# Patient Record
Sex: MALE | Race: BLACK OR AFRICAN AMERICAN | NOT HISPANIC OR LATINO | Employment: OTHER | URBAN - METROPOLITAN AREA
[De-identification: names, ages, dates, MRNs, and addresses within clinical notes are randomized per-mention and may not be internally consistent; named-entity substitution may affect disease eponyms.]

---

## 2017-01-23 RX ORDER — LEVOCETIRIZINE DIHYDROCHLORIDE 5 MG/1
5 TABLET, FILM COATED ORAL NIGHTLY
Qty: 30 TABLET | Refills: 11 | OUTPATIENT
Start: 2017-01-23 | End: 2018-01-23

## 2017-01-23 NOTE — TELEPHONE ENCOUNTER
----- Message from Randa Hermosillo sent at 1/23/2017  1:52 PM CST -----  Contact: [phone]  Pt is requesting a refill on levocetirizine (XYZAL) 5 MG tablet Please call pt at your earliest convenience.  Thanks !

## 2017-02-02 ENCOUNTER — TELEPHONE (OUTPATIENT)
Dept: FAMILY MEDICINE | Facility: CLINIC | Age: 47
End: 2017-02-02

## 2017-02-02 NOTE — TELEPHONE ENCOUNTER
----- Message from Amaya Barroso sent at 2/1/2017  1:30 PM CST -----  Contact: Self/[phone]  Patient states that his prescription:  levocetirizine (XYZAL) 5 MG tablet is not covered through his insurance. He would like to speak to staff regarding this matter. Thank you.

## 2017-02-02 NOTE — TELEPHONE ENCOUNTER
Patient contacted and message left that he could get Zertec or Claritin over the counter. Orders given per Kesha Mercedes

## 2017-03-03 ENCOUNTER — OFFICE VISIT (OUTPATIENT)
Dept: FAMILY MEDICINE | Facility: CLINIC | Age: 47
End: 2017-03-03
Payer: COMMERCIAL

## 2017-03-03 VITALS
TEMPERATURE: 98 F | HEIGHT: 65 IN | BODY MASS INDEX: 31.22 KG/M2 | DIASTOLIC BLOOD PRESSURE: 110 MMHG | OXYGEN SATURATION: 98 % | WEIGHT: 187.38 LBS | HEART RATE: 68 BPM | SYSTOLIC BLOOD PRESSURE: 150 MMHG | RESPIRATION RATE: 17 BRPM

## 2017-03-03 DIAGNOSIS — J06.9 VIRAL URI WITH COUGH: Primary | ICD-10-CM

## 2017-03-03 DIAGNOSIS — J84.9 INTERSTITIAL LUNG DISEASE: ICD-10-CM

## 2017-03-03 DIAGNOSIS — I10 BENIGN HYPERTENSION: ICD-10-CM

## 2017-03-03 DIAGNOSIS — J01.90 ACUTE SINUSITIS, RECURRENCE NOT SPECIFIED, UNSPECIFIED LOCATION: ICD-10-CM

## 2017-03-03 PROCEDURE — 1160F RVW MEDS BY RX/DR IN RCRD: CPT | Mod: S$GLB,,, | Performed by: NURSE PRACTITIONER

## 2017-03-03 PROCEDURE — 3077F SYST BP >= 140 MM HG: CPT | Mod: S$GLB,,, | Performed by: NURSE PRACTITIONER

## 2017-03-03 PROCEDURE — 99999 PR PBB SHADOW E&M-EST. PATIENT-LVL III: CPT | Mod: PBBFAC,,, | Performed by: NURSE PRACTITIONER

## 2017-03-03 PROCEDURE — 96372 THER/PROPH/DIAG INJ SC/IM: CPT | Mod: S$GLB,,, | Performed by: NURSE PRACTITIONER

## 2017-03-03 PROCEDURE — 3080F DIAST BP >= 90 MM HG: CPT | Mod: S$GLB,,, | Performed by: NURSE PRACTITIONER

## 2017-03-03 PROCEDURE — 99214 OFFICE O/P EST MOD 30 MIN: CPT | Mod: 25,S$GLB,, | Performed by: NURSE PRACTITIONER

## 2017-03-03 RX ORDER — PREDNISONE 10 MG/1
10 TABLET ORAL DAILY
Qty: 5 TABLET | Refills: 0 | Status: SHIPPED | OUTPATIENT
Start: 2017-03-03 | End: 2017-03-13

## 2017-03-03 RX ORDER — MOMETASONE FUROATE 50 UG/1
2 SPRAY, METERED NASAL DAILY
Qty: 17 G | Refills: 3 | Status: SHIPPED | OUTPATIENT
Start: 2017-03-03 | End: 2019-04-18 | Stop reason: SDUPTHER

## 2017-03-03 RX ORDER — LEVOCETIRIZINE DIHYDROCHLORIDE 5 MG/1
5 TABLET, FILM COATED ORAL NIGHTLY
Qty: 30 TABLET | Refills: 3 | Status: SHIPPED | OUTPATIENT
Start: 2017-03-03 | End: 2017-07-19

## 2017-03-03 RX ORDER — PROMETHAZINE HYDROCHLORIDE AND CODEINE PHOSPHATE 6.25; 1 MG/5ML; MG/5ML
5 SOLUTION ORAL EVERY 4 HOURS PRN
Qty: 240 ML | Refills: 0 | Status: SHIPPED | OUTPATIENT
Start: 2017-03-03 | End: 2017-03-13

## 2017-03-03 RX ORDER — ALBUTEROL SULFATE 90 UG/1
2 AEROSOL, METERED RESPIRATORY (INHALATION) EVERY 6 HOURS PRN
Qty: 18 G | Refills: 2 | Status: SHIPPED | OUTPATIENT
Start: 2017-03-03 | End: 2017-05-02 | Stop reason: SDUPTHER

## 2017-03-03 RX ORDER — EFINACONAZOLE 100 MG/ML
SOLUTION TOPICAL
Refills: 12 | COMMUNITY
Start: 2017-01-10 | End: 2019-01-23

## 2017-03-03 NOTE — PROGRESS NOTES
Upper Respiratory Infection  Patient complains of a 1week history of some URI complaints. Associated symptoms include NP cough, PND, KNIGHT.  He has attempted mucinex OTC.  Sick contacts include coworkers.  He has had a flu shot this season. He has not taken his amlodipine today, he has not been taking the metoprolol at all.    Subjective:       Patient ID: Ronal Ham is a 46 y.o. male.      Review of Systems   Constitutional: Negative for fever.   HENT: Positive for postnasal drip.    Respiratory: Positive for cough and shortness of breath.        Objective:      Physical Exam   Constitutional: He is oriented to person, place, and time. He appears well-developed and well-nourished. He does not appear ill. No distress.   HENT:   Right Ear: A middle ear effusion is present.   Left Ear: A middle ear effusion is present.   Nose: Mucosal edema and rhinorrhea present. Right sinus exhibits no maxillary sinus tenderness and no frontal sinus tenderness. Left sinus exhibits no maxillary sinus tenderness and no frontal sinus tenderness.   Mouth/Throat: Uvula is midline and mucous membranes are normal. Posterior oropharyngeal erythema present.   Cardiovascular: Normal rate, regular rhythm and normal heart sounds.  Exam reveals no friction rub.    No murmur heard.  Pulmonary/Chest: Effort normal. No respiratory distress. He has no decreased breath sounds. He has no wheezes. He has no rhonchi. He has no rales.   Musculoskeletal: Normal range of motion. He exhibits no edema.   Neurological: He is alert and oriented to person, place, and time.   Skin: Skin is warm and dry. No erythema.   Psychiatric: He has a normal mood and affect. His behavior is normal.   Vitals reviewed.      Assessment:       1. Viral URI with cough    2. Acute sinusitis, recurrence not specified, unspecified location    3. Interstitial lung disease        Plan:       Viral URI with cough  -     methylPREDNISolone sod suc(PF) 125 mg/2 mL injection 125  mg; Inject 125 mg into the muscle once.  -     promethazine-codeine 6.25-10 mg/5 ml (PHENERGAN WITH CODEINE) 6.25-10 mg/5 mL syrup; Take 5 mLs by mouth every 4 (four) hours as needed for Cough.  Dispense: 240 mL; Refill: 0  -     levocetirizine (XYZAL) 5 MG tablet; Take 1 tablet (5 mg total) by mouth every evening.  Dispense: 30 tablet; Refill: 3  -     mometasone (NASONEX) 50 mcg/actuation nasal spray; 2 sprays by Nasal route once daily.  Dispense: 17 g; Refill: 3  -     predniSONE (DELTASONE) 10 MG tablet; Take 1 tablet (10 mg total) by mouth once daily.  Dispense: 5 tablet; Refill: 0    Will treat symptoms, he was instructed to use nasonex and xyzal every day, even once he feels better.    Interstitial lung disease  -     albuterol 90 mcg/actuation inhaler; Inhale 2 puffs into the lungs every 6 (six) hours as needed for Wheezing.  Dispense: 18 g; Refill: 2    Benign hypertension  Instructed him to take BOTH BP meds EVERY DAY  He was also encouraged to monitor his BP    Follow up if not improved  Go to ER for new worse or concerning symptoms  Drink plenty of water  Tylenol as needed

## 2017-03-03 NOTE — MR AVS SNAPSHOT
[facility]  [address] [facility]  [place] [address]  Phone: [phone]                  Ronal Ham   3/3/2017 9:20 AM   Office Visit    Description:  Male : 1970   Provider:  Magalie Ham, NP-C   Department:  [facility]           Reason for Visit     Cough     Sinus Problem           Diagnoses this Visit        Comments    Viral URI with cough    -  Primary     Acute sinusitis, recurrence not specified, unspecified location         Interstitial lung disease                To Do List           Goals (5 Years of Data)     None       These Medications        Disp Refills Start End    promethazine-codeine 6.25-10 mg/5 ml (PHENERGAN WITH CODEINE) 6.25-10 mg/5 mL syrup 240 mL 0 3/3/2017 3/13/2017    Take 5 mLs by mouth every 4 (four) hours as needed for Cough. - Oral    Pharmacy: [facility] [address] [place] [address] AT [facility] #: [phone]       albuterol 90 mcg/actuation inhaler 18 g 2 3/3/2017 3/3/2018    Inhale 2 puffs into the lungs every 6 (six) hours as needed for Wheezing. - Inhalation    Pharmacy: [facility] [address] [place] [address] AT [address] [facility] #: [phone]       levocetirizine (XYZAL) 5 MG tablet 30 tablet 3 3/3/2017 3/3/2018    Take 1 tablet (5 mg total) by mouth every evening. - Oral    Pharmacy: [facility] [address] [place] [address] AT [address] [facility] #: [phone]       mometasone (NASONEX) 50 mcg/actuation nasal spray 17 g 3 3/3/2017     2 sprays by Nasal route once daily. - Nasal    Pharmacy: [facility] [address] [place] [address] AT [address] [facility] #: [phone]       predniSONE (DELTASONE) 10 MG tablet 5 tablet 0 3/3/2017 3/13/2017    Take 1 tablet (10 mg total) by mouth once daily. - Oral    Pharmacy: [facility] - [place] [facility] - [facility] [address] AT [address] #:  [phone]         [facility]     [facility] -  Assistance  Registered nurses in the [facility] provide clinical advisement, health education, appointment booking, and other advisory services.  Call for this free service at [phone].             Medications           Message regarding Medications     Verify the changes and/or additions to your medication regime listed below are the same as discussed with your clinician today.  If any of these changes or additions are incorrect, please notify your healthcare provider.        START taking these NEW medications        Refills    promethazine-codeine 6.25-10 mg/5 ml (PHENERGAN WITH CODEINE) 6.25-10 mg/5 mL syrup 0    Sig: Take 5 mLs by mouth every 4 (four) hours as needed for Cough.    Class: Normal    Route: Oral    mometasone (NASONEX) 50 mcg/actuation nasal spray 3    Si sprays by Nasal route once daily.    Class: Normal    Route: Nasal    predniSONE (DELTASONE) 10 MG tablet 0    Sig: Take 1 tablet (10 mg total) by mouth once daily.    Class: Normal    Route: Oral      These medications were administered today        Dose Freq    methylPREDNISolone sod suc(PF) 125 mg/2 mL injection 125 mg 125 mg Once    Sig: Inject 125 mg into the muscle once.    Class: Normal    Route: Intramuscular      STOP taking these medications     cetirizine (ZYRTEC) 10 MG tablet Take 1 tablet (10 mg total) by mouth once daily.    azelastine-fluticasone (DYMISTA) 137-50 mcg/spray Spry nassal spray 1 spray by Each Nare route 2 (two) times daily.           Verify that the below list of medications is an accurate representation of the medications you are currently taking.  If none reported, the list may be blank. If incorrect, please contact your healthcare provider. Carry this list with you in case of emergency.           Current Medications     amlodipine (NORVASC) 10 MG tablet Take 1 tablet (10 mg total) by mouth once daily.    atenolol  "(TENORMIN) 50 MG tablet TAKE 1 TABLET BY MOUTH DAILY    diclofenac sodium (VOLTAREN) 1 % Gel Lower extremities: Apply the gel (4 g) to the affected area 4 times daily. Do not apply more than 16 g daily to any one affected joint of the lower extremities. Upper extremities: Apply the gel (2 g) to the affected area 4 times daily. Do not apply more than 8 g daily to any one affected joint of the upper extremities. Total dose should not exceed 32 g per day, overall affected joints.    econazole nitrate 1 % cream ANTIONE EXT TO THE AFFECTED AND SURROUNDING AREAS OF SKIN 1 TIME PER DAY    hydroxychloroquine (PLAQUENIL) 200 mg tablet Take 2 tablets (400 mg total) by mouth once daily.    JUBLIA 10 % Talat APPLY TO AFFECTED TOENAIL(S) BY TOPICAL ROUTE ONCE DAILY    levocetirizine (XYZAL) 5 MG tablet Take 1 tablet (5 mg total) by mouth every evening.    PREVIDENT 5000 SENSITIVE 1.1-5 % Pste     albuterol 90 mcg/actuation inhaler Inhale 2 puffs into the lungs every 6 (six) hours as needed for Wheezing.    mometasone (NASONEX) 50 mcg/actuation nasal spray 2 sprays by Nasal route once daily.    predniSONE (DELTASONE) 10 MG tablet Take 1 tablet (10 mg total) by mouth once daily.    promethazine-codeine 6.25-10 mg/5 ml (PHENERGAN WITH CODEINE) 6.25-10 mg/5 mL syrup Take 5 mLs by mouth every 4 (four) hours as needed for Cough.           Clinical Reference Information           Your Vitals Were     BP Pulse Temp Resp Height Weight    150/110 (BP Location: Left arm, Patient Position: Sitting, BP Method: Manual) 68 97.9 °F (36.6 °C) (Oral) 17 5' 5" (1.651 m) 85 kg (187 lb 6.3 oz)    SpO2 BMI             98% 31.18 kg/m2         Blood Pressure          Most Recent Value    BP  (!)  150/110      Allergies as of 3/3/2017     Carafate  [Sucralfate]      Immunizations Administered on Date of Encounter - 3/3/2017     None      Instructions    Follow up if not improved  Go to ER for new worse or concerning symptoms  Drink plenty of water  Tylenol " as needed    Viral Upper Respiratory Illness (Adult)  You have a viral upper respiratory illness (URI), which is another term for the common cold. This illness is contagious during the first few days. It is spread through the air by coughing and sneezing. It may also be spread by direct contact (touching the sick person and then touching your own eyes, nose, or mouth). Frequent handwashing will decrease risk of spread. Most viral illnesses go away within 7 to 10 days with rest and simple home remedies. Sometimes the illness may last for several weeks. Antibiotics will not kill a virus, and they are generally not prescribed for this condition.    Home care  · If symptoms are severe, rest at home for the first 2 to 3 days. When you resume activity, don't let yourself get too tired.  · Avoid being exposed to cigarette smoke (yours or others).  · You may use acetaminophen or ibuprofen to control pain and fever, unless another medicine was prescribed. (Note: If you have chronic liver or kidney disease, have ever had a stomach ulcer or gastrointestinal bleeding, or are taking blood-thinning medicines, talk with your healthcare provider before using these medicines.) Aspirin should never be given to anyone under 18 years of age who is ill with a viral infection or fever. It may cause severe liver or brain damage.  · Your appetite may be poor, so a light diet is fine. Avoid dehydration by drinking 6 to 8 glasses of fluids per day (water, soft drinks, juices, tea, or soup). Extra fluids will help loosen secretions in the nose and lungs.  · Over-the-counter cold medicines will not shorten the length of time youre sick, but they may be helpful for the following symptoms: cough, sore throat, and nasal and sinus congestion. (Note: Do not use decongestants if you have high blood pressure.)  Follow-up care  Follow up with your healthcare provider, or as advised.  When to seek medical advice  Call your healthcare provider right  away if any of these occur:  · Cough with lots of colored sputum (mucus)  · Severe headache; face, neck, or ear pain  · Difficulty swallowing due to throat pain  · Fever of 100.4°F (38°C)  Call 911, or get immediate medical care  Call emergency services right away if any of these occur:  · Chest pain, shortness of breath, wheezing, or difficulty breathing  · Coughing up blood  · Inability to swallow due to throat pain  Date Last Reviewed: 9/13/2015 © 2000-2016 [employer]. [address] [place] [address]. All rights reserved. This information is not intended as a substitute for professional medical care. Always follow your healthcare professional's instructions.             Language Assistance Services     ATTENTION: Language assistance services are available, free of charge. Please call [phone].      ATENCIÓN: Si habla bekahañol, tiene a cast disposición servicios gratuitos de asistencia lingüística. Llame al [phone].     CHÚ Ý: N?u b?n nói Ti?ng Vi?t, có các d?ch v? h? tr? ngôn ng? mi?n phí dành cho b?n. G?i s? [phone].         [facility] complies with applicable Federal civil rights laws and does not discriminate on the basis of race, color, national origin, age, disability, or sex.

## 2017-03-03 NOTE — PATIENT INSTRUCTIONS
Follow up if not improved  Go to ER for new worse or concerning symptoms  Drink plenty of water  Tylenol as needed    Viral Upper Respiratory Illness (Adult)  You have a viral upper respiratory illness (URI), which is another term for the common cold. This illness is contagious during the first few days. It is spread through the air by coughing and sneezing. It may also be spread by direct contact (touching the sick person and then touching your own eyes, nose, or mouth). Frequent handwashing will decrease risk of spread. Most viral illnesses go away within 7 to 10 days with rest and simple home remedies. Sometimes the illness may last for several weeks. Antibiotics will not kill a virus, and they are generally not prescribed for this condition.    Home care  · If symptoms are severe, rest at home for the first 2 to 3 days. When you resume activity, don't let yourself get too tired.  · Avoid being exposed to cigarette smoke (yours or others).  · You may use acetaminophen or ibuprofen to control pain and fever, unless another medicine was prescribed. (Note: If you have chronic liver or kidney disease, have ever had a stomach ulcer or gastrointestinal bleeding, or are taking blood-thinning medicines, talk with your healthcare provider before using these medicines.) Aspirin should never be given to anyone under 18 years of age who is ill with a viral infection or fever. It may cause severe liver or brain damage.  · Your appetite may be poor, so a light diet is fine. Avoid dehydration by drinking 6 to 8 glasses of fluids per day (water, soft drinks, juices, tea, or soup). Extra fluids will help loosen secretions in the nose and lungs.  · Over-the-counter cold medicines will not shorten the length of time youre sick, but they may be helpful for the following symptoms: cough, sore throat, and nasal and sinus congestion. (Note: Do not use decongestants if you have high blood pressure.)  Follow-up care  Follow up with  your healthcare provider, or as advised.  When to seek medical advice  Call your healthcare provider right away if any of these occur:  · Cough with lots of colored sputum (mucus)  · Severe headache; face, neck, or ear pain  · Difficulty swallowing due to throat pain  · Fever of 100.4°F (38°C)  Call 911, or get immediate medical care  Call emergency services right away if any of these occur:  · Chest pain, shortness of breath, wheezing, or difficulty breathing  · Coughing up blood  · Inability to swallow due to throat pain  Date Last Reviewed: 9/13/2015  © 7446-3532 [employer]. [address] [place] [address]. All rights reserved. This information is not intended as a substitute for professional medical care. Always follow your healthcare professional's instructions.

## 2017-03-15 ENCOUNTER — OFFICE VISIT (OUTPATIENT)
Dept: FAMILY MEDICINE | Facility: CLINIC | Age: 47
End: 2017-03-15
Payer: COMMERCIAL

## 2017-03-15 VITALS
TEMPERATURE: 97 F | BODY MASS INDEX: 30.93 KG/M2 | HEIGHT: 65 IN | WEIGHT: 185.63 LBS | HEART RATE: 64 BPM | SYSTOLIC BLOOD PRESSURE: 138 MMHG | OXYGEN SATURATION: 99 % | DIASTOLIC BLOOD PRESSURE: 82 MMHG

## 2017-03-15 DIAGNOSIS — J32.0 CHRONIC MAXILLARY SINUSITIS: ICD-10-CM

## 2017-03-15 DIAGNOSIS — B96.89 ACUTE BACTERIAL SINUSITIS: Primary | ICD-10-CM

## 2017-03-15 DIAGNOSIS — J01.90 ACUTE BACTERIAL SINUSITIS: Primary | ICD-10-CM

## 2017-03-15 PROCEDURE — 99214 OFFICE O/P EST MOD 30 MIN: CPT | Mod: S$GLB,,, | Performed by: FAMILY MEDICINE

## 2017-03-15 PROCEDURE — 3079F DIAST BP 80-89 MM HG: CPT | Mod: S$GLB,,, | Performed by: FAMILY MEDICINE

## 2017-03-15 PROCEDURE — 3075F SYST BP GE 130 - 139MM HG: CPT | Mod: S$GLB,,, | Performed by: FAMILY MEDICINE

## 2017-03-15 PROCEDURE — 99999 PR PBB SHADOW E&M-EST. PATIENT-LVL III: CPT | Mod: PBBFAC,,, | Performed by: FAMILY MEDICINE

## 2017-03-15 PROCEDURE — 1160F RVW MEDS BY RX/DR IN RCRD: CPT | Mod: S$GLB,,, | Performed by: FAMILY MEDICINE

## 2017-03-15 RX ORDER — DOXYCYCLINE 100 MG/1
100 CAPSULE ORAL 2 TIMES DAILY
Qty: 60 CAPSULE | Refills: 0 | Status: SHIPPED | OUTPATIENT
Start: 2017-03-15 | End: 2017-04-14

## 2017-03-15 RX ORDER — AZITHROMYCIN 500 MG/1
500 TABLET, FILM COATED ORAL DAILY
Qty: 3 TABLET | Refills: 0 | Status: SHIPPED | OUTPATIENT
Start: 2017-03-15 | End: 2017-03-18

## 2017-03-15 NOTE — PROGRESS NOTES
Chief Complaint   Patient presents with    Sinus Problem       SUBJECTIVE:  Ronal Ham is a 46 y.o. male here for new problem of acute recurrent sinusitis without sneezing and has watery eyes and no real cough this time.  Currently has co-morbidities including per problem list.      Past Medical History:   Diagnosis Date    Arthritis     Eye injury     od hit above od    GERD (gastroesophageal reflux disease)     Hypertension     Lupus (systemic lupus erythematosus)     Pulmonary fibrosis     Raynaud phenomenon      Past Surgical History:   Procedure Laterality Date    HERNIA REPAIR       Social History     Social History    Marital status:      Spouse name: N/A    Number of children: 5    Years of education: some colle     Occupational History     [place]  [employer]     Social History Main Topics    Smoking status: Never Smoker    Smokeless tobacco: Never Used    Alcohol use No    Drug use: No    Sexual activity: Yes     Partners: Female     Other Topics Concern    Not on file     Social History Narrative    Adult Screenings updated and reviewed    Mammogram( for females)    Pap ( for females)    PSA( for males )    Colonoscopy age  48    Flu shot yearly    Td     Pneumovax recommended one time  at age  65    Zostavax recommended one time at  age  58    Eye exam recommended yearly    Bone density      Family History   Problem Relation Age of Onset    Heart disease Mother     Heart disease Father     Glaucoma Father     Glaucoma Brother     No Known Problems Sister     No Known Problems Daughter     No Known Problems Son     No Known Problems Maternal Aunt     No Known Problems Maternal Uncle     No Known Problems Paternal Aunt     No Known Problems Paternal Uncle     No Known Problems Maternal Grandmother     No Known Problems Maternal Grandfather     No Known Problems Paternal Grandmother     No Known Problems Paternal Grandfather     Asthma Neg  Hx     Emphysema Neg Hx     Amblyopia Neg Hx     Blindness Neg Hx     Cancer Neg Hx     Cataracts Neg Hx     Diabetes Neg Hx     Hypertension Neg Hx     Macular degeneration Neg Hx     Retinal detachment Neg Hx     Strabismus Neg Hx     Stroke Neg Hx     Thyroid disease Neg Hx      Current Outpatient Prescriptions on File Prior to Visit   Medication Sig Dispense Refill    albuterol 90 mcg/actuation inhaler Inhale 2 puffs into the lungs every 6 (six) hours as needed for Wheezing. 18 g 2    amlodipine (NORVASC) 10 MG tablet Take 1 tablet (10 mg total) by mouth once daily. 90 tablet 1    atenolol (TENORMIN) 50 MG tablet TAKE 1 TABLET BY MOUTH DAILY 90 tablet 11    diclofenac sodium (VOLTAREN) 1 % Gel Lower extremities: Apply the gel (4 g) to the affected area 4 times daily. Do not apply more than 16 g daily to any one affected joint of the lower extremities. Upper extremities: Apply the gel (2 g) to the affected area 4 times daily. Do not apply more than 8 g daily to any one affected joint of the upper extremities. Total dose should not exceed 32 g per day, overall affected joints. 100 g 1    econazole nitrate 1 % cream ANTIONE EXT TO THE AFFECTED AND SURROUNDING AREAS OF SKIN 1 TIME PER DAY  12    hydroxychloroquine (PLAQUENIL) 200 mg tablet Take 2 tablets (400 mg total) by mouth once daily. 180 tablet 3    JUBLIA 10 % Talat APPLY TO AFFECTED TOENAIL(S) BY TOPICAL ROUTE ONCE DAILY  12    levocetirizine (XYZAL) 5 MG tablet Take 1 tablet (5 mg total) by mouth every evening. 30 tablet 3    mometasone (NASONEX) 50 mcg/actuation nasal spray 2 sprays by Nasal route once daily. 17 g 3    PREVIDENT 5000 SENSITIVE 1.1-5 % Pste   1     No current facility-administered medications on file prior to visit.      Review of patient's allergies indicates:   Allergen Reactions    Carafate  [sucralfate]      Other reaction(s): Hives         ROS  Per HPI  No fever or chills  OBJECTIVE:  /82  Pulse 64  Temp 96.9  "°F (36.1 °C) (Oral)   Ht 5' 5" (1.651 m)  Wt 84.2 kg (185 lb 10 oz)  SpO2 99%  BMI 30.89 kg/m2    Wt Readings from Last 3 Encounters:   03/15/17 84.2 kg (185 lb 10 oz)   03/03/17 85 kg (187 lb 6.3 oz)   12/06/16 84.8 kg (186 lb 15.2 oz)     BP Readings from Last 3 Encounters:   03/15/17 138/82   03/03/17 (!) 150/110   12/06/16 (!) 130/90       He appears ill, in some apparent distress.  Alert and oriented times three, pleasant and cooperative. Vital signs are as documented in vital signs section.  Nose     Review of old Records:  Reviewed per [facility]    Review of old labs:  Lab Results   Component Value Date    TSH 1.695 08/24/2015     Lab Results   Component Value Date    WBC 4.61 05/10/2016    HGB 12.8 (L) 05/10/2016    HCT 38.1 (L) 05/10/2016    MCV 88 05/10/2016     05/10/2016       Chemistry        Component Value Date/Time     05/10/2016 1105    K 4.0 05/10/2016 1105     05/10/2016 1105    CO2 29 05/10/2016 1105    BUN 13 05/10/2016 1105    CREATININE 0.8 05/10/2016 1105    GLU 69 (L) 05/10/2016 1105        Component Value Date/Time    CALCIUM 9.1 05/10/2016 1105    ALKPHOS 63 05/10/2016 1105    AST 46 (H) 05/10/2016 1105    ALT 39 05/10/2016 1105    BILITOT 0.3 05/10/2016 1105        Lab Results   Component Value Date    CHOL 149 08/24/2015    CHOL 140 04/01/2014    CHOL 157 06/14/2013     Lab Results   Component Value Date    HDL 43 08/24/2015    HDL 42 04/01/2014    HDL 35 (L) 06/14/2013     Lab Results   Component Value Date    LDLCALC 90.0 08/24/2015    LDLCALC 90.0 04/01/2014    LDLCALC 112.0 06/14/2013     Lab Results   Component Value Date    TRIG 80 08/24/2015    TRIG 40 04/01/2014    TRIG 50 06/14/2013     Lab Results   Component Value Date    CHOLHDL 28.9 08/24/2015    CHOLHDL 30.0 04/01/2014    CHOLHDL 22.3 06/14/2013         Review of old imaging:  Reviewed prior imaging    ASSESSMENT:  Problem List Items Addressed This Visit     None      Visit Diagnoses     Acute bacterial " sinusitis    -  Primary    Relevant Medications    azithromycin (ZITHROMAX) 500 MG tablet    Chronic maxillary sinusitis        Relevant Medications    doxycycline (MONODOX) 100 MG capsule          ICD-10-CM ICD-9-CM   1. Acute bacterial sinusitis J01.90 461.9    B96.89    2. Chronic maxillary sinusitis J32.0 473.0         PLAN:  1. Acute bacterial sinusitis  Potential medication side effects were discussed with the patient; let me know if any occur.    - azithromycin (ZITHROMAX) 500 MG tablet; Take 1 tablet (500 mg total) by mouth once daily.  Dispense: 3 tablet; Refill: 0    2. Chronic maxillary sinusitis  Potential medication side effects were discussed with the patient; let me know if any occur.    - doxycycline (MONODOX) 100 MG capsule; Take 1 capsule (100 mg total) by mouth 2 (two) times daily.  Dispense: 60 capsule; Refill: 0      Start with azithromycin and continue doxy for prolonged course.    Medication List with Changes/Refills   New Medications    AZITHROMYCIN (ZITHROMAX) 500 MG TABLET    Take 1 tablet (500 mg total) by mouth once daily.    DOXYCYCLINE (MONODOX) 100 MG CAPSULE    Take 1 capsule (100 mg total) by mouth 2 (two) times daily.   Current Medications    ALBUTEROL 90 MCG/ACTUATION INHALER    Inhale 2 puffs into the lungs every 6 (six) hours as needed for Wheezing.    AMLODIPINE (NORVASC) 10 MG TABLET    Take 1 tablet (10 mg total) by mouth once daily.    ATENOLOL (TENORMIN) 50 MG TABLET    TAKE 1 TABLET BY MOUTH DAILY    DICLOFENAC SODIUM (VOLTAREN) 1 % GEL    Lower extremities: Apply the gel (4 g) to the affected area 4 times daily. Do not apply more than 16 g daily to any one affected joint of the lower extremities. Upper extremities: Apply the gel (2 g) to the affected area 4 times daily. Do not apply more than 8 g daily to any one affected joint of the upper extremities. Total dose should not exceed 32 g per day, overall affected joints.    ECONAZOLE NITRATE 1 % CREAM    ANTIONE EXT TO THE  AFFECTED AND SURROUNDING AREAS OF SKIN 1 TIME PER DAY    HYDROXYCHLOROQUINE (PLAQUENIL) 200 MG TABLET    Take 2 tablets (400 mg total) by mouth once daily.    JUBLIA 10 % FITO    APPLY TO AFFECTED TOENAIL(S) BY TOPICAL ROUTE ONCE DAILY    LEVOCETIRIZINE (XYZAL) 5 MG TABLET    Take 1 tablet (5 mg total) by mouth every evening.    MOMETASONE (NASONEX) 50 MCG/ACTUATION NASAL SPRAY    2 sprays by Nasal route once daily.    PREVIDENT 5000 SENSITIVE 1.1-5 % PSTE           No Follow-up on file.

## 2017-03-22 ENCOUNTER — OFFICE VISIT (OUTPATIENT)
Dept: FAMILY MEDICINE | Facility: CLINIC | Age: 47
End: 2017-03-22
Payer: COMMERCIAL

## 2017-03-22 VITALS
SYSTOLIC BLOOD PRESSURE: 120 MMHG | DIASTOLIC BLOOD PRESSURE: 80 MMHG | TEMPERATURE: 98 F | RESPIRATION RATE: 16 BRPM | BODY MASS INDEX: 30.52 KG/M2 | WEIGHT: 183.19 LBS | HEART RATE: 69 BPM | OXYGEN SATURATION: 98 % | HEIGHT: 65 IN

## 2017-03-22 DIAGNOSIS — M32.9 SYSTEMIC LUPUS ERYTHEMATOSUS, UNSPECIFIED SLE TYPE, UNSPECIFIED ORGAN INVOLVEMENT STATUS: ICD-10-CM

## 2017-03-22 DIAGNOSIS — R21 RASH: Primary | ICD-10-CM

## 2017-03-22 DIAGNOSIS — L29.9 ITCHING: ICD-10-CM

## 2017-03-22 PROCEDURE — 99999 PR PBB SHADOW E&M-EST. PATIENT-LVL IV: CPT | Mod: PBBFAC,,, | Performed by: PHYSICIAN ASSISTANT

## 2017-03-22 PROCEDURE — 3074F SYST BP LT 130 MM HG: CPT | Mod: S$GLB,,, | Performed by: PHYSICIAN ASSISTANT

## 2017-03-22 PROCEDURE — 3079F DIAST BP 80-89 MM HG: CPT | Mod: S$GLB,,, | Performed by: PHYSICIAN ASSISTANT

## 2017-03-22 PROCEDURE — 1160F RVW MEDS BY RX/DR IN RCRD: CPT | Mod: S$GLB,,, | Performed by: PHYSICIAN ASSISTANT

## 2017-03-22 PROCEDURE — 99214 OFFICE O/P EST MOD 30 MIN: CPT | Mod: S$GLB,,, | Performed by: PHYSICIAN ASSISTANT

## 2017-03-22 RX ORDER — TRIAMCINOLONE ACETONIDE 1 MG/G
CREAM TOPICAL 2 TIMES DAILY
Qty: 80 G | Refills: 0 | Status: SHIPPED | OUTPATIENT
Start: 2017-03-22 | End: 2018-02-02

## 2017-03-22 NOTE — MR AVS SNAPSHOT
[facility]  [address]  [address]  [place] [address]  Phone: [phone]  Fax: [phone]                  Ronal Ham   3/22/2017 10:20 AM   Office Visit    Description:  Male : 1970   Provider:  MELISSA Muñoz   Department:  [facility]           Reason for Visit     Insect Bite     itching all over           Diagnoses this Visit        Comments    Rash    -  Primary            To Do List           Goals (5 Years of Data)     None       These Medications        Disp Refills Start End    triamcinolone acetonide 0.1% (KENALOG) 0.1 % cream 80 g 0 3/22/2017 2017    Apply topically 2 (two) times daily. - Topical (Top)    Pharmacy: [id number] - [place], [place] [address] AT [facility]: [phone]         [facility]     [facility] On Call Nurse Care Line -  Assistance  Registered nurses in the [facility] provide clinical advisement, health education, appointment booking, and other advisory services.  Call for this free service at [phone].             Medications           Message regarding Medications     Verify the changes and/or additions to your medication regime listed below are the same as discussed with your clinician today.  If any of these changes or additions are incorrect, please notify your healthcare provider.        START taking these NEW medications        Refills    triamcinolone acetonide 0.1% (KENALOG) 0.1 % cream 0    Sig: Apply topically 2 (two) times daily.    Class: Normal    Route: Topical (Top)      STOP taking these medications     PREVIDENT 5000 SENSITIVE 1.1-5 % Pste            Verify that the below list of medications is an accurate representation of the medications you are currently taking.  If none reported, the list may be blank. If incorrect, please contact your healthcare provider. Carry this list with you in case of emergency.           Current  "Medications     albuterol 90 mcg/actuation inhaler Inhale 2 puffs into the lungs every 6 (six) hours as needed for Wheezing.    amlodipine (NORVASC) 10 MG tablet Take 1 tablet (10 mg total) by mouth once daily.    atenolol (TENORMIN) 50 MG tablet TAKE 1 TABLET BY MOUTH DAILY    diclofenac sodium (VOLTAREN) 1 % Gel Lower extremities: Apply the gel (4 g) to the affected area 4 times daily. Do not apply more than 16 g daily to any one affected joint of the lower extremities. Upper extremities: Apply the gel (2 g) to the affected area 4 times daily. Do not apply more than 8 g daily to any one affected joint of the upper extremities. Total dose should not exceed 32 g per day, overall affected joints.    doxycycline (MONODOX) 100 MG capsule Take 1 capsule (100 mg total) by mouth 2 (two) times daily.    econazole nitrate 1 % cream ANTIONE EXT TO THE AFFECTED AND SURROUNDING AREAS OF SKIN 1 TIME PER DAY    hydroxychloroquine (PLAQUENIL) 200 mg tablet Take 2 tablets (400 mg total) by mouth once daily.    JUBLIA 10 % Talat APPLY TO AFFECTED TOENAIL(S) BY TOPICAL ROUTE ONCE DAILY    levocetirizine (XYZAL) 5 MG tablet Take 1 tablet (5 mg total) by mouth every evening.    mometasone (NASONEX) 50 mcg/actuation nasal spray 2 sprays by Nasal route once daily.    triamcinolone acetonide 0.1% (KENALOG) 0.1 % cream Apply topically 2 (two) times daily.           Clinical Reference Information           Your Vitals Were     BP Pulse Temp Resp Height Weight    120/80 69 98.3 °F (36.8 °C) (Oral) 16 5' 5" (1.651 m) 83.1 kg (183 lb 3.2 oz)    SpO2 BMI             98% 30.49 kg/m2         Blood Pressure          Most Recent Value    BP  120/80      Allergies as of 3/22/2017     Carafate  [Sucralfate]      Immunizations Administered on Date of Encounter - 3/22/2017     None      Instructions    cetrizine or loratadine once a day  Benadryl at night    Give it 1-2 weeks. If not better see rheumatology        Language Assistance Services     " ATTENTION: Language assistance services are available, free of charge. Please call [phone].      ATENCIÓN: Si habla bekahañol, tiene a cast disposición servicios gratuitos de asistencia lingüística. Llame al [phone].     CHÚ Ý: N?u b?n nói Ti?ng Vi?t, có các d?ch v? h? tr? ngôn ng? mi?n phí dành cho b?n. G?i s? [phone].         [facility] complies with applicable Federal civil rights laws and does not discriminate on the basis of race, color, national origin, age, disability, or sex.

## 2017-03-22 NOTE — PATIENT INSTRUCTIONS
cetrizine or loratadine once a day  Benadryl at night    Give it 1-2 weeks. If not better see rheumatology

## 2017-03-22 NOTE — PROGRESS NOTES
Subjective:       Patient ID: Ronal Ham is a 46 y.o. male with multiple medical diagnoses as listed in the medical history and problem list that presents for Insect Bite (left leg) and itching all over  .    Chief Complaint: Insect Bite (left leg) and itching all over      Insect Bite   This is a new (unsure ) problem. The current episode started in the past 7 days (4 days ). Associated symptoms include a rash (about 4 days as well-improving neosporin). Pertinent negatives include no abdominal pain, anorexia, arthralgias, chills, fever, myalgias, nausea or vomiting. Associated symptoms comments: Itching all over but worse on buttock and genital region as well as leg . Treatments tried: benadryl 2 yesterday and 1 today. The treatment provided moderate relief.     No change to detergent, soaps, or lotions but did start two new natural liquid vitamins and one tablet.  Stop the last two days and possible improvement of symptoms.     History of lupus on plaquenil.      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   Finishing up doxycyline for sinusitis.     Review of Systems   Constitutional: Negative for chills and fever.   Eyes: Positive for pain and redness. Negative for discharge and itching.   Respiratory: Negative for chest tightness, shortness of breath and wheezing.    Gastrointestinal: Negative for abdominal pain, anorexia, nausea and vomiting.   Musculoskeletal: Negative for arthralgias and myalgias.   Skin: Positive for rash (about 4 days as well-improving neosporin). Negative for color change, pallor and wound.        Itching all over for about 4 days.   Buttock and jock area irritated   NO HIVES.          PAST MEDICAL HISTORY:  Past Medical History:   Diagnosis Date    Arthritis     Eye injury     od hit above od    GERD (gastroesophageal reflux disease)     Hypertension     Lupus (systemic lupus erythematosus)     Pulmonary fibrosis     Raynaud phenomenon        SOCIAL HISTORY:  Social History      Social History    Marital status:      Spouse name: N/A    Number of children: 5    Years of education: some colle     Occupational History     [place]  [employer]     Social History Main Topics    Smoking status: Never Smoker    Smokeless tobacco: Never Used    Alcohol use No    Drug use: No    Sexual activity: Yes     Partners: Female     Other Topics Concern    Not on file     Social History Narrative    Adult Screenings updated and reviewed    Mammogram( for females)    Pap ( for females)    PSA( for males )    Colonoscopy age  48    Flu shot yearly    Td     Pneumovax recommended one time  at age  63    Zostavax recommended one time at  age  58    Eye exam recommended yearly    Bone density        ALLERGIES AND MEDICATIONS: updated and reviewed.  Review of patient's allergies indicates:   Allergen Reactions    Carafate  [sucralfate]      Other reaction(s): Hives     Current Outpatient Prescriptions   Medication Sig Dispense Refill    albuterol 90 mcg/actuation inhaler Inhale 2 puffs into the lungs every 6 (six) hours as needed for Wheezing. 18 g 2    amlodipine (NORVASC) 10 MG tablet Take 1 tablet (10 mg total) by mouth once daily. 90 tablet 1    atenolol (TENORMIN) 50 MG tablet TAKE 1 TABLET BY MOUTH DAILY 90 tablet 11    diclofenac sodium (VOLTAREN) 1 % Gel Lower extremities: Apply the gel (4 g) to the affected area 4 times daily. Do not apply more than 16 g daily to any one affected joint of the lower extremities. Upper extremities: Apply the gel (2 g) to the affected area 4 times daily. Do not apply more than 8 g daily to any one affected joint of the upper extremities. Total dose should not exceed 32 g per day, overall affected joints. 100 g 1    doxycycline (MONODOX) 100 MG capsule Take 1 capsule (100 mg total) by mouth 2 (two) times daily. 60 capsule 0    econazole nitrate 1 % cream ANTIONE EXT TO THE AFFECTED AND SURROUNDING AREAS OF SKIN 1 TIME PER DAY  12     "hydroxychloroquine (PLAQUENIL) 200 mg tablet Take 2 tablets (400 mg total) by mouth once daily. 180 tablet 3    JUBLIA 10 % Talat APPLY TO AFFECTED TOENAIL(S) BY TOPICAL ROUTE ONCE DAILY  12    levocetirizine (XYZAL) 5 MG tablet Take 1 tablet (5 mg total) by mouth every evening. 30 tablet 3    mometasone (NASONEX) 50 mcg/actuation nasal spray 2 sprays by Nasal route once daily. 17 g 3    triamcinolone acetonide 0.1% (KENALOG) 0.1 % cream Apply topically 2 (two) times daily. 80 g 0     No current facility-administered medications for this visit.          Objective:   /80  Pulse 69  Temp 98.3 °F (36.8 °C) (Oral)   Resp 16  Ht 5' 5" (1.651 m)  Wt 83.1 kg (183 lb 3.2 oz)  SpO2 98%  BMI 30.49 kg/m2     Physical Exam   Constitutional: He is oriented to person, place, and time.   HENT:   Head: Normocephalic.   Right Ear: Tympanic membrane, external ear and ear canal normal.   Left Ear: Tympanic membrane, external ear and ear canal normal.   Nose: Nose normal. Right sinus exhibits no maxillary sinus tenderness and no frontal sinus tenderness. Left sinus exhibits no maxillary sinus tenderness and no frontal sinus tenderness.   Rash with erythema and some swelling in the malar regional area with improvement with the doxycyline for his sinus he states    Eyes: Conjunctivae and EOM are normal.   Cardiovascular: Normal rate and regular rhythm.    Pulmonary/Chest: Effort normal and breath sounds normal.   Genitourinary: Penis normal. Circumcised. No phimosis, paraphimosis, penile erythema or penile tenderness. No discharge found.   Lymphadenopathy:     He has no cervical adenopathy.   Neurological: He is alert and oriented to person, place, and time.   Skin: Skin is warm and dry. Rash noted.                Assessment:       1. Rash    2. Systemic lupus erythematosus, unspecified SLE type, unspecified organ involvement status    3. Itching        Plan:       Rash  -     triamcinolone acetonide 0.1% (KENALOG) 0.1 % " cream; Apply topically 2 (two) times daily.  Dispense: 80 g; Refill: 0  Discussed with Dr. Terrell who agrees with plan.   If worsens will have him follow up with rheumatology.    Systemic lupus erythematosus, unspecified SLE type, unspecified organ involvement status  Continue plaquenil.     Itching  cetrizine or loratadine once a day  Benadryl at night    Give it 1-2 weeks. If not better see rheumatology           No Follow-up on file.

## 2017-04-19 ENCOUNTER — OFFICE VISIT (OUTPATIENT)
Dept: FAMILY MEDICINE | Facility: CLINIC | Age: 47
End: 2017-04-19
Payer: COMMERCIAL

## 2017-04-19 ENCOUNTER — TELEPHONE (OUTPATIENT)
Dept: FAMILY MEDICINE | Facility: CLINIC | Age: 47
End: 2017-04-19

## 2017-04-19 VITALS
RESPIRATION RATE: 16 BRPM | HEIGHT: 65 IN | DIASTOLIC BLOOD PRESSURE: 82 MMHG | HEART RATE: 63 BPM | WEIGHT: 184.31 LBS | TEMPERATURE: 98 F | SYSTOLIC BLOOD PRESSURE: 128 MMHG | OXYGEN SATURATION: 98 % | BODY MASS INDEX: 30.71 KG/M2

## 2017-04-19 DIAGNOSIS — Z79.1 ENCOUNTER FOR MONITORING CHRONIC NSAID THERAPY: ICD-10-CM

## 2017-04-19 DIAGNOSIS — R06.09 DYSPNEA ON EXERTION: Primary | ICD-10-CM

## 2017-04-19 DIAGNOSIS — R00.2 PALPITATION: ICD-10-CM

## 2017-04-19 DIAGNOSIS — Z51.81 ENCOUNTER FOR MONITORING CHRONIC NSAID THERAPY: ICD-10-CM

## 2017-04-19 DIAGNOSIS — I10 ESSENTIAL HYPERTENSION: ICD-10-CM

## 2017-04-19 DIAGNOSIS — Z00.00 ANNUAL PHYSICAL EXAM: Primary | ICD-10-CM

## 2017-04-19 PROCEDURE — 3079F DIAST BP 80-89 MM HG: CPT | Mod: S$GLB,,, | Performed by: PHYSICIAN ASSISTANT

## 2017-04-19 PROCEDURE — 3074F SYST BP LT 130 MM HG: CPT | Mod: S$GLB,,, | Performed by: PHYSICIAN ASSISTANT

## 2017-04-19 PROCEDURE — 99213 OFFICE O/P EST LOW 20 MIN: CPT | Mod: S$GLB,,, | Performed by: PHYSICIAN ASSISTANT

## 2017-04-19 PROCEDURE — 1160F RVW MEDS BY RX/DR IN RCRD: CPT | Mod: S$GLB,,, | Performed by: PHYSICIAN ASSISTANT

## 2017-04-19 PROCEDURE — 99999 PR PBB SHADOW E&M-EST. PATIENT-LVL IV: CPT | Mod: PBBFAC,,, | Performed by: PHYSICIAN ASSISTANT

## 2017-04-19 RX ORDER — AMLODIPINE BESYLATE 10 MG/1
10 TABLET ORAL DAILY
Qty: 90 TABLET | Refills: 1 | Status: SHIPPED | OUTPATIENT
Start: 2017-04-19 | End: 2017-08-31 | Stop reason: SDUPTHER

## 2017-04-19 NOTE — PROGRESS NOTES
Subjective:       Patient ID: Ronal Ham is a 46 y.o. male with multiple medical diagnoses as listed in the medical history and problem list that presents for Hypertension (pt b/p at home 140/79)  .    Chief Complaint: Hypertension (pt b/p at home 140/79)      Hypertension   This is a chronic problem. The current episode started more than 1 year ago. The problem is uncontrolled (he stopped taking his medicine and chose to stop the Norvasc because it was a lower dose.  He didnt think he needed it anymore ). Associated symptoms include blurred vision (improved ), headaches (resolved ), malaise/fatigue, palpitations (comes and goes for awhile but was bad on sunday but better now ) and shortness of breath (with exercise ). Pertinent negatives include no anxiety, chest pain, orthopnea, peripheral edema or sweats. There are no associated agents to hypertension. Risk factors for coronary artery disease include family history, obesity and male gender. Past treatments include calcium channel blockers and beta blockers (back on both since Monday ).   stopped medication about a week and then Sunday he felt dizzy, had a headache, and bleed when he blew his nose. Pressure was 200/140s. Took an extra atenolol. He took one that morning too but no Norvasc.   Back on it since Monday now.   Pressure is doing better, still a little high.     Review of Systems   Constitutional: Positive for malaise/fatigue.   Eyes: Positive for blurred vision (improved ).   Respiratory: Positive for shortness of breath (with exercise ).    Cardiovascular: Positive for palpitations (comes and goes for awhile but was bad on sunday but better now ). Negative for chest pain and orthopnea.   Neurological: Positive for headaches (resolved ).         PAST MEDICAL HISTORY:  Past Medical History:   Diagnosis Date    Arthritis     Eye injury     od hit above od    GERD (gastroesophageal reflux disease)     Hypertension     Lupus (systemic lupus  erythematosus)     Pulmonary fibrosis     Raynaud phenomenon        SOCIAL HISTORY:  Social History     Social History    Marital status:      Spouse name: N/A    Number of children: 5    Years of education: some colle     Occupational History     [place]  [employer]     Social History Main Topics    Smoking status: Never Smoker    Smokeless tobacco: Never Used    Alcohol use No    Drug use: No    Sexual activity: Yes     Partners: Female     Other Topics Concern    Not on file     Social History Narrative    Adult Screenings updated and reviewed    Mammogram( for females)    Pap ( for females)    PSA( for males )    Colonoscopy age  48    Flu shot yearly    Td     Pneumovax recommended one time  at age  65    Zostavax recommended one time at  age  60    Eye exam recommended yearly    Bone density        ALLERGIES AND MEDICATIONS: updated and reviewed.  Review of patient's allergies indicates:   Allergen Reactions    Carafate  [sucralfate]      Other reaction(s): Hives     Current Outpatient Prescriptions   Medication Sig Dispense Refill    albuterol 90 mcg/actuation inhaler Inhale 2 puffs into the lungs every 6 (six) hours as needed for Wheezing. 18 g 2    amlodipine (NORVASC) 10 MG tablet Take 1 tablet (10 mg total) by mouth once daily. 90 tablet 1    atenolol (TENORMIN) 50 MG tablet TAKE 1 TABLET BY MOUTH DAILY 90 tablet 11    diclofenac sodium (VOLTAREN) 1 % Gel Lower extremities: Apply the gel (4 g) to the affected area 4 times daily. Do not apply more than 16 g daily to any one affected joint of the lower extremities. Upper extremities: Apply the gel (2 g) to the affected area 4 times daily. Do not apply more than 8 g daily to any one affected joint of the upper extremities. Total dose should not exceed 32 g per day, overall affected joints. 100 g 1    econazole nitrate 1 % cream ANTIONE EXT TO THE AFFECTED AND SURROUNDING AREAS OF SKIN 1 TIME PER DAY  12     "hydroxychloroquine (PLAQUENIL) 200 mg tablet Take 2 tablets (400 mg total) by mouth once daily. 180 tablet 3    JUBLIA 10 % Talat APPLY TO AFFECTED TOENAIL(S) BY TOPICAL ROUTE ONCE DAILY  12    levocetirizine (XYZAL) 5 MG tablet Take 1 tablet (5 mg total) by mouth every evening. 30 tablet 3    mometasone (NASONEX) 50 mcg/actuation nasal spray 2 sprays by Nasal route once daily. 17 g 3    triamcinolone acetonide 0.1% (KENALOG) 0.1 % cream Apply topically 2 (two) times daily. 80 g 0     No current facility-administered medications for this visit.          Objective:   /82  Pulse 63  Temp 98.3 °F (36.8 °C) (Oral)   Resp 16  Ht 5' 5" (1.651 m)  Wt 83.6 kg (184 lb 4.9 oz)  SpO2 98%  BMI 30.67 kg/m2     Physical Exam   Constitutional: He is oriented to person, place, and time. No distress.   HENT:   Head: Normocephalic and atraumatic.   Right Ear: Tympanic membrane, external ear and ear canal normal.   Left Ear: Tympanic membrane, external ear and ear canal normal.   Nose: Nose normal.   Mouth/Throat: Uvula is midline, oropharynx is clear and moist and mucous membranes are normal. No tonsillar exudate.   Eyes: Conjunctivae and EOM are normal.   Cardiovascular: Normal rate and regular rhythm.    Pulmonary/Chest: Effort normal and breath sounds normal.   Lymphadenopathy:     He has no cervical adenopathy.   Neurological: He is alert and oriented to person, place, and time.   Skin: Skin is warm. No erythema.         His machine read 132/93 both arms.     Assessment:       1. Dyspnea on exertion    2. Essential hypertension    3. Palpitation    4. Encounter for monitoring chronic NSAID therapy        Plan:       Dyspnea on exertion  -     Exercise stress echo; Future  -     Ambulatory referral to Cardiology    Essential hypertension  -     amlodipine (NORVASC) 10 MG tablet; Take 1 tablet (10 mg total) by mouth once daily.  Dispense: 90 tablet; Refill: 1  Continue atenolol.   Yellow log sheet given.   Bring " back for next appt.   Aware to Check blood pressure twice a day. One hour after taking medication and after dinner or before bed. Sit for 5 minutes. Keep arm at heart level.   No changes as high reading was reflective of him being off the medication.     Palpitation  -     Holter monitor - 48 hour; Future  -     Ambulatory referral to Cardiology      Encounter for monitoring chronic NSAID therapy   Take it for headache. States it is all that helps. Labs order under annual to check CBC.   States he took Fioricet but did not like it.   Consider neurology referral.            No Follow-up on file.

## 2017-04-19 NOTE — LETTER
April 19, 2017         [facility]  Family Medicine  [address]  [address]  [place] [address]  Phone: [phone]  Fax: [phone]   April 19, 2017     Patient: Ronal Ham   YOB: 1970   Date of Visit: 4/19/2017       To Whom it May Concern:    Ronal Ham was seen in my clinic on 4/19/2017. He may return with no restrictions.    If you have any questions or concerns, please don't hesitate to call.    Sincerely,         MELISSA Muñoz

## 2017-04-19 NOTE — MR AVS SNAPSHOT
[facility]  [address]  [address]  [place] [address]  Phone: [phone]  Fax: [phone]                  Ronal Ham   2017 4:00 PM   Office Visit    Description:  Male : 1970   Provider:  MELISSA Muñoz   Department:  [facility]           Reason for Visit     Hypertension           Diagnoses this Visit        Comments    Dyspnea on exertion    -  Primary     Essential hypertension         Palpitation                To Do List           Future Appointments        Provider Department Dept Phone    2017 10:00 AM Bruce Terrell MD [facility] [phone]      Goals (5 Years of Data)     None       These Medications        Disp Refills Start End    amlodipine (NORVASC) 10 MG tablet 90 tablet 1 2017     Take 1 tablet (10 mg total) by mouth once daily. - Oral    Pharmacy: [facility] [address] [place] [address] [address] AT [facility] #: [phone]       Notes to Pharmacy: **Patient requests 90 days supply**      [facility]     [facility] On [facility] Nurse Care Line -  Assistance  Unless otherwise directed by your provider, please contact [facility], our nurse care line that is available for  assistance.     Registered nurses in the [facility] provide: appointment scheduling, clinical advisement, health education, and other advisory services.  Call: [phone] (toll free)               Medications           Message regarding Medications     Verify the changes and/or additions to your medication regime listed below are the same as discussed with your clinician today.  If any of these changes or additions are incorrect, please notify your healthcare provider.             Verify that the below list of medications is an accurate representation of the medications you are currently taking.  If none reported, the list may be blank. If incorrect, please contact your  "healthcare provider. Carry this list with you in case of emergency.           Current Medications     albuterol 90 mcg/actuation inhaler Inhale 2 puffs into the lungs every 6 (six) hours as needed for Wheezing.    amlodipine (NORVASC) 10 MG tablet Take 1 tablet (10 mg total) by mouth once daily.    atenolol (TENORMIN) 50 MG tablet TAKE 1 TABLET BY MOUTH DAILY    diclofenac sodium (VOLTAREN) 1 % Gel Lower extremities: Apply the gel (4 g) to the affected area 4 times daily. Do not apply more than 16 g daily to any one affected joint of the lower extremities. Upper extremities: Apply the gel (2 g) to the affected area 4 times daily. Do not apply more than 8 g daily to any one affected joint of the upper extremities. Total dose should not exceed 32 g per day, overall affected joints.    econazole nitrate 1 % cream ANTIONE EXT TO THE AFFECTED AND SURROUNDING AREAS OF SKIN 1 TIME PER DAY    hydroxychloroquine (PLAQUENIL) 200 mg tablet Take 2 tablets (400 mg total) by mouth once daily.    JUBLIA 10 % Talat APPLY TO AFFECTED TOENAIL(S) BY TOPICAL ROUTE ONCE DAILY    levocetirizine (XYZAL) 5 MG tablet Take 1 tablet (5 mg total) by mouth every evening.    mometasone (NASONEX) 50 mcg/actuation nasal spray 2 sprays by Nasal route once daily.    triamcinolone acetonide 0.1% (KENALOG) 0.1 % cream Apply topically 2 (two) times daily.           Clinical Reference Information           Your Vitals Were     BP Pulse Temp Resp Height Weight    140/90 63 98.3 °F (36.8 °C) (Oral) 16 5' 5" (1.651 m) 83.6 kg (184 lb 4.9 oz)    SpO2 BMI             98% 30.67 kg/m2         Blood Pressure          Most Recent Value    BP  (!)  140/90      Allergies as of 4/19/2017     Carafate  [Sucralfate]      Immunizations Administered on Date of Encounter - 4/19/2017     None      Orders Placed During Today's Visit      Normal Orders This Visit    Ambulatory referral to Cardiology     Future Labs/Procedures Expected by Expires    Exercise stress echo  As " directed 7/18/2017    Holter monitor - 48 hour  As directed 4/19/2018      Language Assistance Services     ATTENTION: Language assistance services are available, free of charge. Please call [phone].      ATENCIÓN: Si rafaela arteaga, tiene a cast disposición servicios gratuitos de asistencia lingüística. Llame al [phone].     CHÚ Ý: N?u b?n nói Ti?ng Vi?t, có các d?ch v? h? tr? ngôn ng? mi?n phí dành cho b?n. G?i s? [phone].         [facility] complies with applicable Federal civil rights laws and does not discriminate on the basis of race, color, national origin, age, disability, or sex.

## 2017-04-19 NOTE — TELEPHONE ENCOUNTER
Patient called and stated that he has been having elevated B/ps for several days since Monday 214/160 on Monday night , and he was feeling so bad that he didn't leave to go anywhere; today it was 150/101, 148/98 and 150/ 96. Dr. Terrell instructed patient to go to the ED or the UC to be evaluated to check on his kidneys , before ordering anything.

## 2017-05-02 ENCOUNTER — OFFICE VISIT (OUTPATIENT)
Dept: FAMILY MEDICINE | Facility: CLINIC | Age: 47
End: 2017-05-02
Payer: COMMERCIAL

## 2017-05-02 ENCOUNTER — LAB VISIT (OUTPATIENT)
Dept: LAB | Facility: HOSPITAL | Age: 47
End: 2017-05-02
Attending: FAMILY MEDICINE
Payer: COMMERCIAL

## 2017-05-02 VITALS
HEART RATE: 79 BPM | SYSTOLIC BLOOD PRESSURE: 136 MMHG | OXYGEN SATURATION: 98 % | WEIGHT: 189.63 LBS | HEIGHT: 65 IN | DIASTOLIC BLOOD PRESSURE: 80 MMHG | TEMPERATURE: 97 F | BODY MASS INDEX: 31.59 KG/M2

## 2017-05-02 DIAGNOSIS — J84.9 INTERSTITIAL LUNG DISEASE: ICD-10-CM

## 2017-05-02 DIAGNOSIS — Z00.00 ANNUAL PHYSICAL EXAM: ICD-10-CM

## 2017-05-02 DIAGNOSIS — Z00.00 ANNUAL PHYSICAL EXAM: Primary | ICD-10-CM

## 2017-05-02 LAB
ALBUMIN SERPL BCP-MCNC: 3.5 G/DL
ALP SERPL-CCNC: 52 U/L
ALT SERPL W/O P-5'-P-CCNC: 23 U/L
ANION GAP SERPL CALC-SCNC: 9 MMOL/L
AST SERPL-CCNC: 25 U/L
BASOPHILS # BLD AUTO: 0.02 K/UL
BASOPHILS NFR BLD: 0.5 %
BILIRUB SERPL-MCNC: 0.4 MG/DL
BUN SERPL-MCNC: 18 MG/DL
CALCIUM SERPL-MCNC: 9.4 MG/DL
CHLORIDE SERPL-SCNC: 106 MMOL/L
CHOLEST/HDLC SERPL: 3.3 {RATIO}
CO2 SERPL-SCNC: 28 MMOL/L
CREAT SERPL-MCNC: 0.8 MG/DL
DIFFERENTIAL METHOD: ABNORMAL
EOSINOPHIL # BLD AUTO: 0.2 K/UL
EOSINOPHIL NFR BLD: 5.4 %
ERYTHROCYTE [DISTWIDTH] IN BLOOD BY AUTOMATED COUNT: 13.9 %
EST. GFR  (AFRICAN AMERICAN): >60 ML/MIN/1.73 M^2
EST. GFR  (NON AFRICAN AMERICAN): >60 ML/MIN/1.73 M^2
GLUCOSE SERPL-MCNC: 67 MG/DL
HCT VFR BLD AUTO: 37.9 %
HDL/CHOLESTEROL RATIO: 29.9 %
HDLC SERPL-MCNC: 144 MG/DL
HDLC SERPL-MCNC: 43 MG/DL
HGB BLD-MCNC: 13 G/DL
LDLC SERPL CALC-MCNC: 91.4 MG/DL
LYMPHOCYTES # BLD AUTO: 1.5 K/UL
LYMPHOCYTES NFR BLD: 39.6 %
MCH RBC QN AUTO: 30.8 PG
MCHC RBC AUTO-ENTMCNC: 34.3 %
MCV RBC AUTO: 90 FL
MONOCYTES # BLD AUTO: 0.5 K/UL
MONOCYTES NFR BLD: 13.6 %
NEUTROPHILS # BLD AUTO: 1.6 K/UL
NEUTROPHILS NFR BLD: 40.6 %
NONHDLC SERPL-MCNC: 101 MG/DL
PLATELET # BLD AUTO: 222 K/UL
PMV BLD AUTO: 10.3 FL
POTASSIUM SERPL-SCNC: 4.2 MMOL/L
PROT SERPL-MCNC: 7.9 G/DL
RBC # BLD AUTO: 4.22 M/UL
SODIUM SERPL-SCNC: 143 MMOL/L
TRIGL SERPL-MCNC: 48 MG/DL
TSH SERPL DL<=0.005 MIU/L-ACNC: 2.79 UIU/ML
WBC # BLD AUTO: 3.89 K/UL

## 2017-05-02 PROCEDURE — 85025 COMPLETE CBC W/AUTO DIFF WBC: CPT

## 2017-05-02 PROCEDURE — 3075F SYST BP GE 130 - 139MM HG: CPT | Mod: S$GLB,,, | Performed by: FAMILY MEDICINE

## 2017-05-02 PROCEDURE — 80061 LIPID PANEL: CPT

## 2017-05-02 PROCEDURE — 99999 PR PBB SHADOW E&M-EST. PATIENT-LVL III: CPT | Mod: PBBFAC,,, | Performed by: FAMILY MEDICINE

## 2017-05-02 PROCEDURE — 3079F DIAST BP 80-89 MM HG: CPT | Mod: S$GLB,,, | Performed by: FAMILY MEDICINE

## 2017-05-02 PROCEDURE — 80053 COMPREHEN METABOLIC PANEL: CPT

## 2017-05-02 PROCEDURE — 84443 ASSAY THYROID STIM HORMONE: CPT

## 2017-05-02 PROCEDURE — 99396 PREV VISIT EST AGE 40-64: CPT | Mod: S$GLB,,, | Performed by: FAMILY MEDICINE

## 2017-05-02 PROCEDURE — 36415 COLL VENOUS BLD VENIPUNCTURE: CPT

## 2017-05-02 PROCEDURE — 83036 HEMOGLOBIN GLYCOSYLATED A1C: CPT

## 2017-05-02 RX ORDER — ALBUTEROL SULFATE 90 UG/1
2 AEROSOL, METERED RESPIRATORY (INHALATION) EVERY 6 HOURS PRN
Qty: 18 G | Refills: 2 | Status: SHIPPED | OUTPATIENT
Start: 2017-05-02 | End: 2018-03-16 | Stop reason: SDUPTHER

## 2017-05-02 NOTE — PROGRESS NOTES
Chief Complaint   Patient presents with    Annual Exam     SUBJECTIVE:   Ronal Ham is a 46 y.o. male presenting for his annual checkup.  Current Outpatient Prescriptions   Medication Sig Dispense Refill    albuterol 90 mcg/actuation inhaler Inhale 2 puffs into the lungs every 6 (six) hours as needed for Wheezing. 18 g 2    amlodipine (NORVASC) 10 MG tablet Take 1 tablet (10 mg total) by mouth once daily. 90 tablet 1    atenolol (TENORMIN) 50 MG tablet TAKE 1 TABLET BY MOUTH DAILY 90 tablet 11    diclofenac sodium (VOLTAREN) 1 % Gel Lower extremities: Apply the gel (4 g) to the affected area 4 times daily. Do not apply more than 16 g daily to any one affected joint of the lower extremities. Upper extremities: Apply the gel (2 g) to the affected area 4 times daily. Do not apply more than 8 g daily to any one affected joint of the upper extremities. Total dose should not exceed 32 g per day, overall affected joints. 100 g 1    econazole nitrate 1 % cream ANTIONE EXT TO THE AFFECTED AND SURROUNDING AREAS OF SKIN 1 TIME PER DAY  12    hydroxychloroquine (PLAQUENIL) 200 mg tablet Take 2 tablets (400 mg total) by mouth once daily. 180 tablet 3    JUBLIA 10 % Talat APPLY TO AFFECTED TOENAIL(S) BY TOPICAL ROUTE ONCE DAILY  12    levocetirizine (XYZAL) 5 MG tablet Take 1 tablet (5 mg total) by mouth every evening. 30 tablet 3    mometasone (NASONEX) 50 mcg/actuation nasal spray 2 sprays by Nasal route once daily. 17 g 3    triamcinolone acetonide 0.1% (KENALOG) 0.1 % cream Apply topically 2 (two) times daily. 80 g 0     No current facility-administered medications for this visit.      Allergies: Carafate  [sucralfate]     ROS:  Feeling well. No dyspnea or chest pain on exertion. No abdominal pain, change in bowel habits, black or bloody stools. No urinary tract or prostatic symptoms. No neurological complaints.  Feeling a little better with the new medication  OBJECTIVE:   The patient appears well, alert,  "oriented x 3, in no distress.   /80 (BP Location: Right arm, Patient Position: Sitting, BP Method: Manual)  Pulse 79  Temp 97.1 °F (36.2 °C) (Oral)   Ht 5' 5" (1.651 m)  Wt 86 kg (189 lb 9.5 oz)  SpO2 98%  BMI 31.55 kg/m2  ENT abnormal.  Neck supple. No adenopathy or thyromegaly. DOUG. Lungs are wheezing, good air entry, , rhonchi or rales. S1 and S2 normal, no murmurs, regular rate and rhythm. Abdomen is soft without tenderness, guarding, mass or organomegaly.  exam: deferred.  Extremities show no edema, normal peripheral pulses. Neurological is normal without focal findings.    ASSESSMENT:   1. Annual physical exam    2. Interstitial lung disease        PLAN:   Counseled on age appropriate medical preventative services, including age appropriate cancer screenings, over all nutritional health, need for a consistent exercise regimen and an over all push towards maintaining a vigorous and active lifestyle.  Counseled on age appropriate vaccines and discussed upcoming health care needs based on age/gender.  Spent time with patient counseling on need for a good patient/doctor relationship moving forward.  Discussed use of common OTC medications and supplements.  Discussed common dietary aids and use of caffeine and the need for good sleep hygiene and stress management.    F/u on the test on the 10th  "

## 2017-05-04 LAB
ESTIMATED AVG GLUCOSE: 117 MG/DL
HBA1C MFR BLD HPLC: 5.7 %

## 2017-05-05 ENCOUNTER — TELEPHONE (OUTPATIENT)
Dept: FAMILY MEDICINE | Facility: CLINIC | Age: 47
End: 2017-05-05

## 2017-05-05 NOTE — TELEPHONE ENCOUNTER
----- Message from Bruce Terrell MD sent at 5/4/2017  6:40 AM CDT -----  Please place a 3 month recall, thank you!

## 2017-05-10 ENCOUNTER — HOSPITAL ENCOUNTER (OUTPATIENT)
Dept: CARDIOLOGY | Facility: HOSPITAL | Age: 47
Discharge: HOME | End: 2017-05-10
Attending: FAMILY MEDICINE
Payer: COMMERCIAL

## 2017-05-10 DIAGNOSIS — R06.09 DYSPNEA ON EXERTION: ICD-10-CM

## 2017-05-10 DIAGNOSIS — R00.2 PALPITATION: ICD-10-CM

## 2017-05-10 LAB
DIASTOLIC DYSFUNCTION: NO
ESTIMATED PA SYSTOLIC PRESSURE: 31.94
GLOBAL PERICARDIAL EFFUSION: NORMAL
MITRAL VALVE REGURGITATION: NORMAL
RETIRED EF AND QEF - SEE NOTES: 55 (ref 55–65)
TRICUSPID VALVE REGURGITATION: NORMAL

## 2017-05-10 PROCEDURE — 93227 XTRNL ECG REC<48 HR R&I: CPT | Mod: ,,, | Performed by: INTERNAL MEDICINE

## 2017-05-10 PROCEDURE — 93325 DOPPLER ECHO COLOR FLOW MAPG: CPT | Mod: 26,,, | Performed by: INTERNAL MEDICINE

## 2017-05-10 PROCEDURE — 93225 XTRNL ECG REC<48 HRS REC: CPT

## 2017-05-10 PROCEDURE — 93320 DOPPLER ECHO COMPLETE: CPT | Mod: 26,,, | Performed by: INTERNAL MEDICINE

## 2017-05-10 PROCEDURE — 93325 DOPPLER ECHO COLOR FLOW MAPG: CPT

## 2017-05-10 PROCEDURE — 93351 STRESS TTE COMPLETE: CPT | Mod: 26,,, | Performed by: INTERNAL MEDICINE

## 2017-06-04 DIAGNOSIS — I10 BENIGN HYPERTENSION: ICD-10-CM

## 2017-06-05 RX ORDER — ATENOLOL 50 MG/1
TABLET ORAL
Qty: 30 TABLET | Refills: 5 | Status: SHIPPED | OUTPATIENT
Start: 2017-06-05 | End: 2017-08-31 | Stop reason: ALTCHOICE

## 2017-06-16 DIAGNOSIS — I10 ESSENTIAL HYPERTENSION: ICD-10-CM

## 2017-06-16 RX ORDER — AMLODIPINE BESYLATE 10 MG/1
TABLET ORAL
Qty: 30 TABLET | Refills: 11 | Status: SHIPPED | OUTPATIENT
Start: 2017-06-16 | End: 2017-07-19 | Stop reason: SDUPTHER

## 2017-06-26 DIAGNOSIS — J84.9 INTERSTITIAL LUNG DISEASE: ICD-10-CM

## 2017-06-26 DIAGNOSIS — M32.9 SLE (SYSTEMIC LUPUS ERYTHEMATOSUS): ICD-10-CM

## 2017-06-26 RX ORDER — HYDROXYCHLOROQUINE SULFATE 200 MG/1
TABLET, FILM COATED ORAL
Qty: 180 TABLET | Refills: 0 | Status: SHIPPED | OUTPATIENT
Start: 2017-06-26 | End: 2017-10-26 | Stop reason: SDUPTHER

## 2017-07-19 ENCOUNTER — OFFICE VISIT (OUTPATIENT)
Dept: FAMILY MEDICINE | Facility: CLINIC | Age: 47
End: 2017-07-19
Payer: COMMERCIAL

## 2017-07-19 VITALS
RESPIRATION RATE: 16 BRPM | BODY MASS INDEX: 30.45 KG/M2 | SYSTOLIC BLOOD PRESSURE: 130 MMHG | DIASTOLIC BLOOD PRESSURE: 80 MMHG | TEMPERATURE: 98 F | WEIGHT: 182.75 LBS | HEIGHT: 65 IN | HEART RATE: 77 BPM | OXYGEN SATURATION: 97 %

## 2017-07-19 DIAGNOSIS — G89.29 CHRONIC PAIN OF RIGHT KNEE: ICD-10-CM

## 2017-07-19 DIAGNOSIS — M25.561 CHRONIC PAIN OF RIGHT KNEE: ICD-10-CM

## 2017-07-19 DIAGNOSIS — G89.29 CHRONIC PAIN OF RIGHT KNEE: Primary | ICD-10-CM

## 2017-07-19 DIAGNOSIS — M25.561 CHRONIC PAIN OF RIGHT KNEE: Primary | ICD-10-CM

## 2017-07-19 PROCEDURE — 99999 PR PBB SHADOW E&M-EST. PATIENT-LVL IV: CPT | Mod: PBBFAC,,, | Performed by: PHYSICIAN ASSISTANT

## 2017-07-19 PROCEDURE — 99214 OFFICE O/P EST MOD 30 MIN: CPT | Mod: S$GLB,,, | Performed by: PHYSICIAN ASSISTANT

## 2017-07-19 RX ORDER — DICLOFENAC SODIUM 10 MG/G
GEL TOPICAL
Qty: 100 G | Refills: 1 | Status: SHIPPED | OUTPATIENT
Start: 2017-07-19 | End: 2017-08-16 | Stop reason: SDUPTHER

## 2017-07-19 RX ORDER — NAPROXEN 500 MG/1
500 TABLET ORAL 2 TIMES DAILY WITH MEALS
Qty: 60 TABLET | Refills: 0 | Status: SHIPPED | OUTPATIENT
Start: 2017-07-19 | End: 2017-07-19 | Stop reason: SDUPTHER

## 2017-07-19 NOTE — PROGRESS NOTES
Subjective:       Patient ID: Ronal Ham is a 46 y.o. male with multiple medical diagnoses as listed in the medical history and problem list that presents for Knee Pain (x 3 weeks)  .    Chief Complaint: Knee Pain (x 3 weeks)      Knee Pain    The incident occurred more than 1 week ago (recurrance started about 3-4 weeks ago ). The incident occurred at work. There was no injury mechanism. The pain is present in the right knee. Quality: stiffness, numbness  The pain is at a severity of 3/10 (3 currently, at its worse about an 8---usually at work but mostly at the end of the day ). The pain is mild. The pain has been intermittent since onset. Associated symptoms include muscle weakness and numbness. Pertinent negatives include no inability to bear weight, loss of motion or tingling. The symptoms are aggravated by weight bearing and movement. Treatments tried: advil, ice, elevate --last dose last night  The treatment provided moderate relief.     Review of Systems   Constitutional: Negative for appetite change, chills, fatigue and fever.   Musculoskeletal: Positive for arthralgias and joint swelling.   Neurological: Positive for numbness. Negative for dizziness, tingling and tremors.         PAST MEDICAL HISTORY:  Past Medical History:   Diagnosis Date    Arthritis     Eye injury     od hit above od    GERD (gastroesophageal reflux disease)     Hypertension     Lupus (systemic lupus erythematosus)     Pulmonary fibrosis     Raynaud phenomenon        SOCIAL HISTORY:  Social History     Social History    Marital status: Single     Spouse name: N/A    Number of children: 5    Years of education: some colle     Occupational History     [place]  [employer]     Social History Main Topics    Smoking status: Never Smoker    Smokeless tobacco: Never Used    Alcohol use No    Drug use: No    Sexual activity: Yes     Partners: Female     Other Topics Concern    Not on file     Social  History Narrative    Adult Screenings updated and reviewed    Mammogram( for females)    Pap ( for females)    PSA( for males )    Colonoscopy age  48    Flu shot yearly    Td     Pneumovax recommended one time  at age  65    Zostavax recommended one time at  age  58    Eye exam recommended yearly    Bone density        ALLERGIES AND MEDICATIONS: updated and reviewed.  Review of patient's allergies indicates:   Allergen Reactions    Carafate  [sucralfate]      Other reaction(s): Hives     Current Outpatient Prescriptions   Medication Sig Dispense Refill    albuterol 90 mcg/actuation inhaler Inhale 2 puffs into the lungs every 6 (six) hours as needed for Wheezing. 18 g 2    amlodipine (NORVASC) 10 MG tablet Take 1 tablet (10 mg total) by mouth once daily. 90 tablet 1    atenolol (TENORMIN) 50 MG tablet TAKE 1 TABLET BY MOUTH DAILY 30 tablet 5    econazole nitrate 1 % cream ANTIONE EXT TO THE AFFECTED AND SURROUNDING AREAS OF SKIN 1 TIME PER DAY  12    hydroxychloroquine (PLAQUENIL) 200 mg tablet TAKE 2 TABLETS BY MOUTH DAILY 180 tablet 0    JUBLIA 10 % Talat APPLY TO AFFECTED TOENAIL(S) BY TOPICAL ROUTE ONCE DAILY  12    mometasone (NASONEX) 50 mcg/actuation nasal spray 2 sprays by Nasal route once daily. 17 g 3    triamcinolone acetonide 0.1% (KENALOG) 0.1 % cream Apply topically 2 (two) times daily. 80 g 0    diclofenac sodium (VOLTAREN) 1 % Gel Lower extremities: Apply the gel (4 g) to the affected area 4 times daily. Do not apply more than 16 g daily to any one affected joint of the lower extremities. Upper extremities: Apply the gel (2 g) to the affected area 4 times daily. Do not apply more than 8 g daily to any one affected joint of the upper extremities. Total dose should not exceed 32 g per day, overall affected joints. 100 g 1    naproxen (NAPROSYN) 500 MG tablet TAKE 1 TABLET(500 MG) BY MOUTH TWICE DAILY WITH MEALS 180 tablet 0     No current facility-administered medications for this visit.   "        Objective:   /80   Pulse 77   Temp 98.4 °F (36.9 °C) (Oral)   Resp 16   Ht 5' 5" (1.651 m)   Wt 82.9 kg (182 lb 12.2 oz)   SpO2 97%   BMI 30.41 kg/m²      Physical Exam   Constitutional: He is oriented to person, place, and time. No distress.   Eyes: Conjunctivae and EOM are normal.   Cardiovascular: Normal rate and regular rhythm.    Pulmonary/Chest: Effort normal and breath sounds normal.   Musculoskeletal: Normal range of motion.        Right knee: He exhibits swelling (mild no warmth or tenderness ). He exhibits normal range of motion, no effusion, no deformity, no erythema, normal patellar mobility and no bony tenderness. No tenderness found.        Left knee: He exhibits normal range of motion, no effusion, no deformity, normal patellar mobility and no bony tenderness. No tenderness found.        Right lower leg: Normal.        Left lower leg: Normal.   No pain with flexion and extension    Neurological: He is alert and oriented to person, place, and time.   Skin: Skin is warm. No rash noted. No erythema.           Assessment:       1. Chronic pain of right knee        Plan:       Chronic pain of right knee  -     naproxen (NAPROSYN) 500 MG tablet; Take 1 tablet (500 mg total) by mouth 2 (two) times daily with meals.  Dispense: 180 tablet; Refill: 0  -     diclofenac sodium (VOLTAREN) 1 % Gel; Lower extremities: Apply the gel (4 g) to the affected area 4 times daily. Do not apply more than 16 g daily to any one affected joint of the lower extremities. Upper extremities: Apply the gel (2 g) to the affected area 4 times daily. Do not apply more than 8 g daily to any one affected joint of the upper extremities. Total dose should not exceed 32 g per day, overall affected joints.  Dispense: 100 g; Refill: 1    Naproxen twice a day for two week with meals.   Ice every night as well as elevated.  Wear brace for days using stairs.  Wrap with ace bandage or wear sleeve to compress swelling.     We " can try short course of steroids if needed. Updated me in a week via my chart.   If no improvement, back to ortho           No Follow-up on file.

## 2017-07-19 NOTE — PATIENT INSTRUCTIONS
Naproxen twice a day for two week with meals.   Ice every night as well as elevated.  Wear brace for days using stairs.  Wrap with ace bandage or wear sleeve to compress swelling.     We can try short course of steroids if needed.   If no improvement, back to ortho

## 2017-07-20 RX ORDER — NAPROXEN 500 MG/1
TABLET ORAL
Qty: 180 TABLET | Refills: 0 | Status: SHIPPED | OUTPATIENT
Start: 2017-07-20 | End: 2018-02-02

## 2017-08-16 ENCOUNTER — OFFICE VISIT (OUTPATIENT)
Dept: FAMILY MEDICINE | Facility: CLINIC | Age: 47
End: 2017-08-16
Payer: COMMERCIAL

## 2017-08-16 ENCOUNTER — HOSPITAL ENCOUNTER (OUTPATIENT)
Dept: RADIOLOGY | Facility: HOSPITAL | Age: 47
Discharge: HOME | End: 2017-08-16
Attending: PHYSICIAN ASSISTANT
Payer: COMMERCIAL

## 2017-08-16 VITALS
OXYGEN SATURATION: 98 % | SYSTOLIC BLOOD PRESSURE: 122 MMHG | HEIGHT: 65 IN | HEART RATE: 86 BPM | DIASTOLIC BLOOD PRESSURE: 70 MMHG | TEMPERATURE: 99 F | RESPIRATION RATE: 16 BRPM | WEIGHT: 184.06 LBS | BODY MASS INDEX: 30.67 KG/M2

## 2017-08-16 DIAGNOSIS — M25.561 CHRONIC PAIN OF RIGHT KNEE: ICD-10-CM

## 2017-08-16 DIAGNOSIS — M79.2 NEUROGENIC PAIN, LEG, RIGHT: Primary | ICD-10-CM

## 2017-08-16 DIAGNOSIS — G89.29 CHRONIC PAIN OF RIGHT KNEE: ICD-10-CM

## 2017-08-16 PROCEDURE — 99999 PR PBB SHADOW E&M-EST. PATIENT-LVL V: CPT | Mod: PBBFAC,,, | Performed by: PHYSICIAN ASSISTANT

## 2017-08-16 PROCEDURE — 3008F BODY MASS INDEX DOCD: CPT | Mod: S$GLB,,, | Performed by: PHYSICIAN ASSISTANT

## 2017-08-16 PROCEDURE — 3074F SYST BP LT 130 MM HG: CPT | Mod: S$GLB,,, | Performed by: PHYSICIAN ASSISTANT

## 2017-08-16 PROCEDURE — 99214 OFFICE O/P EST MOD 30 MIN: CPT | Mod: S$GLB,,, | Performed by: PHYSICIAN ASSISTANT

## 2017-08-16 PROCEDURE — 73562 X-RAY EXAM OF KNEE 3: CPT | Mod: TC,RT

## 2017-08-16 PROCEDURE — 3078F DIAST BP <80 MM HG: CPT | Mod: S$GLB,,, | Performed by: PHYSICIAN ASSISTANT

## 2017-08-16 PROCEDURE — 73562 X-RAY EXAM OF KNEE 3: CPT | Mod: 26,RT,, | Performed by: RADIOLOGY

## 2017-08-16 RX ORDER — TIZANIDINE 4 MG/1
TABLET ORAL
Qty: 368 TABLET | Refills: 0 | Status: SHIPPED | OUTPATIENT
Start: 2017-08-16 | End: 2018-02-02

## 2017-08-16 RX ORDER — TIZANIDINE 4 MG/1
4 TABLET ORAL EVERY 6 HOURS PRN
Qty: 90 TABLET | Refills: 0 | Status: SHIPPED | OUTPATIENT
Start: 2017-08-16 | End: 2017-08-16 | Stop reason: SDUPTHER

## 2017-08-16 RX ORDER — DICLOFENAC SODIUM 10 MG/G
GEL TOPICAL
Qty: 100 G | Refills: 1 | Status: SHIPPED | OUTPATIENT
Start: 2017-08-16 | End: 2018-01-23 | Stop reason: SDUPTHER

## 2017-08-16 NOTE — PROGRESS NOTES
Subjective:       Patient ID: Ronal Ham is a 47 y.o. male with multiple medical diagnoses as listed in the medical history and problem list that presents for Knee Pain (more on right)  .    Chief Complaint: Knee Pain (more on right)      Knee Pain    The incident occurred more than 1 week ago. There was no injury mechanism (was working with alot of stairs but a bit better now ). The pain is present in the right knee and right thigh. Quality: achy and stiffness of knee but quads and calves tightness and weakness with some numbness as well  The pain is at a severity of 8/10. The pain is moderate. Associated symptoms include muscle weakness. Pertinent negatives include no numbness or tingling. The symptoms are aggravated by movement and weight bearing. He has tried NSAIDs for the symptoms.     Review of Systems   Constitutional: Negative for chills and fever.   Musculoskeletal: Positive for arthralgias, gait problem and myalgias. Negative for joint swelling.   Neurological: Negative for tingling, numbness and headaches.         PAST MEDICAL HISTORY:  Past Medical History:   Diagnosis Date    Arthritis     Eye injury     od hit above od    GERD (gastroesophageal reflux disease)     Hypertension     Lupus (systemic lupus erythematosus)     Pulmonary fibrosis     Raynaud phenomenon        SOCIAL HISTORY:  Social History     Social History    Marital status:      Spouse name: N/A    Number of children: 5    Years of education: some colle     Occupational History     [place]  [employer]     Social History Main Topics    Smoking status: Never Smoker    Smokeless tobacco: Never Used    Alcohol use No    Drug use: No    Sexual activity: Yes     Partners: Female     Other Topics Concern    Not on file     Social History Narrative    Adult Screenings updated and reviewed    Mammogram( for females)    Pap ( for females)    PSA( for males )    Colonoscopy age  48    Flu shot  yearly    Td     Pneumovax recommended one time  at age  65    Zostavax recommended one time at  age  58    Eye exam recommended yearly    Bone density        ALLERGIES AND MEDICATIONS: updated and reviewed.  Review of patient's allergies indicates:   Allergen Reactions    Carafate  [sucralfate]      Other reaction(s): Hives     Current Outpatient Prescriptions   Medication Sig Dispense Refill    albuterol 90 mcg/actuation inhaler Inhale 2 puffs into the lungs every 6 (six) hours as needed for Wheezing. 18 g 2    amlodipine (NORVASC) 10 MG tablet Take 1 tablet (10 mg total) by mouth once daily. 90 tablet 1    atenolol (TENORMIN) 50 MG tablet TAKE 1 TABLET BY MOUTH DAILY 30 tablet 5    hydroxychloroquine (PLAQUENIL) 200 mg tablet TAKE 2 TABLETS BY MOUTH DAILY 180 tablet 0    JUBLIA 10 % Talat APPLY TO AFFECTED TOENAIL(S) BY TOPICAL ROUTE ONCE DAILY  12    mometasone (NASONEX) 50 mcg/actuation nasal spray 2 sprays by Nasal route once daily. 17 g 3    naproxen (NAPROSYN) 500 MG tablet TAKE 1 TABLET(500 MG) BY MOUTH TWICE DAILY WITH MEALS 180 tablet 0    diclofenac sodium (VOLTAREN) 1 % Gel Lower extremities: Apply the gel (4 g) to the affected area 4 times daily. Do not apply more than 16 g daily to any one affected joint of the lower extremities. Upper extremities: Apply the gel (2 g) to the affected area 4 times daily. Do not apply more than 8 g daily to any one affected joint of the upper extremities. Total dose should not exceed 32 g per day, overall affected joints. 100 g 1    econazole nitrate 1 % cream ANTIONE EXT TO THE AFFECTED AND SURROUNDING AREAS OF SKIN 1 TIME PER DAY  12    tizanidine (ZANAFLEX) 4 MG tablet TAKE 1 TABLET(4 MG) BY MOUTH EVERY 6 HOURS AS NEEDED 368 tablet 0    triamcinolone acetonide 0.1% (KENALOG) 0.1 % cream Apply topically 2 (two) times daily. 80 g 0     No current facility-administered medications for this visit.          Objective:   /70   Pulse 86   Temp 98.5 °F (36.9  "°C) (Oral)   Resp 16   Ht 5' 5" (1.651 m)   Wt 83.5 kg (184 lb 1.4 oz)   SpO2 98%   BMI 30.63 kg/m²      Physical Exam   Constitutional: He is oriented to person, place, and time. No distress.   HENT:   Head: Normocephalic and atraumatic.   Cardiovascular: Normal rate and regular rhythm.    Pulmonary/Chest: Effort normal and breath sounds normal.   Musculoskeletal: Normal range of motion. He exhibits tenderness. He exhibits no edema.        Right knee: He exhibits normal range of motion, no swelling, no effusion, no deformity, no erythema, normal patellar mobility and no bony tenderness. Tenderness (quad muscles and above patella) found.        Left knee: He exhibits normal range of motion, no swelling, no deformity and no erythema. No tenderness found.        Right lower leg: He exhibits tenderness. He exhibits no bony tenderness, no edema, no deformity and no laceration.        Left lower leg: Normal.   crepitus left knee    Neurological: He is alert and oriented to person, place, and time.   Skin: Skin is warm. No erythema.           Assessment:       1. Neurogenic pain, leg, right    2. Chronic pain of right knee        Plan:       Neurogenic pain, leg, right  -     EMG w/ Ultrasound; Future  -     Ambulatory referral to Neurology    Chronic pain of right knee  -     Discontinue: tizanidine (ZANAFLEX) 4 MG tablet; Take 1 tablet (4 mg total) by mouth every 6 (six) hours as needed.  Dispense: 90 tablet; Refill: 0  -     Cancel: X-Ray Knee Complete 4 Or More Views Right; Future; Expected date: 08/16/2017  -     diclofenac sodium (VOLTAREN) 1 % Gel; Lower extremities: Apply the gel (4 g) to the affected area 4 times daily. Do not apply more than 16 g daily to any one affected joint of the lower extremities. Upper extremities: Apply the gel (2 g) to the affected area 4 times daily. Do not apply more than 8 g daily to any one affected joint of the upper extremities. Total dose should not exceed 32 g per day, " overall affected joints.  Dispense: 100 g; Refill: 1    Will call and schedule with ortho.   Continue to wear knee sleeve.           No Follow-up on file.

## 2017-08-17 ENCOUNTER — TELEPHONE (OUTPATIENT)
Dept: ORTHOPEDICS | Facility: CLINIC | Age: 47
End: 2017-08-17

## 2017-08-17 NOTE — TELEPHONE ENCOUNTER
Left message for pt stating that unfortunately we have nothing sooner than his scheduled appt, as Paulette is out tomorrow. Other providers scheduled was checked nothing available. He was advised to keep his 8/28 appt.

## 2017-08-28 ENCOUNTER — OFFICE VISIT (OUTPATIENT)
Dept: ORTHOPEDICS | Facility: CLINIC | Age: 47
End: 2017-08-28
Payer: COMMERCIAL

## 2017-08-28 VITALS — BODY MASS INDEX: 31.11 KG/M2 | HEIGHT: 65 IN | WEIGHT: 186.75 LBS

## 2017-08-28 DIAGNOSIS — M17.0 PRIMARY OSTEOARTHRITIS OF BOTH KNEES: Primary | ICD-10-CM

## 2017-08-28 PROCEDURE — 20610 DRAIN/INJ JOINT/BURSA W/O US: CPT | Mod: 50,S$GLB,, | Performed by: NURSE PRACTITIONER

## 2017-08-28 PROCEDURE — 99999 PR PBB SHADOW E&M-EST. PATIENT-LVL III: CPT | Mod: PBBFAC,,, | Performed by: NURSE PRACTITIONER

## 2017-08-28 PROCEDURE — 3008F BODY MASS INDEX DOCD: CPT | Mod: S$GLB,,, | Performed by: NURSE PRACTITIONER

## 2017-08-28 PROCEDURE — 99213 OFFICE O/P EST LOW 20 MIN: CPT | Mod: 25,S$GLB,, | Performed by: NURSE PRACTITIONER

## 2017-08-28 RX ORDER — HYALURONATE SODIUM 20 MG/2 ML
40 SYRINGE (ML) INTRAARTICULAR WEEKLY
Status: DISCONTINUED | OUTPATIENT
Start: 2017-09-11 | End: 2020-06-03

## 2017-08-28 RX ADMIN — TRIAMCINOLONE ACETONIDE 80 MG: 40 INJECTION, SUSPENSION INTRA-ARTICULAR; INTRAMUSCULAR at 04:08

## 2017-08-29 ENCOUNTER — TELEPHONE (OUTPATIENT)
Dept: FAMILY MEDICINE | Facility: CLINIC | Age: 47
End: 2017-08-29

## 2017-08-29 NOTE — LETTER
August 29, 2017    Ronalbetzy Ham  [address]  [place] [address]             [facility]  [address] [facility]  [place] [address]  Phone: [phone]  Fax: [phone] Dear Mr. Ham:    Sorry we were unable to contact you to schedule your neurology appointment. Please give the referral office a call to schedule. [phone].        If you have any questions or concerns, please don't hesitate to call.    Sincerely,        Antonia Barrera MA

## 2017-08-31 ENCOUNTER — OFFICE VISIT (OUTPATIENT)
Dept: FAMILY MEDICINE | Facility: CLINIC | Age: 47
End: 2017-08-31
Payer: COMMERCIAL

## 2017-08-31 VITALS
SYSTOLIC BLOOD PRESSURE: 132 MMHG | DIASTOLIC BLOOD PRESSURE: 70 MMHG | TEMPERATURE: 99 F | OXYGEN SATURATION: 99 % | HEIGHT: 65 IN | BODY MASS INDEX: 30.85 KG/M2 | WEIGHT: 185.19 LBS | HEART RATE: 73 BPM

## 2017-08-31 DIAGNOSIS — J84.9 INTERSTITIAL LUNG DISEASE: ICD-10-CM

## 2017-08-31 DIAGNOSIS — I10 BENIGN HYPERTENSION: Primary | ICD-10-CM

## 2017-08-31 DIAGNOSIS — I10 ESSENTIAL HYPERTENSION: ICD-10-CM

## 2017-08-31 DIAGNOSIS — J30.1 CHRONIC SEASONAL ALLERGIC RHINITIS DUE TO POLLEN: ICD-10-CM

## 2017-08-31 DIAGNOSIS — R05.3 CHRONIC COUGH: ICD-10-CM

## 2017-08-31 PROCEDURE — 3008F BODY MASS INDEX DOCD: CPT | Mod: S$GLB,,, | Performed by: FAMILY MEDICINE

## 2017-08-31 PROCEDURE — 99999 PR PBB SHADOW E&M-EST. PATIENT-LVL III: CPT | Mod: PBBFAC,,, | Performed by: FAMILY MEDICINE

## 2017-08-31 PROCEDURE — 99214 OFFICE O/P EST MOD 30 MIN: CPT | Mod: S$GLB,,, | Performed by: FAMILY MEDICINE

## 2017-08-31 PROCEDURE — 3075F SYST BP GE 130 - 139MM HG: CPT | Mod: S$GLB,,, | Performed by: FAMILY MEDICINE

## 2017-08-31 PROCEDURE — 3078F DIAST BP <80 MM HG: CPT | Mod: S$GLB,,, | Performed by: FAMILY MEDICINE

## 2017-08-31 RX ORDER — PROPRANOLOL HYDROCHLORIDE 40 MG/1
80 TABLET ORAL NIGHTLY PRN
Qty: 90 TABLET | Refills: 1 | Status: SHIPPED | OUTPATIENT
Start: 2017-08-31 | End: 2018-02-02

## 2017-08-31 RX ORDER — TRIAMCINOLONE ACETONIDE 40 MG/ML
80 INJECTION, SUSPENSION INTRA-ARTICULAR; INTRAMUSCULAR
Status: COMPLETED | OUTPATIENT
Start: 2017-08-28 | End: 2017-08-28

## 2017-08-31 RX ORDER — AMLODIPINE BESYLATE 10 MG/1
10 TABLET ORAL DAILY
Qty: 90 TABLET | Refills: 1 | Status: SHIPPED | OUTPATIENT
Start: 2017-08-31 | End: 2018-03-12 | Stop reason: SDUPTHER

## 2017-08-31 RX ORDER — PREDNISONE 10 MG/1
60 TABLET ORAL DAILY
Qty: 120 TABLET | Refills: 0 | Status: SHIPPED | OUTPATIENT
Start: 2017-08-31 | End: 2017-09-20

## 2017-08-31 NOTE — PROGRESS NOTES
CC: Pain of the Left Knee and Pain of the Right Knee      HPI: Pt with bilateral knee pain for the past month. He was seen last year and had gel injections in the left knee. The pain is aching and medial and worse with activity. He works on his feet throughout the day and this aggravates the pain. He has taken oral medication without improvement. He would like to have cortisone injections, if possible, for pain relief. He is ambulating without assistive device. There is not a limp.    ROS  General: denies fever and chills  Resp: no c/o sob  CVS: no c/o cp  MSK: c/o bilateral knee pain which is aching and medial and worse with activity    PE  General: AAOx3, pleasant and cooperative  Resp: respirations even and unlabored  MSK: bilateral knee exam  0 degrees extension  120 degrees flexion  No warmth or erythema   - effusion    Xray:  MRI reviewed by me: Advanced chondromalacia of the medial trochlea and medial femoral condyle    Assessment:  Bilateral knee djd    Plan:  Cortisone injections in bilateral knees today  RICE  nsaids prn  F/u as needed    Knee Injection Procedure Note    Pre-operative Diagnosis: bilateral knee degenerative arthritis    Post-operative Diagnosis: same    Indications: bilateral knee pain    Anesthesia: none    Procedure Details     Verbal consent was obtained for the procedure. The injection site was identified and the skin was prepared with alcohol. The bilateral knee was injected from an anterolateral approach with 1 ml of Kenalog and 5 ml Lidocaine under sterile technique using a 22 gauge needle. The needle was removed and the area cleansed and dressed.    Complications:  None; patient tolerated the procedure well.    he was advised to rest the knee today, using ice and elevation as needed for comfort and swelling. he did receive immediate relief of the knee pain. he was told this would be short lived and is secondary to the lidocaine. he may have an increase in discomfort tonight followed  by steady improvement over the next several days. It may take 1-3 weeks following the injection to get the full benefit of the medication.

## 2017-08-31 NOTE — PROGRESS NOTES
Chief Complaint   Patient presents with    Cough       SUBJECTIVE:  Ronal Ham is a 47 y.o. male here for new problem of cough that is improved currently.  He has bilateral knee pain taht flares up with climbing.  He has been doing HEP and taking the medications to help with this and trying to be as active as possible.  He just wants to get better and stay ahead of the cough and the shortness of breath.  Currently has co-morbidities including per problem list.      Past Medical History:   Diagnosis Date    Arthritis     Eye injury     od hit above od    GERD (gastroesophageal reflux disease)     Hypertension     Lupus (systemic lupus erythematosus)     Pulmonary fibrosis     Raynaud phenomenon      Past Surgical History:   Procedure Laterality Date    HERNIA REPAIR       Social History     Social History    Marital status:      Spouse name: N/A    Number of children: 5    Years of education: some colle     Occupational History     [place]  [employer]     Social History Main Topics    Smoking status: Never Smoker    Smokeless tobacco: Never Used    Alcohol use No    Drug use: No    Sexual activity: Yes     Partners: Female     Other Topics Concern    Not on file     Social History Narrative    Adult Screenings updated and reviewed    Mammogram( for females)    Pap ( for females)    PSA( for males )    Colonoscopy age  48    Flu shot yearly    Td     Pneumovax recommended one time  at age  63    Zostavax recommended one time at  age  58    Eye exam recommended yearly    Bone density      Family History   Problem Relation Age of Onset    Heart disease Mother     Heart disease Father     Glaucoma Father     Glaucoma Brother     No Known Problems Sister     No Known Problems Daughter     No Known Problems Son     No Known Problems Maternal Aunt     No Known Problems Maternal Uncle     No Known Problems Paternal Aunt     No Known Problems Paternal Uncle      No Known Problems Maternal Grandmother     No Known Problems Maternal Grandfather     No Known Problems Paternal Grandmother     No Known Problems Paternal Grandfather     Asthma Neg Hx     Emphysema Neg Hx     Amblyopia Neg Hx     Blindness Neg Hx     Cancer Neg Hx     Cataracts Neg Hx     Diabetes Neg Hx     Hypertension Neg Hx     Macular degeneration Neg Hx     Retinal detachment Neg Hx     Strabismus Neg Hx     Stroke Neg Hx     Thyroid disease Neg Hx      Current Outpatient Prescriptions on File Prior to Visit   Medication Sig Dispense Refill    albuterol 90 mcg/actuation inhaler Inhale 2 puffs into the lungs every 6 (six) hours as needed for Wheezing. 18 g 2    diclofenac sodium (VOLTAREN) 1 % Gel Lower extremities: Apply the gel (4 g) to the affected area 4 times daily. Do not apply more than 16 g daily to any one affected joint of the lower extremities. Upper extremities: Apply the gel (2 g) to the affected area 4 times daily. Do not apply more than 8 g daily to any one affected joint of the upper extremities. Total dose should not exceed 32 g per day, overall affected joints. 100 g 1    econazole nitrate 1 % cream ANTIONE EXT TO THE AFFECTED AND SURROUNDING AREAS OF SKIN 1 TIME PER DAY  12    hydroxychloroquine (PLAQUENIL) 200 mg tablet TAKE 2 TABLETS BY MOUTH DAILY 180 tablet 0    JUBLIA 10 % Talat APPLY TO AFFECTED TOENAIL(S) BY TOPICAL ROUTE ONCE DAILY  12    mometasone (NASONEX) 50 mcg/actuation nasal spray 2 sprays by Nasal route once daily. 17 g 3    naproxen (NAPROSYN) 500 MG tablet TAKE 1 TABLET(500 MG) BY MOUTH TWICE DAILY WITH MEALS 180 tablet 0    tizanidine (ZANAFLEX) 4 MG tablet TAKE 1 TABLET(4 MG) BY MOUTH EVERY 6 HOURS AS NEEDED 368 tablet 0    triamcinolone acetonide 0.1% (KENALOG) 0.1 % cream Apply topically 2 (two) times daily. 80 g 0     Current Facility-Administered Medications on File Prior to Visit   Medication Dose Route Frequency Provider Last Rate Last Dose     "[START ON 9/11/2017] EUFLEXXA 10 mg/mL(mw 2.4 -3.6 million) injection Syrg 40 mg  40 mg Intra-articular Weekly Paulette Sewell NP         Review of patient's allergies indicates:   Allergen Reactions    Carafate  [sucralfate]      Other reaction(s): Hives         Review of Systems   Constitutional: Negative.    HENT: Negative.    Eyes: Negative.    Respiratory: Positive for cough, shortness of breath and wheezing.    Cardiovascular: Negative.    Gastrointestinal: Negative.    Genitourinary: Negative.    Musculoskeletal: Positive for joint pain (knees). Negative for falls.   Skin: Negative.    Neurological: Negative.    Endo/Heme/Allergies: Negative.    Psychiatric/Behavioral: Negative.        OBJECTIVE:  /70   Pulse 73   Temp 98.5 °F (36.9 °C) (Oral)   Ht 5' 5" (1.651 m)   Wt 84 kg (185 lb 3 oz)   SpO2 99%   BMI 30.82 kg/m²     Wt Readings from Last 3 Encounters:   08/31/17 84 kg (185 lb 3 oz)   08/28/17 84.7 kg (186 lb 11.7 oz)   08/16/17 83.5 kg (184 lb 1.4 oz)     BP Readings from Last 3 Encounters:   08/31/17 132/70   08/16/17 122/70   07/19/17 130/80       He appears well, in no apparent distress.  Alert and oriented times three, pleasant and cooperative. Vital signs are as documented in vital signs section.  Face with hypopigmented rash  Nose with chronic rhinorrhea, throat with PND  Lungs with mild wheezing  Heart rate is normal  Knees with edema is noted, redness is swelling      Review of old Records:  Reviewed per [facility]    Review of old labs:  Lab Results   Component Value Date    TSH 2.791 05/02/2017     Lab Results   Component Value Date    WBC 3.89 (L) 05/02/2017    HGB 13.0 (L) 05/02/2017    HCT 37.9 (L) 05/02/2017    MCV 90 05/02/2017     05/02/2017       Chemistry        Component Value Date/Time     05/02/2017 1050    K 4.2 05/02/2017 1050     05/02/2017 1050    CO2 28 05/02/2017 1050    BUN 18 05/02/2017 1050    CREATININE 0.8 05/02/2017 1050    GLU 67 (L) " 05/02/2017 1050        Component Value Date/Time    CALCIUM 9.4 05/02/2017 1050    ALKPHOS 52 (L) 05/02/2017 1050    AST 25 05/02/2017 1050    ALT 23 05/02/2017 1050    BILITOT 0.4 05/02/2017 1050    ESTGFRAFRICA >60 05/02/2017 1050    EGFRNONAA >60 05/02/2017 1050        Lab Results   Component Value Date    CHOL 144 05/02/2017    CHOL 149 08/24/2015    CHOL 140 04/01/2014     Lab Results   Component Value Date    HDL 43 05/02/2017    HDL 43 08/24/2015    HDL 42 04/01/2014     Lab Results   Component Value Date    LDLCALC 91.4 05/02/2017    LDLCALC 90.0 08/24/2015    LDLCALC 90.0 04/01/2014     Lab Results   Component Value Date    TRIG 48 05/02/2017    TRIG 80 08/24/2015    TRIG 40 04/01/2014     Lab Results   Component Value Date    CHOLHDL 29.9 05/02/2017    CHOLHDL 28.9 08/24/2015    CHOLHDL 30.0 04/01/2014         Review of old imaging:  n/a    ASSESSMENT:  Problem List Items Addressed This Visit     Interstitial lung disease    Chronic rhinitis    Relevant Medications    predniSONE (DELTASONE) 10 MG tablet    Chronic cough    Relevant Medications    predniSONE (DELTASONE) 10 MG tablet    Benign hypertension - Primary    Relevant Medications    propranolol (INDERAL) 40 MG tablet    amlodipine (NORVASC) 10 MG tablet      Other Visit Diagnoses     Essential hypertension              ICD-10-CM ICD-9-CM   1. Benign hypertension I10 401.1   2. Chronic cough R05 786.2   3. Chronic seasonal allergic rhinitis due to pollen J30.1 477.0   4. Interstitial lung disease J84.9 515   5. Essential hypertension I10 401.9         PLAN:  1. Benign hypertension  Potential medication side effects were discussed with the patient; let me know if any occur.    - propranolol (INDERAL) 40 MG tablet; Take 2 tablets (80 mg total) by mouth nightly as needed.  Dispense: 90 tablet; Refill: 1  - amlodipine (NORVASC) 10 MG tablet; Take 1 tablet (10 mg total) by mouth once daily.  Dispense: 90 tablet; Refill: 1    2. Chronic cough  Give him  this with a plan.  If starts to exacerbate will start prednisone  Will call me after  - predniSONE (DELTASONE) 10 MG tablet; Take 6 tablets (60 mg total) by mouth once daily.  Dispense: 120 tablet; Refill: 0    3. Chronic seasonal allergic rhinitis due to pollen  Potential medication side effects were discussed with the patient; let me know if any occur.    - predniSONE (DELTASONE) 10 MG tablet; Take 6 tablets (60 mg total) by mouth once daily.  Dispense: 120 tablet; Refill: 0    4. Interstitial lung disease  noted    5. Essential hypertension  The current medical regimen is effective;  continue present plan and medications.        Medication List with Changes/Refills   New Medications    PREDNISONE (DELTASONE) 10 MG TABLET    Take 6 tablets (60 mg total) by mouth once daily.    PROPRANOLOL (INDERAL) 40 MG TABLET    Take 2 tablets (80 mg total) by mouth nightly as needed.   Current Medications    ALBUTEROL 90 MCG/ACTUATION INHALER    Inhale 2 puffs into the lungs every 6 (six) hours as needed for Wheezing.    DICLOFENAC SODIUM (VOLTAREN) 1 % GEL    Lower extremities: Apply the gel (4 g) to the affected area 4 times daily. Do not apply more than 16 g daily to any one affected joint of the lower extremities. Upper extremities: Apply the gel (2 g) to the affected area 4 times daily. Do not apply more than 8 g daily to any one affected joint of the upper extremities. Total dose should not exceed 32 g per day, overall affected joints.    ECONAZOLE NITRATE 1 % CREAM    ANTIONE EXT TO THE AFFECTED AND SURROUNDING AREAS OF SKIN 1 TIME PER DAY    HYDROXYCHLOROQUINE (PLAQUENIL) 200 MG TABLET    TAKE 2 TABLETS BY MOUTH DAILY    JUBLIA 10 % FITO    APPLY TO AFFECTED TOENAIL(S) BY TOPICAL ROUTE ONCE DAILY    MOMETASONE (NASONEX) 50 MCG/ACTUATION NASAL SPRAY    2 sprays by Nasal route once daily.    NAPROXEN (NAPROSYN) 500 MG TABLET    TAKE 1 TABLET(500 MG) BY MOUTH TWICE DAILY WITH MEALS    TIZANIDINE (ZANAFLEX) 4 MG TABLET    TAKE  1 TABLET(4 MG) BY MOUTH EVERY 6 HOURS AS NEEDED    TRIAMCINOLONE ACETONIDE 0.1% (KENALOG) 0.1 % CREAM    Apply topically 2 (two) times daily.   Changed and/or Refilled Medications    Modified Medication Previous Medication    AMLODIPINE (NORVASC) 10 MG TABLET amlodipine (NORVASC) 10 MG tablet       Take 1 tablet (10 mg total) by mouth once daily.    Take 1 tablet (10 mg total) by mouth once daily.   Discontinued Medications    ATENOLOL (TENORMIN) 50 MG TABLET    TAKE 1 TABLET BY MOUTH DAILY       Return in about 4 weeks (around 9/28/2017) for assess treatment plan.

## 2017-09-12 ENCOUNTER — TELEPHONE (OUTPATIENT)
Dept: ORTHOPEDICS | Facility: CLINIC | Age: 47
End: 2017-09-12

## 2017-09-12 NOTE — TELEPHONE ENCOUNTER
----- Message from Trupti Rider sent at 9/11/2017  5:25 PM CDT -----  Contact: Pt  Pt needs to r/s injection pt missed appt    Pt can be reached at [phone]    Thanks

## 2017-09-15 ENCOUNTER — LAB VISIT (OUTPATIENT)
Dept: LAB | Facility: HOSPITAL | Age: 47
End: 2017-09-15
Attending: INTERNAL MEDICINE
Payer: COMMERCIAL

## 2017-09-15 ENCOUNTER — HOSPITAL ENCOUNTER (OUTPATIENT)
Dept: PULMONOLOGY | Facility: CLINIC | Age: 47
Discharge: HOME | End: 2017-09-15
Payer: COMMERCIAL

## 2017-09-15 ENCOUNTER — OFFICE VISIT (OUTPATIENT)
Dept: RHEUMATOLOGY | Facility: CLINIC | Age: 47
End: 2017-09-15
Payer: COMMERCIAL

## 2017-09-15 VITALS
WEIGHT: 180 LBS | HEIGHT: 64 IN | DIASTOLIC BLOOD PRESSURE: 87 MMHG | BODY MASS INDEX: 30.73 KG/M2 | SYSTOLIC BLOOD PRESSURE: 142 MMHG | HEART RATE: 60 BPM

## 2017-09-15 DIAGNOSIS — J84.9 INTERSTITIAL LUNG DISEASE: ICD-10-CM

## 2017-09-15 DIAGNOSIS — M32.13 OTHER SYSTEMIC LUPUS ERYTHEMATOSUS WITH LUNG INVOLVEMENT: Primary | ICD-10-CM

## 2017-09-15 DIAGNOSIS — M32.13 OTHER SYSTEMIC LUPUS ERYTHEMATOSUS WITH LUNG INVOLVEMENT: ICD-10-CM

## 2017-09-15 LAB
C3 SERPL-MCNC: 123 MG/DL
C4 SERPL-MCNC: 31 MG/DL
CK SERPL-CCNC: 248 U/L
PRE FEV1 FVC: 81
PRE FEV1: 1.74
PRE FVC: 2.14
PREDICTED FEV1 FVC: 84
PREDICTED FEV1: 2.54
PREDICTED FVC: 3.04

## 2017-09-15 PROCEDURE — 86160 COMPLEMENT ANTIGEN: CPT | Mod: 59

## 2017-09-15 PROCEDURE — 86160 COMPLEMENT ANTIGEN: CPT

## 2017-09-15 PROCEDURE — 94729 DIFFUSING CAPACITY: CPT | Mod: S$GLB,,, | Performed by: INTERNAL MEDICINE

## 2017-09-15 PROCEDURE — 82550 ASSAY OF CK (CPK): CPT

## 2017-09-15 PROCEDURE — 99215 OFFICE O/P EST HI 40 MIN: CPT | Mod: S$GLB,,, | Performed by: INTERNAL MEDICINE

## 2017-09-15 PROCEDURE — 3079F DIAST BP 80-89 MM HG: CPT | Mod: S$GLB,,, | Performed by: INTERNAL MEDICINE

## 2017-09-15 PROCEDURE — 94727 GAS DIL/WSHOT DETER LNG VOL: CPT | Mod: S$GLB,,, | Performed by: INTERNAL MEDICINE

## 2017-09-15 PROCEDURE — 82085 ASSAY OF ALDOLASE: CPT

## 2017-09-15 PROCEDURE — 94010 BREATHING CAPACITY TEST: CPT | Mod: 51,S$GLB,, | Performed by: INTERNAL MEDICINE

## 2017-09-15 PROCEDURE — 36415 COLL VENOUS BLD VENIPUNCTURE: CPT

## 2017-09-15 PROCEDURE — 86225 DNA ANTIBODY NATIVE: CPT

## 2017-09-15 PROCEDURE — 99999 PR PBB SHADOW E&M-EST. PATIENT-LVL III: CPT | Mod: PBBFAC,,, | Performed by: INTERNAL MEDICINE

## 2017-09-15 PROCEDURE — 3008F BODY MASS INDEX DOCD: CPT | Mod: S$GLB,,, | Performed by: INTERNAL MEDICINE

## 2017-09-15 PROCEDURE — 3077F SYST BP >= 140 MM HG: CPT | Mod: S$GLB,,, | Performed by: INTERNAL MEDICINE

## 2017-09-15 ASSESSMENT — ROUTINE ASSESSMENT OF PATIENT INDEX DATA (RAPID3)
PATIENT GLOBAL ASSESSMENT SCORE: 5
PAIN SCORE: .5
MDHAQ FUNCTION SCORE: 0
AM STIFFNESS SCORE: 1, YES
TOTAL RAPID3 SCORE: 1.83
WHEN YOU AWAKENED IN THE MORNING OVER THE LAST WEEK, PLEASE INDICATE THE AMOUNT OF TIME IT TAKES UNTIL YOU ARE AS LIMBER AS YOU WILL BE FOR THE DAY: 2 MINUTES
FATIGUE SCORE: 1
PSYCHOLOGICAL DISTRESS SCORE: 0

## 2017-09-18 ENCOUNTER — OFFICE VISIT (OUTPATIENT)
Dept: ORTHOPEDICS | Facility: CLINIC | Age: 47
End: 2017-09-18
Payer: COMMERCIAL

## 2017-09-18 DIAGNOSIS — M17.0 PRIMARY OSTEOARTHRITIS OF BOTH KNEES: ICD-10-CM

## 2017-09-18 LAB
ALDOLASE SERPL-CCNC: 4.4 U/L
DSDNA AB SER-ACNC: NORMAL [IU]/ML

## 2017-09-18 PROCEDURE — 20610 DRAIN/INJ JOINT/BURSA W/O US: CPT | Mod: 50,S$GLB,, | Performed by: NURSE PRACTITIONER

## 2017-09-18 PROCEDURE — 99999 PR PBB SHADOW E&M-EST. PATIENT-LVL III: CPT | Mod: PBBFAC,,, | Performed by: NURSE PRACTITIONER

## 2017-09-18 PROCEDURE — 99499 UNLISTED E&M SERVICE: CPT | Mod: S$GLB,,, | Performed by: NURSE PRACTITIONER

## 2017-09-18 RX ADMIN — Medication 40 MG: at 04:09

## 2017-09-18 NOTE — PROGRESS NOTES
Ronal Ham presents to clinic today for the first bilateral knee Euflexxa injection.    Exam demonstrates the no effusion in the  bilateral knee, and the skin is intact.    Diagnosis: osteoarthritis knee    After time out was performed and patient ID, side, and site were verified, the  bilateral  knee was sterilly prepped in the standard fashion.  A 22-gauge needle was introduced into bilateral knee joint from an danni-lateral site without complication and knee was then injected with 2 ml of Euflexxa.  Sterile dressing was applied.  The patient was instructed to resume activities as tolerated and to call with any problems.     We will see Ronal Ham back next week for the second injection.

## 2017-09-19 NOTE — PROGRESS NOTES
History of present illness: 47-year-old gentleman with a diagnosis of SLE prior to 2005.  He initially presented with arthritis and rash.  He has interstitial lung disease which has been stable.  He has been on chronic Plaquenil therapy.  He was last seen in 2014.  He is supposed to be seen on a yearly basis.  He does follow with his primary doctor.  He has having some cough and shortness of breath.  It interferes with activity.  He has had no unexplained fevers.  He has no headache or rash.  He has occasional conjunctivitis.  He has no oral ulcers, dry eye or mouth.  He has had no recent Raynaud's phenomena although he has in the past.  He has some pain in his knees with occasional swelling.  He had had an injection in his knee which had helped.  He has occasional paresthesias in the right leg.  His legs sometimes feel weak.  His last eye exam was one year ago.    Physical examination:  Skin: No rashes  ENT: Adequate tears and saliva  Chest: He has right basilar rales  Cardiac: No murmurs, gallops, rubs  Abdomen: Liver edge is palpable.  No splenomegaly.  Extremities: No sclerodactyly.  No pedal edema  Musculoskeletal: Cervical spine is unremarkable.  He has no synovitis in the upper extremities.  He has effusion in the right knee.  He has bony hypertrophy of both knees.  He has decreased range of motion of the knees.  Neurologic: Normal muscle strength testing    Assessment:  1.  SLE by history.  He has no evidence of active lupus at this time  2.  Interstitial lung disease by history  3.  Osteoarthritis of the knees    Plans:  1.  Laboratory studies are obtained  2.  I scheduled him for pulmonary function test and a CT scan of the chest  3.  Continue Plaquenil as before  4.  He is due for his eye exam  5.  Return to see me in 12 months

## 2017-09-26 ENCOUNTER — OFFICE VISIT (OUTPATIENT)
Dept: ORTHOPEDICS | Facility: CLINIC | Age: 47
End: 2017-09-26
Payer: COMMERCIAL

## 2017-09-26 DIAGNOSIS — M17.0 PRIMARY OSTEOARTHRITIS OF BOTH KNEES: ICD-10-CM

## 2017-09-26 PROCEDURE — 99999 PR PBB SHADOW E&M-EST. PATIENT-LVL III: CPT | Mod: PBBFAC,,, | Performed by: NURSE PRACTITIONER

## 2017-09-26 PROCEDURE — 99499 UNLISTED E&M SERVICE: CPT | Mod: S$GLB,,, | Performed by: NURSE PRACTITIONER

## 2017-09-26 PROCEDURE — 20610 DRAIN/INJ JOINT/BURSA W/O US: CPT | Mod: 50,S$GLB,, | Performed by: NURSE PRACTITIONER

## 2017-09-26 RX ADMIN — Medication 40 MG: at 05:09

## 2017-09-26 NOTE — PROGRESS NOTES
Ronal Ham presents to clinic today for the second bilateral knee Euflexxa injection.    Exam demonstrates the no effusion in the  bilateral knee, and the skin is intact.    Diagnosis: osteoarthritis knee    After time out was performed and patient ID, side, and site were verified, the  bilateral  knee was sterilly prepped in the standard fashion.  A 22-gauge needle was introduced into bilateral knee joint from an danni-lateral site without complication and knee was then injected with 2 ml of Euflexxa.  Sterile dressing was applied.  The patient was instructed to resume activities as tolerated and to call with any problems.     We will see Ronal Ham back next week for the third injection.

## 2017-09-29 DIAGNOSIS — J84.9 INTERSTITIAL LUNG DISEASE: ICD-10-CM

## 2017-09-29 DIAGNOSIS — M32.9 SLE (SYSTEMIC LUPUS ERYTHEMATOSUS): ICD-10-CM

## 2017-09-29 RX ORDER — HYDROXYCHLOROQUINE SULFATE 200 MG/1
TABLET, FILM COATED ORAL
Qty: 180 TABLET | Refills: 3 | Status: SHIPPED | OUTPATIENT
Start: 2017-09-29 | End: 2018-10-01 | Stop reason: SDUPTHER

## 2017-10-16 ENCOUNTER — TELEPHONE (OUTPATIENT)
Dept: ORTHOPEDICS | Facility: CLINIC | Age: 47
End: 2017-10-16

## 2017-10-16 NOTE — TELEPHONE ENCOUNTER
----- Message from Umer Carroll sent at 10/16/2017  2:31 PM CDT -----  Contact: self  Patient ask for a call to reschedule his injection patient can be reached on home phone

## 2017-10-18 ENCOUNTER — OFFICE VISIT (OUTPATIENT)
Dept: ORTHOPEDICS | Facility: CLINIC | Age: 47
End: 2017-10-18
Payer: COMMERCIAL

## 2017-10-18 VITALS — WEIGHT: 179.88 LBS | BODY MASS INDEX: 30.88 KG/M2

## 2017-10-18 DIAGNOSIS — M17.0 PRIMARY OSTEOARTHRITIS OF BOTH KNEES: ICD-10-CM

## 2017-10-18 PROCEDURE — 20610 DRAIN/INJ JOINT/BURSA W/O US: CPT | Mod: 50,S$GLB,, | Performed by: NURSE PRACTITIONER

## 2017-10-18 PROCEDURE — 99999 PR PBB SHADOW E&M-EST. PATIENT-LVL III: CPT | Mod: PBBFAC,,, | Performed by: NURSE PRACTITIONER

## 2017-10-18 PROCEDURE — 99499 UNLISTED E&M SERVICE: CPT | Mod: S$GLB,,, | Performed by: NURSE PRACTITIONER

## 2017-10-18 RX ADMIN — Medication 40 MG: at 04:10

## 2017-10-20 NOTE — PROGRESS NOTES
Ronal Ham presents to clinic today for the third bilateral knee Euflexxa injection.    Exam demonstrates the no effusion in the  bilateral knee, and the skin is intact.    Diagnosis: osteoarthritis knee    After time out was performed and patient ID, side, and site were verified, the  bilateral  knee was sterilly prepped in the standard fashion.  A 22-gauge needle was introduced into bilateral knee joint from an danni-lateral site without complication and knee was then injected with 2 ml of Euflexxa.  Sterile dressing was applied.  The patient was instructed to resume activities as tolerated and to call with any problems.     He will f/u as needed. He reports good relief of the knee pain so far with the injections.

## 2017-10-24 ENCOUNTER — TELEPHONE (OUTPATIENT)
Dept: FAMILY MEDICINE | Facility: CLINIC | Age: 47
End: 2017-10-24

## 2017-10-26 ENCOUNTER — OFFICE VISIT (OUTPATIENT)
Dept: FAMILY MEDICINE | Facility: CLINIC | Age: 47
End: 2017-10-26
Payer: COMMERCIAL

## 2017-10-26 VITALS
HEART RATE: 92 BPM | RESPIRATION RATE: 16 BRPM | TEMPERATURE: 98 F | BODY MASS INDEX: 31 KG/M2 | HEIGHT: 65 IN | SYSTOLIC BLOOD PRESSURE: 130 MMHG | DIASTOLIC BLOOD PRESSURE: 94 MMHG | OXYGEN SATURATION: 99 % | WEIGHT: 186.06 LBS

## 2017-10-26 DIAGNOSIS — J20.8 ACUTE BACTERIAL BRONCHITIS: Primary | ICD-10-CM

## 2017-10-26 DIAGNOSIS — B96.89 ACUTE BACTERIAL BRONCHITIS: Primary | ICD-10-CM

## 2017-10-26 PROCEDURE — 99214 OFFICE O/P EST MOD 30 MIN: CPT | Mod: S$GLB,,, | Performed by: PHYSICIAN ASSISTANT

## 2017-10-26 PROCEDURE — 99999 PR PBB SHADOW E&M-EST. PATIENT-LVL IV: CPT | Mod: PBBFAC,,, | Performed by: PHYSICIAN ASSISTANT

## 2017-10-26 RX ORDER — AMOXICILLIN AND CLAVULANATE POTASSIUM 500; 125 MG/1; MG/1
1 TABLET, FILM COATED ORAL 2 TIMES DAILY
Qty: 20 TABLET | Refills: 0 | Status: SHIPPED | OUTPATIENT
Start: 2017-10-26 | End: 2017-11-05

## 2017-10-26 NOTE — PROGRESS NOTES
Subjective:       Patient ID: Ronal Ham is a 47 y.o. male with multiple medical diagnoses as listed in the medical history and problem list that presents for Shortness of Breath and URI  .    Chief Complaint: Shortness of Breath and URI      Shortness of Breath   This is a new problem. Associated symptoms include wheezing. Pertinent negatives include no ear pain, headaches, rash, rhinorrhea or sore throat.   URI    This is a new problem. The current episode started in the past 7 days. The problem has been unchanged. There has been no fever. Associated symptoms include coughing (sometimes productive ), sinus pain and wheezing. Pertinent negatives include no congestion, ear pain, headaches, nausea, rash, rhinorrhea, sneezing or sore throat. Associated symptoms comments: Chest congestion . Treatments tried: inhaler yesterday and mucinex      Review of Systems   HENT: Positive for sinus pain. Negative for congestion, ear pain, rhinorrhea, sneezing and sore throat.    Respiratory: Positive for cough (sometimes productive ), shortness of breath and wheezing.    Gastrointestinal: Negative for nausea.   Skin: Negative for rash.   Neurological: Negative for headaches.         PAST MEDICAL HISTORY:  Past Medical History:   Diagnosis Date    Arthritis     Eye injury     od hit above od    GERD (gastroesophageal reflux disease)     Hypertension     Lupus (systemic lupus erythematosus)     Pulmonary fibrosis     Raynaud phenomenon        SOCIAL HISTORY:  Social History     Social History    Marital status:      Spouse name: N/A    Number of children: 5    Years of education: some colle     Occupational History     [place]  [employer]     Social History Main Topics    Smoking status: Never Smoker    Smokeless tobacco: Never Used    Alcohol use No    Drug use: No    Sexual activity: Yes     Partners: Female     Other Topics Concern    Not on file     Social History Narrative     Adult Screenings updated and reviewed    Mammogram( for females)    Pap ( for females)    PSA( for males )    Colonoscopy age  48    Flu shot yearly    Td     Pneumovax recommended one time  at age  65    Zostavax recommended one time at  age  60    Eye exam recommended yearly    Bone density        ALLERGIES AND MEDICATIONS: updated and reviewed.  Review of patient's allergies indicates:   Allergen Reactions    Carafate  [sucralfate]      Other reaction(s): Hives     Current Outpatient Prescriptions   Medication Sig Dispense Refill    albuterol 90 mcg/actuation inhaler Inhale 2 puffs into the lungs every 6 (six) hours as needed for Wheezing. 18 g 2    amlodipine (NORVASC) 10 MG tablet Take 1 tablet (10 mg total) by mouth once daily. 90 tablet 1    diclofenac sodium (VOLTAREN) 1 % Gel Lower extremities: Apply the gel (4 g) to the affected area 4 times daily. Do not apply more than 16 g daily to any one affected joint of the lower extremities. Upper extremities: Apply the gel (2 g) to the affected area 4 times daily. Do not apply more than 8 g daily to any one affected joint of the upper extremities. Total dose should not exceed 32 g per day, overall affected joints. 100 g 1    econazole nitrate 1 % cream ANTIONE EXT TO THE AFFECTED AND SURROUNDING AREAS OF SKIN 1 TIME PER DAY  12    hydroxychloroquine (PLAQUENIL) 200 mg tablet TAKE 2 TABLETS BY MOUTH DAILY 180 tablet 3    JUBLIA 10 % Talat APPLY TO AFFECTED TOENAIL(S) BY TOPICAL ROUTE ONCE DAILY  12    naproxen (NAPROSYN) 500 MG tablet TAKE 1 TABLET(500 MG) BY MOUTH TWICE DAILY WITH MEALS 180 tablet 0    propranolol (INDERAL) 40 MG tablet Take 2 tablets (80 mg total) by mouth nightly as needed. 90 tablet 1    tizanidine (ZANAFLEX) 4 MG tablet TAKE 1 TABLET(4 MG) BY MOUTH EVERY 6 HOURS AS NEEDED 368 tablet 0    triamcinolone acetonide 0.1% (KENALOG) 0.1 % cream Apply topically 2 (two) times daily. 80 g 0    amoxicillin-clavulanate 500-125mg (AUGMENTIN) 500-125  "mg Tab Take 1 tablet (500 mg total) by mouth 2 (two) times daily. 20 tablet 0    mometasone (NASONEX) 50 mcg/actuation nasal spray 2 sprays by Nasal route once daily. 17 g 3     Current Facility-Administered Medications   Medication Dose Route Frequency Provider Last Rate Last Dose    EUFLEXXA 10 mg/mL(mw 2.4 -3.6 million) injection Syrg 40 mg  40 mg Intra-articular Weekly Paulette Sewell QI   40 mg at 10/18/17 1637         Objective:   BP (!) 130/94   Pulse 92   Temp 98.4 °F (36.9 °C) (Oral)   Resp 16   Ht 5' 5" (1.651 m)   Wt 84.4 kg (186 lb 1.1 oz)   SpO2 99%   BMI 30.96 kg/m²      Physical Exam   Constitutional: He is oriented to person, place, and time. No distress.   HENT:   Head: Normocephalic and atraumatic.   Right Ear: Tympanic membrane, external ear and ear canal normal.   Left Ear: Tympanic membrane, external ear and ear canal normal.   Nose: Nose normal.   Mouth/Throat: Uvula is midline, oropharynx is clear and moist and mucous membranes are normal. No tonsillar exudate.   Eyes: Conjunctivae and EOM are normal.   Cardiovascular: Normal rate and regular rhythm.    Pulmonary/Chest: Effort normal. He has no decreased breath sounds. He has no wheezes. He has rhonchi. He has no rales.           Lymphadenopathy:     He has no cervical adenopathy.   Neurological: He is alert and oriented to person, place, and time.           Assessment:       1. Acute bacterial bronchitis        Plan:       Acute bacterial bronchitis  -     amoxicillin-clavulanate 500-125mg (AUGMENTIN) 500-125 mg Tab; Take 1 tablet (500 mg total) by mouth 2 (two) times daily.  Dispense: 20 tablet; Refill: 0    inhaler every 6 hours         No Follow-up on file.  "

## 2017-12-11 ENCOUNTER — OFFICE VISIT (OUTPATIENT)
Dept: FAMILY MEDICINE | Facility: CLINIC | Age: 47
End: 2017-12-11
Payer: COMMERCIAL

## 2017-12-11 VITALS
DIASTOLIC BLOOD PRESSURE: 106 MMHG | OXYGEN SATURATION: 97 % | HEART RATE: 90 BPM | WEIGHT: 184.06 LBS | BODY MASS INDEX: 30.67 KG/M2 | TEMPERATURE: 98 F | SYSTOLIC BLOOD PRESSURE: 150 MMHG | HEIGHT: 65 IN

## 2017-12-11 DIAGNOSIS — J01.90 ACUTE SINUSITIS WITH SYMPTOMS GREATER THAN 10 DAYS: Primary | ICD-10-CM

## 2017-12-11 DIAGNOSIS — J84.10 PULMONARY FIBROSIS: ICD-10-CM

## 2017-12-11 DIAGNOSIS — N52.9 ERECTILE DYSFUNCTION, UNSPECIFIED ERECTILE DYSFUNCTION TYPE: ICD-10-CM

## 2017-12-11 DIAGNOSIS — Z23 NEED FOR PROPHYLACTIC VACCINATION AND INOCULATION AGAINST INFLUENZA: ICD-10-CM

## 2017-12-11 PROCEDURE — 90471 IMMUNIZATION ADMIN: CPT | Mod: S$GLB,,, | Performed by: FAMILY MEDICINE

## 2017-12-11 PROCEDURE — 99999 PR PBB SHADOW E&M-EST. PATIENT-LVL IV: CPT | Mod: PBBFAC,,, | Performed by: PHYSICIAN ASSISTANT

## 2017-12-11 PROCEDURE — 90686 IIV4 VACC NO PRSV 0.5 ML IM: CPT | Mod: S$GLB,,, | Performed by: FAMILY MEDICINE

## 2017-12-11 PROCEDURE — 99214 OFFICE O/P EST MOD 30 MIN: CPT | Mod: 25,S$GLB,, | Performed by: PHYSICIAN ASSISTANT

## 2017-12-11 RX ORDER — DESLORATADINE 5 MG/1
5 TABLET ORAL DAILY
Qty: 30 TABLET | Refills: 11 | Status: SHIPPED | OUTPATIENT
Start: 2017-12-11 | End: 2019-01-23

## 2017-12-11 RX ORDER — TADALAFIL 5 MG/1
5 TABLET ORAL DAILY PRN
Qty: 10 TABLET | Refills: 0 | Status: SHIPPED | OUTPATIENT
Start: 2017-12-11 | End: 2019-01-23

## 2017-12-11 RX ORDER — DOXYCYCLINE HYCLATE 100 MG
100 TABLET ORAL 2 TIMES DAILY
Qty: 20 TABLET | Refills: 0 | Status: SHIPPED | OUTPATIENT
Start: 2017-12-11 | End: 2017-12-21

## 2017-12-11 NOTE — PROGRESS NOTES
Subjective:       Patient ID: Ronal Ham is a 47 y.o. male with multiple medical diagnoses as listed in the medical history and problem list that presents for Cough and Sinus Problem  .    Chief Complaint: Cough and Sinus Problem      Cough   This is a new problem. The current episode started 1 to 4 weeks ago. The problem has been gradually worsening. Episode frequency: worse at night  The cough is productive of sputum. Associated symptoms include eye redness (2 weeks ago and used visine ), postnasal drip, rhinorrhea, a sore throat, shortness of breath and wheezing. Pertinent negatives include no chills, ear pain, fever or headaches.   Sinus Problem   This is a new problem. The current episode started 1 to 4 weeks ago (2 weeks ). There has been no fever. Associated symptoms include congestion, coughing (productive white creamy ), shortness of breath, sinus pressure (right maxillary ), sneezing and a sore throat. Pertinent negatives include no chills, ear pain or headaches. Treatments tried: benadryl and claritin      Review of Systems   Constitutional: Negative for chills and fever.   HENT: Positive for congestion, postnasal drip, rhinorrhea, sinus pressure (right maxillary ), sneezing and sore throat. Negative for ear pain and sinus pain.    Eyes: Positive for redness (2 weeks ago and used visine ). Negative for photophobia, pain, discharge, itching and visual disturbance.   Respiratory: Positive for cough (productive white creamy ), shortness of breath and wheezing. Negative for chest tightness.         No history of asthma  No smoking    Gastrointestinal: Negative.    Neurological: Negative for headaches.         PAST MEDICAL HISTORY:  Past Medical History:   Diagnosis Date    Arthritis     Eye injury     od hit above od    GERD (gastroesophageal reflux disease)     Hypertension     Lupus (systemic lupus erythematosus)     Pulmonary fibrosis     Raynaud phenomenon        SOCIAL HISTORY:  Social  History     Social History    Marital status:      Spouse name: N/A    Number of children: 5    Years of education: some colle     Occupational History     [place]  [employer]     Social History Main Topics    Smoking status: Never Smoker    Smokeless tobacco: Never Used    Alcohol use No    Drug use: No    Sexual activity: Yes     Partners: Female     Other Topics Concern    Not on file     Social History Narrative    Adult Screenings updated and reviewed    Mammogram( for females)    Pap ( for females)    PSA( for males )    Colonoscopy age  48    Flu shot yearly    Td     Pneumovax recommended one time  at age  65    Zostavax recommended one time at  age  58    Eye exam recommended yearly    Bone density        ALLERGIES AND MEDICATIONS: updated and reviewed.  Review of patient's allergies indicates:   Allergen Reactions    Carafate  [sucralfate]      Other reaction(s): Hives     Current Outpatient Prescriptions   Medication Sig Dispense Refill    albuterol 90 mcg/actuation inhaler Inhale 2 puffs into the lungs every 6 (six) hours as needed for Wheezing. 18 g 2    amlodipine (NORVASC) 10 MG tablet Take 1 tablet (10 mg total) by mouth once daily. 90 tablet 1    diclofenac sodium (VOLTAREN) 1 % Gel Lower extremities: Apply the gel (4 g) to the affected area 4 times daily. Do not apply more than 16 g daily to any one affected joint of the lower extremities. Upper extremities: Apply the gel (2 g) to the affected area 4 times daily. Do not apply more than 8 g daily to any one affected joint of the upper extremities. Total dose should not exceed 32 g per day, overall affected joints. 100 g 1    econazole nitrate 1 % cream ANTIONE EXT TO THE AFFECTED AND SURROUNDING AREAS OF SKIN 1 TIME PER DAY  12    hydroxychloroquine (PLAQUENIL) 200 mg tablet TAKE 2 TABLETS BY MOUTH DAILY 180 tablet 3    JUBLIA 10 % Talat APPLY TO AFFECTED TOENAIL(S) BY TOPICAL ROUTE ONCE DAILY  12    mometasone  "(NASONEX) 50 mcg/actuation nasal spray 2 sprays by Nasal route once daily. 17 g 3    naproxen (NAPROSYN) 500 MG tablet TAKE 1 TABLET(500 MG) BY MOUTH TWICE DAILY WITH MEALS 180 tablet 0    propranolol (INDERAL) 40 MG tablet Take 2 tablets (80 mg total) by mouth nightly as needed. 90 tablet 1    tizanidine (ZANAFLEX) 4 MG tablet TAKE 1 TABLET(4 MG) BY MOUTH EVERY 6 HOURS AS NEEDED 368 tablet 0    triamcinolone acetonide 0.1% (KENALOG) 0.1 % cream Apply topically 2 (two) times daily. 80 g 0    desloratadine (CLARINEX) 5 mg tablet Take 1 tablet (5 mg total) by mouth once daily. 30 tablet 11    doxycycline (VIBRA-TABS) 100 MG tablet Take 1 tablet (100 mg total) by mouth 2 (two) times daily. 20 tablet 0    tadalafil (CIALIS) 5 MG tablet Take 1 tablet (5 mg total) by mouth daily as needed for Erectile Dysfunction. 10 tablet 0     Current Facility-Administered Medications   Medication Dose Route Frequency Provider Last Rate Last Dose    EUFLEXXA 10 mg/mL(mw 2.4 -3.6 million) injection Syrg 40 mg  40 mg Intra-articular Weekly Paulette Sewell, NP   40 mg at 10/18/17 1637         Objective:   BP (!) 150/106   Pulse 90   Temp 97.9 °F (36.6 °C) (Oral)   Ht 5' 5" (1.651 m)   Wt 83.5 kg (184 lb 1.4 oz)   SpO2 97%   BMI 30.63 kg/m²      Physical Exam   Constitutional: He is oriented to person, place, and time. No distress.   HENT:   Head: Normocephalic and atraumatic.   Right Ear: Tympanic membrane, external ear and ear canal normal.   Left Ear: Tympanic membrane, external ear and ear canal normal.   Nose: No mucosal edema or rhinorrhea. Right sinus exhibits maxillary sinus tenderness. Right sinus exhibits no frontal sinus tenderness. Left sinus exhibits maxillary sinus tenderness. Left sinus exhibits no frontal sinus tenderness.   Mouth/Throat: Uvula is midline, oropharynx is clear and moist and mucous membranes are normal.   Eyes: Conjunctivae and EOM are normal.   Cardiovascular: Normal rate and regular rhythm.  "   Pulmonary/Chest: Effort normal and breath sounds normal.   Left side with some abnormal breath sounds but similar area to before. He has hx of PF and has CT repeat scheduled so will not order X-ray    Musculoskeletal: Normal range of motion.   Lymphadenopathy:     He has no cervical adenopathy.   Neurological: He is alert and oriented to person, place, and time.   Skin: Skin is warm. No erythema.           Assessment:       1. Acute sinusitis with symptoms greater than 10 days    2. Erectile dysfunction, unspecified erectile dysfunction type    3. Need for prophylactic vaccination and inoculation against influenza    4. Pulmonary fibrosis        Plan:       Acute sinusitis with symptoms greater than 10 days  -     doxycycline (VIBRA-TABS) 100 MG tablet; Take 1 tablet (100 mg total) by mouth 2 (two) times daily.  Dispense: 20 tablet; Refill: 0  -     desloratadine (CLARINEX) 5 mg tablet; Take 1 tablet (5 mg total) by mouth once daily.  Dispense: 30 tablet; Refill: 11    Erectile dysfunction, unspecified erectile dysfunction type  -     tadalafil (CIALIS) 5 MG tablet; Take 1 tablet (5 mg total) by mouth daily as needed for Erectile Dysfunction.  Dispense: 10 tablet; Refill: 0    Need for prophylactic vaccination and inoculation against influenza  -     Flu Vaccine - Quadrivalent (PF) (3 years & older)    Pulmonary fibrosis  Repeat Ct and follow up with Jacqui as directed           No Follow-up on file.    Flu consent signed by patient. Flu vaccine administered.

## 2017-12-12 ENCOUNTER — TELEPHONE (OUTPATIENT)
Dept: FAMILY MEDICINE | Facility: CLINIC | Age: 47
End: 2017-12-12

## 2018-01-23 ENCOUNTER — OFFICE VISIT (OUTPATIENT)
Dept: FAMILY MEDICINE | Facility: CLINIC | Age: 48
End: 2018-01-23
Payer: COMMERCIAL

## 2018-01-23 VITALS
OXYGEN SATURATION: 98 % | DIASTOLIC BLOOD PRESSURE: 80 MMHG | WEIGHT: 183 LBS | RESPIRATION RATE: 16 BRPM | SYSTOLIC BLOOD PRESSURE: 130 MMHG | BODY MASS INDEX: 30.49 KG/M2 | HEART RATE: 93 BPM | TEMPERATURE: 99 F | HEIGHT: 65 IN

## 2018-01-23 DIAGNOSIS — R05.3 CHRONIC COUGH: ICD-10-CM

## 2018-01-23 DIAGNOSIS — J84.9 INTERSTITIAL LUNG DISEASE: ICD-10-CM

## 2018-01-23 DIAGNOSIS — M25.561 CHRONIC PAIN OF RIGHT KNEE: Primary | ICD-10-CM

## 2018-01-23 DIAGNOSIS — G89.29 CHRONIC PAIN OF RIGHT KNEE: Primary | ICD-10-CM

## 2018-01-23 PROCEDURE — 99214 OFFICE O/P EST MOD 30 MIN: CPT | Mod: S$GLB,,, | Performed by: FAMILY MEDICINE

## 2018-01-23 PROCEDURE — 99999 PR PBB SHADOW E&M-EST. PATIENT-LVL III: CPT | Mod: PBBFAC,,, | Performed by: FAMILY MEDICINE

## 2018-01-23 PROCEDURE — 3008F BODY MASS INDEX DOCD: CPT | Mod: S$GLB,,, | Performed by: FAMILY MEDICINE

## 2018-01-23 RX ORDER — DICLOFENAC SODIUM 10 MG/G
GEL TOPICAL
Qty: 100 G | Refills: 1 | Status: SHIPPED | OUTPATIENT
Start: 2018-01-23 | End: 2019-01-23

## 2018-01-23 RX ORDER — DOXYCYCLINE 100 MG/1
100 CAPSULE ORAL 2 TIMES DAILY
Qty: 28 CAPSULE | Refills: 0 | Status: SHIPPED | OUTPATIENT
Start: 2018-01-23 | End: 2018-02-02

## 2018-01-23 RX ORDER — METHYLPREDNISOLONE 4 MG/1
TABLET ORAL
Qty: 1 PACKAGE | Refills: 0 | Status: SHIPPED | OUTPATIENT
Start: 2018-01-23 | End: 2018-02-02

## 2018-02-02 ENCOUNTER — OFFICE VISIT (OUTPATIENT)
Dept: FAMILY MEDICINE | Facility: CLINIC | Age: 48
End: 2018-02-02
Payer: COMMERCIAL

## 2018-02-02 VITALS
SYSTOLIC BLOOD PRESSURE: 134 MMHG | HEIGHT: 64 IN | TEMPERATURE: 98 F | OXYGEN SATURATION: 99 % | HEART RATE: 85 BPM | BODY MASS INDEX: 31.24 KG/M2 | RESPIRATION RATE: 18 BRPM | DIASTOLIC BLOOD PRESSURE: 84 MMHG | WEIGHT: 183 LBS

## 2018-02-02 DIAGNOSIS — Z02.4 ENCOUNTER FOR CDL (COMMERCIAL DRIVING LICENSE) EXAM: Primary | ICD-10-CM

## 2018-02-02 LAB
BILIRUB SERPL-MCNC: NORMAL MG/DL
BLOOD URINE, POC: NORMAL
COLOR, POC UA: YELLOW
GLUCOSE UR QL STRIP: NORMAL
KETONES UR QL STRIP: NORMAL
LEUKOCYTE ESTERASE URINE, POC: NORMAL
NITRITE, POC UA: NORMAL
PH, POC UA: 5
PROTEIN, POC: NORMAL
SPECIFIC GRAVITY, POC UA: 1.01
UROBILINOGEN, POC UA: NORMAL

## 2018-02-02 PROCEDURE — 99214 OFFICE O/P EST MOD 30 MIN: CPT | Mod: 25,S$GLB,, | Performed by: NURSE PRACTITIONER

## 2018-02-02 PROCEDURE — 99999 PR PBB SHADOW E&M-EST. PATIENT-LVL III: CPT | Mod: PBBFAC,,, | Performed by: NURSE PRACTITIONER

## 2018-02-02 PROCEDURE — 3008F BODY MASS INDEX DOCD: CPT | Mod: S$GLB,,, | Performed by: NURSE PRACTITIONER

## 2018-02-02 PROCEDURE — 81001 URINALYSIS AUTO W/SCOPE: CPT | Mod: S$GLB,,, | Performed by: NURSE PRACTITIONER

## 2018-02-02 NOTE — PATIENT INSTRUCTIONS
Follow up with primary care provider as previously indicated  Go to ER for new, worse or concerning symptoms    Arthralgia    Arthralgia is the term for pain in or around the joint. It is a symptom, not a disease. This pain may involve one or more joints. In some cases, the pain moves from joint to joint.  There are many causes for joint pain. These include:  · Injury  · Osteoarthritis (wearing out of the joint surface)  · Gout (inflammation of the joint due to crystals in the joint fluid)  · Infection inside the joint    · Bursitis (inflammation of the fluid-filled sacs around the joint)  · Autoimmune disorders such as rheumatoid arthritis or lupus  · Tendonitis (inflammation of chords that attach muscle to bone)  Home care  · Rest the involved joint(s) until your symptoms improve.   · You may be prescribed pain medicine. If none is prescribed, you may use acetaminophen or ibuprofen to control pain and inflammation.  Follow-up care  Follow up with your healthcare provider or as advised.  When to seek medical advice  Contact your healthcare provider right away if any of the following occurs:  · Pain, swelling, or redness of joint increases  · Pain worsens or recurs after a period of improvement  · Pain moves to other joints  · You cannot bear weight on the affected joint   · You cannot move the affected joint  · Joint appears deformed  · New rash appears  · Fever of 100.4ºF (38ºC) or higher, or as directed by your healthcare provider  Date Last Reviewed: 3/1/2017  © 9622-1819 [employer]. [address] [place] [address], [place] [address]. All rights reserved. This information is not intended as a substitute for professional medical care. Always follow your healthcare professional's instructions.

## 2018-02-02 NOTE — PROGRESS NOTES
Subjective:       Patient ID: Ronal Ham is a 47 y.o. male.    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  He feels well today.  He denies any h/o seizure, DM, LOC.  He reports medication compliance.    Review of Systems   Constitutional: Negative for fever.   Respiratory: Negative.    Cardiovascular: Negative.        Objective:      Physical Exam   Constitutional: He is oriented to person, place, and time. He appears well-developed and well-nourished. He does not appear ill. No distress.   HENT:   Head: Normocephalic and atraumatic.   Right Ear: Tympanic membrane normal.   Left Ear: Tympanic membrane normal.   Nose: Nose normal.   Mouth/Throat: Uvula is midline, oropharynx is clear and moist and mucous membranes are normal.   Forced whisper WNL at 5 feet   Eyes: EOM are normal. Pupils are equal, round, and reactive to light.   Corrected visual acuity under hearing/vision tab  Able to distinguish colors on the flag  Horizontal field of vision 70 degrees   Cardiovascular: Normal rate, regular rhythm and normal heart sounds.  Exam reveals no friction rub.    No murmur heard.  Pulses:       Radial pulses are 2+ on the right side, and 2+ on the left side.        Dorsalis pedis pulses are 2+ on the right side, and 2+ on the left side.        Posterior tibial pulses are 2+ on the right side, and 2+ on the left side.   Pulmonary/Chest: Effort normal and breath sounds normal. No respiratory distress. He has no decreased breath sounds. He has no wheezes. He has no rhonchi. He has no rales.   Abdominal: Soft. Bowel sounds are normal. He exhibits no distension. There is no tenderness. There is no guarding.   Musculoskeletal: Normal range of motion.   BUE 5/5  BLE 5/5+  B hand  equal and strong   Neurological: He is alert and oriented to person, place, and time.   Skin: Skin is warm and dry. Capillary refill takes less than 2 seconds.   Vitals  reviewed.      Assessment:       1. Encounter for CDL (commercial driving license) exam        Plan:       Encounter for CDL (commercial driving license) exam  -     POCT urinalysis, dipstick or tablet reag    He is certified for 2 years, he should report any changes in his condition or medical regimen.  F/u with PCP as previously directed

## 2018-02-03 ENCOUNTER — PATIENT MESSAGE (OUTPATIENT)
Dept: FAMILY MEDICINE | Facility: CLINIC | Age: 48
End: 2018-02-03

## 2018-02-03 DIAGNOSIS — R05.3 CHRONIC COUGH: Primary | ICD-10-CM

## 2018-02-05 RX ORDER — SILDENAFIL 100 MG/1
100 TABLET, FILM COATED ORAL DAILY PRN
Qty: 10 TABLET | Refills: 5 | Status: SHIPPED | OUTPATIENT
Start: 2018-02-05 | End: 2020-01-03 | Stop reason: SDUPTHER

## 2018-02-05 RX ORDER — PROMETHAZINE HYDROCHLORIDE AND DEXTROMETHORPHAN HYDROBROMIDE 6.25; 15 MG/5ML; MG/5ML
SYRUP ORAL
Qty: 480 ML | Refills: 0 | Status: SHIPPED | OUTPATIENT
Start: 2018-02-05 | End: 2018-04-27

## 2018-03-12 DIAGNOSIS — I10 BENIGN HYPERTENSION: ICD-10-CM

## 2018-03-12 NOTE — TELEPHONE ENCOUNTER
----- Message from Haley Bailey sent at 3/12/2018  4:16 PM CDT -----  Contact: self  Refill request for ---amlodipine (NORVASC) 10 MG tabletamlodipine (NORVASC) 10 MG tablet-- [facility] on [address]   [phone].

## 2018-03-13 RX ORDER — AMLODIPINE BESYLATE 10 MG/1
10 TABLET ORAL DAILY
Qty: 90 TABLET | Refills: 1 | Status: SHIPPED | OUTPATIENT
Start: 2018-03-13 | End: 2018-09-27 | Stop reason: SDUPTHER

## 2018-03-16 ENCOUNTER — PATIENT MESSAGE (OUTPATIENT)
Dept: FAMILY MEDICINE | Facility: CLINIC | Age: 48
End: 2018-03-16

## 2018-03-16 DIAGNOSIS — J84.9 INTERSTITIAL LUNG DISEASE: ICD-10-CM

## 2018-03-17 RX ORDER — ALBUTEROL SULFATE 90 UG/1
AEROSOL, METERED RESPIRATORY (INHALATION)
Qty: 18 G | Refills: 0 | Status: SHIPPED | OUTPATIENT
Start: 2018-03-17 | End: 2018-04-27 | Stop reason: SDUPTHER

## 2018-04-27 ENCOUNTER — TELEPHONE (OUTPATIENT)
Dept: FAMILY MEDICINE | Facility: CLINIC | Age: 48
End: 2018-04-27

## 2018-04-27 ENCOUNTER — HOSPITAL ENCOUNTER (OUTPATIENT)
Dept: RADIOLOGY | Facility: HOSPITAL | Age: 48
Discharge: HOME | End: 2018-04-27
Attending: INTERNAL MEDICINE
Payer: COMMERCIAL

## 2018-04-27 ENCOUNTER — OFFICE VISIT (OUTPATIENT)
Dept: FAMILY MEDICINE | Facility: CLINIC | Age: 48
End: 2018-04-27
Payer: COMMERCIAL

## 2018-04-27 VITALS
HEIGHT: 64 IN | BODY MASS INDEX: 31.96 KG/M2 | SYSTOLIC BLOOD PRESSURE: 116 MMHG | HEART RATE: 93 BPM | TEMPERATURE: 99 F | WEIGHT: 187.19 LBS | DIASTOLIC BLOOD PRESSURE: 86 MMHG | OXYGEN SATURATION: 99 %

## 2018-04-27 DIAGNOSIS — J06.9 UPPER RESPIRATORY INFECTION WITH COUGH AND CONGESTION: ICD-10-CM

## 2018-04-27 DIAGNOSIS — M25.551 RIGHT HIP PAIN: ICD-10-CM

## 2018-04-27 DIAGNOSIS — M25.551 RIGHT HIP PAIN: Primary | ICD-10-CM

## 2018-04-27 PROCEDURE — 3074F SYST BP LT 130 MM HG: CPT | Mod: CPTII,S$GLB,, | Performed by: INTERNAL MEDICINE

## 2018-04-27 PROCEDURE — 99214 OFFICE O/P EST MOD 30 MIN: CPT | Mod: 25,S$GLB,, | Performed by: INTERNAL MEDICINE

## 2018-04-27 PROCEDURE — 3079F DIAST BP 80-89 MM HG: CPT | Mod: CPTII,S$GLB,, | Performed by: INTERNAL MEDICINE

## 2018-04-27 PROCEDURE — 73502 X-RAY EXAM HIP UNI 2-3 VIEWS: CPT | Mod: 26,RT,, | Performed by: RADIOLOGY

## 2018-04-27 PROCEDURE — 96372 THER/PROPH/DIAG INJ SC/IM: CPT | Mod: S$GLB,,, | Performed by: INTERNAL MEDICINE

## 2018-04-27 PROCEDURE — 73502 X-RAY EXAM HIP UNI 2-3 VIEWS: CPT | Mod: TC,FY,RT

## 2018-04-27 PROCEDURE — 99999 PR PBB SHADOW E&M-EST. PATIENT-LVL III: CPT | Mod: PBBFAC,,, | Performed by: INTERNAL MEDICINE

## 2018-04-27 RX ORDER — KETOROLAC TROMETHAMINE 30 MG/ML
30 INJECTION, SOLUTION INTRAMUSCULAR; INTRAVENOUS
Status: COMPLETED | OUTPATIENT
Start: 2018-04-27 | End: 2018-04-27

## 2018-04-27 RX ORDER — METHYLPREDNISOLONE ACETATE 40 MG/ML
40 INJECTION, SUSPENSION INTRA-ARTICULAR; INTRALESIONAL; INTRAMUSCULAR; SOFT TISSUE
Status: COMPLETED | OUTPATIENT
Start: 2018-04-27 | End: 2018-04-27

## 2018-04-27 RX ORDER — METHYLPREDNISOLONE 4 MG/1
TABLET ORAL
Qty: 1 PACKAGE | Refills: 0 | Status: SHIPPED | OUTPATIENT
Start: 2018-04-28 | End: 2018-10-01 | Stop reason: SDUPTHER

## 2018-04-27 RX ORDER — ALBUTEROL SULFATE 90 UG/1
AEROSOL, METERED RESPIRATORY (INHALATION)
Qty: 18 G | Refills: 3 | Status: SHIPPED | OUTPATIENT
Start: 2018-04-27 | End: 2018-11-20 | Stop reason: SDUPTHER

## 2018-04-27 RX ORDER — PROMETHAZINE HYDROCHLORIDE AND DEXTROMETHORPHAN HYDROBROMIDE 6.25; 15 MG/5ML; MG/5ML
5 SYRUP ORAL EVERY 12 HOURS PRN
Qty: 180 ML | Refills: 0 | Status: SHIPPED | OUTPATIENT
Start: 2018-04-27 | End: 2018-05-07

## 2018-04-27 RX ADMIN — KETOROLAC TROMETHAMINE 30 MG: 30 INJECTION, SOLUTION INTRAMUSCULAR; INTRAVENOUS at 03:04

## 2018-04-27 RX ADMIN — METHYLPREDNISOLONE ACETATE 40 MG: 40 INJECTION, SUSPENSION INTRA-ARTICULAR; INTRALESIONAL; INTRAMUSCULAR; SOFT TISSUE at 03:04

## 2018-04-27 NOTE — TELEPHONE ENCOUNTER
Called pt and gave him results of xray. All questions/concerns addressed and he voiced understanding.

## 2018-04-27 NOTE — PROGRESS NOTES
SUBJECTIVE     Chief Complaint   Patient presents with    Hip Pain     R side hip pain. Dull pain . Can barely walk    Sinus Problem    Cough    Nasal Congestion       HPI  Ronal Ham is a 47 y.o. male with multiple medical diagnoses as listed in the medical history and problem list that presents for evaluation of R hip pain x 1 week. Pt reports pain has been nagging/throbbing at a 7/10 and constant in nature, but is worse when he applies pressure. Pt reports some radiation down the leg. He has been jumping rope and lifting weights, but denies any preceding trauma or falls. He has been taking Tizanidine with minimal relief of pain.    URI- x 1 week. Pt reports a dry cough, runny nose, and R ear fullness. Pt denies any sore throat. +subjective fever, but denies chills or night sweats. He has been taking OTC sinus meds, similar to Claritin, with minimal improvement. Denies any sick contacts/recent travel.    PAST MEDICAL HISTORY:  Past Medical History:   Diagnosis Date    Arthritis     Eye injury     od hit above od    GERD (gastroesophageal reflux disease)     Hypertension     Lupus (systemic lupus erythematosus)     Pulmonary fibrosis     Raynaud phenomenon        PAST SURGICAL HISTORY:  Past Surgical History:   Procedure Laterality Date    HERNIA REPAIR         SOCIAL HISTORY:  Social History     Social History    Marital status:      Spouse name: N/A    Number of children: 5    Years of education: some colle     Occupational History     [place]  [employer]     Social History Main Topics    Smoking status: Never Smoker    Smokeless tobacco: Never Used    Alcohol use No    Drug use: No    Sexual activity: Yes     Partners: Female     Other Topics Concern    Not on file     Social History Narrative    Adult Screenings updated and reviewed    Mammogram( for females)    Pap ( for females)    PSA( for males )    Colonoscopy age  48    Flu shot yearly    Td      Pneumovax recommended one time  at age  63    Zostavax recommended one time at  age  58    Eye exam recommended yearly    Bone density        FAMILY HISTORY:  Family History   Problem Relation Age of Onset    Heart disease Mother     Heart disease Father     Glaucoma Father     Glaucoma Brother     No Known Problems Sister     No Known Problems Daughter     No Known Problems Son     No Known Problems Maternal Aunt     No Known Problems Maternal Uncle     No Known Problems Paternal Aunt     No Known Problems Paternal Uncle     No Known Problems Maternal Grandmother     No Known Problems Maternal Grandfather     No Known Problems Paternal Grandmother     No Known Problems Paternal Grandfather     Asthma Neg Hx     Emphysema Neg Hx     Amblyopia Neg Hx     Blindness Neg Hx     Cancer Neg Hx     Cataracts Neg Hx     Diabetes Neg Hx     Hypertension Neg Hx     Macular degeneration Neg Hx     Retinal detachment Neg Hx     Strabismus Neg Hx     Stroke Neg Hx     Thyroid disease Neg Hx        ALLERGIES AND MEDICATIONS: updated and reviewed.  Review of patient's allergies indicates:   Allergen Reactions    Carafate  [sucralfate] Rash     Other reaction(s): Hives     Current Outpatient Prescriptions   Medication Sig Dispense Refill    albuterol (VENTOLIN HFA) 90 mcg/actuation inhaler USE 2 PUFFS BY MOUTH EVERY 6 HOURS AS NEEDED FOR WHEEZING 18 g 3    amLODIPine (NORVASC) 10 MG tablet Take 1 tablet (10 mg total) by mouth once daily. 90 tablet 1    desloratadine (CLARINEX) 5 mg tablet Take 1 tablet (5 mg total) by mouth once daily. 30 tablet 11    diclofenac sodium (VOLTAREN) 1 % Gel Lower extremities: Apply the gel (4 g) to the affected area 4 times daily. Do not apply more than 16 g daily to any one affected joint of the lower extremities. Upper extremities: Apply the gel (2 g) to the affected area 4 times daily. Do not apply more than 8 g daily to any one affected joint of the upper  extremities. Total dose should not exceed 32 g per day, overall affected joints. 100 g 1    econazole nitrate 1 % cream ANTIONE EXT TO THE AFFECTED AND SURROUNDING AREAS OF SKIN 1 TIME PER DAY  12    hydroxychloroquine (PLAQUENIL) 200 mg tablet TAKE 2 TABLETS BY MOUTH DAILY 180 tablet 3    JUBLIA 10 % Talat APPLY TO AFFECTED TOENAIL(S) BY TOPICAL ROUTE ONCE DAILY  12    mometasone (NASONEX) 50 mcg/actuation nasal spray 2 sprays by Nasal route once daily. 17 g 3    sildenafil (VIAGRA) 100 MG tablet Take 1 tablet (100 mg total) by mouth daily as needed for Erectile Dysfunction. 10 tablet 5    tadalafil (CIALIS) 5 MG tablet Take 1 tablet (5 mg total) by mouth daily as needed for Erectile Dysfunction. 10 tablet 0    [START ON 4/28/2018] methylPREDNISolone (MEDROL DOSEPACK) 4 mg tablet use as directed 1 Package 0    promethazine-dextromethorphan (PROMETHAZINE-DM) 6.25-15 mg/5 mL Syrp Take 5 mLs by mouth every 12 (twelve) hours as needed. 180 mL 0     Current Facility-Administered Medications   Medication Dose Route Frequency Provider Last Rate Last Dose    EUFLEXXA 10 mg/mL(mw 2.4 -3.6 million) injection Syrg 40 mg  40 mg Intra-articular Weekly Paulette Sewell NP   40 mg at 10/18/17 1637       ROS  Review of Systems   Constitutional: Negative for chills and fever.   HENT: Positive for congestion and rhinorrhea. Negative for hearing loss and sore throat.    Eyes: Negative for visual disturbance.   Respiratory: Positive for cough and shortness of breath.    Cardiovascular: Negative for chest pain, palpitations and leg swelling.   Gastrointestinal: Negative for abdominal pain, constipation, diarrhea, nausea and vomiting.   Genitourinary: Negative for dysuria, frequency and urgency.   Musculoskeletal: Positive for arthralgias (R hip pain). Negative for joint swelling and myalgias.   Skin: Negative for rash and wound.   Neurological: Negative for headaches.   Psychiatric/Behavioral: Negative for agitation and confusion.  "The patient is not nervous/anxious.          OBJECTIVE     Physical Exam  Vitals:    04/27/18 1445   BP: 116/86   Pulse: 93   Temp: 99.1 °F (37.3 °C)    Body mass index is 32.13 kg/m².  Weight: 84.9 kg (187 lb 2.7 oz)   Height: 5' 4" (162.6 cm)     Physical Exam   Constitutional: He is oriented to person, place, and time. He appears well-developed and well-nourished. No distress.   HENT:   Head: Normocephalic and atraumatic.   Right Ear: External ear normal.   Left Ear: External ear normal.   Nose: Nose normal.   Mouth/Throat: Oropharynx is clear and moist.   Eyes: Conjunctivae and EOM are normal. Right eye exhibits no discharge. Left eye exhibits no discharge. No scleral icterus.   Neck: Normal range of motion. Neck supple. No JVD present. No tracheal deviation present.   Cardiovascular: Normal rate, regular rhythm, normal heart sounds and intact distal pulses.  Exam reveals no gallop and no friction rub.    No murmur heard.  Pulmonary/Chest: Effort normal and breath sounds normal. No respiratory distress. He has no wheezes.   Abdominal: Soft. Bowel sounds are normal. He exhibits no distension and no mass. There is no tenderness. There is no rebound and no guarding.   Musculoskeletal: Normal range of motion. He exhibits no edema or deformity.        Right hip: He exhibits tenderness. He exhibits normal range of motion, normal strength and no bony tenderness.   Neurological: He is alert and oriented to person, place, and time. He exhibits normal muscle tone. Coordination normal.   Skin: Skin is warm and dry. No rash noted. No erythema.   Psychiatric: He has a normal mood and affect. His behavior is normal. Judgment and thought content normal.         Health Maintenance       Date Due Completion Date    TETANUS VACCINE 06/02/2020 6/2/2010    Lipid Panel 05/02/2022 5/2/2017            ASSESSMENT     47 y.o. male with     1. Right hip pain    2. Upper respiratory infection with cough and congestion        PLAN:     1. " Right hip pain  - Likely 2/2 greater trochanteric bursitis vs IT band syndrome vs OA; will proceed with imaging  - Pt encouraged to apply ice packs 2-3 times daily at 10 minute intervals x 72 hours, then okay to change to heating compress with care not to burn his self; he  voiced understanding   - Pt to take NSAIDs prn pain  - X-Ray Hip 2 View Right; Future  - ketorolac injection 30 mg; Inject 1 mL (30 mg total) into the muscle one time.  - methylPREDNISolone acetate injection 40 mg; Inject 1 mL (40 mg total) into the muscle one time.  - methylPREDNISolone (MEDROL DOSEPACK) 4 mg tablet; use as directed  Dispense: 1 Package; Refill: 0    2. Upper respiratory infection with cough and congestion  - Continue symptomatic treatment with rest, increase fluid intake, tylenol or ibuprofen PRN fever(temp >/= 100.4) or body aches. Okay to take OTC antihistamines, i.e. Bendaryl, Claritin, Allegra, etc. as needed.  - Okay to gargle with warm, salt water or use throat lozenges as needed  - methylPREDNISolone (MEDROL DOSEPACK) 4 mg tablet; use as directed  Dispense: 1 Package; Refill: 0  - albuterol (VENTOLIN HFA) 90 mcg/actuation inhaler; USE 2 PUFFS BY MOUTH EVERY 6 HOURS AS NEEDED FOR WHEEZING  Dispense: 18 g; Refill: 3  - promethazine-dextromethorphan (PROMETHAZINE-DM) 6.25-15 mg/5 mL Syrp; Take 5 mLs by mouth every 12 (twelve) hours as needed.  Dispense: 180 mL; Refill: 0        RTC in 1-2 weeks as needed for any acute worsening of current condition or failure to improve     Ramila Hurt MD  04/27/2018 3:07 PM        No Follow-up on file.

## 2018-08-09 ENCOUNTER — OFFICE VISIT (OUTPATIENT)
Dept: FAMILY MEDICINE | Facility: CLINIC | Age: 48
End: 2018-08-09
Payer: COMMERCIAL

## 2018-08-09 VITALS
DIASTOLIC BLOOD PRESSURE: 88 MMHG | HEART RATE: 86 BPM | SYSTOLIC BLOOD PRESSURE: 138 MMHG | HEIGHT: 64 IN | BODY MASS INDEX: 30.41 KG/M2 | OXYGEN SATURATION: 98 % | TEMPERATURE: 99 F | WEIGHT: 178.13 LBS

## 2018-08-09 DIAGNOSIS — J84.9 INTERSTITIAL LUNG DISEASE: ICD-10-CM

## 2018-08-09 DIAGNOSIS — M32.13 OTHER SYSTEMIC LUPUS ERYTHEMATOSUS WITH LUNG INVOLVEMENT: Primary | ICD-10-CM

## 2018-08-09 DIAGNOSIS — J31.0 CHRONIC RHINITIS: ICD-10-CM

## 2018-08-09 DIAGNOSIS — Z00.00 ANNUAL PHYSICAL EXAM: ICD-10-CM

## 2018-08-09 PROCEDURE — 3079F DIAST BP 80-89 MM HG: CPT | Mod: CPTII,S$GLB,, | Performed by: FAMILY MEDICINE

## 2018-08-09 PROCEDURE — 3008F BODY MASS INDEX DOCD: CPT | Mod: CPTII,S$GLB,, | Performed by: FAMILY MEDICINE

## 2018-08-09 PROCEDURE — 99999 PR PBB SHADOW E&M-EST. PATIENT-LVL III: CPT | Mod: PBBFAC,,, | Performed by: FAMILY MEDICINE

## 2018-08-09 PROCEDURE — 99214 OFFICE O/P EST MOD 30 MIN: CPT | Mod: S$GLB,,, | Performed by: FAMILY MEDICINE

## 2018-08-09 PROCEDURE — 3075F SYST BP GE 130 - 139MM HG: CPT | Mod: CPTII,S$GLB,, | Performed by: FAMILY MEDICINE

## 2018-08-09 RX ORDER — PREDNISONE 20 MG/1
60 TABLET ORAL DAILY
Qty: 21 TABLET | Refills: 0 | Status: SHIPPED | OUTPATIENT
Start: 2018-08-09 | End: 2018-08-16

## 2018-08-09 RX ORDER — DOXYCYCLINE 100 MG/1
100 CAPSULE ORAL 2 TIMES DAILY
Qty: 28 CAPSULE | Refills: 0 | Status: SHIPPED | OUTPATIENT
Start: 2018-08-09 | End: 2018-09-08

## 2018-08-09 RX ORDER — AZITHROMYCIN 250 MG/1
TABLET, FILM COATED ORAL
Qty: 6 TABLET | Refills: 0 | Status: SHIPPED | OUTPATIENT
Start: 2018-08-09 | End: 2018-10-01

## 2018-08-09 NOTE — PROGRESS NOTES
Chief Complaint   Patient presents with    Cough     no sore throat or ear pain    Shortness of Breath     going on x 1 week    Fatigue       SUBJECTIVE:  Ronal Ham is a 48 y.o. male here for new problem of flare of his cough and ILD, eh has been sneezing, itching watery nose and eyes that progressed to thick mucus and more cough and Castro with sinus TTP over the last 10-14 days, no overt fever or chills noted.  Currently has co-morbidities including per problem list.      Past Medical History:   Diagnosis Date    Arthritis     Eye injury     od hit above od    GERD (gastroesophageal reflux disease)     Hypertension     Lupus (systemic lupus erythematosus)     Pulmonary fibrosis     Raynaud phenomenon      Past Surgical History:   Procedure Laterality Date    HERNIA REPAIR       Social History     Social History    Marital status:      Spouse name: N/A    Number of children: 5    Years of education: some colle     Occupational History     [place]  [employer]     Social History Main Topics    Smoking status: Never Smoker    Smokeless tobacco: Never Used    Alcohol use No    Drug use: No    Sexual activity: Yes     Partners: Female     Other Topics Concern    Not on file     Social History Narrative    Adult Screenings updated and reviewed    Mammogram( for females)    Pap ( for females)    PSA( for males )    Colonoscopy age  48    Flu shot yearly    Td     Pneumovax recommended one time  at age  63    Zostavax recommended one time at  age  58    Eye exam recommended yearly    Bone density      Family History   Problem Relation Age of Onset    Heart disease Mother     Heart disease Father     Glaucoma Father     Glaucoma Brother     No Known Problems Sister     No Known Problems Daughter     No Known Problems Son     No Known Problems Maternal Aunt     No Known Problems Maternal Uncle     No Known Problems Paternal Aunt     No Known Problems Paternal  Uncle     No Known Problems Maternal Grandmother     No Known Problems Maternal Grandfather     No Known Problems Paternal Grandmother     No Known Problems Paternal Grandfather     Asthma Neg Hx     Emphysema Neg Hx     Amblyopia Neg Hx     Blindness Neg Hx     Cancer Neg Hx     Cataracts Neg Hx     Diabetes Neg Hx     Hypertension Neg Hx     Macular degeneration Neg Hx     Retinal detachment Neg Hx     Strabismus Neg Hx     Stroke Neg Hx     Thyroid disease Neg Hx      Current Outpatient Prescriptions on File Prior to Visit   Medication Sig Dispense Refill    albuterol (VENTOLIN HFA) 90 mcg/actuation inhaler USE 2 PUFFS BY MOUTH EVERY 6 HOURS AS NEEDED FOR WHEEZING 18 g 3    amLODIPine (NORVASC) 10 MG tablet Take 1 tablet (10 mg total) by mouth once daily. 90 tablet 1    desloratadine (CLARINEX) 5 mg tablet Take 1 tablet (5 mg total) by mouth once daily. 30 tablet 11    hydroxychloroquine (PLAQUENIL) 200 mg tablet TAKE 2 TABLETS BY MOUTH DAILY 180 tablet 3    diclofenac sodium (VOLTAREN) 1 % Gel Lower extremities: Apply the gel (4 g) to the affected area 4 times daily. Do not apply more than 16 g daily to any one affected joint of the lower extremities. Upper extremities: Apply the gel (2 g) to the affected area 4 times daily. Do not apply more than 8 g daily to any one affected joint of the upper extremities. Total dose should not exceed 32 g per day, overall affected joints. 100 g 1    econazole nitrate 1 % cream ANTIONE EXT TO THE AFFECTED AND SURROUNDING AREAS OF SKIN 1 TIME PER DAY  12    JUBLIA 10 % Talat APPLY TO AFFECTED TOENAIL(S) BY TOPICAL ROUTE ONCE DAILY  12    methylPREDNISolone (MEDROL DOSEPACK) 4 mg tablet use as directed 1 Package 0    mometasone (NASONEX) 50 mcg/actuation nasal spray 2 sprays by Nasal route once daily. 17 g 3    sildenafil (VIAGRA) 100 MG tablet Take 1 tablet (100 mg total) by mouth daily as needed for Erectile Dysfunction. 10 tablet 5    tadalafil (CIALIS)  "5 MG tablet Take 1 tablet (5 mg total) by mouth daily as needed for Erectile Dysfunction. 10 tablet 0     Current Facility-Administered Medications on File Prior to Visit   Medication Dose Route Frequency Provider Last Rate Last Dose    EUFLEXXA 10 mg/mL(mw 2.4 -3.6 million) injection Syrg 40 mg  40 mg Intra-articular Weekly Paulette SewellQI   40 mg at 10/18/17 1637     Review of patient's allergies indicates:   Allergen Reactions    Carafate  [sucralfate] Rash     Other reaction(s): Hives         ROS    OBJECTIVE:  /88   Pulse 86   Temp 99 °F (37.2 °C) (Oral)   Ht 5' 4" (1.626 m)   Wt 80.8 kg (178 lb 2.1 oz)   SpO2 98%   BMI 30.58 kg/m²     Wt Readings from Last 3 Encounters:   08/09/18 80.8 kg (178 lb 2.1 oz)   04/27/18 84.9 kg (187 lb 2.7 oz)   02/02/18 83 kg (183 lb)     BP Readings from Last 3 Encounters:   08/09/18 138/88   04/27/18 116/86   02/02/18 134/84       Appears somewhat ill, alert and oriented  Chronic skin changes noted, no new focal lesions.  Lungs coarse throughout no signs of consolidation  Nose with severe coryza and thick mucus, left sinus TTP  Throat with PND  No significant LAD.    Review of old Records:  Reviewed per [facility]    Review of old labs:      Review of old imaging:      ASSESSMENT:  Problem List Items Addressed This Visit     SLE (systemic lupus erythematosus) - Primary    Relevant Medications    predniSONE (DELTASONE) 20 MG tablet    azithromycin (Z-ANDREY) 250 MG tablet    doxycycline (MONODOX) 100 MG capsule    Interstitial lung disease    Relevant Medications    predniSONE (DELTASONE) 20 MG tablet    azithromycin (Z-ANDREY) 250 MG tablet    doxycycline (MONODOX) 100 MG capsule    Chronic rhinitis    Relevant Medications    predniSONE (DELTASONE) 20 MG tablet    azithromycin (Z-ANDREY) 250 MG tablet    doxycycline (MONODOX) 100 MG capsule      Other Visit Diagnoses     Annual physical exam        Relevant Orders    CBC auto differential    Comprehensive metabolic panel    " Hemoglobin A1c    Lipid panel    HIV-1 and HIV-2 antibodies    C. trachomatis/N. gonorrhoeae by AMP DNA    TSH    Uric acid    Urinalysis    Testosterone Panel    RPR          ICD-10-CM ICD-9-CM   1. Other systemic lupus erythematosus with lung involvement M32.13 710.0     517.8   2. Interstitial lung disease J84.9 515   3. Chronic rhinitis J31.0 472.0   4. Annual physical exam Z00.00 V70.0         PLAN:    Suspect COPD like exacerbation  Treat similarly as well as some sinusitis.  He will f/u if not improved  Has done well in past  If clears he will then get labs and do f/u for annual    1. Other systemic lupus erythematosus with lung involvement    - predniSONE (DELTASONE) 20 MG tablet; Take 3 tablets (60 mg total) by mouth once daily. for 7 days  Dispense: 21 tablet; Refill: 0  - azithromycin (Z-ANDREY) 250 MG tablet; Take 1 po daily, take 2 on day one.  Dispense: 6 tablet; Refill: 0  - doxycycline (MONODOX) 100 MG capsule; Take 1 capsule (100 mg total) by mouth 2 (two) times daily.  Dispense: 28 capsule; Refill: 0    2. Interstitial lung disease   predniSONE (DELTASONE) 20 MG tablet; Take 3 tablets (60 mg total) by mouth once daily. for 7 days  Dispense: 21 tablet; Refill: 0  - azithromycin (Z-ANDREY) 250 MG tablet; Take 1 po daily, take 2 on day one.  Dispense: 6 tablet; Refill: 0  - doxycycline (MONODOX) 100 MG capsule; Take 1 capsule (100 mg total) by mouth 2 (two) times daily.  Dispense: 28 capsule; Refill: 0    3. Chronic rhinitis    - predniSONE (DELTASONE) 20 MG tablet; Take 3 tablets (60 mg total) by mouth once daily. for 7 days  Dispense: 21 tablet; Refill: 0  - azithromycin (Z-ANDREY) 250 MG tablet; Take 1 po daily, take 2 on day one.  Dispense: 6 tablet; Refill: 0  - doxycycline (MONODOX) 100 MG capsule; Take 1 capsule (100 mg total) by mouth 2 (two) times daily.  Dispense: 28 capsule; Refill: 0    4. Annual physical exam    - CBC auto differential; Standing  - Comprehensive metabolic panel; Standing  -  Hemoglobin A1c; Standing  - Lipid panel; Standing  - HIV-1 and HIV-2 antibodies; Future  - C. trachomatis/N. gonorrhoeae by AMP DNA; Future  - TSH; Standing  - Uric acid; Future  - Urinalysis; Standing  - Testosterone Panel; Future  - RPR; Future      Medication List with Changes/Refills   New Medications    AZITHROMYCIN (Z-ANDREY) 250 MG TABLET    Take 1 po daily, take 2 on day one.    DOXYCYCLINE (MONODOX) 100 MG CAPSULE    Take 1 capsule (100 mg total) by mouth 2 (two) times daily.    PREDNISONE (DELTASONE) 20 MG TABLET    Take 3 tablets (60 mg total) by mouth once daily. for 7 days   Current Medications    ALBUTEROL (VENTOLIN HFA) 90 MCG/ACTUATION INHALER    USE 2 PUFFS BY MOUTH EVERY 6 HOURS AS NEEDED FOR WHEEZING    AMLODIPINE (NORVASC) 10 MG TABLET    Take 1 tablet (10 mg total) by mouth once daily.    DESLORATADINE (CLARINEX) 5 MG TABLET    Take 1 tablet (5 mg total) by mouth once daily.    DICLOFENAC SODIUM (VOLTAREN) 1 % GEL    Lower extremities: Apply the gel (4 g) to the affected area 4 times daily. Do not apply more than 16 g daily to any one affected joint of the lower extremities. Upper extremities: Apply the gel (2 g) to the affected area 4 times daily. Do not apply more than 8 g daily to any one affected joint of the upper extremities. Total dose should not exceed 32 g per day, overall affected joints.    ECONAZOLE NITRATE 1 % CREAM    ANTIONE EXT TO THE AFFECTED AND SURROUNDING AREAS OF SKIN 1 TIME PER DAY    HYDROXYCHLOROQUINE (PLAQUENIL) 200 MG TABLET    TAKE 2 TABLETS BY MOUTH DAILY    JUBLIA 10 % FITO    APPLY TO AFFECTED TOENAIL(S) BY TOPICAL ROUTE ONCE DAILY    METHYLPREDNISOLONE (MEDROL DOSEPACK) 4 MG TABLET    use as directed    MOMETASONE (NASONEX) 50 MCG/ACTUATION NASAL SPRAY    2 sprays by Nasal route once daily.    SILDENAFIL (VIAGRA) 100 MG TABLET    Take 1 tablet (100 mg total) by mouth daily as needed for Erectile Dysfunction.    TADALAFIL (CIALIS) 5 MG TABLET    Take 1 tablet (5 mg total)  by mouth daily as needed for Erectile Dysfunction.       No Follow-up on file.

## 2018-09-17 DIAGNOSIS — I10 ESSENTIAL HYPERTENSION: Primary | ICD-10-CM

## 2018-09-27 DIAGNOSIS — I10 BENIGN HYPERTENSION: ICD-10-CM

## 2018-09-27 NOTE — TELEPHONE ENCOUNTER
----- Message from Aixa Sahni sent at 9/27/2018  3:36 PM CDT -----  Contact: Self  Patient requesting refill. Patient is totally out of medication. Please call [phone]    amLODIPine (NORVASC) 10 MG tablet        [facility] [id number] [facility] [place] [facility] [address] [facility] AT [facility] [address] [place] [address]

## 2018-09-28 RX ORDER — AMLODIPINE BESYLATE 10 MG/1
10 TABLET ORAL DAILY
Qty: 90 TABLET | Refills: 1 | Status: SHIPPED | OUTPATIENT
Start: 2018-09-28 | End: 2019-04-03 | Stop reason: SDUPTHER

## 2018-10-01 ENCOUNTER — OFFICE VISIT (OUTPATIENT)
Dept: FAMILY MEDICINE | Facility: CLINIC | Age: 48
End: 2018-10-01
Payer: COMMERCIAL

## 2018-10-01 VITALS
OXYGEN SATURATION: 98 % | DIASTOLIC BLOOD PRESSURE: 102 MMHG | HEIGHT: 64 IN | HEART RATE: 89 BPM | BODY MASS INDEX: 31.24 KG/M2 | TEMPERATURE: 98 F | WEIGHT: 183 LBS | SYSTOLIC BLOOD PRESSURE: 150 MMHG

## 2018-10-01 DIAGNOSIS — R05.3 CHRONIC COUGH: ICD-10-CM

## 2018-10-01 DIAGNOSIS — J84.9 INTERSTITIAL LUNG DISEASE: ICD-10-CM

## 2018-10-01 DIAGNOSIS — M32.9 SLE (SYSTEMIC LUPUS ERYTHEMATOSUS): ICD-10-CM

## 2018-10-01 DIAGNOSIS — M32.13 OTHER SYSTEMIC LUPUS ERYTHEMATOSUS WITH LUNG INVOLVEMENT: ICD-10-CM

## 2018-10-01 DIAGNOSIS — J31.0 CHRONIC RHINITIS: Primary | ICD-10-CM

## 2018-10-01 PROCEDURE — 99999 PR PBB SHADOW E&M-EST. PATIENT-LVL III: CPT | Mod: PBBFAC,,, | Performed by: FAMILY MEDICINE

## 2018-10-01 PROCEDURE — 3077F SYST BP >= 140 MM HG: CPT | Mod: CPTII,S$GLB,, | Performed by: FAMILY MEDICINE

## 2018-10-01 PROCEDURE — 99214 OFFICE O/P EST MOD 30 MIN: CPT | Mod: S$GLB,,, | Performed by: FAMILY MEDICINE

## 2018-10-01 PROCEDURE — 3080F DIAST BP >= 90 MM HG: CPT | Mod: CPTII,S$GLB,, | Performed by: FAMILY MEDICINE

## 2018-10-01 PROCEDURE — 3008F BODY MASS INDEX DOCD: CPT | Mod: CPTII,S$GLB,, | Performed by: FAMILY MEDICINE

## 2018-10-01 RX ORDER — HYDROXYCHLOROQUINE SULFATE 200 MG/1
400 TABLET, FILM COATED ORAL DAILY
Qty: 180 TABLET | Refills: 3 | Status: SHIPPED | OUTPATIENT
Start: 2018-10-01 | End: 2019-12-09 | Stop reason: SDUPTHER

## 2018-10-01 RX ORDER — PROMETHAZINE HYDROCHLORIDE AND CODEINE PHOSPHATE 6.25; 1 MG/5ML; MG/5ML
5 SOLUTION ORAL EVERY 4 HOURS PRN
Qty: 240 ML | Refills: 0 | Status: SHIPPED | OUTPATIENT
Start: 2018-10-01 | End: 2019-12-09 | Stop reason: SDUPTHER

## 2018-10-01 NOTE — PROGRESS NOTES
Chief Complaint   Patient presents with    Knee Pain     swelling,and pain    Neck Pain     R side of neck pain x 2weeks    Cough    Hypertension     been out of meds x couple days.       SUBJECTIVE:  Ronal Ham is a 48 y.o. male here for follow up of knee pain and neck pain with cough and HTN and has not been taking his medications to date.  He admits he has not been taking the plaquenil.  He just didn't think that it helped at all.  He is now doing worse he is stating that he has more chest congestion and neck spasm and he has been haivng more inflammation and knee pain.  Currently has co-morbidities including per problem list.      Social History     Tobacco Use    Smoking status: Never Smoker    Smokeless tobacco: Never Used   Substance Use Topics    Alcohol use: No    Drug use: No       Patient Active Problem List    Diagnosis Date Noted    Systemic lupus erythematosus with lung involvement     Effusion of left knee joint 11/17/2015     MRI 2015      Chondromalacia of left patellofemoral joint 11/17/2015    Prediabetes 09/11/2015    Chronic cough 09/02/2014    Allergic rhinitis 09/02/2014    Tinea pedis 04/01/2014    Male erectile disorder 04/01/2014    Encounter for vitamin deficiency screening 04/01/2014    Chronic rhinitis 10/03/2012    GERD (gastroesophageal reflux disease) 10/03/2012    Raynaud's syndrome 09/19/2012    Interstitial lung disease 09/19/2012    SLE (systemic lupus erythematosus) 09/07/2012    Benign hypertension 09/07/2012       Review of Systems   Constitutional: Negative.    HENT: Negative.    Eyes: Negative.    Respiratory: Positive for cough, shortness of breath and wheezing. Negative for hemoptysis and sputum production.    Cardiovascular: Negative.    Gastrointestinal: Negative.    Genitourinary: Negative.    Musculoskeletal: Positive for joint pain and myalgias. Negative for back pain, falls and neck pain.   Skin: Negative.    Neurological: Negative.   "  Endo/Heme/Allergies: Negative.    Psychiatric/Behavioral: Negative.        OBJECTIVE:  BP (!) 150/102   Pulse 89   Temp 98.2 °F (36.8 °C) (Oral)   Ht 5' 4" (1.626 m)   Wt 83 kg (182 lb 15.7 oz)   SpO2 98%   BMI 31.41 kg/m²     Wt Readings from Last 3 Encounters:   10/01/18 83 kg (182 lb 15.7 oz)   08/09/18 80.8 kg (178 lb 2.1 oz)   04/27/18 84.9 kg (187 lb 2.7 oz)     BP Readings from Last 3 Encounters:   10/01/18 (!) 150/102   08/09/18 138/88   04/27/18 116/86       He appears well, in no apparent distress.  Alert and oriented times three, pleasant and cooperative. Vital signs are as documented in vital signs section.  Nose with coryza  Lungs with coarse BS and with wheezing.  n oedema  Has knee swelling.  The abdomen is soft without tenderness, guarding, mass, rebound or organomegaly. Bowel sounds are normal. No CVA tenderness or inguinal adenopathy noted.      Review of old Records:  Reviewed per epic and [facility]    Review of old labs:    Reviewed last labs    Review of old imaging:  Reviewed imaging      ASSESSMENT:  Problem List Items Addressed This Visit     SLE (systemic lupus erythematosus)    Relevant Medications    hydroxychloroquine (PLAQUENIL) 200 mg tablet    Interstitial lung disease    Relevant Medications    hydroxychloroquine (PLAQUENIL) 200 mg tablet    Chronic rhinitis - Primary    Chronic cough          ICD-10-CM ICD-9-CM   1. Chronic rhinitis J31.0 472.0   2. Chronic cough R05 786.2   3. Other systemic lupus erythematosus with lung involvement M32.13 710.0     517.8   4. Interstitial lung disease J84.9 515   5. SLE (systemic lupus erythematosus) M32.9 710.0               PLAN:     actually get him on plaquenil  Schedule eye exam  Add anorro   Avoid ICS for now  Albuterol  Promethazine and codeine for symptoms.    zanaflex for at night.         Medication List           Accurate as of 10/1/18  4:13 PM. If you have any questions, ask your nurse or doctor.               START taking these " medications    promethazine-codeine 6.25-10 mg/5 ml 6.25-10 mg/5 mL syrup  Commonly known as:  PHENERGAN with CODEINE  Take 5 mLs by mouth every 4 (four) hours as needed for Cough.  Started by:  Bruce Terrell MD        CHANGE how you take these medications    hydroxychloroquine 200 mg tablet  Commonly known as:  PLAQUENIL  Take 2 tablets (400 mg total) by mouth once daily.  What changed:  See the new instructions.  Changed by:  Bruce Terrell MD        CONTINUE taking these medications    albuterol 90 mcg/actuation inhaler  Commonly known as:  VENTOLIN HFA  USE 2 PUFFS BY MOUTH EVERY 6 HOURS AS NEEDED FOR WHEEZING     amLODIPine 10 MG tablet  Commonly known as:  NORVASC  Take 1 tablet (10 mg total) by mouth once daily.     azithromycin 250 MG tablet  Commonly known as:  Z-ANDREY  Take 1 po daily, take 2 on day one.     desloratadine 5 mg tablet  Commonly known as:  CLARINEX  Take 1 tablet (5 mg total) by mouth once daily.     diclofenac sodium 1 % Gel  Commonly known as:  VOLTAREN  Lower extremities: Apply the gel (4 g) to the affected area 4 times daily. Do not apply more than 16 g daily to any one affected joint of the lower extremities. Upper extremities: Apply the gel (2 g) to the affected area 4 times daily. Do not apply more than 8 g daily to any one affected joint of the upper extremities. Total dose should not exceed 32 g per day, overall affected joints.     econazole nitrate 1 % cream     JUBLIA 10 % Avne  Generic drug:  efinaconazole     mometasone 50 mcg/actuation nasal spray  Commonly known as:  NASONEX  2 sprays by Nasal route once daily.     sildenafil 100 MG tablet  Commonly known as:  VIAGRA  Take 1 tablet (100 mg total) by mouth daily as needed for Erectile Dysfunction.     tadalafil 5 MG tablet  Commonly known as:  CIALIS  Take 1 tablet (5 mg total) by mouth daily as needed for Erectile Dysfunction.           Where to Get Your Medications      These medications were sent to [id number] -  [place] - [address] [address] [address] [place] [address]    Phone:  [phone]   · hydroxychloroquine 200 mg tablet     You can get these medications from any pharmacy    Bring a paper prescription for each of these medications  · promethazine-codeine 6.25-10 mg/5 ml 6.25-10 mg/5 mL syrup         No Follow-up on file.

## 2018-11-20 DIAGNOSIS — J06.9 UPPER RESPIRATORY INFECTION WITH COUGH AND CONGESTION: ICD-10-CM

## 2018-11-20 RX ORDER — ALBUTEROL SULFATE 90 UG/1
AEROSOL, METERED RESPIRATORY (INHALATION)
Qty: 18 G | Refills: 3 | Status: SHIPPED | OUTPATIENT
Start: 2018-11-20 | End: 2018-11-26 | Stop reason: SDUPTHER

## 2018-11-20 NOTE — TELEPHONE ENCOUNTER
----- Message from Dorota Bowman sent at 11/19/2018  4:35 PM CST -----  Contact: Self  Patient called for medication refill. Please call [phone]        albuterol (VENTOLIN HFA) 90 mcg/actuation inhaler              [id number]  [place], [place] - [facility] [address] AT [address]

## 2018-11-26 DIAGNOSIS — J06.9 UPPER RESPIRATORY INFECTION WITH COUGH AND CONGESTION: ICD-10-CM

## 2018-11-26 RX ORDER — ALBUTEROL SULFATE 90 UG/1
AEROSOL, METERED RESPIRATORY (INHALATION)
Qty: 18 G | Refills: 3 | Status: SHIPPED | OUTPATIENT
Start: 2018-11-26 | End: 2020-01-28 | Stop reason: ALTCHOICE

## 2018-11-26 NOTE — TELEPHONE ENCOUNTER
----- Message from Magalie Robbins sent at 11/26/2018  3:39 PM CST -----  Contact: Self/  [phone]  Refill:  albuterol (VENTOLIN HFA) 90 mcg/actuation inhaler  [facility] on [address] in [facility]

## 2018-11-26 NOTE — TELEPHONE ENCOUNTER
----- Message from Magalie Robbins sent at 11/26/2018  3:39 PM CST -----  Contact: Self/  [phone]  Refill:  albuterol (VENTOLIN HFA) 90 mcg/actuation inhaler  [facility] on [address] in [facility]

## 2018-12-18 ENCOUNTER — LAB VISIT (OUTPATIENT)
Dept: LAB | Facility: HOSPITAL | Age: 48
End: 2018-12-18
Attending: FAMILY MEDICINE
Payer: COMMERCIAL

## 2018-12-18 DIAGNOSIS — Z00.00 ANNUAL PHYSICAL EXAM: ICD-10-CM

## 2018-12-18 LAB
ALBUMIN SERPL BCP-MCNC: 3.5 G/DL
ALP SERPL-CCNC: 67 U/L
ALT SERPL W/O P-5'-P-CCNC: 20 U/L
ANION GAP SERPL CALC-SCNC: 9 MMOL/L
AST SERPL-CCNC: 29 U/L
BASOPHILS # BLD AUTO: 0.02 K/UL
BASOPHILS NFR BLD: 0.5 %
BILIRUB SERPL-MCNC: 0.3 MG/DL
BILIRUB UR QL STRIP: NEGATIVE
BUN SERPL-MCNC: 11 MG/DL
CALCIUM SERPL-MCNC: 9.3 MG/DL
CHLORIDE SERPL-SCNC: 106 MMOL/L
CHOLEST SERPL-MCNC: 138 MG/DL
CHOLEST/HDLC SERPL: 2.8 {RATIO}
CLARITY UR: ABNORMAL
CO2 SERPL-SCNC: 27 MMOL/L
COLOR UR: ABNORMAL
CREAT SERPL-MCNC: 0.9 MG/DL
DIFFERENTIAL METHOD: ABNORMAL
EOSINOPHIL # BLD AUTO: 0.2 K/UL
EOSINOPHIL NFR BLD: 4.8 %
ERYTHROCYTE [DISTWIDTH] IN BLOOD BY AUTOMATED COUNT: 13.6 %
EST. GFR  (AFRICAN AMERICAN): >60 ML/MIN/1.73 M^2
EST. GFR  (NON AFRICAN AMERICAN): >60 ML/MIN/1.73 M^2
GLUCOSE SERPL-MCNC: 96 MG/DL
GLUCOSE UR QL STRIP: NEGATIVE
HCT VFR BLD AUTO: 37.1 %
HDLC SERPL-MCNC: 49 MG/DL
HDLC SERPL: 35.5 %
HGB BLD-MCNC: 12.8 G/DL
HGB UR QL STRIP: NEGATIVE
KETONES UR QL STRIP: NEGATIVE
LDLC SERPL CALC-MCNC: 77.4 MG/DL
LEUKOCYTE ESTERASE UR QL STRIP: NEGATIVE
LYMPHOCYTES # BLD AUTO: 1.5 K/UL
LYMPHOCYTES NFR BLD: 38.4 %
MCH RBC QN AUTO: 30.5 PG
MCHC RBC AUTO-ENTMCNC: 34.5 G/DL
MCV RBC AUTO: 88 FL
MONOCYTES # BLD AUTO: 0.3 K/UL
MONOCYTES NFR BLD: 7.3 %
NEUTROPHILS # BLD AUTO: 2 K/UL
NEUTROPHILS NFR BLD: 49 %
NITRITE UR QL STRIP: NEGATIVE
NONHDLC SERPL-MCNC: 89 MG/DL
PH UR STRIP: 6 [PH] (ref 5–8)
PLATELET # BLD AUTO: 186 K/UL
PMV BLD AUTO: 9.3 FL
POTASSIUM SERPL-SCNC: 3.9 MMOL/L
PROT SERPL-MCNC: 7.9 G/DL
PROT UR QL STRIP: NEGATIVE
RBC # BLD AUTO: 4.2 M/UL
SODIUM SERPL-SCNC: 142 MMOL/L
SP GR UR STRIP: 1.01 (ref 1–1.03)
TRIGL SERPL-MCNC: 58 MG/DL
TSH SERPL DL<=0.005 MIU/L-ACNC: 1.86 UIU/ML
URATE SERPL-MCNC: 5.9 MG/DL
URN SPEC COLLECT METH UR: ABNORMAL
UROBILINOGEN UR STRIP-ACNC: NEGATIVE EU/DL
WBC # BLD AUTO: 3.98 K/UL

## 2018-12-18 PROCEDURE — 82040 ASSAY OF SERUM ALBUMIN: CPT

## 2018-12-18 PROCEDURE — 83036 HEMOGLOBIN GLYCOSYLATED A1C: CPT

## 2018-12-18 PROCEDURE — 81003 URINALYSIS AUTO W/O SCOPE: CPT

## 2018-12-18 PROCEDURE — 87591 N.GONORRHOEAE DNA AMP PROB: CPT

## 2018-12-18 PROCEDURE — 86703 HIV-1/HIV-2 1 RESULT ANTBDY: CPT

## 2018-12-18 PROCEDURE — 80061 LIPID PANEL: CPT

## 2018-12-18 PROCEDURE — 36415 COLL VENOUS BLD VENIPUNCTURE: CPT

## 2018-12-18 PROCEDURE — 86592 SYPHILIS TEST NON-TREP QUAL: CPT

## 2018-12-18 PROCEDURE — 84550 ASSAY OF BLOOD/URIC ACID: CPT

## 2018-12-18 PROCEDURE — 85025 COMPLETE CBC W/AUTO DIFF WBC: CPT

## 2018-12-18 PROCEDURE — 80053 COMPREHEN METABOLIC PANEL: CPT

## 2018-12-18 PROCEDURE — 84443 ASSAY THYROID STIM HORMONE: CPT

## 2018-12-18 PROCEDURE — 84270 ASSAY OF SEX HORMONE GLOBUL: CPT

## 2018-12-18 PROCEDURE — 87491 CHLMYD TRACH DNA AMP PROBE: CPT

## 2018-12-19 ENCOUNTER — OFFICE VISIT (OUTPATIENT)
Dept: FAMILY MEDICINE | Facility: CLINIC | Age: 48
End: 2018-12-19
Payer: COMMERCIAL

## 2018-12-19 VITALS
HEIGHT: 65 IN | DIASTOLIC BLOOD PRESSURE: 72 MMHG | TEMPERATURE: 98 F | BODY MASS INDEX: 31 KG/M2 | OXYGEN SATURATION: 100 % | HEART RATE: 90 BPM | WEIGHT: 186.06 LBS | SYSTOLIC BLOOD PRESSURE: 130 MMHG

## 2018-12-19 DIAGNOSIS — M32.13 SYSTEMIC LUPUS ERYTHEMATOSUS WITH LUNG INVOLVEMENT, UNSPECIFIED SLE TYPE: ICD-10-CM

## 2018-12-19 DIAGNOSIS — J84.9 INTERSTITIAL LUNG DISEASE: Primary | ICD-10-CM

## 2018-12-19 DIAGNOSIS — R05.3 CHRONIC COUGH: ICD-10-CM

## 2018-12-19 DIAGNOSIS — M32.13 OTHER SYSTEMIC LUPUS ERYTHEMATOSUS WITH LUNG INVOLVEMENT: ICD-10-CM

## 2018-12-19 LAB
ESTIMATED AVG GLUCOSE: 103 MG/DL
HBA1C MFR BLD HPLC: 5.2 %
HIV 1+2 AB+HIV1 P24 AG SERPL QL IA: NEGATIVE
RPR SER QL: NORMAL

## 2018-12-19 PROCEDURE — 3075F SYST BP GE 130 - 139MM HG: CPT | Mod: CPTII,S$GLB,, | Performed by: FAMILY MEDICINE

## 2018-12-19 PROCEDURE — 99214 OFFICE O/P EST MOD 30 MIN: CPT | Mod: S$GLB,,, | Performed by: FAMILY MEDICINE

## 2018-12-19 PROCEDURE — 3078F DIAST BP <80 MM HG: CPT | Mod: CPTII,S$GLB,, | Performed by: FAMILY MEDICINE

## 2018-12-19 PROCEDURE — 3008F BODY MASS INDEX DOCD: CPT | Mod: CPTII,S$GLB,, | Performed by: FAMILY MEDICINE

## 2018-12-19 PROCEDURE — 99999 PR PBB SHADOW E&M-EST. PATIENT-LVL III: CPT | Mod: PBBFAC,,, | Performed by: FAMILY MEDICINE

## 2018-12-19 RX ORDER — TRIAMCINOLONE ACETONIDE 1 MG/G
CREAM TOPICAL 2 TIMES DAILY
Qty: 60 G | Refills: 0 | Status: SHIPPED | OUTPATIENT
Start: 2018-12-19 | End: 2019-01-23 | Stop reason: SDUPTHER

## 2018-12-19 RX ORDER — PREDNISONE 10 MG/1
TABLET ORAL
Qty: 60 TABLET | Refills: 0 | Status: SHIPPED | OUTPATIENT
Start: 2018-12-19 | End: 2019-01-18

## 2018-12-19 RX ORDER — TRIAMCINOLONE ACETONIDE 1 MG/G
CREAM TOPICAL 2 TIMES DAILY
Qty: 60 G | Refills: 0 | Status: SHIPPED | OUTPATIENT
Start: 2018-12-19 | End: 2018-12-19 | Stop reason: SDUPTHER

## 2018-12-19 NOTE — PROGRESS NOTES
Chief Complaint   Patient presents with    Sinusitis       SUBJECTIVE:  Ronal Ham is a 48 y.o. male here for new problem of sinusitis that is chronic but stable, breathing that is stable, and the knee milton n that has improved but still with mild to moderate pain and trouble with climbing steps at work, manageable.  He has done well with prednisone burst in the past.  He also has issues with new skin lesions on the face that are itchy and then open to sores and similar to prior SLE skin lesions.  Currently has co-morbidities including per problem lsit.      Past Medical History:   Diagnosis Date    Arthritis     Eye injury     od hit above od    GERD (gastroesophageal reflux disease)     Hypertension     Lupus (systemic lupus erythematosus)     Pulmonary fibrosis     Raynaud phenomenon      Past Surgical History:   Procedure Laterality Date    HERNIA REPAIR       Social History     Socioeconomic History    Marital status:      Spouse name: Not on file    Number of children: 5    Years of education: some colle    Highest education level: Not on file   Social Needs    Financial resource strain: Not on file    Food insecurity - worry: Not on file    Food insecurity - inability: Not on file    Transportation needs - medical: Not on file    Transportation needs - non-medical: Not on file   Occupational History    Occupation: [employer] [place]      Employer: [employer]   Tobacco Use    Smoking status: Never Smoker    Smokeless tobacco: Never Used   Substance and Sexual Activity    Alcohol use: No    Drug use: No    Sexual activity: Yes     Partners: Female   Other Topics Concern    Not on file   Social History Narrative    Adult Screenings updated and reviewed    Mammogram( for females)    Pap ( for females)    PSA( for males )    Colonoscopy age  48    Flu shot yearly    Td     Pneumovax recommended one time  at age  65    Zostavax recommended one time at  age  58    Eye  exam recommended yearly    Bone density      Family History   Problem Relation Age of Onset    Heart disease Mother     Heart disease Father     Glaucoma Father     Glaucoma Brother     No Known Problems Sister     No Known Problems Daughter     No Known Problems Son     No Known Problems Maternal Aunt     No Known Problems Maternal Uncle     No Known Problems Paternal Aunt     No Known Problems Paternal Uncle     No Known Problems Maternal Grandmother     No Known Problems Maternal Grandfather     No Known Problems Paternal Grandmother     No Known Problems Paternal Grandfather     Asthma Neg Hx     Emphysema Neg Hx     Amblyopia Neg Hx     Blindness Neg Hx     Cancer Neg Hx     Cataracts Neg Hx     Diabetes Neg Hx     Hypertension Neg Hx     Macular degeneration Neg Hx     Retinal detachment Neg Hx     Strabismus Neg Hx     Stroke Neg Hx     Thyroid disease Neg Hx      Current Outpatient Medications on File Prior to Visit   Medication Sig Dispense Refill    albuterol (VENTOLIN HFA) 90 mcg/actuation inhaler USE 2 PUFFS BY MOUTH EVERY 6 HOURS AS NEEDED FOR WHEEZING 18 g 3    amLODIPine (NORVASC) 10 MG tablet Take 1 tablet (10 mg total) by mouth once daily. 90 tablet 1    diclofenac sodium (VOLTAREN) 1 % Gel Lower extremities: Apply the gel (4 g) to the affected area 4 times daily. Do not apply more than 16 g daily to any one affected joint of the lower extremities. Upper extremities: Apply the gel (2 g) to the affected area 4 times daily. Do not apply more than 8 g daily to any one affected joint of the upper extremities. Total dose should not exceed 32 g per day, overall affected joints. 100 g 1    econazole nitrate 1 % cream ANTIONE EXT TO THE AFFECTED AND SURROUNDING AREAS OF SKIN 1 TIME PER DAY  12    hydroxychloroquine (PLAQUENIL) 200 mg tablet Take 2 tablets (400 mg total) by mouth once daily. 180 tablet 3    JUBLIA 10 % [place] APPLY TO AFFECTED TOENAIL(S) BY TOPICAL ROUTE ONCE  "DAILY  12    mometasone (NASONEX) 50 mcg/actuation nasal spray 2 sprays by Nasal route once daily. 17 g 3    sildenafil (VIAGRA) 100 MG tablet Take 1 tablet (100 mg total) by mouth daily as needed for Erectile Dysfunction. 10 tablet 5    [DISCONTINUED] umeclidinium-vilanterol (ANORO ELLIPTA) 62.5-25 mcg/actuation DsDv Inhale 1 puff into the lungs once daily. 60 each 5    desloratadine (CLARINEX) 5 mg tablet Take 1 tablet (5 mg total) by mouth once daily. 30 tablet 11    tadalafil (CIALIS) 5 MG tablet Take 1 tablet (5 mg total) by mouth daily as needed for Erectile Dysfunction. 10 tablet 0     Current Facility-Administered Medications on File Prior to Visit   Medication Dose Route Frequency Provider Last Rate Last Dose    EUFLEXXA 10 mg/mL(mw 2.4 -3.6 million) injection Syrg 40 mg  40 mg Intra-articular Weekly Paulette Sewell, NP   40 mg at 10/18/17 1637     Review of patient's allergies indicates:   Allergen Reactions    Carafate  [sucralfate] Rash     Other reaction(s): Hives         ROS    OBJECTIVE:  /72   Pulse 90   Temp 97.6 °F (36.4 °C) (Oral)   Ht 5' 5" (1.651 m)   Wt 84.4 kg (186 lb 1.1 oz)   SpO2 100%   BMI 30.96 kg/m²     Wt Readings from Last 3 Encounters:   12/19/18 84.4 kg (186 lb 1.1 oz)   10/01/18 83 kg (182 lb 15.7 oz)   08/09/18 80.8 kg (178 lb 2.1 oz)     BP Readings from Last 3 Encounters:   12/19/18 130/72   10/01/18 (!) 150/102   08/09/18 138/88       He appears well, in no apparent distress.  Alert and oriented times three, pleasant and cooperative. Vital signs are as documented in vital signs section.  Skin on the face with new and old lesions.  Sinuses with chronic changes and no acute seeming pain.  No increased musuc right now.  The neck is supple and free of adenopathy or masses, the thyroid is normal without enlargement or nodules.  Lungs with chronic changes noted.  No acute consolidative signs on exam.  Knees without acute swelling or redness noted today.      Review " of old Records:  Reviewed per [facility]    Review of old labs:  Lab Results   Component Value Date    TSH 1.859 12/18/2018     Lab Results   Component Value Date    WBC 3.98 12/18/2018    HGB 12.8 (L) 12/18/2018    HCT 37.1 (L) 12/18/2018    MCV 88 12/18/2018     12/18/2018       Chemistry        Component Value Date/Time     12/18/2018 1626    K 3.9 12/18/2018 1626     12/18/2018 1626    CO2 27 12/18/2018 1626    BUN 11 12/18/2018 1626    CREATININE 0.9 12/18/2018 1626    GLU 96 12/18/2018 1626        Component Value Date/Time    CALCIUM 9.3 12/18/2018 1626    ALKPHOS 67 12/18/2018 1626    AST 29 12/18/2018 1626    ALT 20 12/18/2018 1626    BILITOT 0.3 12/18/2018 1626    ESTGFRAFRICA >60 12/18/2018 1626    EGFRNONAA >60 12/18/2018 1626        Lab Results   Component Value Date    CHOL 138 12/18/2018    CHOL 144 05/02/2017    CHOL 149 08/24/2015     Lab Results   Component Value Date    HDL 49 12/18/2018    HDL 43 05/02/2017    HDL 43 08/24/2015     Lab Results   Component Value Date    LDLCALC 77.4 12/18/2018    LDLCALC 91.4 05/02/2017    LDLCALC 90.0 08/24/2015     Lab Results   Component Value Date    TRIG 58 12/18/2018    TRIG 48 05/02/2017    TRIG 80 08/24/2015     Lab Results   Component Value Date    CHOLHDL 35.5 12/18/2018    CHOLHDL 29.9 05/02/2017    CHOLHDL 28.9 08/24/2015         Review of old imaging:  Reviewed prior imaging    ASSESSMENT:  Problem List Items Addressed This Visit     Systemic lupus erythematosus with lung involvement    SLE (systemic lupus erythematosus)    Interstitial lung disease - Primary    Chronic cough          ICD-10-CM ICD-9-CM   1. Interstitial lung disease J84.9 515   2. Systemic lupus erythematosus with lung involvement, unspecified SLE type M32.13 710.0     517.8   3. Other systemic lupus erythematosus with lung involvement M32.13 710.0     517.8   4. Chronic cough R05 786.2         PLAN:  1. Interstitial lung disease  Do a steroid burst and see if he  improves.  We will add in meloxicam adfter he is done.111    2. Systemic lupus erythematosus with lung involvement, unspecified SLE type  Steroid burst    3. Other systemic lupus erythematosus with lung involvement  See above    4. Chronic cough  Continue with prior treatments.             Medication List           Accurate as of 12/19/18  4:11 PM. If you have any questions, ask your nurse or doctor.               START taking these medications    predniSONE 10 MG tablet  Commonly known as:  DELTASONE  Take 3 tablets (30 mg total) by mouth once daily for 10 days, THEN 2 tablets (20 mg total) once daily for 10 days, THEN 1 tablet (10 mg total) once daily for 10 days.  Start taking on:  12/19/2018  Started by:  Bruce Terrell MD        CONTINUE taking these medications    albuterol 90 mcg/actuation inhaler  Commonly known as:  VENTOLIN HFA  USE 2 PUFFS BY MOUTH EVERY 6 HOURS AS NEEDED FOR WHEEZING     amLODIPine 10 MG tablet  Commonly known as:  NORVASC  Take 1 tablet (10 mg total) by mouth once daily.     desloratadine 5 mg tablet  Commonly known as:  CLARINEX  Take 1 tablet (5 mg total) by mouth once daily.     diclofenac sodium 1 % Gel  Commonly known as:  VOLTAREN  Lower extremities: Apply the gel (4 g) to the affected area 4 times daily. Do not apply more than 16 g daily to any one affected joint of the lower extremities. Upper extremities: Apply the gel (2 g) to the affected area 4 times daily. Do not apply more than 8 g daily to any one affected joint of the upper extremities. Total dose should not exceed 32 g per day, overall affected joints.     econazole nitrate 1 % cream     hydroxychloroquine 200 mg tablet  Commonly known as:  PLAQUENIL  Take 2 tablets (400 mg total) by mouth once daily.     JUBLIA 10 % [place]  Generic drug:  efinaconazole     mometasone 50 mcg/actuation nasal spray  Commonly known as:  NASONEX  2 sprays by Nasal route once daily.     sildenafil 100 MG tablet  Commonly known as:  VIAGRA  Take  1 tablet (100 mg total) by mouth daily as needed for Erectile Dysfunction.     tadalafil 5 MG tablet  Commonly known as:  CIALIS  Take 1 tablet (5 mg total) by mouth daily as needed for Erectile Dysfunction.           Where to Get Your Medications      You can get these medications from any pharmacy    Bring a paper prescription for each of these medications  · predniSONE 10 MG tablet         No Follow-up on file.

## 2018-12-20 LAB
C TRACH DNA SPEC QL NAA+PROBE: NOT DETECTED
N GONORRHOEA DNA SPEC QL NAA+PROBE: NOT DETECTED

## 2018-12-23 LAB
ALBUMIN SERPL-MCNC: 3.8 G/DL (ref 3.6–5.1)
SHBG SERPL-SCNC: 23 NMOL/L (ref 10–50)
TESTOST FREE SERPL-MCNC: 52.2 PG/ML (ref 46–224)
TESTOST SERPL-MCNC: 295 NG/DL (ref 250–1100)
TESTOSTERONE.FREE+WB SERPL-MCNC: 91.4 NG/DL (ref 110–575)

## 2018-12-24 PROBLEM — R79.89 LOW TESTOSTERONE IN MALE: Status: ACTIVE | Noted: 2018-12-24

## 2019-01-23 ENCOUNTER — OFFICE VISIT (OUTPATIENT)
Dept: FAMILY MEDICINE | Facility: CLINIC | Age: 49
End: 2019-01-23
Payer: COMMERCIAL

## 2019-01-23 VITALS
HEIGHT: 65 IN | OXYGEN SATURATION: 99 % | TEMPERATURE: 98 F | HEART RATE: 89 BPM | DIASTOLIC BLOOD PRESSURE: 88 MMHG | RESPIRATION RATE: 16 BRPM | SYSTOLIC BLOOD PRESSURE: 138 MMHG | WEIGHT: 190.25 LBS | BODY MASS INDEX: 31.7 KG/M2

## 2019-01-23 DIAGNOSIS — M32.13 SYSTEMIC LUPUS ERYTHEMATOSUS WITH LUNG INVOLVEMENT, UNSPECIFIED SLE TYPE: ICD-10-CM

## 2019-01-23 DIAGNOSIS — Z12.11 COLON CANCER SCREENING: Primary | ICD-10-CM

## 2019-01-23 DIAGNOSIS — K21.9 GASTROESOPHAGEAL REFLUX DISEASE, ESOPHAGITIS PRESENCE NOT SPECIFIED: ICD-10-CM

## 2019-01-23 PROCEDURE — 3008F PR BODY MASS INDEX (BMI) DOCUMENTED: ICD-10-PCS | Mod: CPTII,S$GLB,, | Performed by: PHYSICIAN ASSISTANT

## 2019-01-23 PROCEDURE — 99214 PR OFFICE/OUTPT VISIT, EST, LEVL IV, 30-39 MIN: ICD-10-PCS | Mod: S$GLB,,, | Performed by: PHYSICIAN ASSISTANT

## 2019-01-23 PROCEDURE — 3079F PR MOST RECENT DIASTOLIC BLOOD PRESSURE 80-89 MM HG: ICD-10-PCS | Mod: CPTII,S$GLB,, | Performed by: PHYSICIAN ASSISTANT

## 2019-01-23 PROCEDURE — 99214 OFFICE O/P EST MOD 30 MIN: CPT | Mod: S$GLB,,, | Performed by: PHYSICIAN ASSISTANT

## 2019-01-23 PROCEDURE — 3008F BODY MASS INDEX DOCD: CPT | Mod: CPTII,S$GLB,, | Performed by: PHYSICIAN ASSISTANT

## 2019-01-23 PROCEDURE — 3075F PR MOST RECENT SYSTOLIC BLOOD PRESS GE 130-139MM HG: ICD-10-PCS | Mod: CPTII,S$GLB,, | Performed by: PHYSICIAN ASSISTANT

## 2019-01-23 PROCEDURE — 3075F SYST BP GE 130 - 139MM HG: CPT | Mod: CPTII,S$GLB,, | Performed by: PHYSICIAN ASSISTANT

## 2019-01-23 PROCEDURE — 99999 PR PBB SHADOW E&M-EST. PATIENT-LVL IV: CPT | Mod: PBBFAC,,, | Performed by: PHYSICIAN ASSISTANT

## 2019-01-23 PROCEDURE — 3079F DIAST BP 80-89 MM HG: CPT | Mod: CPTII,S$GLB,, | Performed by: PHYSICIAN ASSISTANT

## 2019-01-23 PROCEDURE — 99999 PR PBB SHADOW E&M-EST. PATIENT-LVL IV: ICD-10-PCS | Mod: PBBFAC,,, | Performed by: PHYSICIAN ASSISTANT

## 2019-01-23 RX ORDER — TRIAMCINOLONE ACETONIDE 1 MG/G
CREAM TOPICAL 2 TIMES DAILY
Qty: 60 G | Refills: 0 | Status: SHIPPED | OUTPATIENT
Start: 2019-01-23 | End: 2022-05-06

## 2019-01-23 NOTE — PATIENT INSTRUCTIONS
Sherman Osman MD Consulting Physician Rheumatology 01/23/2019 End  1/23/19   Phone: [phone]; Fax: [phone]

## 2019-01-23 NOTE — PROGRESS NOTES
Subjective:       Patient ID: Ronal Ham is a 48 y.o. male with multiple medical diagnoses as listed in the medical history and problem list that presents for Rash (face ) and Heartburn  .    Chief Complaint: Rash (face ) and Heartburn      Rash   This is a new problem. The current episode started more than 1 month ago. The problem is unchanged. Location: face, cheeks  The rash is characterized by redness and swelling. Pertinent negatives include no cough or fatigue. Past treatments include nothing (states the pharmacy said he never got the crem that was sent in by Dr. anders ).   Heartburn   He complains of heartburn. He reports no belching, no choking, no coughing or no early satiety. This is a new problem. The current episode started 1 to 4 weeks ago. The heartburn is of mild intensity. The heartburn does not wake him from sleep. The heartburn does not limit his activity. The heartburn doesn't change with position. The symptoms are aggravated by ETOH (red wine ). Pertinent negatives include no anemia, fatigue or melena. Risk factors include ETOH use. He has tried nothing for the symptoms.        Review of Systems   Constitutional: Negative for fatigue.   Respiratory: Negative for cough and choking.    Gastrointestinal: Positive for heartburn. Negative for melena.   Skin: Positive for rash.         PAST MEDICAL HISTORY:  Past Medical History:   Diagnosis Date    Arthritis     Eye injury     od hit above od    GERD (gastroesophageal reflux disease)     Hypertension     Lupus (systemic lupus erythematosus)     Pulmonary fibrosis     Raynaud phenomenon        SOCIAL HISTORY:  Social History     Socioeconomic History    Marital status:      Spouse name: Not on file    Number of children: 5    Years of education: some colle    Highest education level: Not on file   Social Needs    Financial resource strain: Not on file    Food insecurity - worry: Not on file    Food insecurity - inability:  Not on file    Transportation needs - medical: Not on file    Transportation needs - non-medical: Not on file   Occupational History    Occupation: Communication Science off [employer]      Employer: [employer]   Tobacco Use    Smoking status: Never Smoker    Smokeless tobacco: Never Used   Substance and Sexual Activity    Alcohol use: No    Drug use: No    Sexual activity: Yes     Partners: Female   Other Topics Concern    Not on file   Social History Narrative    Adult Screenings updated and reviewed    Mammogram( for females)    Pap ( for females)    PSA( for males )    Colonoscopy age  48    Flu shot yearly    Td     Pneumovax recommended one time  at age  63    Zostavax recommended one time at  age  58    Eye exam recommended yearly    Bone density        ALLERGIES AND MEDICATIONS: updated and reviewed.  Review of patient's allergies indicates:   Allergen Reactions    Carafate  [sucralfate] Rash     Other reaction(s): Hives     Current Outpatient Medications   Medication Sig Dispense Refill    albuterol (VENTOLIN HFA) 90 mcg/actuation inhaler USE 2 PUFFS BY MOUTH EVERY 6 HOURS AS NEEDED FOR WHEEZING 18 g 3    amLODIPine (NORVASC) 10 MG tablet Take 1 tablet (10 mg total) by mouth once daily. 90 tablet 1    hydroxychloroquine (PLAQUENIL) 200 mg tablet Take 2 tablets (400 mg total) by mouth once daily. 180 tablet 3    sildenafil (VIAGRA) 100 MG tablet Take 1 tablet (100 mg total) by mouth daily as needed for Erectile Dysfunction. 10 tablet 5    econazole nitrate 1 % cream ANTIONE EXT TO THE AFFECTED AND SURROUNDING AREAS OF SKIN 1 TIME PER DAY  12    mometasone (NASONEX) 50 mcg/actuation nasal spray 2 sprays by Nasal route once daily. 17 g 3    ranitidine (ZANTAC) 150 MG capsule Take 1 capsule (150 mg total) by mouth 2 (two) times daily. 60 capsule 0    triamcinolone acetonide 0.1% (KENALOG) 0.1 % cream Apply topically 2 (two) times daily. 60 g 0     Current Facility-Administered Medications   Medication Dose  "Route Frequency Provider Last Rate Last Dose    EUFLEXXA 10 mg/mL(mw 2.4 -3.6 million) injection Syrg 40 mg  40 mg Intra-articular Weekly Paulette Sewell NP   40 mg at 10/18/17 1637         Objective:   /88   Pulse 89   Temp 98.4 °F (36.9 °C) (Oral)   Resp 16   Ht 5' 5" (1.651 m)   Wt 86.3 kg (190 lb 4.1 oz)   SpO2 99%   BMI 31.66 kg/m²      Physical Exam   Constitutional: He is oriented to person, place, and time. No distress.   HENT:   Head: Normocephalic and atraumatic.   Cardiovascular: Normal rate and regular rhythm.   Pulmonary/Chest: Effort normal and breath sounds normal.   Abdominal: Soft. Bowel sounds are normal. There is no tenderness.   Neurological: He is alert and oriented to person, place, and time.   Skin: Rash (face, bilaterally adjacent to nose, swelling, indurated, erthematous and dry) noted.           Assessment:       1. Colon cancer screening    2. Systemic lupus erythematosus with lung involvement, unspecified SLE type    3. Gastroesophageal reflux disease, esophagitis presence not specified        Plan:       Colon cancer screening  -     Case request GI: COLONOSCOPY    Systemic lupus erythematosus with lung involvement, unspecified SLE type  -     triamcinolone acetonide 0.1% (KENALOG) 0.1 % cream; Apply topically 2 (two) times daily.  Dispense: 60 g; Refill: 0  Aware it can lighten his skin.  Needs to follow up with rheumatologist. It has been years.     Gastroesophageal reflux disease, esophagitis presence not specified  -     ranitidine (ZANTAC) 150 MG capsule; Take 1 capsule (150 mg total) by mouth 2 (two) times daily.  Dispense: 60 capsule; Refill: 0            No Follow-up on file.  "

## 2019-01-30 DIAGNOSIS — Z12.11 COLON CANCER SCREENING: Primary | ICD-10-CM

## 2019-03-22 ENCOUNTER — PATIENT MESSAGE (OUTPATIENT)
Dept: ADMINISTRATIVE | Facility: OTHER | Age: 49
End: 2019-03-22

## 2019-04-03 DIAGNOSIS — I10 BENIGN HYPERTENSION: ICD-10-CM

## 2019-04-03 RX ORDER — AMLODIPINE BESYLATE 10 MG/1
TABLET ORAL
Qty: 90 TABLET | Refills: 0 | Status: SHIPPED | OUTPATIENT
Start: 2019-04-03 | End: 2019-07-09 | Stop reason: SDUPTHER

## 2019-04-18 ENCOUNTER — OFFICE VISIT (OUTPATIENT)
Dept: FAMILY MEDICINE | Facility: CLINIC | Age: 49
End: 2019-04-18
Payer: COMMERCIAL

## 2019-04-18 VITALS
OXYGEN SATURATION: 98 % | SYSTOLIC BLOOD PRESSURE: 138 MMHG | BODY MASS INDEX: 30.71 KG/M2 | HEART RATE: 72 BPM | DIASTOLIC BLOOD PRESSURE: 88 MMHG | RESPIRATION RATE: 16 BRPM | WEIGHT: 184.31 LBS | TEMPERATURE: 98 F | HEIGHT: 65 IN

## 2019-04-18 DIAGNOSIS — J84.9 INTERSTITIAL LUNG DISEASE: ICD-10-CM

## 2019-04-18 DIAGNOSIS — M32.13 SYSTEMIC LUPUS ERYTHEMATOSUS WITH LUNG INVOLVEMENT, UNSPECIFIED SLE TYPE: ICD-10-CM

## 2019-04-18 DIAGNOSIS — M32.13 OTHER SYSTEMIC LUPUS ERYTHEMATOSUS WITH LUNG INVOLVEMENT: Primary | ICD-10-CM

## 2019-04-18 DIAGNOSIS — J01.90 ACUTE SINUSITIS, RECURRENCE NOT SPECIFIED, UNSPECIFIED LOCATION: ICD-10-CM

## 2019-04-18 PROCEDURE — 3079F PR MOST RECENT DIASTOLIC BLOOD PRESSURE 80-89 MM HG: ICD-10-PCS | Mod: CPTII,S$GLB,, | Performed by: PHYSICIAN ASSISTANT

## 2019-04-18 PROCEDURE — 3008F PR BODY MASS INDEX (BMI) DOCUMENTED: ICD-10-PCS | Mod: CPTII,S$GLB,, | Performed by: PHYSICIAN ASSISTANT

## 2019-04-18 PROCEDURE — 99999 PR PBB SHADOW E&M-EST. PATIENT-LVL V: CPT | Mod: PBBFAC,,, | Performed by: PHYSICIAN ASSISTANT

## 2019-04-18 PROCEDURE — 3075F PR MOST RECENT SYSTOLIC BLOOD PRESS GE 130-139MM HG: ICD-10-PCS | Mod: CPTII,S$GLB,, | Performed by: PHYSICIAN ASSISTANT

## 2019-04-18 PROCEDURE — 99213 PR OFFICE/OUTPT VISIT, EST, LEVL III, 20-29 MIN: ICD-10-PCS | Mod: S$GLB,,, | Performed by: PHYSICIAN ASSISTANT

## 2019-04-18 PROCEDURE — 3008F BODY MASS INDEX DOCD: CPT | Mod: CPTII,S$GLB,, | Performed by: PHYSICIAN ASSISTANT

## 2019-04-18 PROCEDURE — 99999 PR PBB SHADOW E&M-EST. PATIENT-LVL V: ICD-10-PCS | Mod: PBBFAC,,, | Performed by: PHYSICIAN ASSISTANT

## 2019-04-18 PROCEDURE — 3075F SYST BP GE 130 - 139MM HG: CPT | Mod: CPTII,S$GLB,, | Performed by: PHYSICIAN ASSISTANT

## 2019-04-18 PROCEDURE — 99213 OFFICE O/P EST LOW 20 MIN: CPT | Mod: S$GLB,,, | Performed by: PHYSICIAN ASSISTANT

## 2019-04-18 PROCEDURE — 3079F DIAST BP 80-89 MM HG: CPT | Mod: CPTII,S$GLB,, | Performed by: PHYSICIAN ASSISTANT

## 2019-04-18 RX ORDER — AZELASTINE 1 MG/ML
1 SPRAY, METERED NASAL 2 TIMES DAILY
Qty: 30 ML | Refills: 2 | Status: SHIPPED | OUTPATIENT
Start: 2019-04-18 | End: 2021-03-15

## 2019-04-18 RX ORDER — MOMETASONE FUROATE 50 UG/1
2 SPRAY, METERED NASAL DAILY
Qty: 17 G | Refills: 3 | Status: SHIPPED | OUTPATIENT
Start: 2019-04-18 | End: 2019-04-22

## 2019-04-18 RX ORDER — LORATADINE 10 MG/1
10 TABLET ORAL DAILY
COMMUNITY
End: 2020-02-06 | Stop reason: SDUPTHER

## 2019-04-18 NOTE — PROGRESS NOTES
Subjective:       Patient ID: Ronal Ham is a 48 y.o. male with multiple medical diagnoses as listed in the medical history and problem list that presents for URI and Hoarse  .    Chief Complaint: URI and Hoarse      URI    This is a new problem. The current episode started in the past 7 days (monday last week ). Associated symptoms include congestion, coughing (dry), ear pain, headaches, rhinorrhea and sneezing. Pertinent negatives include no abdominal pain, diarrhea, nausea, sinus pain, sore throat or vomiting. Treatments tried: essential oil and claritin  The treatment provided mild relief.   c/o throat is dry at night.     Review of Systems   Constitutional: Positive for fatigue. Negative for chills and fever.   HENT: Positive for congestion, ear pain, postnasal drip, rhinorrhea, sinus pressure (maxillary ), sneezing and voice change. Negative for sinus pain, sore throat and trouble swallowing.    Eyes: Negative for pain, discharge, redness and itching.   Respiratory: Positive for cough (dry).    Gastrointestinal: Negative for abdominal pain, constipation, diarrhea, nausea and vomiting.   Musculoskeletal: Negative for myalgias.   Neurological: Positive for headaches.         PAST MEDICAL HISTORY:  Past Medical History:   Diagnosis Date    Arthritis     Eye injury     od hit above od    GERD (gastroesophageal reflux disease)     Hypertension     Lupus (systemic lupus erythematosus)     Pulmonary fibrosis     Raynaud phenomenon        SOCIAL HISTORY:  Social History     Socioeconomic History    Marital status:      Spouse name: Not on file    Number of children: 5    Years of education: some colle    Highest education level: Not on file   Occupational History    Occupation:  [place]      Employer: [employer]   Social Needs    Financial resource strain: Not on file    Food insecurity:     Worry: Not on file     Inability: Not on file    Transportation needs:      Medical: Not on file     Non-medical: Not on file   Tobacco Use    Smoking status: Never Smoker    Smokeless tobacco: Never Used   Substance and Sexual Activity    Alcohol use: No    Drug use: No    Sexual activity: Yes     Partners: Female   Lifestyle    Physical activity:     Days per week: Not on file     Minutes per session: Not on file    Stress: Not on file   Relationships    Social connections:     Talks on phone: Not on file     Gets together: Not on file     Attends Restorationist service: Not on file     Active member of club or organization: Not on file     Attends meetings of clubs or organizations: Not on file     Relationship status: Not on file   Other Topics Concern    Not on file   Social History Narrative    Adult Screenings updated and reviewed    Mammogram( for females)    Pap ( for females)    PSA( for males )    Colonoscopy age  48    Flu shot yearly    Td     Pneumovax recommended one time  at age  65    Zostavax recommended one time at  age  58    Eye exam recommended yearly    Bone density        ALLERGIES AND MEDICATIONS: updated and reviewed.  Review of patient's allergies indicates:   Allergen Reactions    Carafate  [sucralfate] Rash     Other reaction(s): Hives     Current Outpatient Medications   Medication Sig Dispense Refill    albuterol (VENTOLIN HFA) 90 mcg/actuation inhaler USE 2 PUFFS BY MOUTH EVERY 6 HOURS AS NEEDED FOR WHEEZING 18 g 3    amLODIPine (NORVASC) 10 MG tablet TAKE 1 TABLET BY MOUTH EVERY DAY 90 tablet 0    econazole nitrate 1 % cream ANTIONE EXT TO THE AFFECTED AND SURROUNDING AREAS OF SKIN 1 TIME PER DAY  12    hydroxychloroquine (PLAQUENIL) 200 mg tablet Take 2 tablets (400 mg total) by mouth once daily. 180 tablet 3    loratadine (CLARITIN) 10 mg tablet Take 10 mg by mouth once daily.      mometasone (NASONEX) 50 mcg/actuation nasal spray 2 sprays by Nasal route once daily. 17 g 3    ranitidine (ZANTAC) 150 MG capsule Take 1 capsule (150 mg total) by mouth  "2 (two) times daily. 60 capsule 0    sildenafil (VIAGRA) 100 MG tablet Take 1 tablet (100 mg total) by mouth daily as needed for Erectile Dysfunction. 10 tablet 5    triamcinolone acetonide 0.1% (KENALOG) 0.1 % cream Apply topically 2 (two) times daily. 60 g 0    azelastine (ASTELIN) 137 mcg (0.1 %) nasal spray 1 spray (137 mcg total) by Nasal route 2 (two) times daily. 30 mL 2     Current Facility-Administered Medications   Medication Dose Route Frequency Provider Last Rate Last Dose    EUFLEXXA 10 mg/mL(mw 2.4 -3.6 million) injection Syrg 40 mg  40 mg Intra-articular Weekly Paulette Sewell NP   40 mg at 10/18/17 1637         Objective:   /88   Pulse 72   Temp 98.3 °F (36.8 °C) (Oral)   Resp 16   Ht 5' 5" (1.651 m)   Wt 83.6 kg (184 lb 4.9 oz)   SpO2 98%   BMI 30.67 kg/m²      Physical Exam   Constitutional: He is oriented to person, place, and time. No distress.   HENT:   Head: Normocephalic and atraumatic.   Right Ear: External ear and ear canal normal. Tympanic membrane is injected. No middle ear effusion.   Left Ear: External ear and ear canal normal. Tympanic membrane is injected.  No middle ear effusion.   Nose: Mucosal edema and rhinorrhea present. Right sinus exhibits no maxillary sinus tenderness and no frontal sinus tenderness. Left sinus exhibits no maxillary sinus tenderness and no frontal sinus tenderness.   Mouth/Throat: Uvula is midline and mucous membranes are normal. No oropharyngeal exudate or posterior oropharyngeal erythema (PND). No tonsillar exudate.   Fluid in both TMs  Right nasal congestion clear    Eyes: Conjunctivae and EOM are normal.   Cardiovascular: Normal rate, regular rhythm and normal heart sounds.   Pulmonary/Chest: Effort normal and breath sounds normal. He has no decreased breath sounds. He has no wheezes. He has no rhonchi. He has no rales.           Egophony in all marked areas but patient has interstitial lung disease    Lymphadenopathy:     He has no " cervical adenopathy.   Neurological: He is alert and oriented to person, place, and time.           Assessment:       1. Other systemic lupus erythematosus with lung involvement    2. Acute sinusitis, recurrence not specified, unspecified location    3. Systemic lupus erythematosus with lung involvement, unspecified SLE type    4. Interstitial lung disease        Plan:       Other systemic lupus erythematosus with lung involvement  -     Ambulatory referral to Rheumatology  Pt has not seen one in a couple years.     Acute sinusitis, recurrence not specified, unspecified location  -     azelastine (ASTELIN) 137 mcg (0.1 %) nasal spray; 1 spray (137 mcg total) by Nasal route 2 (two) times daily.  Dispense: 30 mL; Refill: 2  -     mometasone (NASONEX) 50 mcg/actuation nasal spray; 2 sprays by Nasal route once daily.  Dispense: 17 g; Refill: 3    Call for fever 100.4 or higher, change in discharge color, no improvement in 7-10 days.     Also let us know for any increase in SOB from his baseline, chest pain, chest tightness.     Systemic lupus erythematosus with lung involvement, unspecified SLE type  Getting back in with rheum    Interstitial lung disease  Getting back in with rheum as this is related this his SLE          No follow-ups on file.

## 2019-04-22 ENCOUNTER — TELEPHONE (OUTPATIENT)
Dept: FAMILY MEDICINE | Facility: CLINIC | Age: 49
End: 2019-04-22

## 2019-04-22 DIAGNOSIS — J31.0 CHRONIC RHINITIS: Primary | ICD-10-CM

## 2019-04-22 RX ORDER — FLUTICASONE PROPIONATE 50 MCG
1 SPRAY, SUSPENSION (ML) NASAL DAILY
Qty: 16 G | Refills: 5 | Status: SHIPPED | OUTPATIENT
Start: 2019-04-22 | End: 2020-08-07

## 2019-04-22 NOTE — TELEPHONE ENCOUNTER
----- Message from Dorota Bowman sent at 4/22/2019 11:07 AM CDT -----  Contact: [facility]  [facility] called to request different medication for insurance purposes. Listed medication is not covered by patient's insurance.Please call to advise at [phone]        mometasone (NASONEX) 50 mcg/actuation nasal spray 17 g             [id number] [address] AT [facility] [address]

## 2019-04-29 ENCOUNTER — TELEPHONE (OUTPATIENT)
Dept: ADMINISTRATIVE | Facility: HOSPITAL | Age: 49
End: 2019-04-29

## 2019-05-07 ENCOUNTER — HOSPITAL ENCOUNTER (OUTPATIENT)
Dept: RADIOLOGY | Facility: HOSPITAL | Age: 49
Discharge: HOME | End: 2019-05-07
Attending: ALLERGY & IMMUNOLOGY
Payer: COMMERCIAL

## 2019-05-07 ENCOUNTER — OFFICE VISIT (OUTPATIENT)
Dept: ALLERGY | Facility: CLINIC | Age: 49
End: 2019-05-07
Payer: COMMERCIAL

## 2019-05-07 ENCOUNTER — TELEPHONE (OUTPATIENT)
Dept: ALLERGY | Facility: CLINIC | Age: 49
End: 2019-05-07

## 2019-05-07 VITALS
BODY MASS INDEX: 30.01 KG/M2 | SYSTOLIC BLOOD PRESSURE: 118 MMHG | HEIGHT: 65 IN | DIASTOLIC BLOOD PRESSURE: 88 MMHG | WEIGHT: 180.13 LBS

## 2019-05-07 DIAGNOSIS — M32.13 SYSTEMIC LUPUS ERYTHEMATOSUS WITH LUNG INVOLVEMENT, UNSPECIFIED SLE TYPE: ICD-10-CM

## 2019-05-07 DIAGNOSIS — R05.9 COUGH: Primary | ICD-10-CM

## 2019-05-07 DIAGNOSIS — J18.9 PNEUMONIA OF LEFT LOWER LOBE DUE TO INFECTIOUS ORGANISM: Primary | ICD-10-CM

## 2019-05-07 DIAGNOSIS — J31.0 CHRONIC RHINITIS: ICD-10-CM

## 2019-05-07 DIAGNOSIS — R05.9 COUGH: ICD-10-CM

## 2019-05-07 PROCEDURE — 3074F PR MOST RECENT SYSTOLIC BLOOD PRESSURE < 130 MM HG: ICD-10-PCS | Mod: CPTII,S$GLB,, | Performed by: ALLERGY & IMMUNOLOGY

## 2019-05-07 PROCEDURE — 99999 PR PBB SHADOW E&M-EST. PATIENT-LVL IV: ICD-10-PCS | Mod: PBBFAC,,, | Performed by: ALLERGY & IMMUNOLOGY

## 2019-05-07 PROCEDURE — 71046 X-RAY EXAM CHEST 2 VIEWS: CPT | Mod: TC

## 2019-05-07 PROCEDURE — 99204 OFFICE O/P NEW MOD 45 MIN: CPT | Mod: S$GLB,,, | Performed by: ALLERGY & IMMUNOLOGY

## 2019-05-07 PROCEDURE — 71046 XR CHEST PA AND LATERAL: ICD-10-PCS | Mod: 26,,, | Performed by: RADIOLOGY

## 2019-05-07 PROCEDURE — 3074F SYST BP LT 130 MM HG: CPT | Mod: CPTII,S$GLB,, | Performed by: ALLERGY & IMMUNOLOGY

## 2019-05-07 PROCEDURE — 3079F DIAST BP 80-89 MM HG: CPT | Mod: CPTII,S$GLB,, | Performed by: ALLERGY & IMMUNOLOGY

## 2019-05-07 PROCEDURE — 3079F PR MOST RECENT DIASTOLIC BLOOD PRESSURE 80-89 MM HG: ICD-10-PCS | Mod: CPTII,S$GLB,, | Performed by: ALLERGY & IMMUNOLOGY

## 2019-05-07 PROCEDURE — 99204 PR OFFICE/OUTPT VISIT, NEW, LEVL IV, 45-59 MIN: ICD-10-PCS | Mod: S$GLB,,, | Performed by: ALLERGY & IMMUNOLOGY

## 2019-05-07 PROCEDURE — 71046 X-RAY EXAM CHEST 2 VIEWS: CPT | Mod: 26,,, | Performed by: RADIOLOGY

## 2019-05-07 PROCEDURE — 99999 PR PBB SHADOW E&M-EST. PATIENT-LVL IV: CPT | Mod: PBBFAC,,, | Performed by: ALLERGY & IMMUNOLOGY

## 2019-05-07 PROCEDURE — 3008F BODY MASS INDEX DOCD: CPT | Mod: CPTII,S$GLB,, | Performed by: ALLERGY & IMMUNOLOGY

## 2019-05-07 PROCEDURE — 3008F PR BODY MASS INDEX (BMI) DOCUMENTED: ICD-10-PCS | Mod: CPTII,S$GLB,, | Performed by: ALLERGY & IMMUNOLOGY

## 2019-05-07 RX ORDER — AMOXICILLIN AND CLAVULANATE POTASSIUM 875; 125 MG/1; MG/1
1 TABLET, FILM COATED ORAL EVERY 12 HOURS
Qty: 14 TABLET | Refills: 0 | Status: SHIPPED | OUTPATIENT
Start: 2019-05-07 | End: 2019-05-14

## 2019-05-07 NOTE — PATIENT INSTRUCTIONS
Start taking flonase (nasonex also ok) 2 sprays daily.  If symptoms still persistent, double the dose to 2 sprays twice a day.     If symptoms still persistent, ADD azelastine.      Restart your ranitidine/zantac 1 tablet twice daily.

## 2019-05-07 NOTE — TELEPHONE ENCOUNTER
Reviewed CXR results with patient - possible LLL PNA with effusion.  Patient says he has not had any fevers recently.  Explained that this is either infectious or 2/2 underlying SLE.  Will treat for possible PNA but will also have follow up on 5/13/19 with pulmonology.  Patient well-appearing in clinic today, VSS.  Instructed patient to go to ER if worse in any way.  Will call in a prescription for augmentin x 7 days.

## 2019-05-13 ENCOUNTER — HOSPITAL ENCOUNTER (OUTPATIENT)
Dept: PULMONOLOGY | Facility: CLINIC | Age: 49
Discharge: HOME | End: 2019-05-13
Payer: COMMERCIAL

## 2019-05-13 ENCOUNTER — PATIENT MESSAGE (OUTPATIENT)
Dept: ALLERGY | Facility: CLINIC | Age: 49
End: 2019-05-13

## 2019-05-13 DIAGNOSIS — R05.9 COUGH: ICD-10-CM

## 2019-05-13 LAB
POST FEV1 FVC: 0.83
POST FEV1: 1.87
POST FVC: 2.26
PRE FEV1 FVC: 77
PRE FEV1: 1.81
PRE FVC: 2.35
PREDICTED FEV1 FVC: 83
PREDICTED FEV1: 2.68
PREDICTED FVC: 3.22

## 2019-05-13 PROCEDURE — 94729 PR C02/MEMBANE DIFFUSE CAPACITY: ICD-10-PCS | Mod: S$GLB,,, | Performed by: INTERNAL MEDICINE

## 2019-05-13 PROCEDURE — 94060 PR EVAL OF BRONCHOSPASM: ICD-10-PCS | Mod: S$GLB,,, | Performed by: INTERNAL MEDICINE

## 2019-05-13 PROCEDURE — 94727 GAS DIL/WSHOT DETER LNG VOL: CPT | Mod: S$GLB,,, | Performed by: INTERNAL MEDICINE

## 2019-05-13 PROCEDURE — 94729 DIFFUSING CAPACITY: CPT | Mod: S$GLB,,, | Performed by: INTERNAL MEDICINE

## 2019-05-13 PROCEDURE — 94060 EVALUATION OF WHEEZING: CPT | Mod: S$GLB,,, | Performed by: INTERNAL MEDICINE

## 2019-05-13 PROCEDURE — 94727 PR PULM FUNCTION TEST BY GAS: ICD-10-PCS | Mod: S$GLB,,, | Performed by: INTERNAL MEDICINE

## 2019-05-14 ENCOUNTER — TELEPHONE (OUTPATIENT)
Dept: ALLERGY | Facility: CLINIC | Age: 49
End: 2019-05-14

## 2019-05-14 NOTE — TELEPHONE ENCOUNTER
Spoke to Mr. Ham.  Discussed PFT results.  He said he is feeling better after antibiotics.  Recommended that he make an appointment to see pulmonology SOON.

## 2019-05-28 ENCOUNTER — TELEPHONE (OUTPATIENT)
Dept: FAMILY MEDICINE | Facility: CLINIC | Age: 49
End: 2019-05-28

## 2019-05-28 NOTE — TELEPHONE ENCOUNTER
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 Pt wants to know if he could get a copy of the physical.    Can the clinic reply by [facility]? No    Would the patient rather a call back or a response via [facility]?  Call back    Best call back number:[phone]    Additional Information:

## 2019-05-28 NOTE — TELEPHONE ENCOUNTER
"Return call to Pt, and informed him that he was given the original form back after he received his physical. Pt stated, "I don't know where I put it". Informed him that he could call the job where he turned it in to, and they should be able to provide him with a copy. Pt acknowledged understanding.   "

## 2019-07-09 DIAGNOSIS — I10 BENIGN HYPERTENSION: ICD-10-CM

## 2019-07-10 RX ORDER — AMLODIPINE BESYLATE 10 MG/1
TABLET ORAL
Qty: 90 TABLET | Refills: 0 | Status: SHIPPED | OUTPATIENT
Start: 2019-07-10 | End: 2019-10-12 | Stop reason: SDUPTHER

## 2019-10-11 DIAGNOSIS — M32.13 SYSTEMIC LUPUS ERYTHEMATOSUS WITH LUNG INVOLVEMENT, UNSPECIFIED SLE TYPE: ICD-10-CM

## 2019-10-11 DIAGNOSIS — Z79.899 LONG-TERM USE OF PLAQUENIL: Primary | ICD-10-CM

## 2019-10-12 DIAGNOSIS — I10 BENIGN HYPERTENSION: ICD-10-CM

## 2019-10-14 RX ORDER — AMLODIPINE BESYLATE 10 MG/1
TABLET ORAL
Qty: 90 TABLET | Refills: 0 | Status: SHIPPED | OUTPATIENT
Start: 2019-10-14 | End: 2019-12-26

## 2019-11-20 ENCOUNTER — CLINICAL SUPPORT (OUTPATIENT)
Dept: FAMILY MEDICINE | Facility: CLINIC | Age: 49
End: 2019-11-20
Payer: COMMERCIAL

## 2019-11-20 ENCOUNTER — PATIENT OUTREACH (OUTPATIENT)
Dept: ADMINISTRATIVE | Facility: OTHER | Age: 49
End: 2019-11-20

## 2019-11-20 VITALS
SYSTOLIC BLOOD PRESSURE: 146 MMHG | BODY MASS INDEX: 29.89 KG/M2 | DIASTOLIC BLOOD PRESSURE: 96 MMHG | OXYGEN SATURATION: 97 % | TEMPERATURE: 99 F | WEIGHT: 179.38 LBS | HEIGHT: 65 IN | HEART RATE: 89 BPM

## 2019-11-20 DIAGNOSIS — Z02.4 ENCOUNTER FOR CDL (COMMERCIAL DRIVING LICENSE) EXAM: Primary | ICD-10-CM

## 2019-11-20 LAB
BILIRUB SERPL-MCNC: NORMAL MG/DL
BILIRUB UR QL STRIP: NEGATIVE
BLOOD URINE, POC: NORMAL
CLARITY UR REFRACT.AUTO: CLEAR
COLOR UR AUTO: NORMAL
COLOR, POC UA: YELLOW
GLUCOSE UR QL STRIP: NEGATIVE
GLUCOSE UR QL STRIP: NORMAL
HGB UR QL STRIP: NEGATIVE
KETONES UR QL STRIP: NEGATIVE
KETONES UR QL STRIP: NORMAL
LEUKOCYTE ESTERASE UR QL STRIP: NEGATIVE
LEUKOCYTE ESTERASE URINE, POC: NORMAL
NITRITE UR QL STRIP: NEGATIVE
NITRITE, POC UA: NORMAL
PH UR STRIP: 7 [PH] (ref 5–8)
PH, POC UA: 7
PROT UR QL STRIP: NEGATIVE
PROTEIN, POC: NORMAL
SP GR UR STRIP: 1.01 (ref 1–1.03)
SPECIFIC GRAVITY, POC UA: 1.01
URN SPEC COLLECT METH UR: NORMAL
UROBILINOGEN, POC UA: NORMAL

## 2019-11-20 PROCEDURE — 81002 URINALYSIS NONAUTO W/O SCOPE: CPT | Mod: S$GLB,,, | Performed by: NURSE PRACTITIONER

## 2019-11-20 PROCEDURE — 99999 PR PBB SHADOW E&M-EST. PATIENT-LVL IV: ICD-10-PCS | Mod: PBBFAC,,, | Performed by: NURSE PRACTITIONER

## 2019-11-20 PROCEDURE — 81002 POCT URINE DIPSTICK WITHOUT MICROSCOPE: ICD-10-PCS | Mod: S$GLB,,, | Performed by: NURSE PRACTITIONER

## 2019-11-20 PROCEDURE — 99499 UNLISTED E&M SERVICE: CPT | Mod: S$GLB,,, | Performed by: NURSE PRACTITIONER

## 2019-11-20 PROCEDURE — 99999 PR PBB SHADOW E&M-EST. PATIENT-LVL IV: CPT | Mod: PBBFAC,,, | Performed by: NURSE PRACTITIONER

## 2019-11-20 PROCEDURE — 99499 NO LOS: ICD-10-PCS | Mod: S$GLB,,, | Performed by: NURSE PRACTITIONER

## 2019-11-20 PROCEDURE — 81003 URINALYSIS AUTO W/O SCOPE: CPT

## 2019-11-20 NOTE — PROGRESS NOTES
" Medical Examination     Ronal Ham is a 49 y.o. male who presents today for a  fitness determination physical exam. The patient reports no problems.  The following portions of the patient's history were reviewed and updated as appropriate: allergies, current medications, past family history, past medical history, past social history, past surgical history and problem list.    Review of Systems  A comprehensive review of systems was negative.    Review or Systems  Eyes: denies visual changes at this time denies floaters   ENT: no nasal congestion or sore throat  Respiratory: no cough or shorness of breath  Cardiovascular: no chest pain or palpitations  Gastrointestinal: no nausea or vomiting, no abdominal pain or change in bowel habits  Genitourinary: no hematuria or dysuria; denies frequency  Hematologic/Lymphatic: no easy bruising or lymphadenopathy  Musculoskeletal: no arthralgias or myalgias  Neurological: no seizures or tremors  Endocrine: no heat or cold intolerance    Objective:      Vision:   Uncorrected Corrected Horizontal Field of Vision   Right Eye 20/20 Does not wear glasses 70 degrees   Left Eye  20/20 Does not wear glasses 70 degrees   Both Eyes  20/20 Does not wear glasses      Applicant can recognize and distinguish among traffic control signals and devices showing standard red, green, and magali colors.    Monocular Vision?: No    Hearing:  Forced whisper WNL at 5 feet    BP (!) 146/96 (BP Location: Left arm, Patient Position: Sitting, BP Method: Large (Manual))   Pulse 89   Temp 98.7 °F (37.1 °C) (Oral)   Ht 5' 5" (1.651 m)   Wt 81.4 kg (179 lb 5.5 oz)   SpO2 97%   BMI 29.84 kg/m²     General Appearance:    Alert, cooperative, no distress, appears stated age   Head:    Normocephalic, without obvious abnormality, atraumatic   Eyes:    PERRL, conjunctiva/corneas clear, EOM's intact, fundi     benign, both eyes        Ears:    Normal TM's and external " ear canals, both ears   Nose:   Nares normal, septum midline, mucosa normal, no drainage    or sinus tenderness   Throat:   Lips, mucosa, and tongue normal; teeth and gums normal   Neck:   Supple, symmetrical, trachea midline, no adenopathy;        thyroid:  No enlargement/tenderness/nodules; no carotid    bruit or JVD   Back:     Symmetric, no curvature, ROM normal, no CVA tenderness   Lungs:     Clear to auscultation bilaterally, respirations unlabored   Chest wall:    No tenderness or deformity   Heart:    Regular rate and rhythm, S1 and S2 normal, no murmur, rub   or gallop   Abdomen:     Soft, non-tender, bowel sounds active all four quadrants,     no masses, no organomegaly   Genitalia:    Normal male without lesion, discharge or tenderness   Rectal:    Normal tone, normal prostate, no masses or tenderness;    guaiac negative stool   Extremities:   Extremities normal, atraumatic, no cyanosis or edema   Pulses:   2+ and symmetric all extremities   Skin:   Skin color, texture, turgor normal, no rashes or lesions   Lymph nodes:   Cervical, supraclavicular, and axillary nodes normal   Neurologic:   CNII-XII intact. Normal strength, sensation and reflexes       throughout       Labs:  Lab Results   Component Value Date    SPECGRAV 1.010 11/20/2019    PROTEINUR POS 11/20/2019    BILIRUBINUR NEG 11/20/2019          Assessment:      Healthy male exam.   Meets standards in 47 .41;  qualifies for 1 year certificate.      Plan:      Medical examiners certificate completed and printed.  Discussed blood pressure with patient and normal parameters. He is to follow up with his PCP for blood pressure management. Discussed the importance with being consistent with blood pressure medication. He verbalized understanding.  Return as needed.

## 2019-11-22 ENCOUNTER — OCCUPATIONAL HEALTH (OUTPATIENT)
Dept: URGENT CARE | Facility: CLINIC | Age: 49
End: 2019-11-22

## 2019-11-22 DIAGNOSIS — Z02.1 PRE-EMPLOYMENT EXAMINATION: Primary | ICD-10-CM

## 2019-11-22 PROCEDURE — 99499 UNLISTED E&M SERVICE: CPT | Mod: S$GLB,,, | Performed by: NURSE PRACTITIONER

## 2019-11-22 PROCEDURE — 99499 DOT PHYSICAL: ICD-10-PCS | Mod: S$GLB,,, | Performed by: NURSE PRACTITIONER

## 2019-11-26 ENCOUNTER — PATIENT MESSAGE (OUTPATIENT)
Dept: OPTOMETRY | Facility: CLINIC | Age: 49
End: 2019-11-26

## 2019-12-09 DIAGNOSIS — J84.9 INTERSTITIAL LUNG DISEASE: ICD-10-CM

## 2019-12-09 DIAGNOSIS — M32.9 SLE (SYSTEMIC LUPUS ERYTHEMATOSUS): ICD-10-CM

## 2019-12-11 RX ORDER — HYDROXYCHLOROQUINE SULFATE 200 MG/1
400 TABLET, FILM COATED ORAL DAILY
Qty: 180 TABLET | Refills: 3 | Status: SHIPPED | OUTPATIENT
Start: 2019-12-11 | End: 2020-12-21 | Stop reason: SDUPTHER

## 2019-12-11 RX ORDER — PROMETHAZINE HYDROCHLORIDE AND CODEINE PHOSPHATE 6.25; 1 MG/5ML; MG/5ML
5 SOLUTION ORAL EVERY 4 HOURS PRN
Qty: 240 ML | Refills: 0 | Status: SHIPPED | OUTPATIENT
Start: 2019-12-11 | End: 2020-01-17 | Stop reason: SDUPTHER

## 2019-12-25 DIAGNOSIS — I10 BENIGN HYPERTENSION: ICD-10-CM

## 2019-12-26 RX ORDER — AMLODIPINE BESYLATE 10 MG/1
TABLET ORAL
Qty: 90 TABLET | Refills: 0 | Status: SHIPPED | OUTPATIENT
Start: 2019-12-26 | End: 2020-03-30

## 2020-01-03 NOTE — TELEPHONE ENCOUNTER
Last Office Visit Info:   The patient's last visit with Bruce Terrell MD was on 12/19/2018.    The patient's last visit in current department was on Visit date not found.        Last CBC Results:   Lab Results   Component Value Date    WBC 3.98 12/18/2018    HGB 12.8 (L) 12/18/2018    HCT 37.1 (L) 12/18/2018     12/18/2018       Last CMP Results  Lab Results   Component Value Date     12/18/2018    K 3.9 12/18/2018     12/18/2018    CO2 27 12/18/2018    BUN 11 12/18/2018    CREATININE 0.9 12/18/2018    CALCIUM 9.3 12/18/2018    ALBUMIN 3.5 12/18/2018    ALBUMIN 3.8 12/18/2018    AST 29 12/18/2018    ALT 20 12/18/2018       Last Lipids  Lab Results   Component Value Date    CHOL 138 12/18/2018    TRIG 58 12/18/2018    HDL 49 12/18/2018    LDLCALC 77.4 12/18/2018       Last A1C  Lab Results   Component Value Date    HGBA1C 5.2 12/18/2018       Last TSH  Lab Results   Component Value Date    TSH 1.859 12/18/2018         Current Med Refills  Medication List with Changes/Refills   Current Medications    ALBUTEROL (VENTOLIN HFA) 90 MCG/ACTUATION INHALER    USE 2 PUFFS BY MOUTH EVERY 6 HOURS AS NEEDED FOR WHEEZING       Start Date: 11/26/2018End Date: --    AMLODIPINE (NORVASC) 10 MG TABLET    TAKE 1 TABLET BY MOUTH EVERY DAY       Start Date: 12/26/2019End Date: --    AZELASTINE (ASTELIN) 137 MCG (0.1 %) NASAL SPRAY    1 spray (137 mcg total) by Nasal route 2 (two) times daily.       Start Date: 4/18/2019 End Date: 4/17/2020    ECONAZOLE NITRATE 1 % CREAM    ANTIONE EXT TO THE AFFECTED AND SURROUNDING AREAS OF SKIN 1 TIME PER DAY       Start Date: 11/18/2016End Date: --    FLUTICASONE (FLONASE) 50 MCG/ACTUATION NASAL SPRAY    1 spray (50 mcg total) by Each Nare route once daily.       Start Date: 4/22/2019 End Date: --    HYDROXYCHLOROQUINE (PLAQUENIL) 200 MG TABLET    Take 2 tablets (400 mg total) by mouth once daily.       Start Date: 12/11/2019End Date: --    LORATADINE (CLARITIN) 10 MG TABLET    Take 10  mg by mouth once daily.       Start Date: --        End Date: --    RANITIDINE (ZANTAC) 150 MG CAPSULE    Take 1 capsule (150 mg total) by mouth 2 (two) times daily.       Start Date: 1/23/2019 End Date: 1/23/2020    SILDENAFIL (VIAGRA) 100 MG TABLET    Take 1 tablet (100 mg total) by mouth daily as needed for Erectile Dysfunction.       Start Date: 2/5/2018  End Date: 4/18/2021    TRIAMCINOLONE ACETONIDE 0.1% (KENALOG) 0.1 % CREAM    Apply topically 2 (two) times daily.       Start Date: 1/23/2019 End Date: --       Order(s) placed per written order guidelines:     Please advise.

## 2020-01-04 RX ORDER — SILDENAFIL 100 MG/1
100 TABLET, FILM COATED ORAL DAILY PRN
Qty: 10 TABLET | Refills: 5 | Status: SHIPPED | OUTPATIENT
Start: 2020-01-04 | End: 2021-06-04 | Stop reason: SDUPTHER

## 2020-01-08 ENCOUNTER — PATIENT MESSAGE (OUTPATIENT)
Dept: ADMINISTRATIVE | Facility: HOSPITAL | Age: 50
End: 2020-01-08

## 2020-01-08 ENCOUNTER — PATIENT OUTREACH (OUTPATIENT)
Dept: ADMINISTRATIVE | Facility: HOSPITAL | Age: 50
End: 2020-01-08

## 2020-01-17 RX ORDER — PROMETHAZINE HYDROCHLORIDE AND CODEINE PHOSPHATE 6.25; 1 MG/5ML; MG/5ML
5 SOLUTION ORAL EVERY 4 HOURS PRN
Qty: 240 ML | Refills: 0 | Status: SHIPPED | OUTPATIENT
Start: 2020-01-17 | End: 2020-04-20 | Stop reason: SDUPTHER

## 2020-01-17 NOTE — TELEPHONE ENCOUNTER
Last Office Visit Info:   The patient's last visit with Bruce Terrell MD was on 12/19/2018.    The patient's last visit in current department was on Visit date not found.        Last CBC Results:   Lab Results   Component Value Date    WBC 3.98 12/18/2018    HGB 12.8 (L) 12/18/2018    HCT 37.1 (L) 12/18/2018     12/18/2018       Last CMP Results  Lab Results   Component Value Date     12/18/2018    K 3.9 12/18/2018     12/18/2018    CO2 27 12/18/2018    BUN 11 12/18/2018    CREATININE 0.9 12/18/2018    CALCIUM 9.3 12/18/2018    ALBUMIN 3.5 12/18/2018    ALBUMIN 3.8 12/18/2018    AST 29 12/18/2018    ALT 20 12/18/2018       Last Lipids  Lab Results   Component Value Date    CHOL 138 12/18/2018    TRIG 58 12/18/2018    HDL 49 12/18/2018    LDLCALC 77.4 12/18/2018       Last A1C  Lab Results   Component Value Date    HGBA1C 5.2 12/18/2018       Last TSH  Lab Results   Component Value Date    TSH 1.859 12/18/2018         Current Med Refills  Medication List with Changes/Refills   Current Medications    ALBUTEROL (VENTOLIN HFA) 90 MCG/ACTUATION INHALER    USE 2 PUFFS BY MOUTH EVERY 6 HOURS AS NEEDED FOR WHEEZING       Start Date: 11/26/2018End Date: --    AMLODIPINE (NORVASC) 10 MG TABLET    TAKE 1 TABLET BY MOUTH EVERY DAY       Start Date: 12/26/2019End Date: --    AZELASTINE (ASTELIN) 137 MCG (0.1 %) NASAL SPRAY    1 spray (137 mcg total) by Nasal route 2 (two) times daily.       Start Date: 4/18/2019 End Date: 4/17/2020    ECONAZOLE NITRATE 1 % CREAM    ANTIONE EXT TO THE AFFECTED AND SURROUNDING AREAS OF SKIN 1 TIME PER DAY       Start Date: 11/18/2016End Date: --    FLUTICASONE (FLONASE) 50 MCG/ACTUATION NASAL SPRAY    1 spray (50 mcg total) by Each Nare route once daily.       Start Date: 4/22/2019 End Date: --    HYDROXYCHLOROQUINE (PLAQUENIL) 200 MG TABLET    Take 2 tablets (400 mg total) by mouth once daily.       Start Date: 12/11/2019End Date: --    LORATADINE (CLARITIN) 10 MG TABLET    Take 10  mg by mouth once daily.       Start Date: --        End Date: --    RANITIDINE (ZANTAC) 150 MG CAPSULE    Take 1 capsule (150 mg total) by mouth 2 (two) times daily.       Start Date: 1/23/2019 End Date: 1/23/2020    SILDENAFIL (VIAGRA) 100 MG TABLET    Take 1 tablet (100 mg total) by mouth daily as needed for Erectile Dysfunction.       Start Date: 1/4/2020  End Date: 1/3/2021    TRIAMCINOLONE ACETONIDE 0.1% (KENALOG) 0.1 % CREAM    Apply topically 2 (two) times daily.       Start Date: 1/23/2019 End Date: --       Order(s) placed per written order guidelines:     Please advise.

## 2020-01-28 ENCOUNTER — OFFICE VISIT (OUTPATIENT)
Dept: FAMILY MEDICINE | Facility: CLINIC | Age: 50
End: 2020-01-28
Payer: COMMERCIAL

## 2020-01-28 ENCOUNTER — PATIENT OUTREACH (OUTPATIENT)
Dept: ADMINISTRATIVE | Facility: HOSPITAL | Age: 50
End: 2020-01-28

## 2020-01-28 ENCOUNTER — PATIENT MESSAGE (OUTPATIENT)
Dept: ADMINISTRATIVE | Facility: HOSPITAL | Age: 50
End: 2020-01-28

## 2020-01-28 VITALS
WEIGHT: 179.69 LBS | RESPIRATION RATE: 19 BRPM | SYSTOLIC BLOOD PRESSURE: 122 MMHG | HEIGHT: 65 IN | TEMPERATURE: 99 F | OXYGEN SATURATION: 99 % | DIASTOLIC BLOOD PRESSURE: 80 MMHG | BODY MASS INDEX: 29.94 KG/M2 | HEART RATE: 93 BPM

## 2020-01-28 DIAGNOSIS — B96.89 ACUTE BACTERIAL BRONCHITIS: Primary | ICD-10-CM

## 2020-01-28 DIAGNOSIS — I10 BENIGN HYPERTENSION: ICD-10-CM

## 2020-01-28 DIAGNOSIS — J20.8 ACUTE BACTERIAL BRONCHITIS: Primary | ICD-10-CM

## 2020-01-28 DIAGNOSIS — Z00.00 ANNUAL PHYSICAL EXAM: Primary | ICD-10-CM

## 2020-01-28 DIAGNOSIS — Z12.11 COLON CANCER SCREENING: ICD-10-CM

## 2020-01-28 PROCEDURE — 99999 PR PBB SHADOW E&M-EST. PATIENT-LVL IV: ICD-10-PCS | Mod: PBBFAC,,, | Performed by: PHYSICIAN ASSISTANT

## 2020-01-28 PROCEDURE — 3008F PR BODY MASS INDEX (BMI) DOCUMENTED: ICD-10-PCS | Mod: CPTII,S$GLB,, | Performed by: PHYSICIAN ASSISTANT

## 2020-01-28 PROCEDURE — 99214 PR OFFICE/OUTPT VISIT, EST, LEVL IV, 30-39 MIN: ICD-10-PCS | Mod: 25,S$GLB,, | Performed by: PHYSICIAN ASSISTANT

## 2020-01-28 PROCEDURE — 96372 THER/PROPH/DIAG INJ SC/IM: CPT | Mod: S$GLB,,, | Performed by: PHYSICIAN ASSISTANT

## 2020-01-28 PROCEDURE — 3079F PR MOST RECENT DIASTOLIC BLOOD PRESSURE 80-89 MM HG: ICD-10-PCS | Mod: CPTII,S$GLB,, | Performed by: PHYSICIAN ASSISTANT

## 2020-01-28 PROCEDURE — 3079F DIAST BP 80-89 MM HG: CPT | Mod: CPTII,S$GLB,, | Performed by: PHYSICIAN ASSISTANT

## 2020-01-28 PROCEDURE — 99214 OFFICE O/P EST MOD 30 MIN: CPT | Mod: 25,S$GLB,, | Performed by: PHYSICIAN ASSISTANT

## 2020-01-28 PROCEDURE — 3074F SYST BP LT 130 MM HG: CPT | Mod: CPTII,S$GLB,, | Performed by: PHYSICIAN ASSISTANT

## 2020-01-28 PROCEDURE — 3074F PR MOST RECENT SYSTOLIC BLOOD PRESSURE < 130 MM HG: ICD-10-PCS | Mod: CPTII,S$GLB,, | Performed by: PHYSICIAN ASSISTANT

## 2020-01-28 PROCEDURE — 99999 PR PBB SHADOW E&M-EST. PATIENT-LVL IV: CPT | Mod: PBBFAC,,, | Performed by: PHYSICIAN ASSISTANT

## 2020-01-28 PROCEDURE — 3008F BODY MASS INDEX DOCD: CPT | Mod: CPTII,S$GLB,, | Performed by: PHYSICIAN ASSISTANT

## 2020-01-28 PROCEDURE — 96372 PR INJECTION,THERAP/PROPH/DIAG2ST, IM OR SUBCUT: ICD-10-PCS | Mod: S$GLB,,, | Performed by: PHYSICIAN ASSISTANT

## 2020-01-28 RX ORDER — AMOXICILLIN AND CLAVULANATE POTASSIUM 500; 125 MG/1; MG/1
1 TABLET, FILM COATED ORAL 2 TIMES DAILY
Qty: 14 TABLET | Refills: 0 | Status: SHIPPED | OUTPATIENT
Start: 2020-01-28 | End: 2020-02-04

## 2020-01-28 RX ORDER — METHYLPREDNISOLONE 4 MG/1
TABLET ORAL
Qty: 1 PACKAGE | Refills: 0 | Status: SHIPPED | OUTPATIENT
Start: 2020-01-28 | End: 2020-02-11 | Stop reason: ALTCHOICE

## 2020-01-28 RX ORDER — METHYLPREDNISOLONE ACETATE 40 MG/ML
40 INJECTION, SUSPENSION INTRA-ARTICULAR; INTRALESIONAL; INTRAMUSCULAR; SOFT TISSUE
Status: COMPLETED | OUTPATIENT
Start: 2020-01-28 | End: 2020-01-28

## 2020-01-28 RX ORDER — BENZONATATE 100 MG/1
100 CAPSULE ORAL 3 TIMES DAILY PRN
Qty: 30 CAPSULE | Refills: 0 | Status: SHIPPED | OUTPATIENT
Start: 2020-01-28 | End: 2020-02-27

## 2020-01-28 RX ORDER — ALBUTEROL SULFATE 90 UG/1
1-2 AEROSOL, METERED RESPIRATORY (INHALATION) EVERY 6 HOURS PRN
Qty: 18 G | Refills: 0 | Status: SHIPPED | OUTPATIENT
Start: 2020-01-28 | End: 2020-08-10

## 2020-01-28 RX ADMIN — METHYLPREDNISOLONE ACETATE 40 MG: 40 INJECTION, SUSPENSION INTRA-ARTICULAR; INTRALESIONAL; INTRAMUSCULAR; SOFT TISSUE at 01:01

## 2020-01-28 NOTE — PROGRESS NOTES
Subjective:       Patient ID: Ronal Ham is a 49 y.o. male with multiple medical diagnoses as listed in the medical history and problem list that presents for Headache; Sinusitis; and Nasal Congestion  .    Chief Complaint: Headache; Sinusitis; and Nasal Congestion      Sinusitis   This is a new problem. The current episode started 1 to 4 weeks ago. The problem has been gradually worsening since onset. Associated symptoms include congestion, coughing, shortness of breath, sinus pressure, sneezing and a sore throat. Pertinent negatives include no chills or ear pain. Treatments tried: claritin cough syrup advil --last dose at 6      Review of Systems   Constitutional: Negative for chills, fatigue and fever.   HENT: Positive for congestion, postnasal drip, rhinorrhea, sinus pressure, sneezing, sore throat and voice change. Negative for ear pain and sinus pain.    Eyes: Positive for pain. Negative for photophobia, discharge, redness and itching.   Respiratory: Positive for cough, shortness of breath and wheezing. Negative for chest tightness.    Gastrointestinal: Negative for abdominal pain, constipation, diarrhea, nausea and vomiting.   Musculoskeletal: Negative for myalgias.   Neurological: Positive for dizziness.         PAST MEDICAL HISTORY:  Past Medical History:   Diagnosis Date    Arthritis     Eye injury     od hit above od    GERD (gastroesophageal reflux disease)     Hypertension     Lupus (systemic lupus erythematosus)     Pulmonary fibrosis     Raynaud phenomenon        SOCIAL HISTORY:  Social History     Socioeconomic History    Marital status:      Spouse name: Not on file    Number of children: 5    Years of education: some colle    Highest education level: Not on file   Occupational History    Occupation:  off shore      Employer: [employer]   Social Needs    Financial resource strain: Not on file    Food insecurity:     Worry: Not on file     Inability: Not  on file    Transportation needs:     Medical: Not on file     Non-medical: Not on file   Tobacco Use    Smoking status: Never Smoker    Smokeless tobacco: Never Used   Substance and Sexual Activity    Alcohol use: No    Drug use: No    Sexual activity: Yes     Partners: Female   Lifestyle    Physical activity:     Days per week: Not on file     Minutes per session: Not on file    Stress: Not on file   Relationships    Social connections:     Talks on phone: Not on file     Gets together: Not on file     Attends Hindu service: Not on file     Active member of club or organization: Not on file     Attends meetings of clubs or organizations: Not on file     Relationship status: Not on file   Other Topics Concern    Not on file   Social History Narrative    Adult Screenings updated and reviewed    Mammogram( for females)    Pap ( for females)    PSA( for males )    Colonoscopy age  48    Flu shot yearly    Td     Pneumovax recommended one time  at age  65    Zostavax recommended one time at  age  58    Eye exam recommended yearly    Bone density        ALLERGIES AND MEDICATIONS: updated and reviewed.  Review of patient's allergies indicates:   Allergen Reactions    Carafate  [sucralfate] Rash     Other reaction(s): Hives     Current Outpatient Medications   Medication Sig Dispense Refill    amLODIPine (NORVASC) 10 MG tablet TAKE 1 TABLET BY MOUTH EVERY DAY 90 tablet 0    azelastine (ASTELIN) 137 mcg (0.1 %) nasal spray 1 spray (137 mcg total) by Nasal route 2 (two) times daily. 30 mL 2    econazole nitrate 1 % cream ANTIONE EXT TO THE AFFECTED AND SURROUNDING AREAS OF SKIN 1 TIME PER DAY  12    fluticasone (FLONASE) 50 mcg/actuation nasal spray 1 spray (50 mcg total) by Each Nare route once daily. 16 g 5    hydroxychloroquine (PLAQUENIL) 200 mg tablet Take 2 tablets (400 mg total) by mouth once daily. 180 tablet 3    loratadine (CLARITIN) 10 mg tablet Take 10 mg by mouth once daily.      sildenafil  "(VIAGRA) 100 MG tablet Take 1 tablet (100 mg total) by mouth daily as needed for Erectile Dysfunction. 10 tablet 5    triamcinolone acetonide 0.1% (KENALOG) 0.1 % cream Apply topically 2 (two) times daily. 60 g 0    albuterol (PROAIR HFA) 90 mcg/actuation inhaler Inhale 1-2 puffs into the lungs every 6 (six) hours as needed for Wheezing. Rescue 18 g 0    amoxicillin-clavulanate 500-125mg (AUGMENTIN) 500-125 mg Tab Take 1 tablet (500 mg total) by mouth 2 (two) times daily. for 7 days 14 tablet 0    benzonatate (TESSALON) 100 MG capsule Take 1 capsule (100 mg total) by mouth 3 (three) times daily as needed for Cough. 30 capsule 0    methylPREDNISolone (MEDROL DOSEPACK) 4 mg tablet Take as directed 1 Package 0    ranitidine (ZANTAC) 150 MG capsule Take 1 capsule (150 mg total) by mouth 2 (two) times daily. (Patient not taking: Reported on 11/20/2019) 60 capsule 0     Current Facility-Administered Medications   Medication Dose Route Frequency Provider Last Rate Last Dose    EUFLEXXA 10 mg/mL(mw 2.4 -3.6 million) injection Syrg 40 mg  40 mg Intra-articular Weekly Paulette Sewell NP   40 mg at 10/18/17 1637    methylPREDNISolone acetate injection 40 mg  40 mg Intramuscular 1 time in Clinic/HOD MELISSA Muñoz             Objective:   /80 (BP Location: Left arm, Patient Position: Sitting, BP Method: Large (Manual))   Pulse 93   Temp 98.9 °F (37.2 °C) (Oral)   Resp 19   Ht 5' 5" (1.651 m)   Wt 81.5 kg (179 lb 10.8 oz)   SpO2 99%   BMI 29.90 kg/m²      Physical Exam   Constitutional: He is oriented to person, place, and time. No distress.   HENT:   Head: Normocephalic and atraumatic.   Right Ear: Tympanic membrane normal.   Left Ear: Tympanic membrane normal.   Nose: Right sinus exhibits maxillary sinus tenderness and frontal sinus tenderness. Left sinus exhibits maxillary sinus tenderness and frontal sinus tenderness.   Mouth/Throat: Uvula is midline, oropharynx is clear and moist and mucous membranes " are normal.   Left purulent discharge    Eyes: Conjunctivae and EOM are normal.   Cardiovascular: Normal rate and regular rhythm.   Pulmonary/Chest: Effort normal. He has wheezes. He has no rhonchi. He has no rales.   Neurological: He is alert and oriented to person, place, and time.   Skin: Skin is warm. No erythema.           Assessment:       1. Acute bacterial bronchitis    2. Benign hypertension    3. Colon cancer screening        Plan:       Acute bacterial bronchitis  -     methylPREDNISolone (MEDROL DOSEPACK) 4 mg tablet; Take as directed  Dispense: 1 Package; Refill: 0  -     methylPREDNISolone acetate injection 40 mg  -     benzonatate (TESSALON) 100 MG capsule; Take 1 capsule (100 mg total) by mouth 3 (three) times daily as needed for Cough.  Dispense: 30 capsule; Refill: 0  -     albuterol (PROAIR HFA) 90 mcg/actuation inhaler; Inhale 1-2 puffs into the lungs every 6 (six) hours as needed for Wheezing. Rescue  Dispense: 18 g; Refill: 0  -     amoxicillin-clavulanate 500-125mg (AUGMENTIN) 500-125 mg Tab; Take 1 tablet (500 mg total) by mouth 2 (two) times daily. for 7 days  Dispense: 14 tablet; Refill: 0    Benign hypertension  The current medical regimen is effective;  continue present plan and medications.  Will monitor pressure     Colon cancer screening  -     Fecal Immunochemical Test (iFOBT); Future; Expected date: 01/28/2020            No follow-ups on file.

## 2020-01-28 NOTE — PROGRESS NOTES
After obtaining consent, and per orders of MELISSA Carrasquillo, injection of Depo-Medrol 40mg/mL given by Mague Duckworth. Patient instructed to remain in clinic for 20 minutes afterwards, and to report any adverse reaction to me immediately.

## 2020-01-29 ENCOUNTER — LAB VISIT (OUTPATIENT)
Dept: LAB | Facility: HOSPITAL | Age: 50
End: 2020-01-29
Attending: PHYSICIAN ASSISTANT
Payer: COMMERCIAL

## 2020-01-29 DIAGNOSIS — Z00.00 ANNUAL PHYSICAL EXAM: ICD-10-CM

## 2020-01-29 LAB
ALBUMIN SERPL BCP-MCNC: 3.6 G/DL (ref 3.5–5.2)
ALP SERPL-CCNC: 60 U/L (ref 55–135)
ALT SERPL W/O P-5'-P-CCNC: 21 U/L (ref 10–44)
ANION GAP SERPL CALC-SCNC: 9 MMOL/L (ref 8–16)
AST SERPL-CCNC: 28 U/L (ref 10–40)
BACTERIA #/AREA URNS HPF: NORMAL /HPF
BASOPHILS # BLD AUTO: 0.02 K/UL (ref 0–0.2)
BASOPHILS NFR BLD: 0.4 % (ref 0–1.9)
BILIRUB SERPL-MCNC: 0.3 MG/DL (ref 0.1–1)
BILIRUB UR QL STRIP: NEGATIVE
BUN SERPL-MCNC: 11 MG/DL (ref 6–20)
CALCIUM SERPL-MCNC: 9.2 MG/DL (ref 8.7–10.5)
CHLORIDE SERPL-SCNC: 107 MMOL/L (ref 95–110)
CHOLEST SERPL-MCNC: 144 MG/DL (ref 120–199)
CHOLEST/HDLC SERPL: 3.1 {RATIO} (ref 2–5)
CLARITY UR: CLEAR
CO2 SERPL-SCNC: 26 MMOL/L (ref 23–29)
COLOR UR: YELLOW
CREAT SERPL-MCNC: 0.8 MG/DL (ref 0.5–1.4)
DIFFERENTIAL METHOD: ABNORMAL
EOSINOPHIL # BLD AUTO: 0.3 K/UL (ref 0–0.5)
EOSINOPHIL NFR BLD: 5.7 % (ref 0–8)
ERYTHROCYTE [DISTWIDTH] IN BLOOD BY AUTOMATED COUNT: 13.2 % (ref 11.5–14.5)
EST. GFR  (AFRICAN AMERICAN): >60 ML/MIN/1.73 M^2
EST. GFR  (NON AFRICAN AMERICAN): >60 ML/MIN/1.73 M^2
GLUCOSE SERPL-MCNC: 76 MG/DL (ref 70–110)
GLUCOSE UR QL STRIP: NEGATIVE
HCT VFR BLD AUTO: 42 % (ref 40–54)
HDLC SERPL-MCNC: 47 MG/DL (ref 40–75)
HDLC SERPL: 32.6 % (ref 20–50)
HGB BLD-MCNC: 13.8 G/DL (ref 14–18)
HGB UR QL STRIP: NEGATIVE
HYALINE CASTS #/AREA URNS LPF: 0 /LPF
IMM GRANULOCYTES # BLD AUTO: 0.01 K/UL (ref 0–0.04)
IMM GRANULOCYTES NFR BLD AUTO: 0.2 % (ref 0–0.5)
KETONES UR QL STRIP: NEGATIVE
LDLC SERPL CALC-MCNC: 91.2 MG/DL (ref 63–159)
LEUKOCYTE ESTERASE UR QL STRIP: NEGATIVE
LYMPHOCYTES # BLD AUTO: 1.4 K/UL (ref 1–4.8)
LYMPHOCYTES NFR BLD: 29.2 % (ref 18–48)
MCH RBC QN AUTO: 29.9 PG (ref 27–31)
MCHC RBC AUTO-ENTMCNC: 32.9 G/DL (ref 32–36)
MCV RBC AUTO: 91 FL (ref 82–98)
MICROSCOPIC COMMENT: NORMAL
MONOCYTES # BLD AUTO: 0.4 K/UL (ref 0.3–1)
MONOCYTES NFR BLD: 8.9 % (ref 4–15)
NEUTROPHILS # BLD AUTO: 2.6 K/UL (ref 1.8–7.7)
NEUTROPHILS NFR BLD: 55.6 % (ref 38–73)
NITRITE UR QL STRIP: NEGATIVE
NONHDLC SERPL-MCNC: 97 MG/DL
NRBC BLD-RTO: 0 /100 WBC
PH UR STRIP: 5 [PH] (ref 5–8)
PLATELET # BLD AUTO: 187 K/UL (ref 150–350)
PMV BLD AUTO: 9.1 FL (ref 9.2–12.9)
POTASSIUM SERPL-SCNC: 3.9 MMOL/L (ref 3.5–5.1)
PROT SERPL-MCNC: 8.1 G/DL (ref 6–8.4)
PROT UR QL STRIP: ABNORMAL
RBC # BLD AUTO: 4.62 M/UL (ref 4.6–6.2)
RBC #/AREA URNS HPF: 3 /HPF (ref 0–4)
SODIUM SERPL-SCNC: 142 MMOL/L (ref 136–145)
SP GR UR STRIP: 1.02 (ref 1–1.03)
TRIGL SERPL-MCNC: 29 MG/DL (ref 30–150)
TSH SERPL DL<=0.005 MIU/L-ACNC: 1.22 UIU/ML (ref 0.4–4)
URN SPEC COLLECT METH UR: ABNORMAL
UROBILINOGEN UR STRIP-ACNC: NEGATIVE EU/DL
WBC # BLD AUTO: 4.73 K/UL (ref 3.9–12.7)
WBC #/AREA URNS HPF: 2 /HPF (ref 0–5)

## 2020-01-29 PROCEDURE — 80053 COMPREHEN METABOLIC PANEL: CPT

## 2020-01-29 PROCEDURE — 81000 URINALYSIS NONAUTO W/SCOPE: CPT

## 2020-01-29 PROCEDURE — 83036 HEMOGLOBIN GLYCOSYLATED A1C: CPT

## 2020-01-29 PROCEDURE — 86703 HIV-1/HIV-2 1 RESULT ANTBDY: CPT

## 2020-01-29 PROCEDURE — 80061 LIPID PANEL: CPT

## 2020-01-29 PROCEDURE — 85025 COMPLETE CBC W/AUTO DIFF WBC: CPT

## 2020-01-29 PROCEDURE — 36415 COLL VENOUS BLD VENIPUNCTURE: CPT

## 2020-01-29 PROCEDURE — 84443 ASSAY THYROID STIM HORMONE: CPT

## 2020-01-29 PROCEDURE — 84153 ASSAY OF PSA TOTAL: CPT

## 2020-01-30 LAB
COMPLEXED PSA SERPL-MCNC: 1.1 NG/ML (ref 0–4)
ESTIMATED AVG GLUCOSE: 111 MG/DL (ref 68–131)
HBA1C MFR BLD HPLC: 5.5 % (ref 4–5.6)
HIV 1+2 AB+HIV1 P24 AG SERPL QL IA: NEGATIVE

## 2020-02-06 ENCOUNTER — OFFICE VISIT (OUTPATIENT)
Dept: FAMILY MEDICINE | Facility: CLINIC | Age: 50
End: 2020-02-06
Payer: COMMERCIAL

## 2020-02-06 VITALS
WEIGHT: 180.75 LBS | HEIGHT: 65 IN | DIASTOLIC BLOOD PRESSURE: 78 MMHG | SYSTOLIC BLOOD PRESSURE: 132 MMHG | TEMPERATURE: 98 F | HEART RATE: 70 BPM | OXYGEN SATURATION: 99 % | BODY MASS INDEX: 30.12 KG/M2

## 2020-02-06 DIAGNOSIS — K21.9 GASTROESOPHAGEAL REFLUX DISEASE, ESOPHAGITIS PRESENCE NOT SPECIFIED: ICD-10-CM

## 2020-02-06 DIAGNOSIS — J30.9 ALLERGIC RHINITIS, UNSPECIFIED SEASONALITY, UNSPECIFIED TRIGGER: ICD-10-CM

## 2020-02-06 DIAGNOSIS — S83.412A SPRAIN OF MEDIAL COLLATERAL LIGAMENT OF LEFT KNEE, INITIAL ENCOUNTER: Primary | ICD-10-CM

## 2020-02-06 PROCEDURE — 99214 PR OFFICE/OUTPT VISIT, EST, LEVL IV, 30-39 MIN: ICD-10-PCS | Mod: 25,S$GLB,, | Performed by: PHYSICIAN ASSISTANT

## 2020-02-06 PROCEDURE — 3078F PR MOST RECENT DIASTOLIC BLOOD PRESSURE < 80 MM HG: ICD-10-PCS | Mod: CPTII,S$GLB,, | Performed by: PHYSICIAN ASSISTANT

## 2020-02-06 PROCEDURE — 3075F SYST BP GE 130 - 139MM HG: CPT | Mod: CPTII,S$GLB,, | Performed by: PHYSICIAN ASSISTANT

## 2020-02-06 PROCEDURE — 96372 PR INJECTION,THERAP/PROPH/DIAG2ST, IM OR SUBCUT: ICD-10-PCS | Mod: S$GLB,,, | Performed by: PHYSICIAN ASSISTANT

## 2020-02-06 PROCEDURE — 3075F PR MOST RECENT SYSTOLIC BLOOD PRESS GE 130-139MM HG: ICD-10-PCS | Mod: CPTII,S$GLB,, | Performed by: PHYSICIAN ASSISTANT

## 2020-02-06 PROCEDURE — 99214 OFFICE O/P EST MOD 30 MIN: CPT | Mod: 25,S$GLB,, | Performed by: PHYSICIAN ASSISTANT

## 2020-02-06 PROCEDURE — 99999 PR PBB SHADOW E&M-EST. PATIENT-LVL IV: ICD-10-PCS | Mod: PBBFAC,,, | Performed by: PHYSICIAN ASSISTANT

## 2020-02-06 PROCEDURE — 3008F BODY MASS INDEX DOCD: CPT | Mod: CPTII,S$GLB,, | Performed by: PHYSICIAN ASSISTANT

## 2020-02-06 PROCEDURE — 96372 THER/PROPH/DIAG INJ SC/IM: CPT | Mod: S$GLB,,, | Performed by: PHYSICIAN ASSISTANT

## 2020-02-06 PROCEDURE — 99999 PR PBB SHADOW E&M-EST. PATIENT-LVL IV: CPT | Mod: PBBFAC,,, | Performed by: PHYSICIAN ASSISTANT

## 2020-02-06 PROCEDURE — 3078F DIAST BP <80 MM HG: CPT | Mod: CPTII,S$GLB,, | Performed by: PHYSICIAN ASSISTANT

## 2020-02-06 PROCEDURE — 3008F PR BODY MASS INDEX (BMI) DOCUMENTED: ICD-10-PCS | Mod: CPTII,S$GLB,, | Performed by: PHYSICIAN ASSISTANT

## 2020-02-06 RX ORDER — KETOROLAC TROMETHAMINE 30 MG/ML
30 INJECTION, SOLUTION INTRAMUSCULAR; INTRAVENOUS ONCE
Status: COMPLETED | OUTPATIENT
Start: 2020-02-06 | End: 2020-02-06

## 2020-02-06 RX ORDER — DICLOFENAC SODIUM 10 MG/G
2 GEL TOPICAL 4 TIMES DAILY
Qty: 100 G | Refills: 0 | Status: SHIPPED | OUTPATIENT
Start: 2020-02-06 | End: 2022-11-16 | Stop reason: ALTCHOICE

## 2020-02-06 RX ORDER — LORATADINE 10 MG/1
10 TABLET ORAL DAILY
Qty: 30 TABLET | Refills: 11 | Status: SHIPPED | OUTPATIENT
Start: 2020-02-06 | End: 2020-02-11 | Stop reason: SDUPTHER

## 2020-02-06 RX ADMIN — KETOROLAC TROMETHAMINE 30 MG: 30 INJECTION, SOLUTION INTRAMUSCULAR; INTRAVENOUS at 12:02

## 2020-02-06 NOTE — PATIENT INSTRUCTIONS
Understanding Medial Collateral Ligament Sprain    The knee is a complex joint where the thighbone (femur) meets the shinbone (tibia). Strong tissues called ligaments connect these bones together. Ligaments also keep the bones aligned, so the knee only bends how it is supposed to. The medial collateral ligament (MCL) runs across the knee joint on the medial side of the leg. Injury to this ligament may be very painful. The knee may also not work the way it should.  Causes of an MCL sprain  An MCL sprain often happens when the knee joint is pushed beyond its normal range of motion. It is most common during a blow to the knee from the outside, pushing the knee inward. It may also happen if the knee is forced into a twist. These movements stretch and tear the MCL. Other parts of the knee may be damaged along with the MCL.  Symptoms of an MCL sprain  These include:  · Knee pain  · Knee swelling  · Locking of the joint  · Wobbly or unstable feeling in the joint  Treatment for an MCL sprain  Treatment will depend on the severity of the sprain and whether there is damage to other parts of the knee. Options often include:  · Rest. This allows the knee to heal. Activities that stress the knee should be avoided. Crutches, a knee brace, or both may also be recommended for a short time.  · Cold packs and elevation of the knee. These help reduce swelling and relieve pain.  · Compression. The knee may be wrapped with a bandage to help reduce swelling.  · Medicines. These help relieve pain and swelling.  · Exercises. These help improve the knees stability, strength, and range of motion.  If the injury is severe or several parts of the joint are involved, surgery may be an option. Surgery repairs the MCL and any other damaged structures.     When to call your healthcare provider  Call your healthcare provider right away if you have any of these:  · Pain, swelling, or instability that doesnt get better with treatment or gets  worse  · New symptoms   Date Last Reviewed: 3/10/2016  © 7443-7712 [employer] [address] [place] [address]. All rights reserved. This information is not intended as a substitute for professional medical care. Always follow your healthcare professional's instructions.        Quadriceps, Isometric (Strength)    This exercise is for an injured right knee. Switch sides if the injury is to your left knee.  1. Sit on the floor with your right leg straight in front of you. Bend your left knee up and put your left foot flat on the floor.  2. Flex your right foot and tighten the thigh muscles of your right leg. Press the back of your right knee toward the floor. Dont arch your back or hunch your shoulders.  3. Hold for 5 to 10 seconds. Then relax.  4. Repeat 10 times, or as instructed.  5. Do this exercise 3 times a day, or as instructed.  Date Last Reviewed: 3/10/2016  © 5997-9987 [employer] [address] [place] [address]. All rights reserved. This information is not intended as a substitute for professional medical care. Always follow your healthcare professional's instructions.        Quadriceps, Short Arcs (Strength)    6. Sit on the floor with your right leg straight in front of you. Bend your left knee up and put your left foot flat on the floor.  7. Place a rolled-up towel under your right thigh, just above your knee. Relax your leg.  8. Straighten your right leg, lifting your foot off the floor. Hold for 5 seconds. Then relax.  9. Repeat 10 times, or as instructed.  10. Switch legs and then repeat.     Challenge yourself  Use ankle weights for a tougher workout. Your healthcare provider will tell you what size ankle weights to use.   Date Last Reviewed: 3/10/2016  © 4766-8376 [employer] [address] [place] [address]. All rights reserved. This information is not intended as a substitute for professional medical care. Always follow  your healthcare professional's instructions.

## 2020-02-06 NOTE — PROGRESS NOTES
Subjective:       Patient ID: Ronal Ham is a 49 y.o. male with multiple medical diagnoses as listed in the medical history and problem list that presents for Knee Pain (left)  .    Chief Complaint: Knee Pain (left)      Knee Pain    The incident occurred more than 1 week ago (at work ). There was no injury mechanism (may have hit it at work not sure ). The pain is present in the left knee (medial lower aspect ). The quality of the pain is described as aching. The pain is at a severity of 8/10. The pain has been constant (intensity changes ) since onset. Pertinent negatives include no inability to bear weight, numbness or tingling. The symptoms are aggravated by movement, palpation and weight bearing. Treatments tried: advil and excerdin--nothing today  The treatment provided mild relief.        Review of Systems   Constitutional: Negative for chills, fatigue and fever.   Musculoskeletal: Positive for arthralgias and myalgias. Negative for back pain and gait problem.   Skin: Negative for color change and rash.   Neurological: Negative for tingling and numbness.         PAST MEDICAL HISTORY:  Past Medical History:   Diagnosis Date    Arthritis     Eye injury     od hit above od    GERD (gastroesophageal reflux disease)     Hypertension     Lupus (systemic lupus erythematosus)     Pulmonary fibrosis     Raynaud phenomenon        SOCIAL HISTORY:  Social History     Socioeconomic History    Marital status:      Spouse name: Not on file    Number of children: 5    Years of education: some colle    Highest education level: Not on file   Occupational History    Occupation:  off shore      Employer: [employer]   Social Needs    Financial resource strain: Not on file    Food insecurity:     Worry: Not on file     Inability: Not on file    Transportation needs:     Medical: Not on file     Non-medical: Not on file   Tobacco Use    Smoking status: Never Smoker    Smokeless  tobacco: Never Used   Substance and Sexual Activity    Alcohol use: No    Drug use: No    Sexual activity: Yes     Partners: Female   Lifestyle    Physical activity:     Days per week: Not on file     Minutes per session: Not on file    Stress: Not on file   Relationships    Social connections:     Talks on phone: Not on file     Gets together: Not on file     Attends Worship service: Not on file     Active member of club or organization: Not on file     Attends meetings of clubs or organizations: Not on file     Relationship status: Not on file   Other Topics Concern    Not on file   Social History Narrative    Adult Screenings updated and reviewed    Mammogram( for females)    Pap ( for females)    PSA( for males )    Colonoscopy age  48    Flu shot yearly    Td     Pneumovax recommended one time  at age  63    Zostavax recommended one time at  age  58    Eye exam recommended yearly    Bone density        ALLERGIES AND MEDICATIONS: updated and reviewed.  Review of patient's allergies indicates:   Allergen Reactions    Carafate  [sucralfate] Rash     Other reaction(s): Hives     Current Outpatient Medications   Medication Sig Dispense Refill    albuterol (PROAIR HFA) 90 mcg/actuation inhaler Inhale 1-2 puffs into the lungs every 6 (six) hours as needed for Wheezing. Rescue 18 g 0    amLODIPine (NORVASC) 10 MG tablet TAKE 1 TABLET BY MOUTH EVERY DAY 90 tablet 0    azelastine (ASTELIN) 137 mcg (0.1 %) nasal spray 1 spray (137 mcg total) by Nasal route 2 (two) times daily. 30 mL 2    benzonatate (TESSALON) 100 MG capsule Take 1 capsule (100 mg total) by mouth 3 (three) times daily as needed for Cough. 30 capsule 0    econazole nitrate 1 % cream ANTIONE EXT TO THE AFFECTED AND SURROUNDING AREAS OF SKIN 1 TIME PER DAY  12    fluticasone (FLONASE) 50 mcg/actuation nasal spray 1 spray (50 mcg total) by Each Nare route once daily. 16 g 5    hydroxychloroquine (PLAQUENIL) 200 mg tablet Take 2 tablets (400 mg  "total) by mouth once daily. 180 tablet 3    loratadine (CLARITIN) 10 mg tablet Take 1 tablet (10 mg total) by mouth once daily. 30 tablet 11    ranitidine (ZANTAC) 150 MG capsule Take 1 capsule (150 mg total) by mouth 2 (two) times daily. 60 capsule 2    sildenafil (VIAGRA) 100 MG tablet Take 1 tablet (100 mg total) by mouth daily as needed for Erectile Dysfunction. 10 tablet 5    triamcinolone acetonide 0.1% (KENALOG) 0.1 % cream Apply topically 2 (two) times daily. 60 g 0    diclofenac sodium (VOLTAREN) 1 % Gel Apply 2 g topically 4 (four) times daily. 100 g 0    methylPREDNISolone (MEDROL DOSEPACK) 4 mg tablet Take as directed (Patient not taking: Reported on 2/6/2020) 1 Package 0     Current Facility-Administered Medications   Medication Dose Route Frequency Provider Last Rate Last Dose    EUFLEXXA 10 mg/mL(mw 2.4 -3.6 million) injection Syrg 40 mg  40 mg Intra-articular Weekly Paulette Sewell, NP   40 mg at 10/18/17 1637         Objective:   /78   Pulse 70   Temp 98.1 °F (36.7 °C) (Oral)   Ht 5' 5" (1.651 m)   Wt 82 kg (180 lb 12.4 oz)   SpO2 99%   BMI 30.08 kg/m²      Physical Exam   Constitutional: He is oriented to person, place, and time.   HENT:   Head: Normocephalic and atraumatic.   Cardiovascular: Normal rate and regular rhythm.   Pulmonary/Chest: Effort normal and breath sounds normal.   Musculoskeletal:        Right hip: Normal.        Right knee: He exhibits normal range of motion, no swelling, no effusion, no deformity, normal patellar mobility and no bony tenderness. Tenderness found. No medial joint line, no lateral joint line and no patellar tendon tenderness noted.        Left knee: Normal.        Right ankle: Normal.        Legs:  Neurological: He is alert and oriented to person, place, and time.   Skin: No rash noted. No erythema.           Assessment:       1. Sprain of medial collateral ligament of left knee, initial encounter    2. Gastroesophageal reflux disease, " esophagitis presence not specified    3. Allergic rhinitis, unspecified seasonality, unspecified trigger        Plan:       Sprain of medial collateral ligament of left knee, initial encounter  -     diclofenac sodium (VOLTAREN) 1 % Gel; Apply 2 g topically 4 (four) times daily.  Dispense: 100 g; Refill: 0  -     ketorolac injection 30 mg  RICE  Follow up if no improved.     Gastroesophageal reflux disease, esophagitis presence not specified  -     ranitidine (ZANTAC) 150 MG capsule; Take 1 capsule (150 mg total) by mouth 2 (two) times daily.  Dispense: 60 capsule; Refill: 2    Allergic rhinitis, unspecified seasonality, unspecified trigger  -     loratadine (CLARITIN) 10 mg tablet; Take 1 tablet (10 mg total) by mouth once daily.  Dispense: 30 tablet; Refill: 11            No follow-ups on file.

## 2020-02-11 ENCOUNTER — OFFICE VISIT (OUTPATIENT)
Dept: FAMILY MEDICINE | Facility: CLINIC | Age: 50
End: 2020-02-11
Payer: COMMERCIAL

## 2020-02-11 ENCOUNTER — TELEPHONE (OUTPATIENT)
Dept: FAMILY MEDICINE | Facility: CLINIC | Age: 50
End: 2020-02-11

## 2020-02-11 VITALS
BODY MASS INDEX: 30.08 KG/M2 | HEART RATE: 77 BPM | SYSTOLIC BLOOD PRESSURE: 132 MMHG | OXYGEN SATURATION: 98 % | DIASTOLIC BLOOD PRESSURE: 82 MMHG | TEMPERATURE: 98 F | WEIGHT: 180.75 LBS

## 2020-02-11 DIAGNOSIS — K21.9 GASTROESOPHAGEAL REFLUX DISEASE, ESOPHAGITIS PRESENCE NOT SPECIFIED: ICD-10-CM

## 2020-02-11 DIAGNOSIS — M25.561 ACUTE PAIN OF RIGHT KNEE: Primary | ICD-10-CM

## 2020-02-11 DIAGNOSIS — J30.9 ALLERGIC RHINITIS, UNSPECIFIED SEASONALITY, UNSPECIFIED TRIGGER: ICD-10-CM

## 2020-02-11 PROCEDURE — 3008F PR BODY MASS INDEX (BMI) DOCUMENTED: ICD-10-PCS | Mod: CPTII,S$GLB,, | Performed by: FAMILY MEDICINE

## 2020-02-11 PROCEDURE — 3075F SYST BP GE 130 - 139MM HG: CPT | Mod: CPTII,S$GLB,, | Performed by: FAMILY MEDICINE

## 2020-02-11 PROCEDURE — 3079F PR MOST RECENT DIASTOLIC BLOOD PRESSURE 80-89 MM HG: ICD-10-PCS | Mod: CPTII,S$GLB,, | Performed by: FAMILY MEDICINE

## 2020-02-11 PROCEDURE — 99999 PR PBB SHADOW E&M-EST. PATIENT-LVL III: CPT | Mod: PBBFAC,,, | Performed by: FAMILY MEDICINE

## 2020-02-11 PROCEDURE — 99214 OFFICE O/P EST MOD 30 MIN: CPT | Mod: S$GLB,,, | Performed by: FAMILY MEDICINE

## 2020-02-11 PROCEDURE — 3079F DIAST BP 80-89 MM HG: CPT | Mod: CPTII,S$GLB,, | Performed by: FAMILY MEDICINE

## 2020-02-11 PROCEDURE — 3008F BODY MASS INDEX DOCD: CPT | Mod: CPTII,S$GLB,, | Performed by: FAMILY MEDICINE

## 2020-02-11 PROCEDURE — 3075F PR MOST RECENT SYSTOLIC BLOOD PRESS GE 130-139MM HG: ICD-10-PCS | Mod: CPTII,S$GLB,, | Performed by: FAMILY MEDICINE

## 2020-02-11 PROCEDURE — 99999 PR PBB SHADOW E&M-EST. PATIENT-LVL III: ICD-10-PCS | Mod: PBBFAC,,, | Performed by: FAMILY MEDICINE

## 2020-02-11 PROCEDURE — 99214 PR OFFICE/OUTPT VISIT, EST, LEVL IV, 30-39 MIN: ICD-10-PCS | Mod: S$GLB,,, | Performed by: FAMILY MEDICINE

## 2020-02-11 RX ORDER — LORATADINE 10 MG/1
10 TABLET ORAL DAILY
Qty: 30 TABLET | Refills: 11 | Status: SHIPPED | OUTPATIENT
Start: 2020-02-11 | End: 2021-06-03 | Stop reason: SDUPTHER

## 2020-02-11 RX ORDER — TRAMADOL HYDROCHLORIDE 50 MG/1
50 TABLET ORAL EVERY 6 HOURS
Qty: 28 TABLET | Refills: 0 | Status: SHIPPED | OUTPATIENT
Start: 2020-02-11 | End: 2021-06-04 | Stop reason: SDUPTHER

## 2020-02-11 RX ORDER — TIZANIDINE 4 MG/1
4-16 TABLET ORAL NIGHTLY PRN
Qty: 60 TABLET | Refills: 0 | Status: SHIPPED | OUTPATIENT
Start: 2020-02-11 | End: 2020-03-10 | Stop reason: ALTCHOICE

## 2020-02-11 RX ORDER — IBUPROFEN 800 MG/1
800 TABLET ORAL 3 TIMES DAILY
Qty: 21 TABLET | Refills: 0 | Status: SHIPPED | OUTPATIENT
Start: 2020-02-11 | End: 2020-03-10

## 2020-02-11 NOTE — PROGRESS NOTES
Chief Complaint   Patient presents with    Right Knee Pain/ possibly swollen    Facial Rash/ Outbreak     SUBJECTIVE:  Ronal Ham is a 49 y.o. male who sustained a right knee injury 2 week(s) ago. Mechanism of injury: direct trauma. Immediate symptoms: immediate pain, delayed swelling. Symptoms have been chronic and worsening since that time. Prior history of related problems: no prior problems with this area in the past.    OBJECTIVE:  Vital signs as noted above.  Appearance: alert, well appearing, and in no distress and in mild to moderate distress.  Knee exam: antalgic gait, soft tissue tenderness over on the right side, per problem list.  X-ray: not needed.    ASSESSMENT:  Knee strain, not mproving with topical  Increase treatment for 2 weeks.  Get xray and injection if not improving.    PLAN:  Apply a compressive ACE bandage. Rest and elevate the affected painful area.  Apply cold compresses intermittently as needed.  As pain recedes, begin normal activities slowly as tolerated.  Call if symptoms persist.  Potential medication side effects were discussed with the patient; let me know if any occur.  Use NSAID, tizanidine, tramadol  See orders for this visit as documented in the electronic medical record.

## 2020-02-11 NOTE — TELEPHONE ENCOUNTER
----- Message from Jodie Haq sent at 2/11/2020  4:22 PM CST -----  Contact: self [phone]  .Type: Patient Call Back    Who called: self     What is the request in detail: Pt state that the pharmacy didn't received the Rx for traMADol (ULTRAM) 50 mg tablet. Please resend to   .  [id number] - [place], [place] - [address] [facility] [address]  [address]  [facility] [place] [address]  Phone: [phone] Fax: [phone]      Can the clinic reply by [facility]? Call back     Would the patient rather a call back or a response via [facility]? Call back     Best call back number: [phone]

## 2020-02-11 NOTE — TELEPHONE ENCOUNTER
Return call to Pt, and informed that his Rx was ready for  at the office. Pt states that he will pick it up on tomorrow.

## 2020-02-12 ENCOUNTER — TELEPHONE (OUTPATIENT)
Dept: FAMILY MEDICINE | Facility: CLINIC | Age: 50
End: 2020-02-12

## 2020-02-12 DIAGNOSIS — K21.9 GASTROESOPHAGEAL REFLUX DISEASE, ESOPHAGITIS PRESENCE NOT SPECIFIED: ICD-10-CM

## 2020-02-12 RX ORDER — FAMOTIDINE 40 MG/1
40 TABLET, FILM COATED ORAL DAILY
Qty: 30 TABLET | Refills: 11 | Status: SHIPPED | OUTPATIENT
Start: 2020-02-12 | End: 2021-03-15

## 2020-02-12 NOTE — TELEPHONE ENCOUNTER
----- Message from Sridhar Hung sent at 2/12/2020  9:30 AM CST -----  Contact: isauro/pelon /[phone]   Name of Who is Calling:     What is the request in detail:  Request call back in reference to medication ranitidine (ZANTAC) 150 MG capsule  Is not covered by insurance and want to know if want to change to famotidine or other medication  Please contact to further discuss and advise      Can the clinic reply by [facility]: no     What Number to Call Back if not in [facility]:  akrolina /[phone]

## 2020-02-20 DIAGNOSIS — M25.569 KNEE PAIN, UNSPECIFIED CHRONICITY, UNSPECIFIED LATERALITY: Primary | ICD-10-CM

## 2020-02-21 ENCOUNTER — CLINICAL SUPPORT (OUTPATIENT)
Dept: FAMILY MEDICINE | Facility: CLINIC | Age: 50
End: 2020-02-21
Payer: COMMERCIAL

## 2020-02-21 DIAGNOSIS — M25.569 KNEE PAIN, UNSPECIFIED CHRONICITY, UNSPECIFIED LATERALITY: Primary | ICD-10-CM

## 2020-02-21 PROCEDURE — 99212 OFFICE O/P EST SF 10 MIN: CPT | Mod: 25,S$GLB,, | Performed by: FAMILY MEDICINE

## 2020-02-21 PROCEDURE — 20610 LARGE JOINT ASPIRATION/INJECTION: R KNEE: ICD-10-PCS | Mod: S$GLB,,, | Performed by: FAMILY MEDICINE

## 2020-02-21 PROCEDURE — 99212 PR OFFICE/OUTPT VISIT, EST, LEVL II, 10-19 MIN: ICD-10-PCS | Mod: 25,S$GLB,, | Performed by: FAMILY MEDICINE

## 2020-02-21 PROCEDURE — 20610 DRAIN/INJ JOINT/BURSA W/O US: CPT | Mod: S$GLB,,, | Performed by: FAMILY MEDICINE

## 2020-02-21 RX ORDER — TRIAMCINOLONE ACETONIDE 40 MG/ML
40 INJECTION, SUSPENSION INTRA-ARTICULAR; INTRAMUSCULAR
Status: DISCONTINUED | OUTPATIENT
Start: 2020-02-21 | End: 2020-02-21 | Stop reason: HOSPADM

## 2020-02-21 RX ADMIN — TRIAMCINOLONE ACETONIDE 40 MG: 40 INJECTION, SUSPENSION INTRA-ARTICULAR; INTRAMUSCULAR at 01:02

## 2020-02-21 NOTE — PROCEDURES
Large Joint Aspiration/Injection: R knee  Performed by: Bruce Terrell MD  Authorized by: Bruce Terrell MD  Date/Time: 2/21/2020 1:40 PM    Consent Done?:  Yes (Verbal)  Indications:  diagnostic evaluation and pain  Timeout: Immediately prior to procedure a time out was called to verify the correct patient, procedure, equipment, support staff and site/side marked as required.  Prep:Patient was prepped and draped in the usual sterile fashion.  Procedure site marked: Yes     Details:   Needle size: 21 G   Approach: anterolateral  Location:  Knee  Site:  R knee    Medications: 40 mg triamcinolone acetonide 40 mg/mL  Aspirate:  sent for laboratory analysis  Patient tolerance:  patient tolerated the procedure well with no immediate complications

## 2020-02-21 NOTE — PROGRESS NOTES
Right knee pain    SUBJECTIVE:  Ronal Ham is a 49 y.o. male  Here with knee pain not improving much with conservative treatment.  Wants to know next step    OBJECTIVE:  Right knee with mild swelling and no rash  Flexion with pain    ASSESSMENT:  1. Knee pain, unspecified chronicity, unspecified laterality      PLAN:  Diagnoses and all orders for this visit:    Knee pain, unspecified chronicity, unspecified laterality      Apply a compressive ACE bandage. Rest and elevate the affected painful area.  Apply cold compresses intermittently as needed.  As pain recedes, begin normal activities slowly as tolerated.  Call if symptoms persist.

## 2020-03-04 ENCOUNTER — OFFICE VISIT (OUTPATIENT)
Dept: ORTHOPEDICS | Facility: CLINIC | Age: 50
End: 2020-03-04
Payer: COMMERCIAL

## 2020-03-04 ENCOUNTER — HOSPITAL ENCOUNTER (OUTPATIENT)
Dept: RADIOLOGY | Facility: HOSPITAL | Age: 50
Discharge: HOME | End: 2020-03-04
Attending: NURSE PRACTITIONER
Payer: COMMERCIAL

## 2020-03-04 VITALS
HEART RATE: 99 BPM | DIASTOLIC BLOOD PRESSURE: 88 MMHG | BODY MASS INDEX: 30.12 KG/M2 | WEIGHT: 180.75 LBS | HEIGHT: 65 IN | SYSTOLIC BLOOD PRESSURE: 135 MMHG

## 2020-03-04 DIAGNOSIS — M25.562 PAIN IN BOTH KNEES, UNSPECIFIED CHRONICITY: ICD-10-CM

## 2020-03-04 DIAGNOSIS — M25.562 PAIN IN BOTH KNEES, UNSPECIFIED CHRONICITY: Primary | ICD-10-CM

## 2020-03-04 DIAGNOSIS — M25.561 PAIN IN BOTH KNEES, UNSPECIFIED CHRONICITY: Primary | ICD-10-CM

## 2020-03-04 DIAGNOSIS — S89.91XA INJURY OF RIGHT KNEE, INITIAL ENCOUNTER: ICD-10-CM

## 2020-03-04 DIAGNOSIS — M25.561 PAIN IN BOTH KNEES, UNSPECIFIED CHRONICITY: ICD-10-CM

## 2020-03-04 PROCEDURE — 3079F PR MOST RECENT DIASTOLIC BLOOD PRESSURE 80-89 MM HG: ICD-10-PCS | Mod: CPTII,S$GLB,, | Performed by: NURSE PRACTITIONER

## 2020-03-04 PROCEDURE — 3079F DIAST BP 80-89 MM HG: CPT | Mod: CPTII,S$GLB,, | Performed by: NURSE PRACTITIONER

## 2020-03-04 PROCEDURE — 73564 X-RAY EXAM KNEE 4 OR MORE: CPT | Mod: 26,,, | Performed by: RADIOLOGY

## 2020-03-04 PROCEDURE — 99213 PR OFFICE/OUTPT VISIT, EST, LEVL III, 20-29 MIN: ICD-10-PCS | Mod: S$GLB,,, | Performed by: NURSE PRACTITIONER

## 2020-03-04 PROCEDURE — 3075F SYST BP GE 130 - 139MM HG: CPT | Mod: CPTII,S$GLB,, | Performed by: NURSE PRACTITIONER

## 2020-03-04 PROCEDURE — 99999 PR PBB SHADOW E&M-EST. PATIENT-LVL III: ICD-10-PCS | Mod: PBBFAC,,, | Performed by: NURSE PRACTITIONER

## 2020-03-04 PROCEDURE — 73564 XR KNEE ORTHO BILAT WITH FLEXION: ICD-10-PCS | Mod: 26,,, | Performed by: RADIOLOGY

## 2020-03-04 PROCEDURE — 3075F PR MOST RECENT SYSTOLIC BLOOD PRESS GE 130-139MM HG: ICD-10-PCS | Mod: CPTII,S$GLB,, | Performed by: NURSE PRACTITIONER

## 2020-03-04 PROCEDURE — 3008F PR BODY MASS INDEX (BMI) DOCUMENTED: ICD-10-PCS | Mod: CPTII,S$GLB,, | Performed by: NURSE PRACTITIONER

## 2020-03-04 PROCEDURE — 99213 OFFICE O/P EST LOW 20 MIN: CPT | Mod: S$GLB,,, | Performed by: NURSE PRACTITIONER

## 2020-03-04 PROCEDURE — 3008F BODY MASS INDEX DOCD: CPT | Mod: CPTII,S$GLB,, | Performed by: NURSE PRACTITIONER

## 2020-03-04 PROCEDURE — 73564 X-RAY EXAM KNEE 4 OR MORE: CPT | Mod: TC,50

## 2020-03-04 PROCEDURE — 99999 PR PBB SHADOW E&M-EST. PATIENT-LVL III: CPT | Mod: PBBFAC,,, | Performed by: NURSE PRACTITIONER

## 2020-03-04 NOTE — PROGRESS NOTES
CC: Pain of the Right Knee      HPI: Pt with c/o right knee pain for the past month. The pain started after he hit his knee against a metal box at work. The pain is medial and aching. It is worse with increased activity. He has taken advil around the clock without relief. He ambulates independently. He has seen his pcp and had an intraarticular cortisone injection without relief. He has tried wearing a sleeve brace in the past, but the one he has is too tight. He ambulates independently. There is a slight limp.    ROS  General: denies fever and chills  Resp: no c/o sob  CVS: no c/o cp  MSK: c/o right knee pain which is medial and aching and worse with increased activity    PE  General: AAOx3, pleasant and cooperative  Resp: respirations even and unlabored  MSK: right knee exam  0 degrees extension  120 degrees flexion  No warmth or erythema   - effusion  - crepitus  5/5 quad strength  - instability  + cleopatra, medial    Xray:  Reviewed by me with the patient: no fracture or destruction noted    Assessment:  Right knee injury    Plan:  MRI for further evaluation in light of negative xray, lack of relief with cortisone injection and nsaids, and + cleopatra  Continue advil and tramadol as prescribed by his pcp  RICE  F/u MRI results by phone

## 2020-03-10 ENCOUNTER — HOSPITAL ENCOUNTER (EMERGENCY)
Facility: HOSPITAL | Age: 50
Discharge: HOME | End: 2020-03-10
Attending: EMERGENCY MEDICINE
Payer: COMMERCIAL

## 2020-03-10 VITALS
SYSTOLIC BLOOD PRESSURE: 178 MMHG | TEMPERATURE: 99 F | HEART RATE: 81 BPM | OXYGEN SATURATION: 100 % | HEIGHT: 65 IN | WEIGHT: 180 LBS | DIASTOLIC BLOOD PRESSURE: 100 MMHG | BODY MASS INDEX: 29.99 KG/M2 | RESPIRATION RATE: 14 BRPM

## 2020-03-10 DIAGNOSIS — S39.012A STRAIN OF LUMBAR REGION, INITIAL ENCOUNTER: Primary | ICD-10-CM

## 2020-03-10 DIAGNOSIS — M54.31 RIGHT SIDED SCIATICA: ICD-10-CM

## 2020-03-10 PROCEDURE — 63600175 PHARM REV CODE 636 W HCPCS: Performed by: EMERGENCY MEDICINE

## 2020-03-10 PROCEDURE — 99284 EMERGENCY DEPT VISIT MOD MDM: CPT | Mod: 25

## 2020-03-10 PROCEDURE — 99284 PR EMERGENCY DEPT VISIT,LEVEL IV: ICD-10-PCS | Mod: ,,, | Performed by: EMERGENCY MEDICINE

## 2020-03-10 PROCEDURE — 96372 THER/PROPH/DIAG INJ SC/IM: CPT

## 2020-03-10 PROCEDURE — 25000003 PHARM REV CODE 250: Performed by: EMERGENCY MEDICINE

## 2020-03-10 PROCEDURE — 99284 EMERGENCY DEPT VISIT MOD MDM: CPT | Mod: ,,, | Performed by: EMERGENCY MEDICINE

## 2020-03-10 RX ORDER — OXYCODONE AND ACETAMINOPHEN 7.5; 325 MG/1; MG/1
1 TABLET ORAL
Status: COMPLETED | OUTPATIENT
Start: 2020-03-10 | End: 2020-03-10

## 2020-03-10 RX ORDER — OXYCODONE AND ACETAMINOPHEN 5; 325 MG/1; MG/1
1 TABLET ORAL EVERY 4 HOURS PRN
Qty: 6 TABLET | Refills: 0 | Status: SHIPPED | OUTPATIENT
Start: 2020-03-10 | End: 2020-03-12

## 2020-03-10 RX ORDER — IBUPROFEN 600 MG/1
600 TABLET ORAL EVERY 8 HOURS PRN
Qty: 9 TABLET | Refills: 0 | Status: SHIPPED | OUTPATIENT
Start: 2020-03-10 | End: 2020-03-12 | Stop reason: SDUPTHER

## 2020-03-10 RX ORDER — KETOROLAC TROMETHAMINE 30 MG/ML
30 INJECTION, SOLUTION INTRAMUSCULAR; INTRAVENOUS
Status: COMPLETED | OUTPATIENT
Start: 2020-03-10 | End: 2020-03-10

## 2020-03-10 RX ORDER — LIDOCAINE 50 MG/G
1 PATCH TOPICAL
Status: DISCONTINUED | OUTPATIENT
Start: 2020-03-10 | End: 2020-03-10 | Stop reason: HOSPADM

## 2020-03-10 RX ORDER — CYCLOBENZAPRINE HCL 10 MG
10 TABLET ORAL 3 TIMES DAILY PRN
Qty: 6 TABLET | Refills: 0 | Status: SHIPPED | OUTPATIENT
Start: 2020-03-10 | End: 2020-03-12 | Stop reason: SDUPTHER

## 2020-03-10 RX ORDER — CYCLOBENZAPRINE HCL 10 MG
10 TABLET ORAL
Status: COMPLETED | OUTPATIENT
Start: 2020-03-10 | End: 2020-03-10

## 2020-03-10 RX ADMIN — LIDOCAINE 1 PATCH: 50 PATCH TOPICAL at 07:03

## 2020-03-10 RX ADMIN — KETOROLAC TROMETHAMINE 30 MG: 30 INJECTION, SOLUTION INTRAMUSCULAR; INTRAVENOUS at 07:03

## 2020-03-10 RX ADMIN — OXYCODONE HYDROCHLORIDE AND ACETAMINOPHEN 1 TABLET: 7.5; 325 TABLET ORAL at 08:03

## 2020-03-10 RX ADMIN — CYCLOBENZAPRINE 10 MG: 10 TABLET, FILM COATED ORAL at 07:03

## 2020-03-10 NOTE — ED PROVIDER NOTES
Encounter Date: 3/10/2020    SCRIBE #1 NOTE: I, Liliya Ojeda, am scribing for, and in the presence of,  Dr. Malcolm . I have scribed the entire note.       History     Chief Complaint   Patient presents with    Back Pain     started week ago, was moving something today    Knee Pain     r knee pain     Time patient was seen by the provider: 6:54 PM      The patient is a 49 y.o. male with PMHX including: HTN, Raynaud phenomenon, lupus, who presents to the ED with a complaint of back pain. The patient reports of chronic right knee pain starting a month ago. He describes pain as throbbing in nature. He is being seen by his doctor for the knee. He was given muscle relaxer, which he has ran out of. He is schedule for an MRI of the right knee tomorrow. States chronic knee pain has thrown off his gait and he began developing lower back pain in the past few weeks, exacerbated by picking up a heavy box last week  and that exacerbated the pain in his lower back. The pain is worsened by any movement. Denies changes in urination or changes in stool. Denies IV drug use.     The history is provided by the patient and medical records.     Review of patient's allergies indicates:   Allergen Reactions    Carafate  [sucralfate] Rash     Other reaction(s): Hives     Past Medical History:   Diagnosis Date    Arthritis     Eye injury     od hit above od    GERD (gastroesophageal reflux disease)     Hypertension     Lupus (systemic lupus erythematosus)     Pulmonary fibrosis     Raynaud phenomenon      Past Surgical History:   Procedure Laterality Date    HERNIA REPAIR       Family History   Problem Relation Age of Onset    Heart disease Mother     Heart disease Father     Glaucoma Father     Glaucoma Brother     No Known Problems Sister     No Known Problems Daughter     No Known Problems Son     No Known Problems Maternal Aunt     No Known Problems Maternal Uncle     No Known Problems Paternal Aunt     No  Known Problems Paternal Uncle     No Known Problems Maternal Grandmother     No Known Problems Maternal Grandfather     No Known Problems Paternal Grandmother     No Known Problems Paternal Grandfather     Asthma Neg Hx     Emphysema Neg Hx     Amblyopia Neg Hx     Blindness Neg Hx     Cancer Neg Hx     Cataracts Neg Hx     Diabetes Neg Hx     Hypertension Neg Hx     Macular degeneration Neg Hx     Retinal detachment Neg Hx     Strabismus Neg Hx     Stroke Neg Hx     Thyroid disease Neg Hx      Social History     Tobacco Use    Smoking status: Never Smoker    Smokeless tobacco: Never Used   Substance Use Topics    Alcohol use: No    Drug use: No     Review of Systems   Constitutional: Negative for chills and fever.   Respiratory: Negative for cough and shortness of breath.    Cardiovascular: Negative for chest pain and leg swelling.   Gastrointestinal: Negative for abdominal pain, blood in stool, nausea and vomiting.        Neg changes in stool   Genitourinary: Negative for decreased urine volume, difficulty urinating, flank pain and hematuria.        Neg changes in urination   Musculoskeletal: Positive for arthralgias (knee) and back pain. Negative for gait problem, joint swelling, neck pain and neck stiffness.   Skin: Negative for color change, rash and wound.   Allergic/Immunologic: Negative for immunocompromised state.   Neurological: Negative for weakness and numbness.   Hematological: Does not bruise/bleed easily.       Physical Exam     Initial Vitals [03/10/20 1815]   BP Pulse Resp Temp SpO2   (!) 174/105 90 18 98.6 °F (37 °C) 100 %      MAP       --         Physical Exam    Nursing note and vitals reviewed.  Constitutional: He appears well-developed and well-nourished. He is not diaphoretic. He appears distressed.   Appears uncomfortable   HENT:   Head: Normocephalic and atraumatic.   Nose: Nose normal.   Eyes: Conjunctivae and EOM are normal. Pupils are equal, round, and reactive to  light. No scleral icterus.   Neck: Normal range of motion. Neck supple.   Abdominal: Soft. There is no tenderness.   Musculoskeletal: Normal range of motion. He exhibits no edema.   Tenderness to palpation to midline and right paraspinal lumbar and tender over the sciatic notch, full range of motion front and back. Mildly painful ROM R knee, no erythema or swelling. Other extremities normal.    Neurological: He is alert and oriented to person, place, and time. He has normal strength. No sensory deficit.   Negative straight leg raise.   Skin: Skin is warm and dry. Capillary refill takes less than 2 seconds. No rash noted. No pallor.   Psychiatric: He has a normal mood and affect. His behavior is normal. Judgment and thought content normal.         ED Course   Procedures  Labs Reviewed - No data to display       Imaging Results          X-Ray Lumbar Spine Ap And Lateral (Final result)  Result time 03/10/20 19:47:35    Final result by Walyl Marley MD (03/10/20 19:47:35)                 Impression:      No acute finding.  Mild degenerative change at the lumbosacral junction.      Electronically signed by: Wally Marley MD  Date:    03/10/2020  Time:    19:47             Narrative:    EXAMINATION:  XR LUMBAR SPINE AP AND LATERAL    CLINICAL HISTORY:  Low back pain, minor trauma;    TECHNIQUE:  AP, lateral and spot images were performed of the lumbar spine.    COMPARISON:  11/23/2011    FINDINGS:  Five non-rib-bearing lumbar type vertebral bodies.  No evidence of an acute fracture.  Alignment is maintained.  Mild intervertebral disc height loss at L5-S1 is minimally progressed from prior exam.                                 Medical Decision Making:   History:   Old Medical Records: I decided to obtain old medical records.  Old Records Summarized: records from clinic visits and records from previous admission(s).       <> Summary of Records: See below  Initial Assessment:   Patient with tenderness to palpation  equally to midline and paraspinal worsening with range of motion, pain radiates down the right leg maybe due to muscular strain vs sciatica.  Negative straight leg raise, however, and no symptoms of cauda equina. Will order lumbar XR although less likely fracture.   Differential Diagnosis:   Strain, sprain, sciatica, less likely disc herniation, fracture, or mass, I have considered but do not suspect infection or AoD/AAA  Independently Interpreted Test(s):   I have ordered and independently interpreted X-rays - see prior notes.  Clinical Tests:   Radiological Study: Ordered and Reviewed  ED Management:  Given Toradol injection, flexeril and lidocaine patch.     8:00 PM   Patient reports still having pain and stiff range of motion. I reviewed : prescription was given for Tramadol 50 mg for 7 days course 20 for his knee.     8:08 PM  XR shows no emergent findings. Patient observed to ambulate steadily,  stable for discharge with pain and muscle spasm control and recommend follow up with PCP.            Scribe Attestation:   Scribe #1: I performed the above scribed service and the documentation accurately describes the services I performed. I attest to the accuracy of the note.                            Clinical Impression:       ICD-10-CM ICD-9-CM   1. Strain of lumbar region, initial encounter S39.012A 847.2   2. Right sided sciatica M54.31 724.3         Disposition:   Disposition: Discharged  Condition: Stable     ED Disposition Condition    Discharge Stable        ED Prescriptions     Medication Sig Dispense Start Date End Date Auth. Provider    ibuprofen (ADVIL,MOTRIN) 600 MG tablet () Take 1 tablet (600 mg total) by mouth every 8 (eight) hours as needed for Pain. 9 tablet 3/10/2020 3/12/2020 Yovana Malcolm MD    oxyCODONE-acetaminophen (PERCOCET) 5-325 mg per tablet () Take 1 tablet by mouth every 4 (four) hours as needed for Pain. 6 tablet 3/10/2020 3/12/2020 Yovana Malcolm MD     cyclobenzaprine (FLEXERIL) 10 MG tablet () Take 1 tablet (10 mg total) by mouth 3 (three) times daily as needed for Muscle spasms. 6 tablet 3/10/2020 3/12/2020 Yovana Malcolm MD        Follow-up Information     Follow up With Specialties Details Why Contact Info    Bruce Terrell MD Family Medicine Schedule an appointment as soon as possible for a visit in 2 days  [address] [facility]  [place] [address]  [phone]                                       Yovana Malcolm MD  03/15/20 0919

## 2020-03-10 NOTE — ED TRIAGE NOTES
Patient states mid right lower back pain  After picking up a box today, onset 1 week PTA, worse with mvmt or standing up,

## 2020-03-11 ENCOUNTER — HOSPITAL ENCOUNTER (OUTPATIENT)
Dept: RADIOLOGY | Facility: HOSPITAL | Age: 50
Discharge: HOME | End: 2020-03-11
Attending: NURSE PRACTITIONER
Payer: COMMERCIAL

## 2020-03-11 ENCOUNTER — TELEPHONE (OUTPATIENT)
Dept: FAMILY MEDICINE | Facility: CLINIC | Age: 50
End: 2020-03-11

## 2020-03-11 DIAGNOSIS — S89.91XA INJURY OF RIGHT KNEE, INITIAL ENCOUNTER: ICD-10-CM

## 2020-03-11 DIAGNOSIS — M25.569 KNEE PAIN, UNSPECIFIED CHRONICITY, UNSPECIFIED LATERALITY: Primary | ICD-10-CM

## 2020-03-11 DIAGNOSIS — M54.50 LUMBAR PAIN: ICD-10-CM

## 2020-03-11 PROCEDURE — 73721 MRI KNEE WITHOUT CONTRAST RIGHT: ICD-10-PCS | Mod: 26,RT,, | Performed by: RADIOLOGY

## 2020-03-11 PROCEDURE — 73721 MRI JNT OF LWR EXTRE W/O DYE: CPT | Mod: TC,RT

## 2020-03-11 PROCEDURE — 73721 MRI JNT OF LWR EXTRE W/O DYE: CPT | Mod: 26,RT,, | Performed by: RADIOLOGY

## 2020-03-11 NOTE — TELEPHONE ENCOUNTER
Let him know this is beyond my abilities based on his findings, will likely need ortho for knee but we need interventional pain specialist for the back to help.  Have placed referral.

## 2020-03-11 NOTE — TELEPHONE ENCOUNTER
Pt called office, and stated that he is at the hospital having a MRI of his knee done, and is requesting that the Provider order a MRI of his spine. Informed the Pt that I would send the message to the Provider. That  isn't the Provider who ordered the MRI of his knee. That if the Provider finds that the test is warranted he will order the MRI and we will send the order to his Insurance for authorization. Pt placed me on speaker phone with another individual, who stated that the Pt is in excruciating pain and hasn't slept for only 2 hour's. Informed them that the Pt was given Rx's for pain medication on yesterday, and he should be taking that as directed. The Pt asked for a same day appointment. Informed the Pt that the Provider has no same day appointments open. That if he is in excruciating pain that he can go to the Urgent Care on [address] or I can try and schedule him with another Provider at another clinic if their is a opening. Pt refused, and stated that he has been a Pt of  for a long time and he knows about him. Pt asked my name, and if I was a Nurse. I informed him of my name, and that I was 's Nurse. Pt states that I must be new, because he never seen me before. I informed the Pt that I am not new, and have been here for a year. Pt asked again about coming in to the office today to see the Provider. Informed him that the Provider has no same day openings available. Pt asked that I send a message to the Provider to call him personally. Informed him that I would, and that it may take 24-48 hours to respond to his message.

## 2020-03-11 NOTE — DISCHARGE INSTRUCTIONS
Your x-ray did not show fracture but it did show loss of disc height which may be contributing to your muscle strain pain.  Please follow-up with your PCP this week.  Take ibuprofen as prescribed if needed to help relieve pain and swelling.  You may also take Tylenol as indicated.  Take Flexeril as prescribed if needed for severe muscle spasm.  You may also take Percocet as prescribed if needed for severe pain.     Do not take Flexeril and Percocet together as they can cause drowsiness.  Do not drink alcohol, drive, or operate machinery while taking Flexeril or Percocet as they cause drowsiness.    Return to the emergency department for worsening pain, numbness/tingling or weakness down your legs, changes in urination or stooling, or other concerning symptoms.

## 2020-03-12 ENCOUNTER — OFFICE VISIT (OUTPATIENT)
Dept: FAMILY MEDICINE | Facility: CLINIC | Age: 50
End: 2020-03-12
Payer: COMMERCIAL

## 2020-03-12 ENCOUNTER — TELEPHONE (OUTPATIENT)
Dept: FAMILY MEDICINE | Facility: CLINIC | Age: 50
End: 2020-03-12

## 2020-03-12 VITALS
OXYGEN SATURATION: 98 % | DIASTOLIC BLOOD PRESSURE: 88 MMHG | BODY MASS INDEX: 29.92 KG/M2 | TEMPERATURE: 98 F | HEART RATE: 97 BPM | SYSTOLIC BLOOD PRESSURE: 138 MMHG | RESPIRATION RATE: 16 BRPM | HEIGHT: 64 IN | WEIGHT: 175.25 LBS

## 2020-03-12 DIAGNOSIS — Z12.11 COLON CANCER SCREENING: ICD-10-CM

## 2020-03-12 DIAGNOSIS — M54.31 SCIATICA, RIGHT SIDE: Primary | ICD-10-CM

## 2020-03-12 DIAGNOSIS — K59.03 DRUG-INDUCED CONSTIPATION: ICD-10-CM

## 2020-03-12 PROCEDURE — 99214 PR OFFICE/OUTPT VISIT, EST, LEVL IV, 30-39 MIN: ICD-10-PCS | Mod: S$GLB,,, | Performed by: PHYSICIAN ASSISTANT

## 2020-03-12 PROCEDURE — 99999 PR PBB SHADOW E&M-EST. PATIENT-LVL V: CPT | Mod: PBBFAC,,, | Performed by: PHYSICIAN ASSISTANT

## 2020-03-12 PROCEDURE — 3079F DIAST BP 80-89 MM HG: CPT | Mod: CPTII,S$GLB,, | Performed by: PHYSICIAN ASSISTANT

## 2020-03-12 PROCEDURE — 3008F BODY MASS INDEX DOCD: CPT | Mod: CPTII,S$GLB,, | Performed by: PHYSICIAN ASSISTANT

## 2020-03-12 PROCEDURE — 3075F SYST BP GE 130 - 139MM HG: CPT | Mod: CPTII,S$GLB,, | Performed by: PHYSICIAN ASSISTANT

## 2020-03-12 PROCEDURE — 3079F PR MOST RECENT DIASTOLIC BLOOD PRESSURE 80-89 MM HG: ICD-10-PCS | Mod: CPTII,S$GLB,, | Performed by: PHYSICIAN ASSISTANT

## 2020-03-12 PROCEDURE — 3008F PR BODY MASS INDEX (BMI) DOCUMENTED: ICD-10-PCS | Mod: CPTII,S$GLB,, | Performed by: PHYSICIAN ASSISTANT

## 2020-03-12 PROCEDURE — 3075F PR MOST RECENT SYSTOLIC BLOOD PRESS GE 130-139MM HG: ICD-10-PCS | Mod: CPTII,S$GLB,, | Performed by: PHYSICIAN ASSISTANT

## 2020-03-12 PROCEDURE — 99999 PR PBB SHADOW E&M-EST. PATIENT-LVL V: ICD-10-PCS | Mod: PBBFAC,,, | Performed by: PHYSICIAN ASSISTANT

## 2020-03-12 PROCEDURE — 99214 OFFICE O/P EST MOD 30 MIN: CPT | Mod: S$GLB,,, | Performed by: PHYSICIAN ASSISTANT

## 2020-03-12 RX ORDER — CYCLOBENZAPRINE HCL 10 MG
10 TABLET ORAL 3 TIMES DAILY PRN
Qty: 30 TABLET | Refills: 0 | Status: SHIPPED | OUTPATIENT
Start: 2020-03-12 | End: 2020-03-17

## 2020-03-12 RX ORDER — IBUPROFEN 600 MG/1
600 TABLET ORAL EVERY 8 HOURS PRN
Qty: 30 TABLET | Refills: 0 | Status: SHIPPED | OUTPATIENT
Start: 2020-03-12 | End: 2020-03-16

## 2020-03-12 NOTE — TELEPHONE ENCOUNTER
----- Message from Deyanira Rubin sent at 3/12/2020  9:26 AM CDT -----  Contact: WifeBonny Lee   Type:  Same Day Appointment Request    Caller is requesting a same day appointment.  Caller declined first available   appointment listed below.      Name of Caller: Nam Lee     When is the first available appointment? 3/24/2020    Symptoms:  Severe pain/ blood in stool       Would the patient rather a call back or a response via [facility]? Call back       Best Call Back Number: [phone]

## 2020-03-12 NOTE — PROGRESS NOTES
Subjective:       Patient ID: Ronal Ham is a 49 y.o. male with multiple medical diagnoses as listed in the medical history and problem list that presents for Back Pain and Rectal Bleeding (this morning)  .    Chief Complaint: Back Pain and Rectal Bleeding (this morning)      Back Pain   The current episode started 1 to 4 weeks ago. Progression since onset: slightly better  The pain is present in the lumbar spine and gluteal. The pain radiates to the right foot, right knee and right thigh. The pain is at a severity of 9/10. The pain is severe. The symptoms are aggravated by stress, twisting and bending. Pertinent negatives include no bladder incontinence, bowel incontinence, headaches, numbness, paresis, paresthesias or perianal numbness. He has tried ice (only given percocet 6, ibuprofen 9 and flexeril 6 pills --did not take stool softener) for the symptoms.   Rectal Bleeding   This is a recurrent problem. The current episode started more than 1 month ago (recently today; usually daily BM, missed yesterday and strained today ). The problem has been resolved. Pertinent negatives include no headaches or numbness. He has tried nothing for the symptoms.   was not taking stool softener with percocet     Review of Systems   Gastrointestinal: Positive for hematochezia. Negative for bowel incontinence.   Genitourinary: Negative for bladder incontinence.   Neurological: Negative for numbness, headaches and paresthesias.         PAST MEDICAL HISTORY:  Past Medical History:   Diagnosis Date    Arthritis     Eye injury     od hit above od    GERD (gastroesophageal reflux disease)     Hypertension     Lupus (systemic lupus erythematosus)     Pulmonary fibrosis     Raynaud phenomenon        SOCIAL HISTORY:  Social History     Socioeconomic History    Marital status:      Spouse name: Not on file    Number of children: 5    Years of education: some colle    Highest education level: Not on file    Occupational History    Occupation: [employer]      Employer: Check   Social Needs    Financial resource strain: Not on file    Food insecurity:     Worry: Not on file     Inability: Not on file    Transportation needs:     Medical: Not on file     Non-medical: Not on file   Tobacco Use    Smoking status: Never Smoker    Smokeless tobacco: Never Used   Substance and Sexual Activity    Alcohol use: No    Drug use: No    Sexual activity: Yes     Partners: Female   Lifestyle    Physical activity:     Days per week: Not on file     Minutes per session: Not on file    Stress: Not on file   Relationships    Social connections:     Talks on phone: Not on file     Gets together: Not on file     Attends Restorationist service: Not on file     Active member of club or organization: Not on file     Attends meetings of clubs or organizations: Not on file     Relationship status: Not on file   Other Topics Concern    Not on file   Social History Narrative    Adult Screenings updated and reviewed    Mammogram( for females)    Pap ( for females)    PSA( for males )    Colonoscopy age  48    Flu shot yearly    Td     Pneumovax recommended one time  at age  65    Zostavax recommended one time at  age  60    Eye exam recommended yearly    Bone density        ALLERGIES AND MEDICATIONS: updated and reviewed.  Review of patient's allergies indicates:   Allergen Reactions    Carafate  [sucralfate] Rash     Other reaction(s): Hives     Current Outpatient Medications   Medication Sig Dispense Refill    albuterol (PROAIR HFA) 90 mcg/actuation inhaler Inhale 1-2 puffs into the lungs every 6 (six) hours as needed for Wheezing. Rescue 18 g 0    amLODIPine (NORVASC) 10 MG tablet TAKE 1 TABLET BY MOUTH EVERY DAY 90 tablet 0    azelastine (ASTELIN) 137 mcg (0.1 %) nasal spray 1 spray (137 mcg total) by Nasal route 2 (two) times daily. 30 mL 2    cyclobenzaprine (FLEXERIL) 10 MG tablet Take 1 tablet (10 mg total)  "by mouth 3 (three) times daily as needed for Muscle spasms. 30 tablet 0    famotidine (PEPCID) 40 MG tablet Take 1 tablet (40 mg total) by mouth once daily. 30 tablet 11    fluticasone (FLONASE) 50 mcg/actuation nasal spray 1 spray (50 mcg total) by Each Nare route once daily. 16 g 5    hydroxychloroquine (PLAQUENIL) 200 mg tablet Take 2 tablets (400 mg total) by mouth once daily. 180 tablet 3    ibuprofen (ADVIL,MOTRIN) 600 MG tablet Take 1 tablet (600 mg total) by mouth every 8 (eight) hours as needed for Pain. 30 tablet 0    loratadine (CLARITIN) 10 mg tablet Take 1 tablet (10 mg total) by mouth once daily. 30 tablet 11    sildenafil (VIAGRA) 100 MG tablet Take 1 tablet (100 mg total) by mouth daily as needed for Erectile Dysfunction. 10 tablet 5    triamcinolone acetonide 0.1% (KENALOG) 0.1 % cream Apply topically 2 (two) times daily. 60 g 0    diclofenac sodium (VOLTAREN) 1 % Gel Apply 2 g topically 4 (four) times daily. (Patient not taking: Reported on 3/12/2020) 100 g 0    econazole nitrate 1 % cream ANTIONE EXT TO THE AFFECTED AND SURROUNDING AREAS OF SKIN 1 TIME PER DAY  12    oxyCODONE-acetaminophen (PERCOCET) 5-325 mg per tablet Take 1 tablet by mouth every 4 (four) hours as needed for Pain. 6 tablet 0     Current Facility-Administered Medications   Medication Dose Route Frequency Provider Last Rate Last Dose    EUFLEXXA 10 mg/mL(mw 2.4 -3.6 million) injection Syrg 40 mg  40 mg Intra-articular Weekly Paulette Sewell NP   40 mg at 10/18/17 1637         Objective:   /88   Pulse 97   Temp 98.3 °F (36.8 °C) (Oral)   Resp 16   Ht 5' 4" (1.626 m)   Wt 79.5 kg (175 lb 4.3 oz)   SpO2 98%   BMI 30.08 kg/m²      Physical Exam   Constitutional: He is oriented to person, place, and time. No distress.   HENT:   Head: Normocephalic and atraumatic.   Cardiovascular: Normal rate and regular rhythm.   Pulmonary/Chest: Effort normal and breath sounds normal.   Musculoskeletal:        Right hip: He " exhibits normal strength, no tenderness and no bony tenderness.        Left hip: He exhibits normal range of motion, normal strength, no tenderness and no bony tenderness.        Lumbar back: He exhibits tenderness, pain and spasm.   Neurological: He is alert and oriented to person, place, and time.   Skin: No rash noted.           Assessment:       1. Sciatica, right side    2. Colon cancer screening    3. Drug-induced constipation        Plan:       Sciatica, right side  -     cyclobenzaprine (FLEXERIL) 10 MG tablet; Take 1 tablet (10 mg total) by mouth 3 (three) times daily as needed for Muscle spasms.  Dispense: 30 tablet; Refill: 0  -     ibuprofen (ADVIL,MOTRIN) 600 MG tablet; Take 1 tablet (600 mg total) by mouth every 8 (eight) hours as needed for Pain.  Dispense: 30 tablet; Refill: 0  -     Ambulatory referral/consult to Physical/Occupational Therapy; Future; Expected date: 03/19/2020  Moist heat  Stretches demonstrated  Advised to not go long periods of time idle  If not improving over the weekend, medrol dose pack.     Colon cancer screening  -     Case request GI: COLONOSCOPY            No follow-ups on file.

## 2020-03-16 DIAGNOSIS — M17.0 PRIMARY OSTEOARTHRITIS OF BOTH KNEES: Primary | ICD-10-CM

## 2020-03-16 NOTE — PROGRESS NOTES
Orthovisc ordered for bilateral knee djd. Will start once approval is obtained from insurance company.

## 2020-03-18 ENCOUNTER — CLINICAL SUPPORT (OUTPATIENT)
Dept: REHABILITATION | Facility: HOSPITAL | Age: 50
End: 2020-03-18
Payer: COMMERCIAL

## 2020-03-18 ENCOUNTER — TELEPHONE (OUTPATIENT)
Dept: ADMINISTRATIVE | Facility: HOSPITAL | Age: 50
End: 2020-03-18

## 2020-03-18 DIAGNOSIS — M54.31 SCIATICA, RIGHT SIDE: ICD-10-CM

## 2020-03-18 DIAGNOSIS — R26.9 GAIT ABNORMALITY: ICD-10-CM

## 2020-03-18 DIAGNOSIS — M53.86 DECREASED ROM OF LUMBAR SPINE: ICD-10-CM

## 2020-03-18 DIAGNOSIS — M25.661 DECREASED ROM OF RIGHT KNEE: ICD-10-CM

## 2020-03-18 DIAGNOSIS — R53.1 DECREASED STRENGTH: ICD-10-CM

## 2020-03-18 PROCEDURE — 97161 PT EVAL LOW COMPLEX 20 MIN: CPT | Mod: PN

## 2020-03-18 PROCEDURE — 97110 THERAPEUTIC EXERCISES: CPT | Mod: PN

## 2020-03-20 ENCOUNTER — DOCUMENTATION ONLY (OUTPATIENT)
Dept: REHABILITATION | Facility: HOSPITAL | Age: 50
End: 2020-03-20

## 2020-03-20 PROBLEM — R53.1 DECREASED STRENGTH: Status: ACTIVE | Noted: 2020-03-20

## 2020-03-20 PROBLEM — M53.86 DECREASED ROM OF LUMBAR SPINE: Status: ACTIVE | Noted: 2020-03-20

## 2020-03-20 PROBLEM — R26.9 GAIT ABNORMALITY: Status: ACTIVE | Noted: 2020-03-20

## 2020-03-20 PROBLEM — M25.661 DECREASED ROM OF RIGHT KNEE: Status: ACTIVE | Noted: 2020-03-20

## 2020-03-20 NOTE — PROGRESS NOTES
Patient: Ronal Ham  Date: 3/20/2020  Diagnosis: No diagnosis found.  MRN: [id number]    Called and left a voicemail for patient due to therapy following updates regarding COVID-19 closely and taking every precaution to ensure the safety of our patients, staff and community.  In an abundance of caution and in an effort to help reduce risk and limit community spread, we have decided to temporarily postpone appointments for ~2 weeks for patients who may be at increased risk to attend in-person therapy or where therapy is not critically needed at this time. Encouraged pt to call clinic for any questions/concerns.     Shannon Fairchild, PTA  3/20/2020

## 2020-03-20 NOTE — PLAN OF CARE
"  [facility]  Physical Therapy Initial Evaluation    Name: Ronal Ham  Clinic Number: [id number]    Therapy Diagnosis:   Encounter Diagnoses   Name Primary?    Sciatica, right side     Decreased ROM of lumbar spine     Decreased ROM of right knee     Decreased strength     Gait abnormality      Physician: Ramesh Carrasquillo, PA    Physician Orders: PT Eval and Treat   Medical Diagnosis from Referral: M54.31 (ICD-10-CM) - Sciatica, right side  Evaluation Date: 3/18/2020  Authorization Period Expiration: 12/31/2020  Plan of Care Expiration: 6/18/2020  Visit # / Visits authorized: 1/ 20    Time In: 12:05  Time Out: 12:50   Total Billable Time: 45 minutes    Precautions: Standard    Subjective   Date of onset: ~5 weeks ago  History of current condition - oRnal reports: states he has been told he has arthritis in his R knee. Pt states it was doing well for about a year. Pt reports inside of knee feels like it is very tender, throbbing pain. Pt reports bending and walking increased pain. States pain in R side of his back started about 5 weeks ago. "I feel like I wasn't making my regular stepping movement and aggravated his lower back". Pt reports back hurts mostly on R side, also in middle of spine. Pt states numbness in his leg started a few weeks ago, mostly from thigh to knee. Then I picked up a box, severe increase in pain since then. Missed four days of work at the time.      Medical History:   Past Medical History:   Diagnosis Date    Arthritis     Eye injury     od hit above od    GERD (gastroesophageal reflux disease)     Hypertension     Lupus (systemic lupus erythematosus)     Pulmonary fibrosis     Raynaud phenomenon        Surgical History:   Ronal Ham  has a past surgical history that includes Hernia repair.    Medications:   Ronal has a current medication list which includes the following prescription(s): albuterol, amlodipine, azelastine, diclofenac " "sodium, econazole nitrate, famotidine, fluticasone propionate, hydroxychloroquine, loratadine, sildenafil, and triamcinolone acetonide 0.1%, and the following Facility-Administered Medications: euflexxa and sodium hyaluronate (orthovisc).    Allergies:   Review of patient's allergies indicates:   Allergen Reactions    Carafate  [sucralfate] Rash     Other reaction(s): Hives        Imaging, X-Ray Lumbar 3/10/2020:       Prior Therapy: yes  Occupation: , standing, walking, and some lifting  Prior Level of Function: on and off issues with R knee for a few years, had 2 injections and was doing well   Current Level of Function: bending knee, walking, bending down to grab something    Pain:  Current 8/10, worst 10/10, best 0/10   Location: right knee and lower back (R side to middle)   Description: Aching  Aggravating Factors: walking, lifting, bending  Easing Factors: ibuprofen, muscle relaxors    Pts goals: "I would like to be able to move and bend and walk like normal".     Objective     Posture Alignment: pt with trunk lean to R side in standing    GAIT DEVIATIONS: Ronal displays antalgic gait on R LE throughout gait    Lumbar Range of Motion:    %   Flexion 55 (pain R side)     Extension 20 (pain L side)     Left Side Bending 50% of ROM    Right Side Bending 25% of ROM   Left rotation   25% (pain in R knee)   Right Rotation   50% (pain lower back)    *= pain      Knee ROM in degrees (Active):     Left Right   Flexion 128 120   Extension 0 0     Lower Extremity Strength    Right LE  Left LE    Hip flexion: 4-/5 Hip flexion: 4/5   Knee extension: 3+/5 Knee extension: 5/5   Knee flexion: 4/5 Knee flexion: 5/5   Hip extension:  3/5 Hip extension: 3/5   Hip abduction: 3/5 Hip abduction: 4-/5   Hip adduction: NT Hip adduction NT   Ankle dorsiflexion: 5/5 Ankle dorsiflexion: 5/5     Special Tests:  -Repeated Flexion: negative  -Repeated Ext:positive, slight improvement in symptoms standing  -Piriformis " Test: positive bilaterally  -Prone Instability Test: positive  -Bridge Test: negative      Neuro Dynamic Testing:    Sciatic nerve:      SLR: positive bilaterally       Joint Mobility: Decreased lumbar mobility to PA translation    Palpation: Increased TTP and tone to R QL, lumbar paraspinals, B piriformis    Flexibility: Decreased flexibility to B hamstrings, B piriformis, and B hip abductors   Popliteal Angle: R = 30 degrees ; L = 30 degrees   Ifeanyi's test: R = positive ; L = positive    PT Evaluation Completed? Yes  Discussed Plan of Care with patient: Yes           TREATMENT   Treatment Time In: 1230  Treatment Time Out: 1250  Total Treatment time separate from Evaluation: 20 minutes    Ronal received therapeutic exercises to develop strength, endurance, ROM and flexibility for 20 minutes including:  +Hip Abduction stretch 3x30  +Hamstring stretch 3x30  +TrA activation 2x10, 5 seconds ea    Home Exercises and Patient Education Provided    Education provided:   - role of PT, goals, POC, insurance limitations, scheduling/cancellation policies    Written Home Exercises Provided: yes.  Exercises were reviewed and Ronal was able to demonstrate them prior to the end of the session.  Ronal demonstrated good  understanding of the education provided.     See EMR under Patient Instructions for exercises provided 3/18/2020.    Assessment   Ronal is a 49 y.o. male referred to outpatient Physical Therapy with a medical diagnosis of sciatica, R side. Pt presents with signs and symptoms consistent with medical diagnoses along with chronic R knee pain. Pt with muscle imbalance demonstrated by decreased flexibility of B hamstrings, ITB, and piriformis. Pt with decreased strength of B LE, significantly notable in R posterior chain and hip girdle musculature. Pt with increased muscle tone and TTP to R QL, lumbar paraspinals. Pt demonstrating poor core activation as demonstrated by inability to complete TrA without max vc.  Pt would benefit from skilled PT services in order to address listed deficits, improve tolerance to activities, decrease pain, and establish HEP. Pt is motivated to participate in skilled PT services and is in agreement with POC.     Pt prognosis is Good.   Pt will benefit from skilled outpatient Physical Therapy to address the deficits stated above and in the chart below, provide pt/family education, and to maximize pt's level of independence.     Plan of care discussed with patient: Yes  Pt's spiritual, cultural and educational needs considered and patient is agreeable to the plan of care and goals as stated below:     Anticipated Barriers for therapy: none    Medical Necessity is demonstrated by the following  History  Co-morbidities and personal factors that may impact the plan of care Co-morbidities:   Lupus, interstital lung disease, cough, reynaud's syndrome, HTN, GERD    Personal Factors:   no deficits     low   Examination  Body Structures and Functions, activity limitations and participation restrictions that may impact the plan of care Body Regions:   trunk    Body Systems:    ROM  strength  gait  transfers  transitions  motor control    Participation Restrictions:   Ability to work without increased pain     Activity limitations:   Learning and applying knowledge  no deficits    General Tasks and Commands  no deficits    Communication  no deficits    Mobility  lifting and carrying objects  walking    Self care  no deficits    Domestic Life  shopping  cooking  doing house work (cleaning house, washing dishes, laundry)  assisting others    Interactions/Relationships  no deficits    Life Areas  no deficits    Community and Social Life  no deficits         Complexity: low  See FOTO outcome assessment   Clinical Presentation stable and uncomplicated low   Decision Making/ Complexity Score: low     GOALS: Short Term Goals:  4 weeks  1. Report decreased in pain at worse less than  <   / =  6 /10  to increase  tolerance for functional activities.On going  2. Pt to increase B popliteal angle by greater than > or = 10 degrees in order to improve flexibility and posture. On going  3. Increased B LE MMT 1/2  to increase tolerance for ADL and work activities.On going  4. Pt to tolerate HEP to improve ROM and independence with ADL's.On going  5. Pt to improve range of motion by 25% to allow for improved functional mobility to allow for improvement in IADLs. On going    Long Term Goals: 8 weeks  1. Report decreased in pain at worse less than  <   / =  3  /10  to increase tolerance for functional mobility.   2 .Pt to increase B popliteal angle by greater than > or = 20 degrees in order to improve flexibility and posture.   3. Increased B LE MMT 1 grade to increase tolerance for ADL and work activities.  4. Pt will report at 45% or less limitation on FOTO Knee Survey  to demonstrate decrease in disability and improvement in back pain.  5. Pt to be Independent with HEP to improve ROM and independence with ADL's  6. Pt to demonstrate negative Bridge Test in order to show improved core strength for lumbar stabilization.   7. Pt to improve range of motion by 75% to allow for improved functional mobility to allow for improvement in IADLs.       Plan   Plan of care Certification: 3/18/2020 to 6/18/2020.    Outpatient Physical Therapy 2 times weekly for 6 weeks to include the following interventions: Gait Training, Manual Therapy, Moist Heat/ Ice, Neuromuscular Re-ed, Patient Education, Self Care, Therapeutic Activites and Therapeutic Exercise.     Vikki Davis, PT, DPT  3/18/2020

## 2020-03-23 ENCOUNTER — DOCUMENTATION ONLY (OUTPATIENT)
Dept: REHABILITATION | Facility: HOSPITAL | Age: 50
End: 2020-03-23

## 2020-03-23 NOTE — PROGRESS NOTES
Patient: Ronal Ham  Date: 3/23/2020    Diagnosis:    Decreased ROM of lumbar spine   Decreased ROM of right knee   Decreased strength   Gait abnormality     MRN: [id number]    Spoke with patient due to therapy following updates regarding COVID-19 closely and taking every precaution to ensure the safety of our patients, staff and community.  In an abundance of caution and in an effort to help reduce risk and limit community spread, we have decided to temporarily postpone appointments for patients who may be at increased risk to attend in-person therapy or where therapy is not critically needed at this time. Plan of care and home exercise program were reviewed and patient has what they need to continue therapy at home. All patient questions were answered. Also stated to patient that we are exploring virtual methods of providing care and will be in touch over the next few weeks. Patient verbalized understanding to all.    Vikki Davis, PT, DPT  3/23/2020

## 2020-03-24 ENCOUNTER — PATIENT MESSAGE (OUTPATIENT)
Dept: ORTHOPEDICS | Facility: CLINIC | Age: 50
End: 2020-03-24

## 2020-03-27 ENCOUNTER — TELEPHONE (OUTPATIENT)
Dept: PAIN MEDICINE | Facility: CLINIC | Age: 50
End: 2020-03-27

## 2020-03-27 NOTE — TELEPHONE ENCOUNTER
Called pt in regards to rescheduling 3/30/2020 apt with Dr. Galvan . Na, lvm for pt to rt office call .

## 2020-03-27 NOTE — TELEPHONE ENCOUNTER
Rt pt call through the phone room , explained 3/30/2020 with Dr. Galvan needs to be rescheduled . Explained our department isn't seeing in person or procedure visits at this time. Apt will be rescheduled at late date and time . Asked that if pain level is extreme to resist going to the ED or urgent and contact us or PCP's office

## 2020-03-29 DIAGNOSIS — I10 BENIGN HYPERTENSION: ICD-10-CM

## 2020-03-30 ENCOUNTER — TELEPHONE (OUTPATIENT)
Dept: ORTHOPEDICS | Facility: CLINIC | Age: 50
End: 2020-03-30

## 2020-03-30 ENCOUNTER — PATIENT MESSAGE (OUTPATIENT)
Dept: ORTHOPEDICS | Facility: CLINIC | Age: 50
End: 2020-03-30

## 2020-03-30 RX ORDER — AMLODIPINE BESYLATE 10 MG/1
TABLET ORAL
Qty: 90 TABLET | Refills: 0 | Status: SHIPPED | OUTPATIENT
Start: 2020-03-30 | End: 2020-06-26

## 2020-03-30 NOTE — TELEPHONE ENCOUNTER
Returned patient call. No answer. Left VM and responded to him via [facility] acct.      ----- Message from Sandra Gutierrez MA sent at 3/30/2020  1:46 PM CDT -----  Contact: self      ----- Message -----  From: Marcos Quiros  Sent: 3/30/2020   1:40 PM CDT  To: Melod[facility] Caldwell Staff    Pt would like a call back in regards to something to help with the pain. Pt understands that he cannot get the injection due to the virus, but he would like to have something to help with pain.    Contact Info [phone] ([email or web address])

## 2020-04-17 ENCOUNTER — TELEPHONE (OUTPATIENT)
Dept: FAMILY MEDICINE | Facility: CLINIC | Age: 50
End: 2020-04-17

## 2020-04-17 NOTE — TELEPHONE ENCOUNTER
Return call to Pt, and he states that he has a dry cough, right knee pain that he's been having prior to today, and blood in his stool x's 2 day's. Pt states that he has been taking Tylenol 4 times a day, every day for the past 2 week's for the pain. Pt has pain management referral in, that he refused per chart documentation. Pt states that he didn't refused that it was cancelled because of Covid-19. Please advise.

## 2020-04-17 NOTE — TELEPHONE ENCOUNTER
----- Message from Sara Bhakta sent at 4/17/2020  1:22 PM CDT -----  Contact: pt  Type: Patient Call Back    Who called:pt    What is the request in detail:pt is calling to get cough syrup called in for him. Please call pt    Can the clinic reply by [facility]?    Would the patient rather a call back or a response via [facility]? call    Best call back number:[phone] (home)       Additional Information:

## 2020-04-20 NOTE — TELEPHONE ENCOUNTER
Last Office Visit Info:   The patient's last visit with Bruce Terrell MD was on 2/11/2020.    The patient's last visit in current department was on 3/12/2020.        Last CBC Results:   Lab Results   Component Value Date    WBC 4.73 01/29/2020    HGB 13.8 (L) 01/29/2020    HCT 42.0 01/29/2020     01/29/2020       Last CMP Results  Lab Results   Component Value Date     01/29/2020    K 3.9 01/29/2020     01/29/2020    CO2 26 01/29/2020    BUN 11 01/29/2020    CREATININE 0.8 01/29/2020    CALCIUM 9.2 01/29/2020    ALBUMIN 3.6 01/29/2020    AST 28 01/29/2020    ALT 21 01/29/2020       Last Lipids  Lab Results   Component Value Date    CHOL 144 01/29/2020    TRIG 29 (L) 01/29/2020    HDL 47 01/29/2020    LDLCALC 91.2 01/29/2020       Last A1C  Lab Results   Component Value Date    HGBA1C 5.5 01/29/2020       Last TSH  Lab Results   Component Value Date    TSH 1.215 01/29/2020         Current Med Refills  Medication List with Changes/Refills   Current Medications    ALBUTEROL (PROAIR HFA) 90 MCG/ACTUATION INHALER    Inhale 1-2 puffs into the lungs every 6 (six) hours as needed for Wheezing. Rescue       Start Date: 1/28/2020 End Date: 1/27/2021    AMLODIPINE (NORVASC) 10 MG TABLET    TAKE 1 TABLET BY MOUTH EVERY DAY       Start Date: 3/30/2020 End Date: --    AZELASTINE (ASTELIN) 137 MCG (0.1 %) NASAL SPRAY    1 spray (137 mcg total) by Nasal route 2 (two) times daily.       Start Date: 4/18/2019 End Date: 4/17/2020    DICLOFENAC SODIUM (VOLTAREN) 1 % GEL    Apply 2 g topically 4 (four) times daily.       Start Date: 2/6/2020  End Date: --    ECONAZOLE NITRATE 1 % CREAM    ANTIONE EXT TO THE AFFECTED AND SURROUNDING AREAS OF SKIN 1 TIME PER DAY       Start Date: 11/18/2016End Date: --    FAMOTIDINE (PEPCID) 40 MG TABLET    Take 1 tablet (40 mg total) by mouth once daily.       Start Date: 2/12/2020 End Date: 2/11/2021    FLUTICASONE (FLONASE) 50 MCG/ACTUATION NASAL SPRAY    1 spray (50 mcg total) by Each  Nare route once daily.       Start Date: 4/22/2019 End Date: --    HYDROXYCHLOROQUINE (PLAQUENIL) 200 MG TABLET    Take 2 tablets (400 mg total) by mouth once daily.       Start Date: 12/11/2019End Date: --    LORATADINE (CLARITIN) 10 MG TABLET    Take 1 tablet (10 mg total) by mouth once daily.       Start Date: 2/11/2020 End Date: --    SILDENAFIL (VIAGRA) 100 MG TABLET    Take 1 tablet (100 mg total) by mouth daily as needed for Erectile Dysfunction.       Start Date: 1/4/2020  End Date: 1/3/2021    TRIAMCINOLONE ACETONIDE 0.1% (KENALOG) 0.1 % CREAM    Apply topically 2 (two) times daily.       Start Date: 1/23/2019 End Date: --       Order(s) placed per written order guidelines:     Please advise.

## 2020-04-21 RX ORDER — PROMETHAZINE HYDROCHLORIDE AND CODEINE PHOSPHATE 6.25; 1 MG/5ML; MG/5ML
5 SOLUTION ORAL EVERY 4 HOURS PRN
Qty: 240 ML | Refills: 0 | Status: SHIPPED | OUTPATIENT
Start: 2020-04-21 | End: 2020-06-22 | Stop reason: SDUPTHER

## 2020-04-21 NOTE — TELEPHONE ENCOUNTER
Call placed to Pt, no answer. Message left on voicemail to call office. Pt needs to schedule a appointment for later this week.

## 2020-04-22 ENCOUNTER — PATIENT OUTREACH (OUTPATIENT)
Dept: ADMINISTRATIVE | Facility: HOSPITAL | Age: 50
End: 2020-04-22

## 2020-04-24 ENCOUNTER — TELEPHONE (OUTPATIENT)
Dept: FAMILY MEDICINE | Facility: CLINIC | Age: 50
End: 2020-04-24

## 2020-04-24 NOTE — TELEPHONE ENCOUNTER
----- Message from Tanya Jewell sent at 4/24/2020 11:59 AM CDT -----  Contact: Self  Type:  Patient Returning Call    Who Called: ELICIA VILLARREAL [[id number]]    Who Left Message for Patient: Shayy    Does the patient know what this is regarding?:Y    Would the patient rather a call back or a response via [facility]? Call    Best Call Back Number:[phone]    Additional Information:

## 2020-05-20 ENCOUNTER — PATIENT OUTREACH (OUTPATIENT)
Dept: ADMINISTRATIVE | Facility: HOSPITAL | Age: 50
End: 2020-05-20

## 2020-05-21 ENCOUNTER — TELEPHONE (OUTPATIENT)
Dept: ORTHOPEDICS | Facility: CLINIC | Age: 50
End: 2020-05-21

## 2020-05-21 NOTE — TELEPHONE ENCOUNTER
Spoke to patient, he was agreeable to reschedule Orthovisc knee injections. Scheduled as requested.     ----- Message from Peter Lin sent at 5/21/2020 12:07 PM CDT -----  Contact: pt   Please call pt at [phone]    Patient is calling to schedule a future injection appt    Thank you

## 2020-06-02 ENCOUNTER — PATIENT OUTREACH (OUTPATIENT)
Dept: ADMINISTRATIVE | Facility: OTHER | Age: 50
End: 2020-06-02

## 2020-06-03 ENCOUNTER — OFFICE VISIT (OUTPATIENT)
Dept: ORTHOPEDICS | Facility: CLINIC | Age: 50
End: 2020-06-03
Payer: COMMERCIAL

## 2020-06-03 VITALS — WEIGHT: 175.25 LBS | HEIGHT: 64 IN | BODY MASS INDEX: 29.92 KG/M2

## 2020-06-03 DIAGNOSIS — M17.0 PRIMARY OSTEOARTHRITIS OF BOTH KNEES: ICD-10-CM

## 2020-06-03 PROCEDURE — 20610 PR DRAIN/INJECT LARGE JOINT/BURSA: ICD-10-PCS | Mod: 50,S$GLB,, | Performed by: NURSE PRACTITIONER

## 2020-06-03 PROCEDURE — 20610 DRAIN/INJ JOINT/BURSA W/O US: CPT | Mod: 50,S$GLB,, | Performed by: NURSE PRACTITIONER

## 2020-06-03 PROCEDURE — 99499 UNLISTED E&M SERVICE: CPT | Mod: S$GLB,,, | Performed by: NURSE PRACTITIONER

## 2020-06-03 PROCEDURE — 99499 NO LOS: ICD-10-PCS | Mod: S$GLB,,, | Performed by: NURSE PRACTITIONER

## 2020-06-03 PROCEDURE — 99999 PR PBB SHADOW E&M-EST. PATIENT-LVL II: CPT | Mod: PBBFAC,,, | Performed by: NURSE PRACTITIONER

## 2020-06-03 PROCEDURE — 99999 PR PBB SHADOW E&M-EST. PATIENT-LVL II: ICD-10-PCS | Mod: PBBFAC,,, | Performed by: NURSE PRACTITIONER

## 2020-06-03 NOTE — PROGRESS NOTES
Ronal Ham presents to clinic today for the first bilateral knee orthovisc injection.    Exam demonstrates the no effusion in the  bilateral knee, and the skin is intact.    Diagnosis: primary osteoarthritis bilateral knee    After time out was performed and patient ID, side, and site were verified, the  bilateral  knee was sterilly prepped in the standard fashion.  A 22-gauge needle was introduced into bilateral knee joint from an danni-lateral site without complication and knee was then injected with 2 ml of Euflexxa.  Sterile dressing was applied.  The patient was instructed to resume activities as tolerated and to call with any problems.     We will see Ronal Ham back next week for the second injection.

## 2020-06-09 ENCOUNTER — PATIENT OUTREACH (OUTPATIENT)
Dept: ADMINISTRATIVE | Facility: OTHER | Age: 50
End: 2020-06-09

## 2020-06-10 ENCOUNTER — OFFICE VISIT (OUTPATIENT)
Dept: ORTHOPEDICS | Facility: CLINIC | Age: 50
End: 2020-06-10
Payer: COMMERCIAL

## 2020-06-10 DIAGNOSIS — M17.0 PRIMARY OSTEOARTHRITIS OF BOTH KNEES: ICD-10-CM

## 2020-06-10 PROCEDURE — 99499 UNLISTED E&M SERVICE: CPT | Mod: S$GLB,,, | Performed by: NURSE PRACTITIONER

## 2020-06-10 PROCEDURE — 20610 PR DRAIN/INJECT LARGE JOINT/BURSA: ICD-10-PCS | Mod: 50,S$GLB,, | Performed by: NURSE PRACTITIONER

## 2020-06-10 PROCEDURE — 99999 PR PBB SHADOW E&M-EST. PATIENT-LVL II: ICD-10-PCS | Mod: PBBFAC,,, | Performed by: NURSE PRACTITIONER

## 2020-06-10 PROCEDURE — 99999 PR PBB SHADOW E&M-EST. PATIENT-LVL II: CPT | Mod: PBBFAC,,, | Performed by: NURSE PRACTITIONER

## 2020-06-10 PROCEDURE — 20610 DRAIN/INJ JOINT/BURSA W/O US: CPT | Mod: 50,S$GLB,, | Performed by: NURSE PRACTITIONER

## 2020-06-10 PROCEDURE — 99499 NO LOS: ICD-10-PCS | Mod: S$GLB,,, | Performed by: NURSE PRACTITIONER

## 2020-06-10 NOTE — PROGRESS NOTES
Ronal Ham presents to clinic today for the second bilateral knee Orthovisc injection.    Exam demonstrates the no effusion in the  bilateral knee, and the skin is intact.    Diagnosis: primary osteoarthritis bilateral knee    After time out was performed and patient ID, side, and site were verified, the  bilateral  knee was sterilly prepped in the standard fashion.  A 22-gauge needle was introduced into bilateral knee joint from an danni-lateral site without complication and knee was then injected with 2 ml of Euflexxa.  Sterile dressing was applied.  The patient was instructed to resume activities as tolerated and to call with any problems.     We will see Ronal Ham back next week for the third injection.

## 2020-06-16 ENCOUNTER — PATIENT OUTREACH (OUTPATIENT)
Dept: ADMINISTRATIVE | Facility: OTHER | Age: 50
End: 2020-06-16

## 2020-06-17 ENCOUNTER — TELEPHONE (OUTPATIENT)
Dept: ORTHOPEDICS | Facility: CLINIC | Age: 50
End: 2020-06-17

## 2020-06-17 NOTE — TELEPHONE ENCOUNTER
Return call no answer ,left message on voice mail regarding rescheduling his orthopedics appointment

## 2020-06-17 NOTE — TELEPHONE ENCOUNTER
----- Message from Yamileth Lundy sent at 6/17/2020  3:08 PM CDT -----  Pt states he cant make appt this afternoon for injection. Would like to reschedule for next week.    Contact info [phone]

## 2020-06-23 ENCOUNTER — TELEPHONE (OUTPATIENT)
Dept: ORTHOPEDICS | Facility: CLINIC | Age: 50
End: 2020-06-23

## 2020-06-23 RX ORDER — PROMETHAZINE HYDROCHLORIDE AND CODEINE PHOSPHATE 6.25; 1 MG/5ML; MG/5ML
5 SOLUTION ORAL EVERY 4 HOURS PRN
Qty: 240 ML | Refills: 0 | Status: SHIPPED | OUTPATIENT
Start: 2020-06-23 | End: 2020-07-03

## 2020-06-23 NOTE — TELEPHONE ENCOUNTER
Spoke to patient in regards to message. Patient scheduled to come in on 06/24/2020 for 3:45 (4:00). Patient stated thank you. Thanks.

## 2020-06-24 ENCOUNTER — OFFICE VISIT (OUTPATIENT)
Dept: ORTHOPEDICS | Facility: CLINIC | Age: 50
End: 2020-06-24
Payer: COMMERCIAL

## 2020-06-24 VITALS — BODY MASS INDEX: 29.92 KG/M2 | WEIGHT: 175.25 LBS | HEIGHT: 64 IN

## 2020-06-24 DIAGNOSIS — M17.0 PRIMARY OSTEOARTHRITIS OF BOTH KNEES: Primary | ICD-10-CM

## 2020-06-24 PROCEDURE — 99499 NO LOS: ICD-10-PCS | Mod: S$GLB,,, | Performed by: NURSE PRACTITIONER

## 2020-06-24 PROCEDURE — 99499 UNLISTED E&M SERVICE: CPT | Mod: S$GLB,,, | Performed by: NURSE PRACTITIONER

## 2020-06-24 PROCEDURE — 20610 PR DRAIN/INJECT LARGE JOINT/BURSA: ICD-10-PCS | Mod: 50,S$GLB,, | Performed by: NURSE PRACTITIONER

## 2020-06-24 PROCEDURE — 99999 PR PBB SHADOW E&M-EST. PATIENT-LVL III: ICD-10-PCS | Mod: PBBFAC,,, | Performed by: NURSE PRACTITIONER

## 2020-06-24 PROCEDURE — 99999 PR PBB SHADOW E&M-EST. PATIENT-LVL III: CPT | Mod: PBBFAC,,, | Performed by: NURSE PRACTITIONER

## 2020-06-24 PROCEDURE — 20610 DRAIN/INJ JOINT/BURSA W/O US: CPT | Mod: 50,S$GLB,, | Performed by: NURSE PRACTITIONER

## 2020-06-24 RX ORDER — MELOXICAM 15 MG/1
15 TABLET ORAL DAILY
Qty: 30 TABLET | Refills: 1 | Status: SHIPPED | OUTPATIENT
Start: 2020-06-24 | End: 2020-06-24

## 2020-06-24 NOTE — PROGRESS NOTES
Ronal Ham presents to clinic today for the third bilateral knee Orthovisc injection.    Exam demonstrates the no effusion in the  bilateral knee, and the skin is intact.    Diagnosis: primary osteoarthritis bilateral knee    After time out was performed and patient ID, side, and site were verified, the  bilateral  knee was sterilly prepped in the standard fashion.  A 22-gauge needle was introduced into bilateral knee joint from an danni-lateral site without complication and knee was then injected with 2 ml of Orthovisc.  Sterile dressing was applied.  The patient was instructed to resume activities as tolerated and to call with any problems.     He reports good relief and will f/u as needed.

## 2020-08-04 DIAGNOSIS — Z12.11 COLON CANCER SCREENING: Primary | ICD-10-CM

## 2020-08-04 DIAGNOSIS — Z12.11 COLON CANCER SCREENING: ICD-10-CM

## 2020-08-04 RX ORDER — PROMETHAZINE HYDROCHLORIDE AND CODEINE PHOSPHATE 6.25; 1 MG/5ML; MG/5ML
5 SOLUTION ORAL EVERY 4 HOURS PRN
COMMUNITY
End: 2020-09-21 | Stop reason: SDUPTHER

## 2020-08-04 NOTE — TELEPHONE ENCOUNTER
Last Office Visit Info:   The patient's last visit with Bruce Terrell MD was on 2/11/2020.    The patient's last visit in current department was on 3/12/2020.        Last CBC Results:   Lab Results   Component Value Date    WBC 4.73 01/29/2020    HGB 13.8 (L) 01/29/2020    HCT 42.0 01/29/2020     01/29/2020       Last CMP Results  Lab Results   Component Value Date     01/29/2020    K 3.9 01/29/2020     01/29/2020    CO2 26 01/29/2020    BUN 11 01/29/2020    CREATININE 0.8 01/29/2020    CALCIUM 9.2 01/29/2020    ALBUMIN 3.6 01/29/2020    AST 28 01/29/2020    ALT 21 01/29/2020       Last Lipids  Lab Results   Component Value Date    CHOL 144 01/29/2020    TRIG 29 (L) 01/29/2020    HDL 47 01/29/2020    LDLCALC 91.2 01/29/2020       Last A1C  Lab Results   Component Value Date    HGBA1C 5.5 01/29/2020       Last TSH  Lab Results   Component Value Date    TSH 1.215 01/29/2020         Current Med Refills  Medication List with Changes/Refills   Current Medications    ALBUTEROL (PROAIR HFA) 90 MCG/ACTUATION INHALER    Inhale 1-2 puffs into the lungs every 6 (six) hours as needed for Wheezing. Rescue       Start Date: 1/28/2020 End Date: 1/27/2021    AMLODIPINE (NORVASC) 10 MG TABLET    TAKE 1 TABLET BY MOUTH EVERY DAY       Start Date: 6/26/2020 End Date: --    AZELASTINE (ASTELIN) 137 MCG (0.1 %) NASAL SPRAY    1 spray (137 mcg total) by Nasal route 2 (two) times daily.       Start Date: 4/18/2019 End Date: 4/17/2020    DICLOFENAC SODIUM (VOLTAREN) 1 % GEL    Apply 2 g topically 4 (four) times daily.       Start Date: 2/6/2020  End Date: --    ECONAZOLE NITRATE 1 % CREAM    ANTIONE EXT TO THE AFFECTED AND SURROUNDING AREAS OF SKIN 1 TIME PER DAY       Start Date: 11/18/2016End Date: --    FAMOTIDINE (PEPCID) 40 MG TABLET    Take 1 tablet (40 mg total) by mouth once daily.       Start Date: 2/12/2020 End Date: 2/11/2021    FLUTICASONE (FLONASE) 50 MCG/ACTUATION NASAL SPRAY    1 spray (50 mcg total) by Each  Nare route once daily.       Start Date: 4/22/2019 End Date: --    HYDROXYCHLOROQUINE (PLAQUENIL) 200 MG TABLET    Take 2 tablets (400 mg total) by mouth once daily.       Start Date: 12/11/2019End Date: --    LORATADINE (CLARITIN) 10 MG TABLET    Take 1 tablet (10 mg total) by mouth once daily.       Start Date: 2/11/2020 End Date: --    MELOXICAM (MOBIC) 15 MG TABLET    TAKE 1 TABLET(15 MG) BY MOUTH EVERY DAY       Start Date: 6/24/2020 End Date: --    SILDENAFIL (VIAGRA) 100 MG TABLET    Take 1 tablet (100 mg total) by mouth daily as needed for Erectile Dysfunction.       Start Date: 1/4/2020  End Date: 1/3/2021    TRIAMCINOLONE ACETONIDE 0.1% (KENALOG) 0.1 % CREAM    Apply topically 2 (two) times daily.       Start Date: 1/23/2019 End Date: --

## 2020-08-05 RX ORDER — PROMETHAZINE HYDROCHLORIDE AND CODEINE PHOSPHATE 6.25; 1 MG/5ML; MG/5ML
5 SOLUTION ORAL EVERY 4 HOURS PRN
Qty: 240 ML | Refills: 0 | OUTPATIENT
Start: 2020-08-05

## 2020-08-28 ENCOUNTER — TELEPHONE (OUTPATIENT)
Dept: FAMILY MEDICINE | Facility: CLINIC | Age: 50
End: 2020-08-28

## 2020-08-28 NOTE — TELEPHONE ENCOUNTER
----- Message from Tanya Jewell sent at 8/28/2020  3:36 PM CDT -----      Can the clinic reply in [facility]:N        Please refill the medication(s) listed below. Please call the patient when the prescription(s) is ready for  at this phone number         Medication #1 promethazine-codeine 6.25-10 mg/5 ml (PHENERGAN WITH CODEINE) 6.25-10 mg/5 mL syrup    Medication #2       [id number] [address] AT [facility] [address]

## 2020-08-28 NOTE — TELEPHONE ENCOUNTER
Patient advised of refill denial and that an office visit is needed.  Patient verbalized understanding.  Patient has appointment scheduled for 9/8/2020.

## 2020-09-21 ENCOUNTER — OFFICE VISIT (OUTPATIENT)
Dept: FAMILY MEDICINE | Facility: CLINIC | Age: 50
End: 2020-09-21
Payer: COMMERCIAL

## 2020-09-21 VITALS
HEIGHT: 64 IN | WEIGHT: 191.81 LBS | SYSTOLIC BLOOD PRESSURE: 128 MMHG | OXYGEN SATURATION: 100 % | TEMPERATURE: 98 F | DIASTOLIC BLOOD PRESSURE: 80 MMHG | HEART RATE: 82 BPM | BODY MASS INDEX: 32.74 KG/M2

## 2020-09-21 DIAGNOSIS — K21.9 GASTROESOPHAGEAL REFLUX DISEASE, ESOPHAGITIS PRESENCE NOT SPECIFIED: ICD-10-CM

## 2020-09-21 DIAGNOSIS — Z00.00 ANNUAL PHYSICAL EXAM: Primary | ICD-10-CM

## 2020-09-21 DIAGNOSIS — Z12.11 SPECIAL SCREENING FOR MALIGNANT NEOPLASMS, COLON: ICD-10-CM

## 2020-09-21 DIAGNOSIS — M79.10 MYALGIA: ICD-10-CM

## 2020-09-21 PROCEDURE — 3079F PR MOST RECENT DIASTOLIC BLOOD PRESSURE 80-89 MM HG: ICD-10-PCS | Mod: CPTII,S$GLB,, | Performed by: FAMILY MEDICINE

## 2020-09-21 PROCEDURE — 99396 PREV VISIT EST AGE 40-64: CPT | Mod: S$GLB,,, | Performed by: FAMILY MEDICINE

## 2020-09-21 PROCEDURE — 99396 PR PREVENTIVE VISIT,EST,40-64: ICD-10-PCS | Mod: S$GLB,,, | Performed by: FAMILY MEDICINE

## 2020-09-21 PROCEDURE — 3008F PR BODY MASS INDEX (BMI) DOCUMENTED: ICD-10-PCS | Mod: CPTII,S$GLB,, | Performed by: FAMILY MEDICINE

## 2020-09-21 PROCEDURE — 3074F PR MOST RECENT SYSTOLIC BLOOD PRESSURE < 130 MM HG: ICD-10-PCS | Mod: CPTII,S$GLB,, | Performed by: FAMILY MEDICINE

## 2020-09-21 PROCEDURE — 3008F BODY MASS INDEX DOCD: CPT | Mod: CPTII,S$GLB,, | Performed by: FAMILY MEDICINE

## 2020-09-21 PROCEDURE — 3079F DIAST BP 80-89 MM HG: CPT | Mod: CPTII,S$GLB,, | Performed by: FAMILY MEDICINE

## 2020-09-21 PROCEDURE — 3074F SYST BP LT 130 MM HG: CPT | Mod: CPTII,S$GLB,, | Performed by: FAMILY MEDICINE

## 2020-09-21 PROCEDURE — 99999 PR PBB SHADOW E&M-EST. PATIENT-LVL IV: ICD-10-PCS | Mod: PBBFAC,,, | Performed by: FAMILY MEDICINE

## 2020-09-21 PROCEDURE — 99999 PR PBB SHADOW E&M-EST. PATIENT-LVL IV: CPT | Mod: PBBFAC,,, | Performed by: FAMILY MEDICINE

## 2020-09-21 RX ORDER — TIZANIDINE 4 MG/1
4-16 TABLET ORAL NIGHTLY PRN
Qty: 120 TABLET | Refills: 0 | Status: SHIPPED | OUTPATIENT
Start: 2020-09-21 | End: 2020-10-21

## 2020-09-21 RX ORDER — PANTOPRAZOLE SODIUM 40 MG/1
40 TABLET, DELAYED RELEASE ORAL DAILY
Qty: 90 TABLET | Refills: 0 | Status: SHIPPED | OUTPATIENT
Start: 2020-09-21 | End: 2020-12-23 | Stop reason: SDUPTHER

## 2020-09-21 RX ORDER — IBUPROFEN 800 MG/1
800 TABLET ORAL 3 TIMES DAILY
Qty: 21 TABLET | Refills: 0 | Status: SHIPPED | OUTPATIENT
Start: 2020-09-21 | End: 2020-09-28

## 2020-09-21 RX ORDER — PROMETHAZINE HYDROCHLORIDE AND CODEINE PHOSPHATE 6.25; 1 MG/5ML; MG/5ML
5 SOLUTION ORAL EVERY 4 HOURS PRN
Qty: 180 ML | Refills: 1 | Status: SHIPPED | OUTPATIENT
Start: 2020-09-21 | End: 2021-04-14 | Stop reason: ALTCHOICE

## 2020-09-21 NOTE — PROGRESS NOTES
Chief Complaint   Patient presents with    Annual Exam       SUBJECTIVE:  Ronal Ham is a 50 y.o. male here for new problem of wellness exam.  Currently has co-morbidities including per problem list.      Past Medical History:   Diagnosis Date    Arthritis     Eye injury     od hit above od    GERD (gastroesophageal reflux disease)     Hypertension     Lupus (systemic lupus erythematosus)     Pulmonary fibrosis     Raynaud phenomenon      Past Surgical History:   Procedure Laterality Date    HERNIA REPAIR       Social History     Socioeconomic History    Marital status:      Spouse name: Not on file    Number of children: 5    Years of education: some colle    Highest education level: Not on file   Occupational History    Occupation: [employer]      Employer: [email or web address]   Social Needs    Financial resource strain: Not on file    Food insecurity     Worry: Not on file     Inability: Not on file    Transportation needs     Medical: Not on file     Non-medical: Not on file   Tobacco Use    Smoking status: Never Smoker    Smokeless tobacco: Never Used   Substance and Sexual Activity    Alcohol use: No    Drug use: No    Sexual activity: Yes     Partners: Female   Lifestyle    Physical activity     Days per week: Not on file     Minutes per session: Not on file    Stress: Not on file   Relationships    Social connections     Talks on phone: Not on file     Gets together: Not on file     Attends Confucianism service: Not on file     Active member of club or organization: Not on file     Attends meetings of clubs or organizations: Not on file     Relationship status: Not on file   Other Topics Concern    Not on file   Social History Narrative    Adult Screenings updated and reviewed    Mammogram( for females)    Pap ( for females)    PSA( for males )    Colonoscopy age  48    Flu shot yearly    Td     Pneumovax recommended one time  at age  63    Zostavax recommended  one time at  age  58    Eye exam recommended yearly    Bone density      Family History   Problem Relation Age of Onset    Heart disease Mother     Heart disease Father     Glaucoma Father     Glaucoma Brother     No Known Problems Sister     No Known Problems Daughter     No Known Problems Son     No Known Problems Maternal Aunt     No Known Problems Maternal Uncle     No Known Problems Paternal Aunt     No Known Problems Paternal Uncle     No Known Problems Maternal Grandmother     No Known Problems Maternal Grandfather     No Known Problems Paternal Grandmother     No Known Problems Paternal Grandfather     Asthma Neg Hx     Emphysema Neg Hx     Amblyopia Neg Hx     Blindness Neg Hx     Cancer Neg Hx     Cataracts Neg Hx     Diabetes Neg Hx     Hypertension Neg Hx     Macular degeneration Neg Hx     Retinal detachment Neg Hx     Strabismus Neg Hx     Stroke Neg Hx     Thyroid disease Neg Hx      Current Outpatient Medications on File Prior to Visit   Medication Sig Dispense Refill    albuterol (PROVENTIL/VENTOLIN HFA) 90 mcg/actuation inhaler INHALE 1 TO 2 PUFFS INTO THE LUNGS EVERY 6 HOURS AS NEEDED FOR WHEEZING 18 g 0    amLODIPine (NORVASC) 10 MG tablet TAKE 1 TABLET BY MOUTH EVERY DAY 90 tablet 0    diclofenac sodium (VOLTAREN) 1 % Gel Apply 2 g topically 4 (four) times daily. 100 g 0    econazole nitrate 1 % cream ANTIONE EXT TO THE AFFECTED AND SURROUNDING AREAS OF SKIN 1 TIME PER DAY  12    famotidine (PEPCID) 40 MG tablet Take 1 tablet (40 mg total) by mouth once daily. 30 tablet 11    fluticasone propionate (FLONASE) 50 mcg/actuation nasal spray SHAKE LIQUID AND USE 1 SPRAY(50 MCG) IN EACH NOSTRIL EVERY DAY 16 g 5    hydroxychloroquine (PLAQUENIL) 200 mg tablet Take 2 tablets (400 mg total) by mouth once daily. 180 tablet 3    loratadine (CLARITIN) 10 mg tablet Take 1 tablet (10 mg total) by mouth once daily. 30 tablet 11    meloxicam (MOBIC) 15 MG tablet TAKE 1 TABLET(15  "MG) BY MOUTH EVERY DAY 90 tablet 1    sildenafil (VIAGRA) 100 MG tablet Take 1 tablet (100 mg total) by mouth daily as needed for Erectile Dysfunction. 10 tablet 5    triamcinolone acetonide 0.1% (KENALOG) 0.1 % cream Apply topically 2 (two) times daily. 60 g 0    azelastine (ASTELIN) 137 mcg (0.1 %) nasal spray 1 spray (137 mcg total) by Nasal route 2 (two) times daily. 30 mL 2    [DISCONTINUED] promethazine-codeine 6.25-10 mg/5 ml (PHENERGAN WITH CODEINE) 6.25-10 mg/5 mL syrup Take 5 mLs by mouth every 4 (four) hours as needed.       No current facility-administered medications on file prior to visit.      Review of patient's allergies indicates:   Allergen Reactions    Carafate  [sucralfate] Rash     Other reaction(s): Hives         Review of Systems   Constitutional: Negative.    HENT: Negative.    Eyes: Negative.    Respiratory: Positive for cough.    Cardiovascular: Negative.    Gastrointestinal: Negative.    Genitourinary: Negative.    Musculoskeletal: Positive for back pain and joint pain.   Skin: Positive for rash. Negative for itching.   Neurological: Negative.    Endo/Heme/Allergies: Negative.    Psychiatric/Behavioral: Negative.        OBJECTIVE:  /80   Pulse 82   Temp 97.8 °F (36.6 °C) (Oral)   Ht 5' 4" (1.626 m)   Wt 87 kg (191 lb 12.8 oz)   SpO2 100%   BMI 32.92 kg/m²     Wt Readings from Last 3 Encounters:   09/21/20 87 kg (191 lb 12.8 oz)   06/24/20 79.5 kg (175 lb 4.3 oz)   06/03/20 79.5 kg (175 lb 4.3 oz)     BP Readings from Last 3 Encounters:   09/21/20 128/80   03/12/20 138/88   03/10/20 (!) 178/100       He appears well, in no apparent distress.  Alert and oriented times three, pleasant and cooperative. Vital signs are as documented in vital signs section.  Back with increased back pain and muscle  Right knee with mild swelling.  Chronic skin changes.  S1 and S2 normal, no murmurs, clicks, gallops or rubs. Regular rate and rhythm. Chest is clear; no wheezes or rales. No edema " or JVD.  The abdomen is soft without tenderness, guarding, mass, rebound or organomegaly. Bowel sounds are normal. No CVA tenderness or inguinal adenopathy noted.    Review of old Records:  Reviewed per epic and [facility]    Review of old labs:  Lab Results   Component Value Date    TSH 1.215 01/29/2020     Lab Results   Component Value Date    WBC 4.73 01/29/2020    HGB 13.8 (L) 01/29/2020    HCT 42.0 01/29/2020    MCV 91 01/29/2020     01/29/2020       Chemistry        Component Value Date/Time     01/29/2020 1315    K 3.9 01/29/2020 1315     01/29/2020 1315    CO2 26 01/29/2020 1315    BUN 11 01/29/2020 1315    CREATININE 0.8 01/29/2020 1315    GLU 76 01/29/2020 1315        Component Value Date/Time    CALCIUM 9.2 01/29/2020 1315    ALKPHOS 60 01/29/2020 1315    AST 28 01/29/2020 1315    ALT 21 01/29/2020 1315    BILITOT 0.3 01/29/2020 1315    ESTGFRAFRICA >60 01/29/2020 1315    EGFRNONAA >60 01/29/2020 1315        Lab Results   Component Value Date    CHOL 144 01/29/2020    CHOL 138 12/18/2018    CHOL 144 05/02/2017     Lab Results   Component Value Date    HDL 47 01/29/2020    HDL 49 12/18/2018    HDL 43 05/02/2017     Lab Results   Component Value Date    LDLCALC 91.2 01/29/2020    LDLCALC 77.4 12/18/2018    LDLCALC 91.4 05/02/2017     Lab Results   Component Value Date    TRIG 29 (L) 01/29/2020    TRIG 58 12/18/2018    TRIG 48 05/02/2017     Lab Results   Component Value Date    CHOLHDL 32.6 01/29/2020    CHOLHDL 35.5 12/18/2018    CHOLHDL 29.9 05/02/2017         Review of old imaging:  n/a    ASSESSMENT:  Problem List Items Addressed This Visit     GERD (gastroesophageal reflux disease)    Relevant Medications    pantoprazole (PROTONIX) 40 MG tablet      Other Visit Diagnoses     Annual physical exam    -  Primary    Myalgia        Relevant Medications    promethazine-codeine 6.25-10 mg/5 ml (PHENERGAN WITH CODEINE) 6.25-10 mg/5 mL syrup    ibuprofen (ADVIL,MOTRIN) 800 MG tablet    tiZANidine  (ZANAFLEX) 4 MG tablet    Special screening for malignant neoplasms, colon        Relevant Orders    Fecal Immunochemical Test (iFOBT)          ICD-10-CM ICD-9-CM   1. Annual physical exam  Z00.00 V70.0   2. Myalgia  M79.10 729.1   3. Special screening for malignant neoplasms, colon  Z12.11 V76.51   4. Gastroesophageal reflux disease, esophagitis presence not specified  K21.9 530.81       Counseled on age appropriate medical preventative services, including age appropriate cancer screenings, over all nutritional health, need for a consistent exercise regimen and an over all push towards maintaining a vigorous and active lifestyle.  Counseled on age appropriate vaccines and discussed upcoming health care needs based on age/gender.  Spent time with patient counseling on need for a good patient/doctor relationship moving forward.  Discussed use of common OTC medications and supplements.  Discussed common dietary aids and use of caffeine and the need for good sleep hygiene and stress management.    PLAN:  Problem List Items Addressed This Visit     GERD (gastroesophageal reflux disease)    Relevant Medications    pantoprazole (PROTONIX) 40 MG tablet      Other Visit Diagnoses     Annual physical exam    -  Primary    Myalgia        Relevant Medications    promethazine-codeine 6.25-10 mg/5 ml (PHENERGAN WITH CODEINE) 6.25-10 mg/5 mL syrup    ibuprofen (ADVIL,MOTRIN) 800 MG tablet    tiZANidine (ZANAFLEX) 4 MG tablet    Special screening for malignant neoplasms, colon        Relevant Orders    Fecal Immunochemical Test (iFOBT)        Recheck in 2 weeks  [facility] chiropractor  Medication List with Changes/Refills   New Medications    IBUPROFEN (ADVIL,MOTRIN) 800 MG TABLET    Take 1 tablet (800 mg total) by mouth 3 (three) times daily. for 7 days    PANTOPRAZOLE (PROTONIX) 40 MG TABLET    Take 1 tablet (40 mg total) by mouth once daily.    TIZANIDINE (ZANAFLEX) 4 MG TABLET    Take 1-4 tablets (4-16 mg total) by mouth nightly as  needed.   Current Medications    ALBUTEROL (PROVENTIL/VENTOLIN HFA) 90 MCG/ACTUATION INHALER    INHALE 1 TO 2 PUFFS INTO THE LUNGS EVERY 6 HOURS AS NEEDED FOR WHEEZING    AMLODIPINE (NORVASC) 10 MG TABLET    TAKE 1 TABLET BY MOUTH EVERY DAY    AZELASTINE (ASTELIN) 137 MCG (0.1 %) NASAL SPRAY    1 spray (137 mcg total) by Nasal route 2 (two) times daily.    DICLOFENAC SODIUM (VOLTAREN) 1 % GEL    Apply 2 g topically 4 (four) times daily.    ECONAZOLE NITRATE 1 % CREAM    ANTIONE EXT TO THE AFFECTED AND SURROUNDING AREAS OF SKIN 1 TIME PER DAY    FAMOTIDINE (PEPCID) 40 MG TABLET    Take 1 tablet (40 mg total) by mouth once daily.    FLUTICASONE PROPIONATE (FLONASE) 50 MCG/ACTUATION NASAL SPRAY    SHAKE LIQUID AND USE 1 SPRAY(50 MCG) IN EACH NOSTRIL EVERY DAY    HYDROXYCHLOROQUINE (PLAQUENIL) 200 MG TABLET    Take 2 tablets (400 mg total) by mouth once daily.    LORATADINE (CLARITIN) 10 MG TABLET    Take 1 tablet (10 mg total) by mouth once daily.    MELOXICAM (MOBIC) 15 MG TABLET    TAKE 1 TABLET(15 MG) BY MOUTH EVERY DAY    SILDENAFIL (VIAGRA) 100 MG TABLET    Take 1 tablet (100 mg total) by mouth daily as needed for Erectile Dysfunction.    TRIAMCINOLONE ACETONIDE 0.1% (KENALOG) 0.1 % CREAM    Apply topically 2 (two) times daily.   Changed and/or Refilled Medications    Modified Medication Previous Medication    PROMETHAZINE-CODEINE 6.25-10 MG/5 ML (PHENERGAN WITH CODEINE) 6.25-10 MG/5 ML SYRUP promethazine-codeine 6.25-10 mg/5 ml (PHENERGAN WITH CODEINE) 6.25-10 mg/5 mL syrup       Take 5 mLs by mouth every 4 (four) hours as needed.    Take 5 mLs by mouth every 4 (four) hours as needed.         No follow-ups on file.

## 2020-09-23 ENCOUNTER — TELEPHONE (OUTPATIENT)
Dept: FAMILY MEDICINE | Facility: CLINIC | Age: 50
End: 2020-09-23

## 2020-09-23 DIAGNOSIS — I10 BENIGN HYPERTENSION: ICD-10-CM

## 2020-09-23 DIAGNOSIS — M79.10 MYALGIA: ICD-10-CM

## 2020-09-23 RX ORDER — AMLODIPINE BESYLATE 10 MG/1
TABLET ORAL
Qty: 90 TABLET | Refills: 0 | Status: SHIPPED | OUTPATIENT
Start: 2020-09-23 | End: 2020-12-29 | Stop reason: SDUPTHER

## 2020-09-23 RX ORDER — PROMETHAZINE HYDROCHLORIDE AND CODEINE PHOSPHATE 6.25; 1 MG/5ML; MG/5ML
5 SOLUTION ORAL EVERY 4 HOURS PRN
Qty: 180 ML | Refills: 1 | Status: CANCELLED | OUTPATIENT
Start: 2020-09-23

## 2020-09-23 NOTE — TELEPHONE ENCOUNTER
----- Message from Sara Yony sent at 9/23/2020 12:11 PM CDT -----  Regarding: pt  Type: Patient Call Back    Who calledpt    What is the request in detail:pt wants to discuss getting larger cough syrup. Call pt    Can the clinic reply by [facility]?    Would the patient rather a call back or a response via [facility]? call    Best call back number:[phone] (home)       Additional Information:

## 2020-09-23 NOTE — TELEPHONE ENCOUNTER
Last Office Visit Info:   The patient's last visit with Bruce Terrell MD was on 3/12/2020.    The patient's last visit in current department was on 9/21/2020.        Last CBC Results:   Lab Results   Component Value Date    WBC 4.73 01/29/2020    HGB 13.8 (L) 01/29/2020    HCT 42.0 01/29/2020     01/29/2020       Last CMP Results  Lab Results   Component Value Date     01/29/2020    K 3.9 01/29/2020     01/29/2020    CO2 26 01/29/2020    BUN 11 01/29/2020    CREATININE 0.8 01/29/2020    CALCIUM 9.2 01/29/2020    ALBUMIN 3.6 01/29/2020    AST 28 01/29/2020    ALT 21 01/29/2020       Last Lipids  Lab Results   Component Value Date    CHOL 144 01/29/2020    TRIG 29 (L) 01/29/2020    HDL 47 01/29/2020    LDLCALC 91.2 01/29/2020       Last A1C  Lab Results   Component Value Date    HGBA1C 5.5 01/29/2020       Last TSH  Lab Results   Component Value Date    TSH 1.215 01/29/2020         Current Med Refills  Medication List with Changes/Refills   Current Medications    ALBUTEROL (PROVENTIL/VENTOLIN HFA) 90 MCG/ACTUATION INHALER    INHALE 1 TO 2 PUFFS INTO THE LUNGS EVERY 6 HOURS AS NEEDED FOR WHEEZING       Start Date: 8/10/2020 End Date: --    AZELASTINE (ASTELIN) 137 MCG (0.1 %) NASAL SPRAY    1 spray (137 mcg total) by Nasal route 2 (two) times daily.       Start Date: 4/18/2019 End Date: 4/17/2020    DICLOFENAC SODIUM (VOLTAREN) 1 % GEL    Apply 2 g topically 4 (four) times daily.       Start Date: 2/6/2020  End Date: --    ECONAZOLE NITRATE 1 % CREAM    ANTIONE EXT TO THE AFFECTED AND SURROUNDING AREAS OF SKIN 1 TIME PER DAY       Start Date: 11/18/2016End Date: --    FAMOTIDINE (PEPCID) 40 MG TABLET    Take 1 tablet (40 mg total) by mouth once daily.       Start Date: 2/12/2020 End Date: 2/11/2021    FLUTICASONE PROPIONATE (FLONASE) 50 MCG/ACTUATION NASAL SPRAY    SHAKE LIQUID AND USE 1 SPRAY(50 MCG) IN EACH NOSTRIL EVERY DAY       Start Date: 8/7/2020  End Date: --    HYDROXYCHLOROQUINE (PLAQUENIL) 200  MG TABLET    Take 2 tablets (400 mg total) by mouth once daily.       Start Date: 12/11/2019End Date: --    IBUPROFEN (ADVIL,MOTRIN) 800 MG TABLET    Take 1 tablet (800 mg total) by mouth 3 (three) times daily. for 7 days       Start Date: 9/21/2020 End Date: 9/28/2020    LORATADINE (CLARITIN) 10 MG TABLET    Take 1 tablet (10 mg total) by mouth once daily.       Start Date: 2/11/2020 End Date: --    MELOXICAM (MOBIC) 15 MG TABLET    TAKE 1 TABLET(15 MG) BY MOUTH EVERY DAY       Start Date: 6/24/2020 End Date: --    PANTOPRAZOLE (PROTONIX) 40 MG TABLET    Take 1 tablet (40 mg total) by mouth once daily.       Start Date: 9/21/2020 End Date: 9/21/2021    PROMETHAZINE-CODEINE 6.25-10 MG/5 ML (PHENERGAN WITH CODEINE) 6.25-10 MG/5 ML SYRUP    Take 5 mLs by mouth every 4 (four) hours as needed.       Start Date: 9/21/2020 End Date: --    SILDENAFIL (VIAGRA) 100 MG TABLET    Take 1 tablet (100 mg total) by mouth daily as needed for Erectile Dysfunction.       Start Date: 1/4/2020  End Date: 1/3/2021    TIZANIDINE (ZANAFLEX) 4 MG TABLET    Take 1-4 tablets (4-16 mg total) by mouth nightly as needed.       Start Date: 9/21/2020 End Date: 10/21/2020    TRIAMCINOLONE ACETONIDE 0.1% (KENALOG) 0.1 % CREAM    Apply topically 2 (two) times daily.       Start Date: 1/23/2019 End Date: --   Changed and/or Refilled Medications    Modified Medication Previous Medication    AMLODIPINE (NORVASC) 10 MG TABLET amLODIPine (NORVASC) 10 MG tablet       TAKE 1 TABLET BY MOUTH EVERY DAY    TAKE 1 TABLET BY MOUTH EVERY DAY       Start Date: 9/23/2020 End Date: --    Start Date: 6/26/2020 End Date: 9/23/2020       Order(s) placed per written order guidelines: none    Please advise.

## 2020-09-24 ENCOUNTER — TELEPHONE (OUTPATIENT)
Dept: FAMILY MEDICINE | Facility: CLINIC | Age: 50
End: 2020-09-24

## 2020-09-24 NOTE — TELEPHONE ENCOUNTER
----- Message from Dorota Bowman sent at 9/24/2020 11:22 AM CDT -----  Regarding: Advice  Contact: Ronal  Type: Patient Call Back    Who called:Ronal    What is the request in detail:Patient called to request a letter to excuse him from jury duty because of a cough and also to speak with the nurse about writing a new Rx for his cough medication. Please call to advise    Can the clinic reply by [facility]?    Would the patient rather a call back or a response via [facility]? Call    Best call back number:[phone]

## 2020-09-25 NOTE — TELEPHONE ENCOUNTER
----- Message from Winter Cobb sent at 9/25/2020  3:34 PM CDT -----  Regarding: Patient call back  Who called:ELICIA VILLARREAL [[id number]]    What is the request in detail: Patient is requesting a call back. He states he never received a call back yesterday regarding the letter. He would like to further discuss.   Please advise.    Can the clinic reply by [facility]? No    Best call back number: [phone]     Additional Information: N/A

## 2020-09-25 NOTE — TELEPHONE ENCOUNTER
Pt would like a letter from  stating that he does not have to go to jury duty due his constant chronic cough.  He also would like the larger bottle of cough syrup.  He was given 180 mL but he normally gets a larger bottle.

## 2020-09-28 ENCOUNTER — TELEPHONE (OUTPATIENT)
Dept: FAMILY MEDICINE | Facility: CLINIC | Age: 50
End: 2020-09-28

## 2020-09-28 NOTE — TELEPHONE ENCOUNTER
----- Message from Sridhar Hung sent at 9/28/2020  9:20 AM CDT -----  Type:  Patient Returning Call    Who Called:     Who Left Message for Patient:     Does the patient know what this is regarding?: missed call     Best Call Back Number:  [phone]     Additional Information:

## 2020-10-05 ENCOUNTER — PATIENT MESSAGE (OUTPATIENT)
Dept: ADMINISTRATIVE | Facility: HOSPITAL | Age: 50
End: 2020-10-05

## 2020-11-04 ENCOUNTER — PATIENT MESSAGE (OUTPATIENT)
Dept: ADMINISTRATIVE | Facility: HOSPITAL | Age: 50
End: 2020-11-04

## 2020-12-11 DIAGNOSIS — B96.89 ACUTE BACTERIAL BRONCHITIS: ICD-10-CM

## 2020-12-11 DIAGNOSIS — J20.8 ACUTE BACTERIAL BRONCHITIS: ICD-10-CM

## 2020-12-11 RX ORDER — ALBUTEROL SULFATE 90 UG/1
AEROSOL, METERED RESPIRATORY (INHALATION)
Qty: 18 G | Refills: 0 | Status: SHIPPED | OUTPATIENT
Start: 2020-12-11 | End: 2021-03-23 | Stop reason: SDUPTHER

## 2020-12-11 NOTE — TELEPHONE ENCOUNTER
----- Message from Mitch Sahni sent at 12/11/2020 10:35 AM CST -----  Can the clinic reply in [facility]:     Please refill the medication(s) listed below. The patient can be reached at this phone number once it is called into the pharmacy. [phone]    Medication #1albuterol (PROVENTIL/VENTOLIN HFA) 90 mcg/actuation inhaler    Medication #2    Preferred Pharmacy:  [id number] [id number] AT [facility] [address]

## 2020-12-11 NOTE — TELEPHONE ENCOUNTER
Last Office Visit Info:   The patient's last visit with Bruce Terrell MD was on 9/21/2020.    The patient's last visit in current department was on 9/21/2020.        Last CBC Results:   Lab Results   Component Value Date    WBC 4.73 01/29/2020    HGB 13.8 (L) 01/29/2020    HCT 42.0 01/29/2020     01/29/2020       Last CMP Results  Lab Results   Component Value Date     01/29/2020    K 3.9 01/29/2020     01/29/2020    CO2 26 01/29/2020    BUN 11 01/29/2020    CREATININE 0.8 01/29/2020    CALCIUM 9.2 01/29/2020    ALBUMIN 3.6 01/29/2020    AST 28 01/29/2020    ALT 21 01/29/2020       Last Lipids  Lab Results   Component Value Date    CHOL 144 01/29/2020    TRIG 29 (L) 01/29/2020    HDL 47 01/29/2020    LDLCALC 91.2 01/29/2020       Last A1C  Lab Results   Component Value Date    HGBA1C 5.5 01/29/2020       Last TSH  Lab Results   Component Value Date    TSH 1.215 01/29/2020         Current Med Refills  Medication List with Changes/Refills   Current Medications    ALBUTEROL (PROVENTIL/VENTOLIN HFA) 90 MCG/ACTUATION INHALER    INHALE 1 TO 2 PUFFS INTO THE LUNGS EVERY 6 HOURS AS NEEDED FOR WHEEZING       Start Date: 8/10/2020 End Date: --    AMLODIPINE (NORVASC) 10 MG TABLET    TAKE 1 TABLET BY MOUTH EVERY DAY       Start Date: 9/23/2020 End Date: --    AZELASTINE (ASTELIN) 137 MCG (0.1 %) NASAL SPRAY    1 spray (137 mcg total) by Nasal route 2 (two) times daily.       Start Date: 4/18/2019 End Date: 4/17/2020    DICLOFENAC SODIUM (VOLTAREN) 1 % GEL    Apply 2 g topically 4 (four) times daily.       Start Date: 2/6/2020  End Date: --    ECONAZOLE NITRATE 1 % CREAM    ANTIONE EXT TO THE AFFECTED AND SURROUNDING AREAS OF SKIN 1 TIME PER DAY       Start Date: 11/18/2016End Date: --    FAMOTIDINE (PEPCID) 40 MG TABLET    Take 1 tablet (40 mg total) by mouth once daily.       Start Date: 2/12/2020 End Date: 2/11/2021    FLUTICASONE PROPIONATE (FLONASE) 50 MCG/ACTUATION NASAL SPRAY    SHAKE LIQUID AND USE 1  SPRAY(50 MCG) IN EACH NOSTRIL EVERY DAY       Start Date: 8/7/2020  End Date: --    HYDROXYCHLOROQUINE (PLAQUENIL) 200 MG TABLET    Take 2 tablets (400 mg total) by mouth once daily.       Start Date: 12/11/2019End Date: --    LORATADINE (CLARITIN) 10 MG TABLET    Take 1 tablet (10 mg total) by mouth once daily.       Start Date: 2/11/2020 End Date: --    MELOXICAM (MOBIC) 15 MG TABLET    TAKE 1 TABLET(15 MG) BY MOUTH EVERY DAY       Start Date: 6/24/2020 End Date: --    PANTOPRAZOLE (PROTONIX) 40 MG TABLET    Take 1 tablet (40 mg total) by mouth once daily.       Start Date: 9/21/2020 End Date: 9/21/2021    PROMETHAZINE-CODEINE 6.25-10 MG/5 ML (PHENERGAN WITH CODEINE) 6.25-10 MG/5 ML SYRUP    Take 5 mLs by mouth every 4 (four) hours as needed.       Start Date: 9/21/2020 End Date: --    SILDENAFIL (VIAGRA) 100 MG TABLET    Take 1 tablet (100 mg total) by mouth daily as needed for Erectile Dysfunction.       Start Date: 1/4/2020  End Date: 1/3/2021    TRIAMCINOLONE ACETONIDE 0.1% (KENALOG) 0.1 % CREAM    Apply topically 2 (two) times daily.       Start Date: 1/23/2019 End Date: --       Order(s) placed per written order guidelines:   Please advise.

## 2020-12-16 ENCOUNTER — TELEPHONE (OUTPATIENT)
Dept: FAMILY MEDICINE | Facility: CLINIC | Age: 50
End: 2020-12-16

## 2020-12-16 NOTE — TELEPHONE ENCOUNTER
Called patient concerning [employer]. Patient states he will take care of that right now for us. Patient also asked me to schedule an appointment with Dr. Terrell. Patient scheduled for 12/30. Patient thanked me for calling.

## 2020-12-21 DIAGNOSIS — M32.9 SLE (SYSTEMIC LUPUS ERYTHEMATOSUS): ICD-10-CM

## 2020-12-21 DIAGNOSIS — J84.9 INTERSTITIAL LUNG DISEASE: ICD-10-CM

## 2020-12-21 RX ORDER — HYDROXYCHLOROQUINE SULFATE 200 MG/1
400 TABLET, FILM COATED ORAL DAILY
Qty: 180 TABLET | Refills: 3 | Status: SHIPPED | OUTPATIENT
Start: 2020-12-21 | End: 2021-03-22 | Stop reason: SDUPTHER

## 2020-12-21 NOTE — TELEPHONE ENCOUNTER
Last Office Visit Info:   The patient's last visit with Bruce Terrell MD was on 9/21/2020.    The patient's last visit in current department was on 9/21/2020.        Last CBC Results:   Lab Results   Component Value Date    WBC 4.73 01/29/2020    HGB 13.8 (L) 01/29/2020    HCT 42.0 01/29/2020     01/29/2020       Last CMP Results  Lab Results   Component Value Date     01/29/2020    K 3.9 01/29/2020     01/29/2020    CO2 26 01/29/2020    BUN 11 01/29/2020    CREATININE 0.8 01/29/2020    CALCIUM 9.2 01/29/2020    ALBUMIN 3.6 01/29/2020    AST 28 01/29/2020    ALT 21 01/29/2020       Last Lipids  Lab Results   Component Value Date    CHOL 144 01/29/2020    TRIG 29 (L) 01/29/2020    HDL 47 01/29/2020    LDLCALC 91.2 01/29/2020       Last A1C  Lab Results   Component Value Date    HGBA1C 5.5 01/29/2020       Last TSH  Lab Results   Component Value Date    TSH 1.215 01/29/2020         Current Med Refills  Medication List with Changes/Refills   Current Medications    ALBUTEROL (PROVENTIL/VENTOLIN HFA) 90 MCG/ACTUATION INHALER    INHALE 1 TO 2 PUFFS INTO THE LUNGS EVERY 6 HOURS AS NEEDED FOR WHEEZING       Start Date: 12/11/2020End Date: --    AMLODIPINE (NORVASC) 10 MG TABLET    TAKE 1 TABLET BY MOUTH EVERY DAY       Start Date: 9/23/2020 End Date: --    AZELASTINE (ASTELIN) 137 MCG (0.1 %) NASAL SPRAY    1 spray (137 mcg total) by Nasal route 2 (two) times daily.       Start Date: 4/18/2019 End Date: 4/17/2020    DICLOFENAC SODIUM (VOLTAREN) 1 % GEL    Apply 2 g topically 4 (four) times daily.       Start Date: 2/6/2020  End Date: --    ECONAZOLE NITRATE 1 % CREAM    ANTIONE EXT TO THE AFFECTED AND SURROUNDING AREAS OF SKIN 1 TIME PER DAY       Start Date: 11/18/2016End Date: --    FAMOTIDINE (PEPCID) 40 MG TABLET    Take 1 tablet (40 mg total) by mouth once daily.       Start Date: 2/12/2020 End Date: 2/11/2021    FLUTICASONE PROPIONATE (FLONASE) 50 MCG/ACTUATION NASAL SPRAY    SHAKE LIQUID AND USE 1  SPRAY(50 MCG) IN EACH NOSTRIL EVERY DAY       Start Date: 8/7/2020  End Date: --    HYDROXYCHLOROQUINE (PLAQUENIL) 200 MG TABLET    Take 2 tablets (400 mg total) by mouth once daily.       Start Date: 12/11/2019End Date: --    LORATADINE (CLARITIN) 10 MG TABLET    Take 1 tablet (10 mg total) by mouth once daily.       Start Date: 2/11/2020 End Date: --    MELOXICAM (MOBIC) 15 MG TABLET    TAKE 1 TABLET(15 MG) BY MOUTH EVERY DAY       Start Date: 6/24/2020 End Date: --    PANTOPRAZOLE (PROTONIX) 40 MG TABLET    Take 1 tablet (40 mg total) by mouth once daily.       Start Date: 9/21/2020 End Date: 9/21/2021    PROMETHAZINE-CODEINE 6.25-10 MG/5 ML (PHENERGAN WITH CODEINE) 6.25-10 MG/5 ML SYRUP    Take 5 mLs by mouth every 4 (four) hours as needed.       Start Date: 9/21/2020 End Date: --    SILDENAFIL (VIAGRA) 100 MG TABLET    Take 1 tablet (100 mg total) by mouth daily as needed for Erectile Dysfunction.       Start Date: 1/4/2020  End Date: 1/3/2021    TRIAMCINOLONE ACETONIDE 0.1% (KENALOG) 0.1 % CREAM    Apply topically 2 (two) times daily.       Start Date: 1/23/2019 End Date: --           Please advise.

## 2020-12-23 ENCOUNTER — LAB VISIT (OUTPATIENT)
Dept: LAB | Facility: HOSPITAL | Age: 50
End: 2020-12-23
Attending: FAMILY MEDICINE
Payer: COMMERCIAL

## 2020-12-23 DIAGNOSIS — Z12.11 SPECIAL SCREENING FOR MALIGNANT NEOPLASMS, COLON: ICD-10-CM

## 2020-12-23 DIAGNOSIS — K21.9 GASTROESOPHAGEAL REFLUX DISEASE: ICD-10-CM

## 2020-12-23 PROCEDURE — 82274 ASSAY TEST FOR BLOOD FECAL: CPT

## 2020-12-23 NOTE — TELEPHONE ENCOUNTER
Last Office Visit Info:   The patient's last visit with Bruce Terrell MD was on 9/21/2020.    The patient's last visit in current department was on 9/21/2020.        Last CBC Results:   Lab Results   Component Value Date    WBC 4.73 01/29/2020    HGB 13.8 (L) 01/29/2020    HCT 42.0 01/29/2020     01/29/2020       Last CMP Results  Lab Results   Component Value Date     01/29/2020    K 3.9 01/29/2020     01/29/2020    CO2 26 01/29/2020    BUN 11 01/29/2020    CREATININE 0.8 01/29/2020    CALCIUM 9.2 01/29/2020    ALBUMIN 3.6 01/29/2020    AST 28 01/29/2020    ALT 21 01/29/2020       Last Lipids  Lab Results   Component Value Date    CHOL 144 01/29/2020    TRIG 29 (L) 01/29/2020    HDL 47 01/29/2020    LDLCALC 91.2 01/29/2020       Last A1C  Lab Results   Component Value Date    HGBA1C 5.5 01/29/2020       Last TSH  Lab Results   Component Value Date    TSH 1.215 01/29/2020         Current Med Refills  Medication List with Changes/Refills   Current Medications    ALBUTEROL (PROVENTIL/VENTOLIN HFA) 90 MCG/ACTUATION INHALER    INHALE 1 TO 2 PUFFS INTO THE LUNGS EVERY 6 HOURS AS NEEDED FOR WHEEZING       Start Date: 12/11/2020End Date: --    AMLODIPINE (NORVASC) 10 MG TABLET    TAKE 1 TABLET BY MOUTH EVERY DAY       Start Date: 9/23/2020 End Date: --    AZELASTINE (ASTELIN) 137 MCG (0.1 %) NASAL SPRAY    1 spray (137 mcg total) by Nasal route 2 (two) times daily.       Start Date: 4/18/2019 End Date: 4/17/2020    DICLOFENAC SODIUM (VOLTAREN) 1 % GEL    Apply 2 g topically 4 (four) times daily.       Start Date: 2/6/2020  End Date: --    ECONAZOLE NITRATE 1 % CREAM    ANTIONE EXT TO THE AFFECTED AND SURROUNDING AREAS OF SKIN 1 TIME PER DAY       Start Date: 11/18/2016End Date: --    FAMOTIDINE (PEPCID) 40 MG TABLET    Take 1 tablet (40 mg total) by mouth once daily.       Start Date: 2/12/2020 End Date: 2/11/2021    FLUTICASONE PROPIONATE (FLONASE) 50 MCG/ACTUATION NASAL SPRAY    SHAKE LIQUID AND USE 1  SPRAY(50 MCG) IN EACH NOSTRIL EVERY DAY       Start Date: 8/7/2020  End Date: --    HYDROXYCHLOROQUINE (PLAQUENIL) 200 MG TABLET    Take 2 tablets (400 mg total) by mouth once daily.       Start Date: 12/21/2020End Date: --    LORATADINE (CLARITIN) 10 MG TABLET    Take 1 tablet (10 mg total) by mouth once daily.       Start Date: 2/11/2020 End Date: --    MELOXICAM (MOBIC) 15 MG TABLET    TAKE 1 TABLET(15 MG) BY MOUTH EVERY DAY       Start Date: 12/22/2020End Date: --    PANTOPRAZOLE (PROTONIX) 40 MG TABLET    Take 1 tablet (40 mg total) by mouth once daily.       Start Date: 9/21/2020 End Date: 9/21/2021    PROMETHAZINE-CODEINE 6.25-10 MG/5 ML (PHENERGAN WITH CODEINE) 6.25-10 MG/5 ML SYRUP    Take 5 mLs by mouth every 4 (four) hours as needed.       Start Date: 9/21/2020 End Date: --    SILDENAFIL (VIAGRA) 100 MG TABLET    Take 1 tablet (100 mg total) by mouth daily as needed for Erectile Dysfunction.       Start Date: 1/4/2020  End Date: 1/3/2021    TRIAMCINOLONE ACETONIDE 0.1% (KENALOG) 0.1 % CREAM    Apply topically 2 (two) times daily.       Start Date: 1/23/2019 End Date: --           Please advise.

## 2020-12-24 RX ORDER — PANTOPRAZOLE SODIUM 40 MG/1
40 TABLET, DELAYED RELEASE ORAL DAILY
Qty: 90 TABLET | Refills: 3 | Status: SHIPPED | OUTPATIENT
Start: 2020-12-24 | End: 2022-05-06 | Stop reason: SDUPTHER

## 2020-12-29 DIAGNOSIS — I10 BENIGN HYPERTENSION: ICD-10-CM

## 2020-12-29 RX ORDER — AMLODIPINE BESYLATE 10 MG/1
10 TABLET ORAL DAILY
Qty: 90 TABLET | Refills: 0 | Status: SHIPPED | OUTPATIENT
Start: 2020-12-29 | End: 2021-04-14 | Stop reason: SDUPTHER

## 2020-12-29 NOTE — TELEPHONE ENCOUNTER
Last Office Visit Info:   The patient's last visit with Bruce Terrell MD was on 9/21/2020.    The patient's last visit in current department was on 9/21/2020.        Last CBC Results:   Lab Results   Component Value Date    WBC 4.73 01/29/2020    HGB 13.8 (L) 01/29/2020    HCT 42.0 01/29/2020     01/29/2020       Last CMP Results  Lab Results   Component Value Date     01/29/2020    K 3.9 01/29/2020     01/29/2020    CO2 26 01/29/2020    BUN 11 01/29/2020    CREATININE 0.8 01/29/2020    CALCIUM 9.2 01/29/2020    ALBUMIN 3.6 01/29/2020    AST 28 01/29/2020    ALT 21 01/29/2020       Last Lipids  Lab Results   Component Value Date    CHOL 144 01/29/2020    TRIG 29 (L) 01/29/2020    HDL 47 01/29/2020    LDLCALC 91.2 01/29/2020       Last A1C  Lab Results   Component Value Date    HGBA1C 5.5 01/29/2020       Last TSH  Lab Results   Component Value Date    TSH 1.215 01/29/2020         Current Med Refills  Medication List with Changes/Refills   Current Medications    ALBUTEROL (PROVENTIL/VENTOLIN HFA) 90 MCG/ACTUATION INHALER    INHALE 1 TO 2 PUFFS INTO THE LUNGS EVERY 6 HOURS AS NEEDED FOR WHEEZING       Start Date: 12/11/2020End Date: --    AMLODIPINE (NORVASC) 10 MG TABLET    TAKE 1 TABLET BY MOUTH EVERY DAY       Start Date: 9/23/2020 End Date: --    AZELASTINE (ASTELIN) 137 MCG (0.1 %) NASAL SPRAY    1 spray (137 mcg total) by Nasal route 2 (two) times daily.       Start Date: 4/18/2019 End Date: 4/17/2020    DICLOFENAC SODIUM (VOLTAREN) 1 % GEL    Apply 2 g topically 4 (four) times daily.       Start Date: 2/6/2020  End Date: --    ECONAZOLE NITRATE 1 % CREAM    ANTIONE EXT TO THE AFFECTED AND SURROUNDING AREAS OF SKIN 1 TIME PER DAY       Start Date: 11/18/2016End Date: --    FAMOTIDINE (PEPCID) 40 MG TABLET    Take 1 tablet (40 mg total) by mouth once daily.       Start Date: 2/12/2020 End Date: 2/11/2021    FLUTICASONE PROPIONATE (FLONASE) 50 MCG/ACTUATION NASAL SPRAY    SHAKE LIQUID AND USE 1  SPRAY(50 MCG) IN EACH NOSTRIL EVERY DAY       Start Date: 8/7/2020  End Date: --    HYDROXYCHLOROQUINE (PLAQUENIL) 200 MG TABLET    Take 2 tablets (400 mg total) by mouth once daily.       Start Date: 12/21/2020End Date: --    LORATADINE (CLARITIN) 10 MG TABLET    Take 1 tablet (10 mg total) by mouth once daily.       Start Date: 2/11/2020 End Date: --    MELOXICAM (MOBIC) 15 MG TABLET    TAKE 1 TABLET(15 MG) BY MOUTH EVERY DAY       Start Date: 12/22/2020End Date: --    PANTOPRAZOLE (PROTONIX) 40 MG TABLET    Take 1 tablet (40 mg total) by mouth once daily.       Start Date: 12/24/2020End Date: 12/24/2021    PROMETHAZINE-CODEINE 6.25-10 MG/5 ML (PHENERGAN WITH CODEINE) 6.25-10 MG/5 ML SYRUP    Take 5 mLs by mouth every 4 (four) hours as needed.       Start Date: 9/21/2020 End Date: --    SILDENAFIL (VIAGRA) 100 MG TABLET    Take 1 tablet (100 mg total) by mouth daily as needed for Erectile Dysfunction.       Start Date: 1/4/2020  End Date: 1/3/2021    TRIAMCINOLONE ACETONIDE 0.1% (KENALOG) 0.1 % CREAM    Apply topically 2 (two) times daily.       Start Date: 1/23/2019 End Date: --

## 2020-12-29 NOTE — TELEPHONE ENCOUNTER
----- Message from Magalie Robbins sent at 12/29/2020 10:05 AM CST -----  Regarding: Saeid/ [facility]/  [phone]  Type: RX Refill Request    Who Called:  [facility]    Refill or New Rx:  Refill    RX Name and Strength:  amLODIPine (NORVASC) 10 MG tablet    Preferred Pharmacy with phone number:  [id number] [address] [facility] [address]    Local or Mail Order:  Local    Ordering Provider:  CATIE Carrasquillo    Would the patient rather a call back or a response via My [facility]?   Call back    Best Call Back Number:  [phone]

## 2021-01-04 ENCOUNTER — PATIENT OUTREACH (OUTPATIENT)
Dept: ADMINISTRATIVE | Facility: HOSPITAL | Age: 51
End: 2021-01-04

## 2021-01-05 LAB — HEMOCCULT STL QL IA: NEGATIVE

## 2021-01-15 ENCOUNTER — TELEPHONE (OUTPATIENT)
Dept: FAMILY MEDICINE | Facility: CLINIC | Age: 51
End: 2021-01-15

## 2021-03-15 ENCOUNTER — OFFICE VISIT (OUTPATIENT)
Dept: FAMILY MEDICINE | Facility: CLINIC | Age: 51
End: 2021-03-15
Payer: COMMERCIAL

## 2021-03-15 VITALS
DIASTOLIC BLOOD PRESSURE: 80 MMHG | BODY MASS INDEX: 31.05 KG/M2 | HEIGHT: 64 IN | SYSTOLIC BLOOD PRESSURE: 130 MMHG | RESPIRATION RATE: 16 BRPM | WEIGHT: 181.88 LBS | HEART RATE: 88 BPM | TEMPERATURE: 98 F | OXYGEN SATURATION: 99 %

## 2021-03-15 DIAGNOSIS — Z00.00 ANNUAL PHYSICAL EXAM: Primary | ICD-10-CM

## 2021-03-15 DIAGNOSIS — J84.9 INTERSTITIAL LUNG DISEASE: ICD-10-CM

## 2021-03-15 DIAGNOSIS — I10 BENIGN HYPERTENSION: ICD-10-CM

## 2021-03-15 DIAGNOSIS — M54.42 LOW BACK PAIN DUE TO BILATERAL SCIATICA: ICD-10-CM

## 2021-03-15 DIAGNOSIS — M54.41 LOW BACK PAIN DUE TO BILATERAL SCIATICA: ICD-10-CM

## 2021-03-15 DIAGNOSIS — M32.13 SYSTEMIC LUPUS ERYTHEMATOSUS WITH LUNG INVOLVEMENT, UNSPECIFIED SLE TYPE: Primary | ICD-10-CM

## 2021-03-15 DIAGNOSIS — M17.11 OSTEOARTHRITIS OF RIGHT KNEE, UNSPECIFIED OSTEOARTHRITIS TYPE: ICD-10-CM

## 2021-03-15 DIAGNOSIS — R05.9 COUGH: ICD-10-CM

## 2021-03-15 PROCEDURE — 99999 PR PBB SHADOW E&M-EST. PATIENT-LVL V: ICD-10-PCS | Mod: PBBFAC,,, | Performed by: PHYSICIAN ASSISTANT

## 2021-03-15 PROCEDURE — 3079F DIAST BP 80-89 MM HG: CPT | Mod: CPTII,S$GLB,, | Performed by: PHYSICIAN ASSISTANT

## 2021-03-15 PROCEDURE — 3079F PR MOST RECENT DIASTOLIC BLOOD PRESSURE 80-89 MM HG: ICD-10-PCS | Mod: CPTII,S$GLB,, | Performed by: PHYSICIAN ASSISTANT

## 2021-03-15 PROCEDURE — 3008F BODY MASS INDEX DOCD: CPT | Mod: CPTII,S$GLB,, | Performed by: PHYSICIAN ASSISTANT

## 2021-03-15 PROCEDURE — 1125F AMNT PAIN NOTED PAIN PRSNT: CPT | Mod: S$GLB,,, | Performed by: PHYSICIAN ASSISTANT

## 2021-03-15 PROCEDURE — 99999 PR PBB SHADOW E&M-EST. PATIENT-LVL V: CPT | Mod: PBBFAC,,, | Performed by: PHYSICIAN ASSISTANT

## 2021-03-15 PROCEDURE — 1125F PR PAIN SEVERITY QUANTIFIED, PAIN PRESENT: ICD-10-PCS | Mod: S$GLB,,, | Performed by: PHYSICIAN ASSISTANT

## 2021-03-15 PROCEDURE — 3075F PR MOST RECENT SYSTOLIC BLOOD PRESS GE 130-139MM HG: ICD-10-PCS | Mod: CPTII,S$GLB,, | Performed by: PHYSICIAN ASSISTANT

## 2021-03-15 PROCEDURE — 3075F SYST BP GE 130 - 139MM HG: CPT | Mod: CPTII,S$GLB,, | Performed by: PHYSICIAN ASSISTANT

## 2021-03-15 PROCEDURE — 99214 PR OFFICE/OUTPT VISIT, EST, LEVL IV, 30-39 MIN: ICD-10-PCS | Mod: S$GLB,,, | Performed by: PHYSICIAN ASSISTANT

## 2021-03-15 PROCEDURE — 3008F PR BODY MASS INDEX (BMI) DOCUMENTED: ICD-10-PCS | Mod: CPTII,S$GLB,, | Performed by: PHYSICIAN ASSISTANT

## 2021-03-15 PROCEDURE — 99214 OFFICE O/P EST MOD 30 MIN: CPT | Mod: S$GLB,,, | Performed by: PHYSICIAN ASSISTANT

## 2021-03-15 RX ORDER — METHYLPREDNISOLONE 4 MG/1
TABLET ORAL
Qty: 1 PACKAGE | Refills: 0 | Status: SHIPPED | OUTPATIENT
Start: 2021-03-15 | End: 2021-04-14 | Stop reason: ALTCHOICE

## 2021-03-22 ENCOUNTER — OFFICE VISIT (OUTPATIENT)
Dept: RHEUMATOLOGY | Facility: CLINIC | Age: 51
End: 2021-03-22
Payer: COMMERCIAL

## 2021-03-22 VITALS
DIASTOLIC BLOOD PRESSURE: 105 MMHG | HEIGHT: 64 IN | HEART RATE: 90 BPM | OXYGEN SATURATION: 98 % | WEIGHT: 189.63 LBS | BODY MASS INDEX: 32.37 KG/M2 | RESPIRATION RATE: 18 BRPM | TEMPERATURE: 99 F | SYSTOLIC BLOOD PRESSURE: 160 MMHG

## 2021-03-22 DIAGNOSIS — E66.9 CLASS 1 OBESITY WITH BODY MASS INDEX (BMI) OF 32.0 TO 32.9 IN ADULT, UNSPECIFIED OBESITY TYPE, UNSPECIFIED WHETHER SERIOUS COMORBIDITY PRESENT: ICD-10-CM

## 2021-03-22 DIAGNOSIS — M70.51 PES ANSERINUS BURSITIS OF RIGHT KNEE: ICD-10-CM

## 2021-03-22 DIAGNOSIS — M32.13 SYSTEMIC LUPUS ERYTHEMATOSUS WITH LUNG INVOLVEMENT, UNSPECIFIED SLE TYPE: Primary | ICD-10-CM

## 2021-03-22 DIAGNOSIS — G62.9 NEUROPATHY: ICD-10-CM

## 2021-03-22 DIAGNOSIS — Z71.89 COUNSELING AND COORDINATION OF CARE: ICD-10-CM

## 2021-03-22 DIAGNOSIS — J84.9 INTERSTITIAL PULMONARY DISEASE, UNSPECIFIED: ICD-10-CM

## 2021-03-22 DIAGNOSIS — Z79.899 ENCOUNTER FOR LONG-TERM (CURRENT) USE OF OTHER MEDICATIONS: ICD-10-CM

## 2021-03-22 PROCEDURE — 20605 PR DRAIN/INJECT INTERMEDIATE JOINT/BURSA: ICD-10-PCS | Mod: RT,S$GLB,, | Performed by: INTERNAL MEDICINE

## 2021-03-22 PROCEDURE — 20605 DRAIN/INJ JOINT/BURSA W/O US: CPT | Mod: RT,S$GLB,, | Performed by: INTERNAL MEDICINE

## 2021-03-22 PROCEDURE — 3077F SYST BP >= 140 MM HG: CPT | Mod: CPTII,S$GLB,, | Performed by: INTERNAL MEDICINE

## 2021-03-22 PROCEDURE — 3080F DIAST BP >= 90 MM HG: CPT | Mod: CPTII,S$GLB,, | Performed by: INTERNAL MEDICINE

## 2021-03-22 PROCEDURE — 99999 PR PBB SHADOW E&M-EST. PATIENT-LVL V: ICD-10-PCS | Mod: PBBFAC,,, | Performed by: INTERNAL MEDICINE

## 2021-03-22 PROCEDURE — 1125F PR PAIN SEVERITY QUANTIFIED, PAIN PRESENT: ICD-10-PCS | Mod: S$GLB,,, | Performed by: INTERNAL MEDICINE

## 2021-03-22 PROCEDURE — 1125F AMNT PAIN NOTED PAIN PRSNT: CPT | Mod: S$GLB,,, | Performed by: INTERNAL MEDICINE

## 2021-03-22 PROCEDURE — 99999 PR PBB SHADOW E&M-EST. PATIENT-LVL V: CPT | Mod: PBBFAC,,, | Performed by: INTERNAL MEDICINE

## 2021-03-22 PROCEDURE — 3080F PR MOST RECENT DIASTOLIC BLOOD PRESSURE >= 90 MM HG: ICD-10-PCS | Mod: CPTII,S$GLB,, | Performed by: INTERNAL MEDICINE

## 2021-03-22 PROCEDURE — 3008F PR BODY MASS INDEX (BMI) DOCUMENTED: ICD-10-PCS | Mod: CPTII,S$GLB,, | Performed by: INTERNAL MEDICINE

## 2021-03-22 PROCEDURE — 99204 PR OFFICE/OUTPT VISIT, NEW, LEVL IV, 45-59 MIN: ICD-10-PCS | Mod: 25,S$GLB,, | Performed by: INTERNAL MEDICINE

## 2021-03-22 PROCEDURE — 99204 OFFICE O/P NEW MOD 45 MIN: CPT | Mod: 25,S$GLB,, | Performed by: INTERNAL MEDICINE

## 2021-03-22 PROCEDURE — 3077F PR MOST RECENT SYSTOLIC BLOOD PRESSURE >= 140 MM HG: ICD-10-PCS | Mod: CPTII,S$GLB,, | Performed by: INTERNAL MEDICINE

## 2021-03-22 PROCEDURE — 3008F BODY MASS INDEX DOCD: CPT | Mod: CPTII,S$GLB,, | Performed by: INTERNAL MEDICINE

## 2021-03-22 RX ORDER — HYDROXYCHLOROQUINE SULFATE 200 MG/1
400 TABLET, FILM COATED ORAL DAILY
Qty: 180 TABLET | Refills: 3 | Status: SHIPPED | OUTPATIENT
Start: 2021-03-22 | End: 2022-01-14 | Stop reason: SDUPTHER

## 2021-03-22 RX ORDER — GABAPENTIN 300 MG/1
300 CAPSULE ORAL NIGHTLY
Qty: 30 CAPSULE | Refills: 11 | Status: SHIPPED | OUTPATIENT
Start: 2021-03-22 | End: 2022-03-10

## 2021-03-22 RX ORDER — TRIAMCINOLONE ACETONIDE 40 MG/ML
40 INJECTION, SUSPENSION INTRA-ARTICULAR; INTRAMUSCULAR
Status: COMPLETED | OUTPATIENT
Start: 2021-03-22 | End: 2021-03-22

## 2021-03-22 RX ORDER — LIDOCAINE HYDROCHLORIDE 10 MG/ML
1 INJECTION INFILTRATION; PERINEURAL
Status: COMPLETED | OUTPATIENT
Start: 2021-03-22 | End: 2021-03-22

## 2021-03-22 RX ADMIN — TRIAMCINOLONE ACETONIDE 40 MG: 40 INJECTION, SUSPENSION INTRA-ARTICULAR; INTRAMUSCULAR at 10:03

## 2021-03-22 RX ADMIN — LIDOCAINE HYDROCHLORIDE 1 ML: 10 INJECTION INFILTRATION; PERINEURAL at 10:03

## 2021-03-23 DIAGNOSIS — B96.89 ACUTE BACTERIAL BRONCHITIS: ICD-10-CM

## 2021-03-23 DIAGNOSIS — J20.8 ACUTE BACTERIAL BRONCHITIS: ICD-10-CM

## 2021-03-24 RX ORDER — ALBUTEROL SULFATE 90 UG/1
AEROSOL, METERED RESPIRATORY (INHALATION)
Qty: 18 G | Refills: 0 | Status: SHIPPED | OUTPATIENT
Start: 2021-03-24 | End: 2021-04-20 | Stop reason: SDUPTHER

## 2021-03-30 ENCOUNTER — LAB VISIT (OUTPATIENT)
Dept: FAMILY MEDICINE | Facility: CLINIC | Age: 51
End: 2021-03-30
Payer: COMMERCIAL

## 2021-03-30 DIAGNOSIS — J84.9 INTERSTITIAL PULMONARY DISEASE, UNSPECIFIED: ICD-10-CM

## 2021-03-30 PROCEDURE — U0005 INFEC AGEN DETEC AMPLI PROBE: HCPCS | Performed by: INTERNAL MEDICINE

## 2021-03-30 PROCEDURE — U0003 INFECTIOUS AGENT DETECTION BY NUCLEIC ACID (DNA OR RNA); SEVERE ACUTE RESPIRATORY SYNDROME CORONAVIRUS 2 (SARS-COV-2) (CORONAVIRUS DISEASE [COVID-19]), AMPLIFIED PROBE TECHNIQUE, MAKING USE OF HIGH THROUGHPUT TECHNOLOGIES AS DESCRIBED BY CMS-2020-01-R: HCPCS | Performed by: INTERNAL MEDICINE

## 2021-03-31 LAB — SARS-COV-2 RNA RESP QL NAA+PROBE: NOT DETECTED

## 2021-04-06 ENCOUNTER — HOSPITAL ENCOUNTER (OUTPATIENT)
Dept: RESPIRATORY THERAPY | Facility: HOSPITAL | Age: 51
Discharge: HOME | End: 2021-04-06
Attending: INTERNAL MEDICINE
Payer: COMMERCIAL

## 2021-04-06 VITALS — OXYGEN SATURATION: 96 % | RESPIRATION RATE: 18 BRPM | HEART RATE: 81 BPM

## 2021-04-06 DIAGNOSIS — J84.9 INTERSTITIAL PULMONARY DISEASE, UNSPECIFIED: ICD-10-CM

## 2021-04-06 DIAGNOSIS — M32.13 SYSTEMIC LUPUS ERYTHEMATOSUS WITH LUNG INVOLVEMENT, UNSPECIFIED SLE TYPE: ICD-10-CM

## 2021-04-06 LAB
BRPFT: NORMAL
DLCO ADJ PRE: 12.75 ML/(MIN*MMHG)
DLCO SINGLE BREATH LLN: 10.27
DLCO SINGLE BREATH PRE REF: 72.4 %
DLCO SINGLE BREATH REF: 17.2
DLCOC SBVA LLN: 2.21
DLCOC SBVA PRE REF: 103.8 %
DLCOC SBVA REF: 4.45
DLCOC SINGLE BREATH LLN: 10.27
DLCOC SINGLE BREATH PRE REF: 74.2 %
DLCOC SINGLE BREATH REF: 17.2
DLCOVA LLN: 2.21
DLCOVA PRE REF: 101.3 %
DLCOVA PRE: 4.51 ML/(MIN*MMHG*L)
DLCOVA REF: 4.45
DLVAADJ PRE: 4.61 ML/(MIN*MMHG*L)
ERVN2 LLN: -16449.12
ERVN2 PRE REF: 58.3 %
ERVN2 PRE: 0.51 L
ERVN2 REF: 0.88
FEF 25 75 CHG: 8.3 %
FEF 25 75 LLN: 0.89
FEF 25 75 POST REF: 84.4 %
FEF 25 75 PRE REF: 78 %
FEF 25 75 REF: 1.92
FET100 CHG: 3.1 %
FEV1 CHG: 6.2 %
FEV1 FVC CHG: 1.9 %
FEV1 FVC LLN: 71
FEV1 FVC POST REF: 98.7 %
FEV1 FVC PRE REF: 96.9 %
FEV1 FVC REF: 83
FEV1 LLN: 1.37
FEV1 POST REF: 93.5 %
FEV1 PRE REF: 88 %
FEV1 REF: 1.84
FRCN2 LLN: 1.6
FRCN2 PRE REF: 55.7 %
FRCN2 REF: 2.58
FVC CHG: 4.3 %
FVC LLN: 1.68
FVC POST REF: 95.5 %
FVC PRE REF: 91.5 %
FVC REF: 2.21
IVC PRE: 2 L
IVC SINGLE BREATH LLN: 1.68
IVC SINGLE BREATH PRE REF: 90.6 %
IVC SINGLE BREATH REF: 2.21
PEF CHG: 0.2 %
PEF LLN: 3.66
PEF POST REF: 128.9 %
PEF PRE REF: 128.7 %
PEF REF: 5.23
POST FEF 25 75: 1.62 L/S
POST FET 100: 7.75 SEC
POST FEV1 FVC: 81.6 %
POST FEV1: 1.72 L
POST FVC: 2.11 L
POST PEF: 6.75 L/S
PRE DLCO: 12.46 ML/(MIN*MMHG)
PRE FEF 25 75: 1.5 L/S
PRE FET 100: 7.52 SEC
PRE FEV1 FVC: 80.12 %
PRE FEV1: 1.62 L
PRE FRC N2: 1.44 L
PRE FVC: 2.02 L
PRE PEF: 6.74 L/S
RVN2 LLN: 1.03
RVN2 PRE REF: 54.4 %
RVN2 PRE: 0.93 L
RVN2 REF: 1.71
RVN2TLCN2 LLN: 25.26
RVN2TLCN2 PRE REF: 95 %
RVN2TLCN2 PRE: 32.52 %
RVN2TLCN2 REF: 34.24
TLCN2 LLN: 2.71
TLCN2 PRE REF: 74 %
TLCN2 PRE: 2.86 L
TLCN2 REF: 3.87
VA PRE: 2.76 L
VA SINGLE BREATH LLN: 3.72
VA SINGLE BREATH PRE REF: 74.4 %
VA SINGLE BREATH REF: 3.72
VCMAXN2 LLN: 1.68
VCMAXN2 PRE REF: 87.4 %
VCMAXN2 PRE: 1.93 L
VCMAXN2 REF: 2.21

## 2021-04-06 PROCEDURE — 94729 DIFFUSING CAPACITY: CPT | Mod: 26,,, | Performed by: INTERNAL MEDICINE

## 2021-04-06 PROCEDURE — 94010 BREATHING CAPACITY TEST: CPT

## 2021-04-06 PROCEDURE — 94060 EVALUATION OF WHEEZING: CPT | Mod: 26,,, | Performed by: INTERNAL MEDICINE

## 2021-04-06 PROCEDURE — 94727 PR PULM FUNCTION TEST BY GAS: ICD-10-PCS | Mod: 26,,, | Performed by: INTERNAL MEDICINE

## 2021-04-06 PROCEDURE — 94727 GAS DIL/WSHOT DETER LNG VOL: CPT | Mod: 26,,, | Performed by: INTERNAL MEDICINE

## 2021-04-06 PROCEDURE — 94729 PR C02/MEMBANE DIFFUSE CAPACITY: ICD-10-PCS | Mod: 26,,, | Performed by: INTERNAL MEDICINE

## 2021-04-06 PROCEDURE — 94060 PR EVAL OF BRONCHOSPASM: ICD-10-PCS | Mod: 26,,, | Performed by: INTERNAL MEDICINE

## 2021-04-06 PROCEDURE — 25000242 PHARM REV CODE 250 ALT 637 W/ HCPCS: Performed by: INTERNAL MEDICINE

## 2021-04-06 PROCEDURE — 94727 GAS DIL/WSHOT DETER LNG VOL: CPT

## 2021-04-06 PROCEDURE — 94729 DIFFUSING CAPACITY: CPT

## 2021-04-06 RX ORDER — ALBUTEROL SULFATE 2.5 MG/.5ML
2.5 SOLUTION RESPIRATORY (INHALATION) ONCE
Status: COMPLETED | OUTPATIENT
Start: 2021-04-06 | End: 2021-04-06

## 2021-04-06 RX ADMIN — ALBUTEROL SULFATE 2.5 MG: 2.5 SOLUTION RESPIRATORY (INHALATION) at 09:04

## 2021-04-08 RX ORDER — SILDENAFIL 100 MG/1
100 TABLET, FILM COATED ORAL DAILY PRN
Qty: 10 TABLET | Refills: 5 | Status: CANCELLED | OUTPATIENT
Start: 2021-04-08 | End: 2022-04-08

## 2021-04-09 ENCOUNTER — PATIENT OUTREACH (OUTPATIENT)
Dept: ADMINISTRATIVE | Facility: OTHER | Age: 51
End: 2021-04-09

## 2021-04-13 ENCOUNTER — LAB VISIT (OUTPATIENT)
Dept: LAB | Facility: HOSPITAL | Age: 51
End: 2021-04-13
Attending: FAMILY MEDICINE
Payer: COMMERCIAL

## 2021-04-13 DIAGNOSIS — Z00.00 ANNUAL PHYSICAL EXAM: ICD-10-CM

## 2021-04-13 LAB
ALBUMIN SERPL BCP-MCNC: 3.7 G/DL (ref 3.5–5.2)
ALP SERPL-CCNC: 55 U/L (ref 55–135)
ALT SERPL W/O P-5'-P-CCNC: 23 U/L (ref 10–44)
ANION GAP SERPL CALC-SCNC: 7 MMOL/L (ref 8–16)
AST SERPL-CCNC: 29 U/L (ref 10–40)
BASOPHILS # BLD AUTO: 0.04 K/UL (ref 0–0.2)
BASOPHILS NFR BLD: 0.9 % (ref 0–1.9)
BILIRUB SERPL-MCNC: 0.4 MG/DL (ref 0.1–1)
BUN SERPL-MCNC: 15 MG/DL (ref 6–20)
CALCIUM SERPL-MCNC: 9 MG/DL (ref 8.7–10.5)
CHLORIDE SERPL-SCNC: 106 MMOL/L (ref 95–110)
CHOLEST SERPL-MCNC: 151 MG/DL (ref 120–199)
CHOLEST/HDLC SERPL: 2.6 {RATIO} (ref 2–5)
CO2 SERPL-SCNC: 28 MMOL/L (ref 23–29)
CREAT SERPL-MCNC: 0.8 MG/DL (ref 0.5–1.4)
DIFFERENTIAL METHOD: ABNORMAL
EOSINOPHIL # BLD AUTO: 0.3 K/UL (ref 0–0.5)
EOSINOPHIL NFR BLD: 6.8 % (ref 0–8)
ERYTHROCYTE [DISTWIDTH] IN BLOOD BY AUTOMATED COUNT: 14.5 % (ref 11.5–14.5)
EST. GFR  (AFRICAN AMERICAN): >60 ML/MIN/1.73 M^2
EST. GFR  (NON AFRICAN AMERICAN): >60 ML/MIN/1.73 M^2
GLUCOSE SERPL-MCNC: 79 MG/DL (ref 70–110)
HCT VFR BLD AUTO: 39.6 % (ref 40–54)
HDLC SERPL-MCNC: 59 MG/DL (ref 40–75)
HDLC SERPL: 39.1 % (ref 20–50)
HGB BLD-MCNC: 13.1 G/DL (ref 14–18)
IMM GRANULOCYTES # BLD AUTO: 0.01 K/UL (ref 0–0.04)
IMM GRANULOCYTES NFR BLD AUTO: 0.2 % (ref 0–0.5)
LDLC SERPL CALC-MCNC: 84.4 MG/DL (ref 63–159)
LYMPHOCYTES # BLD AUTO: 1.8 K/UL (ref 1–4.8)
LYMPHOCYTES NFR BLD: 41.3 % (ref 18–48)
MCH RBC QN AUTO: 30.8 PG (ref 27–31)
MCHC RBC AUTO-ENTMCNC: 33.1 G/DL (ref 32–36)
MCV RBC AUTO: 93 FL (ref 82–98)
MONOCYTES # BLD AUTO: 0.4 K/UL (ref 0.3–1)
MONOCYTES NFR BLD: 8.6 % (ref 4–15)
NEUTROPHILS # BLD AUTO: 1.8 K/UL (ref 1.8–7.7)
NEUTROPHILS NFR BLD: 42.2 % (ref 38–73)
NONHDLC SERPL-MCNC: 92 MG/DL
NRBC BLD-RTO: 0 /100 WBC
PLATELET # BLD AUTO: 178 K/UL (ref 150–450)
PMV BLD AUTO: 9.8 FL (ref 9.2–12.9)
POTASSIUM SERPL-SCNC: 3.8 MMOL/L (ref 3.5–5.1)
PROT SERPL-MCNC: 8.2 G/DL (ref 6–8.4)
RBC # BLD AUTO: 4.26 M/UL (ref 4.6–6.2)
SODIUM SERPL-SCNC: 141 MMOL/L (ref 136–145)
TRIGL SERPL-MCNC: 38 MG/DL (ref 30–150)
TSH SERPL DL<=0.005 MIU/L-ACNC: 2.11 UIU/ML (ref 0.4–4)
WBC # BLD AUTO: 4.29 K/UL (ref 3.9–12.7)

## 2021-04-13 PROCEDURE — 84153 ASSAY OF PSA TOTAL: CPT | Performed by: PHYSICIAN ASSISTANT

## 2021-04-13 PROCEDURE — 80061 LIPID PANEL: CPT | Performed by: PHYSICIAN ASSISTANT

## 2021-04-13 PROCEDURE — 83036 HEMOGLOBIN GLYCOSYLATED A1C: CPT | Performed by: PHYSICIAN ASSISTANT

## 2021-04-13 PROCEDURE — 80053 COMPREHEN METABOLIC PANEL: CPT | Performed by: PHYSICIAN ASSISTANT

## 2021-04-13 PROCEDURE — 85025 COMPLETE CBC W/AUTO DIFF WBC: CPT | Performed by: PHYSICIAN ASSISTANT

## 2021-04-13 PROCEDURE — 36415 COLL VENOUS BLD VENIPUNCTURE: CPT | Mod: PO | Performed by: PHYSICIAN ASSISTANT

## 2021-04-13 PROCEDURE — 84443 ASSAY THYROID STIM HORMONE: CPT | Performed by: PHYSICIAN ASSISTANT

## 2021-04-14 ENCOUNTER — OFFICE VISIT (OUTPATIENT)
Dept: PULMONOLOGY | Facility: CLINIC | Age: 51
End: 2021-04-14
Payer: COMMERCIAL

## 2021-04-14 ENCOUNTER — OFFICE VISIT (OUTPATIENT)
Dept: FAMILY MEDICINE | Facility: CLINIC | Age: 51
End: 2021-04-14
Payer: COMMERCIAL

## 2021-04-14 ENCOUNTER — TELEPHONE (OUTPATIENT)
Dept: PULMONOLOGY | Facility: CLINIC | Age: 51
End: 2021-04-14

## 2021-04-14 VITALS
WEIGHT: 183 LBS | HEIGHT: 65 IN | TEMPERATURE: 98 F | OXYGEN SATURATION: 100 % | DIASTOLIC BLOOD PRESSURE: 80 MMHG | BODY MASS INDEX: 30.49 KG/M2 | SYSTOLIC BLOOD PRESSURE: 110 MMHG | HEART RATE: 79 BPM

## 2021-04-14 VITALS
SYSTOLIC BLOOD PRESSURE: 140 MMHG | OXYGEN SATURATION: 97 % | WEIGHT: 183 LBS | DIASTOLIC BLOOD PRESSURE: 82 MMHG | BODY MASS INDEX: 31.24 KG/M2 | HEIGHT: 64 IN | HEART RATE: 76 BPM

## 2021-04-14 DIAGNOSIS — R05.3 CHRONIC COUGH: ICD-10-CM

## 2021-04-14 DIAGNOSIS — M32.13 SYSTEMIC LUPUS ERYTHEMATOSUS WITH LUNG INVOLVEMENT, UNSPECIFIED SLE TYPE: ICD-10-CM

## 2021-04-14 DIAGNOSIS — Z00.00 ANNUAL PHYSICAL EXAM: Primary | ICD-10-CM

## 2021-04-14 DIAGNOSIS — I10 BENIGN HYPERTENSION: ICD-10-CM

## 2021-04-14 DIAGNOSIS — J84.9 INTERSTITIAL LUNG DISEASE: ICD-10-CM

## 2021-04-14 DIAGNOSIS — R06.02 SOB (SHORTNESS OF BREATH): Primary | ICD-10-CM

## 2021-04-14 LAB
COMPLEXED PSA SERPL-MCNC: 1.3 NG/ML (ref 0–4)
ESTIMATED AVG GLUCOSE: 105 MG/DL (ref 68–131)
HBA1C MFR BLD: 5.3 % (ref 4–5.6)

## 2021-04-14 PROCEDURE — 3079F PR MOST RECENT DIASTOLIC BLOOD PRESSURE 80-89 MM HG: ICD-10-PCS | Mod: CPTII,S$GLB,, | Performed by: FAMILY MEDICINE

## 2021-04-14 PROCEDURE — 1126F AMNT PAIN NOTED NONE PRSNT: CPT | Mod: S$GLB,,, | Performed by: NURSE PRACTITIONER

## 2021-04-14 PROCEDURE — 99999 PR PBB SHADOW E&M-EST. PATIENT-LVL III: ICD-10-PCS | Mod: PBBFAC,,, | Performed by: FAMILY MEDICINE

## 2021-04-14 PROCEDURE — 3008F BODY MASS INDEX DOCD: CPT | Mod: CPTII,S$GLB,, | Performed by: FAMILY MEDICINE

## 2021-04-14 PROCEDURE — 99396 PR PREVENTIVE VISIT,EST,40-64: ICD-10-PCS | Mod: S$GLB,,, | Performed by: FAMILY MEDICINE

## 2021-04-14 PROCEDURE — 99999 PR PBB SHADOW E&M-EST. PATIENT-LVL IV: CPT | Mod: PBBFAC,,, | Performed by: NURSE PRACTITIONER

## 2021-04-14 PROCEDURE — 99999 PR PBB SHADOW E&M-EST. PATIENT-LVL III: CPT | Mod: PBBFAC,,, | Performed by: FAMILY MEDICINE

## 2021-04-14 PROCEDURE — 3008F PR BODY MASS INDEX (BMI) DOCUMENTED: ICD-10-PCS | Mod: CPTII,S$GLB,, | Performed by: NURSE PRACTITIONER

## 2021-04-14 PROCEDURE — 99999 PR PBB SHADOW E&M-EST. PATIENT-LVL IV: ICD-10-PCS | Mod: PBBFAC,,, | Performed by: NURSE PRACTITIONER

## 2021-04-14 PROCEDURE — 99204 PR OFFICE/OUTPT VISIT, NEW, LEVL IV, 45-59 MIN: ICD-10-PCS | Mod: S$GLB,,, | Performed by: NURSE PRACTITIONER

## 2021-04-14 PROCEDURE — 1126F AMNT PAIN NOTED NONE PRSNT: CPT | Mod: S$GLB,,, | Performed by: FAMILY MEDICINE

## 2021-04-14 PROCEDURE — 3008F PR BODY MASS INDEX (BMI) DOCUMENTED: ICD-10-PCS | Mod: CPTII,S$GLB,, | Performed by: FAMILY MEDICINE

## 2021-04-14 PROCEDURE — 1126F PR PAIN SEVERITY QUANTIFIED, NO PAIN PRESENT: ICD-10-PCS | Mod: S$GLB,,, | Performed by: NURSE PRACTITIONER

## 2021-04-14 PROCEDURE — 3079F DIAST BP 80-89 MM HG: CPT | Mod: CPTII,S$GLB,, | Performed by: FAMILY MEDICINE

## 2021-04-14 PROCEDURE — 3074F SYST BP LT 130 MM HG: CPT | Mod: CPTII,S$GLB,, | Performed by: FAMILY MEDICINE

## 2021-04-14 PROCEDURE — 99204 OFFICE O/P NEW MOD 45 MIN: CPT | Mod: S$GLB,,, | Performed by: NURSE PRACTITIONER

## 2021-04-14 PROCEDURE — 1126F PR PAIN SEVERITY QUANTIFIED, NO PAIN PRESENT: ICD-10-PCS | Mod: S$GLB,,, | Performed by: FAMILY MEDICINE

## 2021-04-14 PROCEDURE — 3008F BODY MASS INDEX DOCD: CPT | Mod: CPTII,S$GLB,, | Performed by: NURSE PRACTITIONER

## 2021-04-14 PROCEDURE — 3074F PR MOST RECENT SYSTOLIC BLOOD PRESSURE < 130 MM HG: ICD-10-PCS | Mod: CPTII,S$GLB,, | Performed by: FAMILY MEDICINE

## 2021-04-14 PROCEDURE — 99396 PREV VISIT EST AGE 40-64: CPT | Mod: S$GLB,,, | Performed by: FAMILY MEDICINE

## 2021-04-14 RX ORDER — FLUTICASONE FUROATE AND VILANTEROL TRIFENATATE 100; 25 UG/1; UG/1
1 POWDER RESPIRATORY (INHALATION) DAILY
Qty: 60 EACH | Refills: 11 | Status: SHIPPED | OUTPATIENT
Start: 2021-04-14 | End: 2022-05-06 | Stop reason: SDUPTHER

## 2021-04-14 RX ORDER — AMLODIPINE BESYLATE 10 MG/1
10 TABLET ORAL DAILY
Qty: 90 TABLET | Refills: 3 | Status: SHIPPED | OUTPATIENT
Start: 2021-04-14 | End: 2022-04-20 | Stop reason: SDUPTHER

## 2021-04-20 ENCOUNTER — HOSPITAL ENCOUNTER (OUTPATIENT)
Dept: PULMONOLOGY | Facility: CLINIC | Age: 51
Discharge: HOME | End: 2021-04-20
Payer: COMMERCIAL

## 2021-04-20 ENCOUNTER — HOSPITAL ENCOUNTER (OUTPATIENT)
Dept: RADIOLOGY | Facility: HOSPITAL | Age: 51
Discharge: HOME | End: 2021-04-20
Attending: INTERNAL MEDICINE
Payer: COMMERCIAL

## 2021-04-20 VITALS — WEIGHT: 181 LBS | BODY MASS INDEX: 30.9 KG/M2 | HEIGHT: 64 IN

## 2021-04-20 DIAGNOSIS — R06.02 SOB (SHORTNESS OF BREATH): ICD-10-CM

## 2021-04-20 DIAGNOSIS — J84.9 INTERSTITIAL PULMONARY DISEASE, UNSPECIFIED: ICD-10-CM

## 2021-04-20 DIAGNOSIS — J20.8 ACUTE BACTERIAL BRONCHITIS: ICD-10-CM

## 2021-04-20 DIAGNOSIS — B96.89 ACUTE BACTERIAL BRONCHITIS: ICD-10-CM

## 2021-04-20 PROCEDURE — 71250 CT THORAX DX C-: CPT | Mod: TC

## 2021-04-20 PROCEDURE — 94618 PULMONARY STRESS TESTING: CPT | Mod: S$GLB,,, | Performed by: INTERNAL MEDICINE

## 2021-04-20 PROCEDURE — 94618 PULMONARY STRESS TESTING: ICD-10-PCS | Mod: S$GLB,,, | Performed by: INTERNAL MEDICINE

## 2021-04-20 PROCEDURE — 71250 CT CHEST WITHOUT CONTRAST: ICD-10-PCS | Mod: 26,,, | Performed by: RADIOLOGY

## 2021-04-20 PROCEDURE — 71250 CT THORAX DX C-: CPT | Mod: 26,,, | Performed by: RADIOLOGY

## 2021-04-20 RX ORDER — ALBUTEROL SULFATE 90 UG/1
AEROSOL, METERED RESPIRATORY (INHALATION)
Qty: 18 G | Refills: 0 | Status: SHIPPED | OUTPATIENT
Start: 2021-04-20 | End: 2021-04-23 | Stop reason: SDUPTHER

## 2021-04-23 ENCOUNTER — OFFICE VISIT (OUTPATIENT)
Dept: ORTHOPEDICS | Facility: CLINIC | Age: 51
End: 2021-04-23
Payer: COMMERCIAL

## 2021-04-23 ENCOUNTER — TELEPHONE (OUTPATIENT)
Dept: RHEUMATOLOGY | Facility: CLINIC | Age: 51
End: 2021-04-23

## 2021-04-23 DIAGNOSIS — M54.9 DORSALGIA, UNSPECIFIED: ICD-10-CM

## 2021-04-23 DIAGNOSIS — J20.8 ACUTE BACTERIAL BRONCHITIS: ICD-10-CM

## 2021-04-23 DIAGNOSIS — B96.89 ACUTE BACTERIAL BRONCHITIS: ICD-10-CM

## 2021-04-23 PROCEDURE — 1125F PR PAIN SEVERITY QUANTIFIED, PAIN PRESENT: ICD-10-PCS | Mod: S$GLB,,, | Performed by: NURSE PRACTITIONER

## 2021-04-23 PROCEDURE — 99999 PR PBB SHADOW E&M-EST. PATIENT-LVL III: ICD-10-PCS | Mod: PBBFAC,,, | Performed by: NURSE PRACTITIONER

## 2021-04-23 PROCEDURE — 99213 PR OFFICE/OUTPT VISIT, EST, LEVL III, 20-29 MIN: ICD-10-PCS | Mod: S$GLB,,, | Performed by: NURSE PRACTITIONER

## 2021-04-23 PROCEDURE — 99213 OFFICE O/P EST LOW 20 MIN: CPT | Mod: S$GLB,,, | Performed by: NURSE PRACTITIONER

## 2021-04-23 PROCEDURE — 99999 PR PBB SHADOW E&M-EST. PATIENT-LVL III: CPT | Mod: PBBFAC,,, | Performed by: NURSE PRACTITIONER

## 2021-04-23 PROCEDURE — 1125F AMNT PAIN NOTED PAIN PRSNT: CPT | Mod: S$GLB,,, | Performed by: NURSE PRACTITIONER

## 2021-04-23 RX ORDER — ALBUTEROL SULFATE 90 UG/1
AEROSOL, METERED RESPIRATORY (INHALATION)
Qty: 25.5 G | Refills: 0 | Status: SHIPPED | OUTPATIENT
Start: 2021-04-23 | End: 2021-11-11 | Stop reason: SDUPTHER

## 2021-04-23 RX ORDER — HYDROCODONE BITARTRATE AND ACETAMINOPHEN 5; 325 MG/1; MG/1
1 TABLET ORAL EVERY 6 HOURS PRN
Qty: 28 TABLET | Refills: 0 | Status: SHIPPED | OUTPATIENT
Start: 2021-04-23 | End: 2021-05-03

## 2021-04-26 ENCOUNTER — TELEPHONE (OUTPATIENT)
Dept: ORTHOPEDICS | Facility: CLINIC | Age: 51
End: 2021-04-26

## 2021-04-26 DIAGNOSIS — M51.36 DDD (DEGENERATIVE DISC DISEASE), LUMBAR: Primary | ICD-10-CM

## 2021-05-04 ENCOUNTER — PATIENT MESSAGE (OUTPATIENT)
Dept: ORTHOPEDICS | Facility: CLINIC | Age: 51
End: 2021-05-04

## 2021-05-05 ENCOUNTER — OFFICE VISIT (OUTPATIENT)
Dept: ORTHOPEDICS | Facility: CLINIC | Age: 51
End: 2021-05-05
Payer: COMMERCIAL

## 2021-05-05 VITALS — WEIGHT: 186.5 LBS | HEIGHT: 64 IN | BODY MASS INDEX: 31.84 KG/M2

## 2021-05-05 DIAGNOSIS — M54.16 LUMBAR RADICULOPATHY: Primary | ICD-10-CM

## 2021-05-05 PROCEDURE — 3008F BODY MASS INDEX DOCD: CPT | Mod: CPTII,S$GLB,, | Performed by: ORTHOPAEDIC SURGERY

## 2021-05-05 PROCEDURE — 99999 PR PBB SHADOW E&M-EST. PATIENT-LVL III: ICD-10-PCS | Mod: PBBFAC,,, | Performed by: ORTHOPAEDIC SURGERY

## 2021-05-05 PROCEDURE — 99999 PR PBB SHADOW E&M-EST. PATIENT-LVL III: CPT | Mod: PBBFAC,,, | Performed by: ORTHOPAEDIC SURGERY

## 2021-05-05 PROCEDURE — 3008F PR BODY MASS INDEX (BMI) DOCUMENTED: ICD-10-PCS | Mod: CPTII,S$GLB,, | Performed by: ORTHOPAEDIC SURGERY

## 2021-05-05 PROCEDURE — 99214 PR OFFICE/OUTPT VISIT, EST, LEVL IV, 30-39 MIN: ICD-10-PCS | Mod: S$GLB,,, | Performed by: ORTHOPAEDIC SURGERY

## 2021-05-05 PROCEDURE — 1125F AMNT PAIN NOTED PAIN PRSNT: CPT | Mod: S$GLB,,, | Performed by: ORTHOPAEDIC SURGERY

## 2021-05-05 PROCEDURE — 99214 OFFICE O/P EST MOD 30 MIN: CPT | Mod: S$GLB,,, | Performed by: ORTHOPAEDIC SURGERY

## 2021-05-05 PROCEDURE — 1125F PR PAIN SEVERITY QUANTIFIED, PAIN PRESENT: ICD-10-PCS | Mod: S$GLB,,, | Performed by: ORTHOPAEDIC SURGERY

## 2021-05-05 RX ORDER — GABAPENTIN 100 MG/1
100 CAPSULE ORAL 2 TIMES DAILY
Qty: 60 CAPSULE | Refills: 11 | Status: SHIPPED | OUTPATIENT
Start: 2021-05-05 | End: 2022-05-06

## 2021-05-19 ENCOUNTER — PATIENT OUTREACH (OUTPATIENT)
Dept: ADMINISTRATIVE | Facility: OTHER | Age: 51
End: 2021-05-19

## 2021-05-20 ENCOUNTER — OFFICE VISIT (OUTPATIENT)
Dept: ORTHOPEDICS | Facility: CLINIC | Age: 51
End: 2021-05-20
Payer: COMMERCIAL

## 2021-05-20 VITALS — HEIGHT: 64 IN | BODY MASS INDEX: 31.84 KG/M2 | WEIGHT: 186.5 LBS

## 2021-05-20 DIAGNOSIS — M54.16 LUMBAR RADICULOPATHY: Primary | ICD-10-CM

## 2021-05-20 PROCEDURE — 1125F PR PAIN SEVERITY QUANTIFIED, PAIN PRESENT: ICD-10-PCS | Mod: S$GLB,,, | Performed by: ORTHOPAEDIC SURGERY

## 2021-05-20 PROCEDURE — 99213 PR OFFICE/OUTPT VISIT, EST, LEVL III, 20-29 MIN: ICD-10-PCS | Mod: S$GLB,,, | Performed by: ORTHOPAEDIC SURGERY

## 2021-05-20 PROCEDURE — 3008F PR BODY MASS INDEX (BMI) DOCUMENTED: ICD-10-PCS | Mod: CPTII,S$GLB,, | Performed by: ORTHOPAEDIC SURGERY

## 2021-05-20 PROCEDURE — 99999 PR PBB SHADOW E&M-EST. PATIENT-LVL III: CPT | Mod: PBBFAC,,, | Performed by: ORTHOPAEDIC SURGERY

## 2021-05-20 PROCEDURE — 1125F AMNT PAIN NOTED PAIN PRSNT: CPT | Mod: S$GLB,,, | Performed by: ORTHOPAEDIC SURGERY

## 2021-05-20 PROCEDURE — 99999 PR PBB SHADOW E&M-EST. PATIENT-LVL III: ICD-10-PCS | Mod: PBBFAC,,, | Performed by: ORTHOPAEDIC SURGERY

## 2021-05-20 PROCEDURE — 99213 OFFICE O/P EST LOW 20 MIN: CPT | Mod: S$GLB,,, | Performed by: ORTHOPAEDIC SURGERY

## 2021-05-20 PROCEDURE — 3008F BODY MASS INDEX DOCD: CPT | Mod: CPTII,S$GLB,, | Performed by: ORTHOPAEDIC SURGERY

## 2021-05-21 ENCOUNTER — TELEPHONE (OUTPATIENT)
Dept: PAIN MEDICINE | Facility: OTHER | Age: 51
End: 2021-05-21

## 2021-05-21 ENCOUNTER — PATIENT MESSAGE (OUTPATIENT)
Dept: PAIN MEDICINE | Facility: OTHER | Age: 51
End: 2021-05-21

## 2021-05-21 ENCOUNTER — TELEPHONE (OUTPATIENT)
Dept: PAIN MEDICINE | Facility: CLINIC | Age: 51
End: 2021-05-21

## 2021-05-21 DIAGNOSIS — M54.16 LUMBAR RADICULOPATHY: Primary | ICD-10-CM

## 2021-05-24 ENCOUNTER — OFFICE VISIT (OUTPATIENT)
Dept: ORTHOPEDICS | Facility: CLINIC | Age: 51
End: 2021-05-24
Payer: COMMERCIAL

## 2021-05-24 DIAGNOSIS — M17.11 PRIMARY OSTEOARTHRITIS OF RIGHT KNEE: ICD-10-CM

## 2021-05-24 DIAGNOSIS — M54.9 BACK PAIN, UNSPECIFIED BACK LOCATION, UNSPECIFIED BACK PAIN LATERALITY, UNSPECIFIED CHRONICITY: Primary | ICD-10-CM

## 2021-05-24 PROCEDURE — 99213 PR OFFICE/OUTPT VISIT, EST, LEVL III, 20-29 MIN: ICD-10-PCS | Mod: 25,S$GLB,, | Performed by: NURSE PRACTITIONER

## 2021-05-24 PROCEDURE — 20610 PR DRAIN/INJECT LARGE JOINT/BURSA: ICD-10-PCS | Mod: RT,S$GLB,, | Performed by: NURSE PRACTITIONER

## 2021-05-24 PROCEDURE — 1125F AMNT PAIN NOTED PAIN PRSNT: CPT | Mod: S$GLB,,, | Performed by: NURSE PRACTITIONER

## 2021-05-24 PROCEDURE — 99999 PR PBB SHADOW E&M-EST. PATIENT-LVL III: CPT | Mod: PBBFAC,,, | Performed by: NURSE PRACTITIONER

## 2021-05-24 PROCEDURE — 20610 DRAIN/INJ JOINT/BURSA W/O US: CPT | Mod: RT,S$GLB,, | Performed by: NURSE PRACTITIONER

## 2021-05-24 PROCEDURE — 1125F PR PAIN SEVERITY QUANTIFIED, PAIN PRESENT: ICD-10-PCS | Mod: S$GLB,,, | Performed by: NURSE PRACTITIONER

## 2021-05-24 PROCEDURE — 99213 OFFICE O/P EST LOW 20 MIN: CPT | Mod: 25,S$GLB,, | Performed by: NURSE PRACTITIONER

## 2021-05-24 PROCEDURE — 99999 PR PBB SHADOW E&M-EST. PATIENT-LVL III: ICD-10-PCS | Mod: PBBFAC,,, | Performed by: NURSE PRACTITIONER

## 2021-05-24 RX ORDER — HYDROCODONE BITARTRATE AND ACETAMINOPHEN 5; 325 MG/1; MG/1
1 TABLET ORAL EVERY 6 HOURS PRN
Qty: 28 TABLET | Refills: 0 | Status: SHIPPED | OUTPATIENT
Start: 2021-05-24 | End: 2021-06-03

## 2021-05-24 RX ADMIN — TRIAMCINOLONE ACETONIDE 40 MG: 40 INJECTION, SUSPENSION INTRA-ARTICULAR; INTRAMUSCULAR at 02:05

## 2021-05-26 RX ORDER — TRIAMCINOLONE ACETONIDE 40 MG/ML
40 INJECTION, SUSPENSION INTRA-ARTICULAR; INTRAMUSCULAR
Status: COMPLETED | OUTPATIENT
Start: 2021-05-24 | End: 2021-05-24

## 2021-06-01 ENCOUNTER — PATIENT MESSAGE (OUTPATIENT)
Dept: PAIN MEDICINE | Facility: OTHER | Age: 51
End: 2021-06-01

## 2021-06-01 PROBLEM — R53.1 DECREASED STRENGTH: Status: RESOLVED | Noted: 2020-03-20 | Resolved: 2021-06-01

## 2021-06-01 PROBLEM — R26.9 GAIT ABNORMALITY: Status: RESOLVED | Noted: 2020-03-20 | Resolved: 2021-06-01

## 2021-06-01 PROBLEM — M25.661 DECREASED ROM OF RIGHT KNEE: Status: RESOLVED | Noted: 2020-03-20 | Resolved: 2021-06-01

## 2021-06-01 PROBLEM — M53.86 DECREASED RANGE OF MOTION OF LUMBAR SPINE: Status: ACTIVE | Noted: 2021-06-01

## 2021-06-01 PROBLEM — M53.86 DECREASED ROM OF LUMBAR SPINE: Status: RESOLVED | Noted: 2020-03-20 | Resolved: 2021-06-01

## 2021-06-01 PROBLEM — R29.898 WEAKNESS OF BOTH LOWER EXTREMITIES: Status: ACTIVE | Noted: 2021-06-01

## 2021-06-02 ENCOUNTER — HOSPITAL ENCOUNTER (OUTPATIENT)
Facility: OTHER | Age: 51
Discharge: HOME | End: 2021-06-02
Attending: ANESTHESIOLOGY | Admitting: ANESTHESIOLOGY
Payer: COMMERCIAL

## 2021-06-02 VITALS
WEIGHT: 185 LBS | HEART RATE: 80 BPM | BODY MASS INDEX: 30.82 KG/M2 | TEMPERATURE: 99 F | HEIGHT: 65 IN | RESPIRATION RATE: 16 BRPM | OXYGEN SATURATION: 98 % | SYSTOLIC BLOOD PRESSURE: 133 MMHG | DIASTOLIC BLOOD PRESSURE: 80 MMHG

## 2021-06-02 DIAGNOSIS — M51.37 DDD (DEGENERATIVE DISC DISEASE), LUMBOSACRAL: Primary | ICD-10-CM

## 2021-06-02 DIAGNOSIS — M54.17 LUMBOSACRAL RADICULOPATHY: ICD-10-CM

## 2021-06-02 DIAGNOSIS — G89.29 CHRONIC PAIN: ICD-10-CM

## 2021-06-02 PROCEDURE — 64483 PR EPIDURAL INJ, ANES/STEROID, TRANSFORAMINAL, LUMB/SACR, SNGL LEVL: ICD-10-PCS | Mod: 50,,, | Performed by: ANESTHESIOLOGY

## 2021-06-02 PROCEDURE — 25500020 PHARM REV CODE 255: Performed by: ANESTHESIOLOGY

## 2021-06-02 PROCEDURE — 64483 NJX AA&/STRD TFRM EPI L/S 1: CPT | Mod: 50,,, | Performed by: ANESTHESIOLOGY

## 2021-06-02 PROCEDURE — 64483 NJX AA&/STRD TFRM EPI L/S 1: CPT | Mod: 50 | Performed by: ANESTHESIOLOGY

## 2021-06-02 PROCEDURE — 25000003 PHARM REV CODE 250: Performed by: ANESTHESIOLOGY

## 2021-06-02 PROCEDURE — 25000003 PHARM REV CODE 250: Performed by: STUDENT IN AN ORGANIZED HEALTH CARE EDUCATION/TRAINING PROGRAM

## 2021-06-02 PROCEDURE — 63600175 PHARM REV CODE 636 W HCPCS: Performed by: ANESTHESIOLOGY

## 2021-06-02 RX ORDER — MIDAZOLAM HYDROCHLORIDE 1 MG/ML
INJECTION INTRAMUSCULAR; INTRAVENOUS
Status: DISCONTINUED | OUTPATIENT
Start: 2021-06-02 | End: 2021-06-02 | Stop reason: HOSPADM

## 2021-06-02 RX ORDER — SODIUM CHLORIDE 9 MG/ML
INJECTION, SOLUTION INTRAVENOUS CONTINUOUS
Status: DISCONTINUED | OUTPATIENT
Start: 2021-06-02 | End: 2021-06-02 | Stop reason: HOSPADM

## 2021-06-02 RX ORDER — LIDOCAINE HYDROCHLORIDE 10 MG/ML
INJECTION INFILTRATION; PERINEURAL
Status: DISCONTINUED | OUTPATIENT
Start: 2021-06-02 | End: 2021-06-02 | Stop reason: HOSPADM

## 2021-06-02 RX ORDER — LIDOCAINE HYDROCHLORIDE 5 MG/ML
INJECTION, SOLUTION INFILTRATION; INTRAVENOUS
Status: DISCONTINUED | OUTPATIENT
Start: 2021-06-02 | End: 2021-06-02 | Stop reason: HOSPADM

## 2021-06-02 RX ORDER — FENTANYL CITRATE 50 UG/ML
INJECTION, SOLUTION INTRAMUSCULAR; INTRAVENOUS
Status: DISCONTINUED | OUTPATIENT
Start: 2021-06-02 | End: 2021-06-02 | Stop reason: HOSPADM

## 2021-06-02 RX ORDER — DEXAMETHASONE SODIUM PHOSPHATE 4 MG/ML
INJECTION, SOLUTION INTRA-ARTICULAR; INTRALESIONAL; INTRAMUSCULAR; INTRAVENOUS; SOFT TISSUE
Status: DISCONTINUED | OUTPATIENT
Start: 2021-06-02 | End: 2021-06-02 | Stop reason: HOSPADM

## 2021-06-02 RX ADMIN — SODIUM CHLORIDE: 0.9 INJECTION, SOLUTION INTRAVENOUS at 01:06

## 2021-06-03 ENCOUNTER — OFFICE VISIT (OUTPATIENT)
Dept: FAMILY MEDICINE | Facility: CLINIC | Age: 51
End: 2021-06-03
Payer: COMMERCIAL

## 2021-06-03 VITALS
OXYGEN SATURATION: 99 % | BODY MASS INDEX: 34.93 KG/M2 | HEART RATE: 85 BPM | HEIGHT: 62 IN | WEIGHT: 189.81 LBS | TEMPERATURE: 98 F

## 2021-06-03 DIAGNOSIS — J30.9 ALLERGIC RHINITIS, UNSPECIFIED SEASONALITY, UNSPECIFIED TRIGGER: ICD-10-CM

## 2021-06-03 PROCEDURE — 99213 PR OFFICE/OUTPT VISIT, EST, LEVL III, 20-29 MIN: ICD-10-PCS | Mod: S$GLB,,, | Performed by: FAMILY MEDICINE

## 2021-06-03 PROCEDURE — 1126F PR PAIN SEVERITY QUANTIFIED, NO PAIN PRESENT: ICD-10-PCS | Mod: S$GLB,,, | Performed by: FAMILY MEDICINE

## 2021-06-03 PROCEDURE — 99213 OFFICE O/P EST LOW 20 MIN: CPT | Mod: S$GLB,,, | Performed by: FAMILY MEDICINE

## 2021-06-03 PROCEDURE — 99999 PR PBB SHADOW E&M-EST. PATIENT-LVL III: CPT | Mod: PBBFAC,,, | Performed by: FAMILY MEDICINE

## 2021-06-03 PROCEDURE — 1126F AMNT PAIN NOTED NONE PRSNT: CPT | Mod: S$GLB,,, | Performed by: FAMILY MEDICINE

## 2021-06-03 PROCEDURE — 3008F PR BODY MASS INDEX (BMI) DOCUMENTED: ICD-10-PCS | Mod: CPTII,S$GLB,, | Performed by: FAMILY MEDICINE

## 2021-06-03 PROCEDURE — 3008F BODY MASS INDEX DOCD: CPT | Mod: CPTII,S$GLB,, | Performed by: FAMILY MEDICINE

## 2021-06-03 PROCEDURE — 99999 PR PBB SHADOW E&M-EST. PATIENT-LVL III: ICD-10-PCS | Mod: PBBFAC,,, | Performed by: FAMILY MEDICINE

## 2021-06-03 RX ORDER — LORATADINE 10 MG/1
10 TABLET ORAL DAILY
Qty: 30 TABLET | Refills: 11 | Status: SHIPPED | OUTPATIENT
Start: 2021-06-03 | End: 2022-09-09 | Stop reason: SDUPTHER

## 2021-06-04 DIAGNOSIS — M25.561 ACUTE PAIN OF RIGHT KNEE: ICD-10-CM

## 2021-06-04 RX ORDER — MONTELUKAST SODIUM 10 MG/1
10 TABLET ORAL NIGHTLY
Qty: 90 TABLET | Refills: 3 | Status: SHIPPED | OUTPATIENT
Start: 2021-06-04 | End: 2021-07-04

## 2021-06-04 RX ORDER — SILDENAFIL 100 MG/1
100 TABLET, FILM COATED ORAL DAILY PRN
Qty: 10 TABLET | Refills: 5 | Status: SHIPPED | OUTPATIENT
Start: 2021-06-04 | End: 2023-01-11 | Stop reason: SDUPTHER

## 2021-06-04 RX ORDER — TRAMADOL HYDROCHLORIDE 50 MG/1
50 TABLET ORAL EVERY 6 HOURS
Qty: 28 TABLET | Refills: 0 | Status: SHIPPED | OUTPATIENT
Start: 2021-06-04 | End: 2021-06-11

## 2021-07-04 NOTE — PROGRESS NOTES
"ALLERGY & IMMUNOLOGY CLINIC - INITIAL CONSULTATION      HISTORY OF PRESENT ILLNESS     Patient ID: Ronal Ham is a 48 y.o. male    CC: cough, rhinitis    HPI:     Ronal Ham is a 48 y.o. male with PMH of SLE and pulmonary fibrosis here for evaluation for asthma and rhinitis symptoms.    Respiratory: Has had coughing and SOB with exertion.  His symptoms have improved after he has started to exercise more.  The cough started a couple of years ago.  Over the last 6 months, he has had increase in cough and onset of SOB.  Will sometimes get associated wheeze.  Denied chest tightness.  No ER/UC visits for these symptoms.  He has had prednisone in the past for asthma, he received a course 12/2018 with minimal improvement.  He has albuterol at home and has been using it at least daily.  He thinks it helps "a little bit".  He has taken mucinex in the past with "somewhat" relief. Other triggers include when he is cutting grass.  Of note, per chart review he was previously followed by pulmonology for interstitial lung disease.  Last appointment was 5/28/14 though he has a follow up on 5/13/19.  11/5/14 chest CT showed ground glass opacification and borderline mediastinal and axial LAD unchanged from 2010.      Rhinitis: Symptoms started about a year ago.  He is having nasal dryness, congestion, rhinorrhea, sneezing, some ocular symptoms.  Post nasal drip.  Symptoms have been worse in the late spring/summer.  No anosmia.  Triggers: smoke, dust, grass.  Denies cat or dogs as triggers.  He has tried antihistamines in the past with some relief.  He last took claritin a couple of days.  He has tried flonase in the past, he does not believe it has helped.  He would take it PRN, 1 SEN daily.  He has tried azelastine 1 sen BID, he believes it has helped sone.  He has never been on montelukast.      Atopy: No history of food allergy.  No history of eczema.  He has a carafate allergy - he got hives after a couple " of days of use.  This was when he was 18 years old.  He does not recall the temporal association with dose.  No other associated symptoms.  No latex allergy.  No venom allergy.      Immunology: No recurrent ear infections.  Never had PNA.  He has had 2 sinus infections in the last 12 months.      GERD: Complains of acid reflux.  He had been on zantac with relief but he is no longer taking it.     REVIEW OF SYSTEMS     Review of Systems   Constitutional: Negative for fever, malaise/fatigue and weight loss.   HENT: Negative for congestion and sore throat.    Eyes: Negative for discharge and redness.   Respiratory: Positive for cough and shortness of breath. Negative for wheezing.    Cardiovascular: Negative for chest pain.   Gastrointestinal: Negative for abdominal pain, diarrhea, nausea and vomiting.   Genitourinary: Negative.    Musculoskeletal: Negative.    Skin: Negative for itching and rash.   Neurological: Negative.  Negative for headaches.   Endo/Heme/Allergies: Negative.    Psychiatric/Behavioral: Negative.       MEDICAL HISTORY     Past Medical History:   Diagnosis Date    Arthritis     Eye injury     od hit above od    GERD (gastroesophageal reflux disease)     Hypertension     Lupus (systemic lupus erythematosus)     Pulmonary fibrosis     Raynaud phenomenon      Past Surgical History:   Procedure Laterality Date    HERNIA REPAIR         Current Outpatient Medications:     albuterol (VENTOLIN HFA) 90 mcg/actuation inhaler, USE 2 PUFFS BY MOUTH EVERY 6 HOURS AS NEEDED FOR WHEEZING, Disp: 18 g, Rfl: 3    amLODIPine (NORVASC) 10 MG tablet, TAKE 1 TABLET BY MOUTH EVERY DAY, Disp: 90 tablet, Rfl: 0    azelastine (ASTELIN) 137 mcg (0.1 %) nasal spray, 1 spray (137 mcg total) by Nasal route 2 (two) times daily., Disp: 30 mL, Rfl: 2    hydroxychloroquine (PLAQUENIL) 200 mg tablet, Take 2 tablets (400 mg total) by mouth once daily., Disp: 180 tablet, Rfl: 3    ranitidine (ZANTAC) 150 MG capsule, Take 1  capsule (150 mg total) by mouth 2 (two) times daily., Disp: 60 capsule, Rfl: 0    sildenafil (VIAGRA) 100 MG tablet, Take 1 tablet (100 mg total) by mouth daily as needed for Erectile Dysfunction., Disp: 10 tablet, Rfl: 5    triamcinolone acetonide 0.1% (KENALOG) 0.1 % cream, Apply topically 2 (two) times daily., Disp: 60 g, Rfl: 0    econazole nitrate 1 % cream, ANTIONE EXT TO THE AFFECTED AND SURROUNDING AREAS OF SKIN 1 TIME PER DAY, Disp: , Rfl: 12    fluticasone (FLONASE) 50 mcg/actuation nasal spray, 1 spray (50 mcg total) by Each Nare route once daily., Disp: 16 g, Rfl: 5    loratadine (CLARITIN) 10 mg tablet, Take 10 mg by mouth once daily., Disp: , Rfl:     Current Facility-Administered Medications:     EUFLEXXA 10 mg/mL(mw 2.4 -3.6 million) injection Syrg 40 mg, 40 mg, Intra-articular, Weekly, Paulette Sewell, QI, 40 mg at 10/18/17 1637    Family hx: No history of atopy.  No immune deficiency in the family.  No autoimmunity.    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  No pets at home.         PHYSICAL EXAM   Physical Exam   Constitutional: He is oriented to person, place, and time. He appears well-developed and well-nourished. No distress.   HENT:   Head: Normocephalic and atraumatic.   Mouth/Throat: Oropharynx is clear and moist.   2-3+ pale/pink turbinates   Eyes: Pupils are equal, round, and reactive to light. Conjunctivae are normal.   Neck: Normal range of motion. Neck supple.   Cardiovascular: Normal rate, regular rhythm and normal heart sounds.   No murmur heard.  Pulmonary/Chest: Effort normal and breath sounds normal. No respiratory distress. He has no wheezes.   Abdominal: Soft. He exhibits no distension. There is no tenderness.   Musculoskeletal: Normal range of motion.   Lymphadenopathy:     He has no cervical adenopathy.   Neurological: He is alert and  "oriented to person, place, and time.   Skin: Skin is warm. Capillary refill takes less than 2 seconds. No rash noted.   Psychiatric: He has a normal mood and affect.     /88   Ht 5' 5" (1.651 m)   Wt 81.7 kg (180 lb 1.9 oz)   BMI 29.97 kg/m²        LABORATORY STUDIES     Component      Latest Ref Rng & Units 12/18/2018   WBC      3.90 - 12.70 K/uL 3.98   RBC      4.60 - 6.20 M/uL 4.20 (L)   Hemoglobin      14.0 - 18.0 g/dL 12.8 (L)   Hematocrit      40.0 - 54.0 % 37.1 (L)   MCV      82 - 98 fL 88   MCH      27.0 - 31.0 pg 30.5   MCHC      32.0 - 36.0 g/dL 34.5   RDW      11.5 - 14.5 % 13.6   Platelets      150 - 350 K/uL 186   MPV      9.2 - 12.9 fL 9.3   Gran # (ANC)      1.8 - 7.7 K/uL 2.0   Lymph #      1.0 - 4.8 K/uL 1.5   Mono #      0.3 - 1.0 K/uL 0.3   Eos #      0.0 - 0.5 K/uL 0.2   Baso #      0.00 - 0.20 K/uL 0.02   Gran%      38.0 - 73.0 % 49.0   Lymph%      18.0 - 48.0 % 38.4   Mono%      4.0 - 15.0 % 7.3   Eosinophil%      0.0 - 8.0 % 4.8   Basophil%      0.0 - 1.9 % 0.5   Differential Method       Automated        ALLERGEN TESTING     Skin Prick: n/a as patient recently had claritin    Immunocaps: ordered on 5/7/19     PULMONARY FUNCTION     PFTs:   9/15/17  FVC: 2.14 (70%)  FEV1: 1.74 (69%)  FEV1/FVC: 81 (96%)     IMAGING & OTHER DIAGNOSTICS     11/5/14 Chest CT  Impression       1.  There is ground glass opacification seen peripherally within both lungs and at both lung bases suggesting an NSIP.  This is similar to what was seen in 2010.  2.  There is borderline mediastinal and axial lymphadenopathy that appears unchanged from 2010.          CHART REVIEW     Previous PCP and pulm notes, pertinent imaging and labs     ASSESSMENT & PLAN     Ronal Ham is a 48 y.o. male with     Cough: Suspect 2/2 ILD related to his SLE.  As patient is has been worse with associated SOB, will get CXR for further evaluation of symptoms.  Repeat PFTs as well.  Patient with post nasal drip AND " uncontrolled GERD which may also contribute to cough symptoms.  Low index of suspicion for asthma though responds somewhat to albuterol.  Cut grass is more of an irritant trigger not usually an allergic process.   Continue follow up with pulm for ILD and possible repeat chest CT.   -     Complete PFT with bronchodilator; Future  -     X-Ray Chest PA And Lateral; Future; Expected date: 05/07/2019    Chronic rhinitis: Not well-controlled.  Will do immunocaps today to evaluate for allergic component as patient recently took claritin.  Recommend restarting flonase (nasonex also ok) 2 SEN daily.  Ok to increase to 2 sen BID for persistent symptoms. If still not well-controlled, add on azelastine.  -     Dermatophagoides [place]; Future; Expected date: 05/07/2019  -     Dermatophagoides Pteronyssinus; Future; Expected date: 05/07/2019  -     [place]; Future; Expected date: 05/07/2019  -     Mj; Future; Expected date: 05/07/2019  -     [place]; Future; Expected date: 05/07/2019  -     English Plantain; Future; Expected date: 05/07/2019  -     Oak; Future; Expected date: 05/07/2019  -     Pecan; Future; Expected date: 05/07/2019  -     [place]; Future; Expected date: 05/07/2019  -     Ragweed; Future; Expected date: 05/07/2019  -     Alternaria; Future; Expected date: 05/07/2019  -     Aspergillus; Future; Expected date: 05/07/2019  -     Cat; Future; Expected date: 05/07/2019  -     Cockroach; Future; Expected date: 05/07/2019  -     Dog; Future; Expected date: 05/07/2019    Systemic lupus erythematosus with lung involvement, unspecified SLE type: Continue follow up per rheum.         Follow up: 6 weeks.  Will call patient in 1 week with allergy test results     Rhina Barber MD  Allergy Fellow       Hx of L. mid femoral/peroneal vein DVT  - at home on Lovenox 70mg BID  - given EEZ on admission- heparin for now, if renal function improves, transition back to Lovenox

## 2021-07-29 PROBLEM — M53.86 DECREASED RANGE OF MOTION OF LUMBAR SPINE: Status: RESOLVED | Noted: 2021-06-01 | Resolved: 2021-07-29

## 2021-07-29 PROBLEM — R29.898 WEAKNESS OF BOTH LOWER EXTREMITIES: Status: RESOLVED | Noted: 2021-06-01 | Resolved: 2021-07-29

## 2021-07-30 ENCOUNTER — IMMUNIZATION (OUTPATIENT)
Dept: PRIMARY CARE CLINIC | Facility: CLINIC | Age: 51
End: 2021-07-30
Payer: COMMERCIAL

## 2021-07-30 DIAGNOSIS — Z23 NEED FOR VACCINATION: Primary | ICD-10-CM

## 2021-08-11 ENCOUNTER — OFFICE VISIT (OUTPATIENT)
Dept: FAMILY MEDICINE | Facility: CLINIC | Age: 51
End: 2021-08-11
Payer: COMMERCIAL

## 2021-08-11 DIAGNOSIS — M54.42 CHRONIC MIDLINE LOW BACK PAIN WITH BILATERAL SCIATICA: Primary | ICD-10-CM

## 2021-08-11 DIAGNOSIS — M54.41 CHRONIC MIDLINE LOW BACK PAIN WITH BILATERAL SCIATICA: Primary | ICD-10-CM

## 2021-08-11 DIAGNOSIS — G89.29 CHRONIC MIDLINE LOW BACK PAIN WITH BILATERAL SCIATICA: Primary | ICD-10-CM

## 2021-08-11 PROCEDURE — 1160F PR REVIEW ALL MEDS BY PRESCRIBER/CLIN PHARMACIST DOCUMENTED: ICD-10-PCS | Mod: CPTII,,, | Performed by: INTERNAL MEDICINE

## 2021-08-11 PROCEDURE — 3044F HG A1C LEVEL LT 7.0%: CPT | Mod: CPTII,,, | Performed by: INTERNAL MEDICINE

## 2021-08-11 PROCEDURE — 99214 PR OFFICE/OUTPT VISIT, EST, LEVL IV, 30-39 MIN: ICD-10-PCS | Mod: 95,,, | Performed by: INTERNAL MEDICINE

## 2021-08-11 PROCEDURE — 1160F RVW MEDS BY RX/DR IN RCRD: CPT | Mod: CPTII,,, | Performed by: INTERNAL MEDICINE

## 2021-08-11 PROCEDURE — 1159F MED LIST DOCD IN RCRD: CPT | Mod: CPTII,,, | Performed by: INTERNAL MEDICINE

## 2021-08-11 PROCEDURE — 3044F PR MOST RECENT HEMOGLOBIN A1C LEVEL <7.0%: ICD-10-PCS | Mod: CPTII,,, | Performed by: INTERNAL MEDICINE

## 2021-08-11 PROCEDURE — 1159F PR MEDICATION LIST DOCUMENTED IN MEDICAL RECORD: ICD-10-PCS | Mod: CPTII,,, | Performed by: INTERNAL MEDICINE

## 2021-08-11 PROCEDURE — 99214 OFFICE O/P EST MOD 30 MIN: CPT | Mod: 95,,, | Performed by: INTERNAL MEDICINE

## 2021-08-11 RX ORDER — TIZANIDINE 4 MG/1
4 TABLET ORAL EVERY 8 HOURS PRN
Qty: 90 TABLET | Refills: 0 | Status: SHIPPED | OUTPATIENT
Start: 2021-08-11 | End: 2021-08-21

## 2021-09-21 ENCOUNTER — IMMUNIZATION (OUTPATIENT)
Dept: PRIMARY CARE CLINIC | Facility: CLINIC | Age: 51
End: 2021-09-21
Payer: COMMERCIAL

## 2021-09-21 DIAGNOSIS — Z23 NEED FOR VACCINATION: Primary | ICD-10-CM

## 2021-09-21 PROCEDURE — 91300 COVID-19, MRNA, LNP-S, PF, 30 MCG/0.3 ML DOSE VACCINE: CPT | Mod: PBBFAC | Performed by: INTERNAL MEDICINE

## 2021-09-21 PROCEDURE — 0002A COVID-19, MRNA, LNP-S, PF, 30 MCG/0.3 ML DOSE VACCINE: CPT | Mod: CV19,PBBFAC | Performed by: INTERNAL MEDICINE

## 2021-11-11 DIAGNOSIS — J20.8 ACUTE BACTERIAL BRONCHITIS: ICD-10-CM

## 2021-11-11 DIAGNOSIS — B96.89 ACUTE BACTERIAL BRONCHITIS: ICD-10-CM

## 2021-11-11 RX ORDER — ALBUTEROL SULFATE 90 UG/1
AEROSOL, METERED RESPIRATORY (INHALATION)
Qty: 25.5 G | Refills: 0 | Status: SHIPPED | OUTPATIENT
Start: 2021-11-11 | End: 2022-05-05

## 2021-11-20 RX ORDER — AMOXICILLIN AND CLAVULANATE POTASSIUM 875; 125 MG/1; MG/1
1 TABLET, FILM COATED ORAL EVERY 12 HOURS
Qty: 14 TABLET | Refills: 0 | Status: SHIPPED | OUTPATIENT
Start: 2021-11-20 | End: 2021-11-27

## 2022-01-14 DIAGNOSIS — Z79.899 ENCOUNTER FOR LONG-TERM (CURRENT) USE OF OTHER MEDICATIONS: ICD-10-CM

## 2022-01-14 DIAGNOSIS — M70.51 PES ANSERINUS BURSITIS OF RIGHT KNEE: ICD-10-CM

## 2022-01-14 RX ORDER — HYDROXYCHLOROQUINE SULFATE 200 MG/1
400 TABLET, FILM COATED ORAL DAILY
Qty: 180 TABLET | Refills: 3 | Status: SHIPPED | OUTPATIENT
Start: 2022-01-14 | End: 2023-01-20

## 2022-01-14 NOTE — TELEPHONE ENCOUNTER
Last Office Visit Info:   The patient's last visit with Bruce Terrell MD was on 6/3/2021.    The patient's last visit in current department was on 8/11/2021.        Last CBC Results:   Lab Results   Component Value Date    WBC 4.29 04/13/2021    HGB 13.1 (L) 04/13/2021    HCT 39.6 (L) 04/13/2021     04/13/2021       Last CMP Results  Lab Results   Component Value Date     04/13/2021    K 3.8 04/13/2021     04/13/2021    CO2 28 04/13/2021    BUN 15 04/13/2021    CREATININE 0.8 04/13/2021    CALCIUM 9.0 04/13/2021    ALBUMIN 3.7 04/13/2021    AST 29 04/13/2021    ALT 23 04/13/2021       Last Lipids  Lab Results   Component Value Date    CHOL 151 04/13/2021    TRIG 38 04/13/2021    HDL 59 04/13/2021    LDLCALC 84.4 04/13/2021       Last A1C  Lab Results   Component Value Date    HGBA1C 5.3 04/13/2021       Last TSH  Lab Results   Component Value Date    TSH 2.106 04/13/2021             Current Med Refills  Medication List with Changes/Refills   Current Medications    ALBUTEROL (PROVENTIL/VENTOLIN HFA) 90 MCG/ACTUATION INHALER    INHALE 1 TO 2 PUFFS INTO THE LUNGS EVERY 6 HOURS AS NEEDED FOR WHEEZING       Start Date: 11/11/2021End Date: --    AMLODIPINE (NORVASC) 10 MG TABLET    Take 1 tablet (10 mg total) by mouth once daily.       Start Date: 4/14/2021 End Date: --    DICLOFENAC SODIUM (VOLTAREN) 1 % GEL    Apply 2 g topically 4 (four) times daily.       Start Date: 2/6/2020  End Date: --    ECONAZOLE NITRATE 1 % CREAM    ANTIONE EXT TO THE AFFECTED AND SURROUNDING AREAS OF SKIN 1 TIME PER DAY       Start Date: 11/18/2016End Date: --    FLUTICASONE FUROATE-VILANTEROL (BREO ELLIPTA) 100-25 MCG/DOSE DISKUS INHALER    Inhale 1 puff into the lungs once daily. Controller       Start Date: 4/14/2021 End Date: --    FLUTICASONE PROPIONATE (FLONASE) 50 MCG/ACTUATION NASAL SPRAY    SHAKE LIQUID AND USE 1 SPRAY(50 MCG) IN EACH NOSTRIL EVERY DAY       Start Date: 8/7/2020  End Date: --    GABAPENTIN (NEURONTIN)  100 MG CAPSULE    Take 1 capsule (100 mg total) by mouth 2 (two) times daily.       Start Date: 5/5/2021  End Date: 5/5/2022    GABAPENTIN (NEURONTIN) 300 MG CAPSULE    Take 1 capsule (300 mg total) by mouth every evening.       Start Date: 3/22/2021 End Date: 3/22/2022    HYDROXYCHLOROQUINE (PLAQUENIL) 200 MG TABLET    Take 2 tablets (400 mg total) by mouth once daily.       Start Date: 3/22/2021 End Date: --    LORATADINE (CLARITIN) 10 MG TABLET    Take 1 tablet (10 mg total) by mouth once daily.       Start Date: 6/3/2021  End Date: --    MELOXICAM (MOBIC) 15 MG TABLET    TAKE 1 TABLET(15 MG) BY MOUTH EVERY DAY       Start Date: 12/22/2020End Date: --    PANTOPRAZOLE (PROTONIX) 40 MG TABLET    Take 1 tablet (40 mg total) by mouth once daily.       Start Date: 12/24/2020End Date: 12/24/2021    SILDENAFIL (VIAGRA) 100 MG TABLET    Take 1 tablet (100 mg total) by mouth daily as needed for Erectile Dysfunction.       Start Date: 6/4/2021  End Date: 6/4/2022    TRIAMCINOLONE ACETONIDE 0.1% (KENALOG) 0.1 % CREAM    Apply topically 2 (two) times daily.       Start Date: 1/23/2019 End Date: --       Order(s) placed per written order guidelines:     Please advise.

## 2022-02-23 DIAGNOSIS — D84.9 IMMUNOSUPPRESSED STATUS: ICD-10-CM

## 2022-02-28 DIAGNOSIS — J31.0 CHRONIC RHINITIS: ICD-10-CM

## 2022-02-28 RX ORDER — FLUTICASONE PROPIONATE 50 MCG
2 SPRAY, SUSPENSION (ML) NASAL DAILY
Qty: 16 G | Refills: 5 | Status: SHIPPED | OUTPATIENT
Start: 2022-02-28 | End: 2023-03-22 | Stop reason: SDUPTHER

## 2022-02-28 NOTE — TELEPHONE ENCOUNTER
Last Office Visit Info:   The patient's last visit with Bruce Terrell MD was on 6/3/2021.    The patient's last visit in current department was on Visit date not found.        Last CBC Results:   Lab Results   Component Value Date    WBC 4.29 04/13/2021    HGB 13.1 (L) 04/13/2021    HCT 39.6 (L) 04/13/2021     04/13/2021       Last CMP Results  Lab Results   Component Value Date     04/13/2021    K 3.8 04/13/2021     04/13/2021    CO2 28 04/13/2021    BUN 15 04/13/2021    CREATININE 0.8 04/13/2021    CALCIUM 9.0 04/13/2021    ALBUMIN 3.7 04/13/2021    AST 29 04/13/2021    ALT 23 04/13/2021       Last Lipids  Lab Results   Component Value Date    CHOL 151 04/13/2021    TRIG 38 04/13/2021    HDL 59 04/13/2021    LDLCALC 84.4 04/13/2021       Last A1C  Lab Results   Component Value Date    HGBA1C 5.3 04/13/2021       Last TSH  Lab Results   Component Value Date    TSH 2.106 04/13/2021             Current Med Refills  Medication List with Changes/Refills   Current Medications    ALBUTEROL (PROVENTIL/VENTOLIN HFA) 90 MCG/ACTUATION INHALER    INHALE 1 TO 2 PUFFS INTO THE LUNGS EVERY 6 HOURS AS NEEDED FOR WHEEZING       Start Date: 11/11/2021End Date: --    AMLODIPINE (NORVASC) 10 MG TABLET    Take 1 tablet (10 mg total) by mouth once daily.       Start Date: 4/14/2021 End Date: --    DICLOFENAC SODIUM (VOLTAREN) 1 % GEL    Apply 2 g topically 4 (four) times daily.       Start Date: 2/6/2020  End Date: --    ECONAZOLE NITRATE 1 % CREAM    ANTIONE EXT TO THE AFFECTED AND SURROUNDING AREAS OF SKIN 1 TIME PER DAY       Start Date: 11/18/2016End Date: --    FLUTICASONE FUROATE-VILANTEROL (BREO ELLIPTA) 100-25 MCG/DOSE DISKUS INHALER    Inhale 1 puff into the lungs once daily. Controller       Start Date: 4/14/2021 End Date: --    FLUTICASONE PROPIONATE (FLONASE) 50 MCG/ACTUATION NASAL SPRAY    SHAKE LIQUID AND USE 1 SPRAY(50 MCG) IN EACH NOSTRIL EVERY DAY       Start Date: 8/7/2020  End Date: --    GABAPENTIN  (NEURONTIN) 100 MG CAPSULE    Take 1 capsule (100 mg total) by mouth 2 (two) times daily.       Start Date: 5/5/2021  End Date: 5/5/2022    GABAPENTIN (NEURONTIN) 300 MG CAPSULE    Take 1 capsule (300 mg total) by mouth every evening.       Start Date: 3/22/2021 End Date: 3/22/2022    HYDROXYCHLOROQUINE (PLAQUENIL) 200 MG TABLET    Take 2 tablets (400 mg total) by mouth once daily.       Start Date: 1/14/2022 End Date: --    LORATADINE (CLARITIN) 10 MG TABLET    Take 1 tablet (10 mg total) by mouth once daily.       Start Date: 6/3/2021  End Date: --    MELOXICAM (MOBIC) 15 MG TABLET    TAKE 1 TABLET(15 MG) BY MOUTH EVERY DAY       Start Date: 12/22/2020End Date: --    PANTOPRAZOLE (PROTONIX) 40 MG TABLET    Take 1 tablet (40 mg total) by mouth once daily.       Start Date: 12/24/2020End Date: 12/24/2021    SILDENAFIL (VIAGRA) 100 MG TABLET    Take 1 tablet (100 mg total) by mouth daily as needed for Erectile Dysfunction.       Start Date: 6/4/2021  End Date: 6/4/2022    TRIAMCINOLONE ACETONIDE 0.1% (KENALOG) 0.1 % CREAM    Apply topically 2 (two) times daily.       Start Date: 1/23/2019 End Date: --       Order(s) placed per written order guidelines:    Please advise.

## 2022-02-28 NOTE — TELEPHONE ENCOUNTER
No new care gaps identified.  Powered by [employer]. Reference number: [id number].   2/28/2022 9:45:40 AM CST

## 2022-03-10 ENCOUNTER — OFFICE VISIT (OUTPATIENT)
Dept: FAMILY MEDICINE | Facility: CLINIC | Age: 52
End: 2022-03-10
Payer: COMMERCIAL

## 2022-03-10 VITALS
OXYGEN SATURATION: 98 % | HEART RATE: 91 BPM | DIASTOLIC BLOOD PRESSURE: 86 MMHG | HEIGHT: 64 IN | WEIGHT: 195.13 LBS | TEMPERATURE: 98 F | BODY MASS INDEX: 33.31 KG/M2 | RESPIRATION RATE: 20 BRPM | SYSTOLIC BLOOD PRESSURE: 128 MMHG

## 2022-03-10 DIAGNOSIS — M32.13 SYSTEMIC LUPUS ERYTHEMATOSUS WITH LUNG INVOLVEMENT, UNSPECIFIED SLE TYPE: ICD-10-CM

## 2022-03-10 DIAGNOSIS — Z00.00 ANNUAL PHYSICAL EXAM: Primary | ICD-10-CM

## 2022-03-10 DIAGNOSIS — J84.9 INTERSTITIAL LUNG DISEASE: ICD-10-CM

## 2022-03-10 DIAGNOSIS — G62.9 NEUROPATHY: ICD-10-CM

## 2022-03-10 PROCEDURE — 99999 PR PBB SHADOW E&M-EST. PATIENT-LVL IV: CPT | Mod: PBBFAC,,, | Performed by: FAMILY MEDICINE

## 2022-03-10 PROCEDURE — 99396 PREV VISIT EST AGE 40-64: CPT | Mod: S$GLB,,, | Performed by: FAMILY MEDICINE

## 2022-03-10 PROCEDURE — 3074F PR MOST RECENT SYSTOLIC BLOOD PRESSURE < 130 MM HG: ICD-10-PCS | Mod: CPTII,S$GLB,, | Performed by: FAMILY MEDICINE

## 2022-03-10 PROCEDURE — 1159F PR MEDICATION LIST DOCUMENTED IN MEDICAL RECORD: ICD-10-PCS | Mod: CPTII,S$GLB,, | Performed by: FAMILY MEDICINE

## 2022-03-10 PROCEDURE — 3074F SYST BP LT 130 MM HG: CPT | Mod: CPTII,S$GLB,, | Performed by: FAMILY MEDICINE

## 2022-03-10 PROCEDURE — 1159F MED LIST DOCD IN RCRD: CPT | Mod: CPTII,S$GLB,, | Performed by: FAMILY MEDICINE

## 2022-03-10 PROCEDURE — 99396 PR PREVENTIVE VISIT,EST,40-64: ICD-10-PCS | Mod: S$GLB,,, | Performed by: FAMILY MEDICINE

## 2022-03-10 PROCEDURE — 3008F BODY MASS INDEX DOCD: CPT | Mod: CPTII,S$GLB,, | Performed by: FAMILY MEDICINE

## 2022-03-10 PROCEDURE — 3008F PR BODY MASS INDEX (BMI) DOCUMENTED: ICD-10-PCS | Mod: CPTII,S$GLB,, | Performed by: FAMILY MEDICINE

## 2022-03-10 PROCEDURE — 1160F PR REVIEW ALL MEDS BY PRESCRIBER/CLIN PHARMACIST DOCUMENTED: ICD-10-PCS | Mod: CPTII,S$GLB,, | Performed by: FAMILY MEDICINE

## 2022-03-10 PROCEDURE — 1160F RVW MEDS BY RX/DR IN RCRD: CPT | Mod: CPTII,S$GLB,, | Performed by: FAMILY MEDICINE

## 2022-03-10 PROCEDURE — 3079F DIAST BP 80-89 MM HG: CPT | Mod: CPTII,S$GLB,, | Performed by: FAMILY MEDICINE

## 2022-03-10 PROCEDURE — 99999 PR PBB SHADOW E&M-EST. PATIENT-LVL IV: ICD-10-PCS | Mod: PBBFAC,,, | Performed by: FAMILY MEDICINE

## 2022-03-10 PROCEDURE — 3079F PR MOST RECENT DIASTOLIC BLOOD PRESSURE 80-89 MM HG: ICD-10-PCS | Mod: CPTII,S$GLB,, | Performed by: FAMILY MEDICINE

## 2022-03-10 RX ORDER — METHOCARBAMOL 500 MG/1
TABLET, FILM COATED ORAL
COMMUNITY
Start: 2021-09-18 | End: 2022-05-06

## 2022-03-10 RX ORDER — GABAPENTIN 300 MG/1
600 CAPSULE ORAL 2 TIMES DAILY
Qty: 120 CAPSULE | Refills: 11 | Status: SHIPPED | OUTPATIENT
Start: 2022-03-10 | End: 2022-05-06

## 2022-03-10 RX ORDER — CYCLOBENZAPRINE HCL 10 MG
10 TABLET ORAL NIGHTLY PRN
COMMUNITY
Start: 2021-11-12 | End: 2023-03-22

## 2022-03-10 RX ORDER — PREDNISONE 50 MG/1
50 TABLET ORAL DAILY
Qty: 7 TABLET | Refills: 0 | Status: SHIPPED | OUTPATIENT
Start: 2022-03-10 | End: 2022-05-06

## 2022-03-10 RX ORDER — PROMETHAZINE HYDROCHLORIDE AND CODEINE PHOSPHATE 6.25; 1 MG/5ML; MG/5ML
5 SOLUTION ORAL EVERY 4 HOURS PRN
Qty: 240 ML | Refills: 0 | Status: SHIPPED | OUTPATIENT
Start: 2022-03-10 | End: 2022-03-20

## 2022-03-10 NOTE — PROGRESS NOTES
Chief Complaint   Patient presents with    check up     Cough       SUBJECTIVE:   Ronal Ham is a 51 y.o. male presenting for his annual checkup.   With workman's comp    Current Outpatient Medications   Medication Sig Dispense Refill    albuterol (PROVENTIL/VENTOLIN HFA) 90 mcg/actuation inhaler INHALE 1 TO 2 PUFFS INTO THE LUNGS EVERY 6 HOURS AS NEEDED FOR WHEEZING 25.5 g 0    amLODIPine (NORVASC) 10 MG tablet Take 1 tablet (10 mg total) by mouth once daily. 90 tablet 3    diclofenac sodium (VOLTAREN) 1 % Gel Apply 2 g topically 4 (four) times daily. 100 g 0    fluticasone furoate-vilanteroL (BREO ELLIPTA) 100-25 mcg/dose diskus inhaler Inhale 1 puff into the lungs once daily. Controller 60 each 11    fluticasone propionate (FLONASE) 50 mcg/actuation nasal spray 2 sprays (100 mcg total) by Each Nostril route once daily. 16 g 5    gabapentin (NEURONTIN) 100 MG capsule Take 1 capsule (100 mg total) by mouth 2 (two) times daily. 60 capsule 11    gabapentin (NEURONTIN) 300 MG capsule Take 1 capsule (300 mg total) by mouth every evening. 30 capsule 11    hydrOXYchloroQUINE (PLAQUENIL) 200 mg tablet Take 2 tablets (400 mg total) by mouth once daily. 180 tablet 3    loratadine (CLARITIN) 10 mg tablet Take 1 tablet (10 mg total) by mouth once daily. 30 tablet 11    meloxicam (MOBIC) 15 MG tablet TAKE 1 TABLET(15 MG) BY MOUTH EVERY DAY 90 tablet 1    sildenafiL (VIAGRA) 100 MG tablet Take 1 tablet (100 mg total) by mouth daily as needed for Erectile Dysfunction. 10 tablet 5    triamcinolone acetonide 0.1% (KENALOG) 0.1 % cream Apply topically 2 (two) times daily. 60 g 0    cyclobenzaprine (FLEXERIL) 10 MG tablet Take 10 mg by mouth nightly as needed.      econazole nitrate 1 % cream ANITONE EXT TO THE AFFECTED AND SURROUNDING AREAS OF SKIN 1 TIME PER DAY  12    methocarbamoL (ROBAXIN) 500 MG Tab SMARTSI Tablet(s) By Mouth Every Evening PRN      pantoprazole (PROTONIX) 40 MG tablet Take 1 tablet  "(40 mg total) by mouth once daily. 90 tablet 3     No current facility-administered medications for this visit.     Allergies: Carafate  [sucralfate]   Patient Active Problem List    Diagnosis Date Noted    Chronic midline low back pain with bilateral sciatica 08/11/2021    Chronic pain 06/02/2021    SOB (shortness of breath)     Low testosterone in male 12/24/2018    Systemic lupus erythematosus with lung involvement     Effusion of left knee joint 11/17/2015    Chondromalacia of left patellofemoral joint 11/17/2015    Prediabetes 09/11/2015    Chronic cough 09/02/2014    Allergic rhinitis 09/02/2014    Tinea pedis 04/01/2014    Male erectile disorder 04/01/2014    Encounter for vitamin deficiency screening 04/01/2014    Pain in limb 12/03/2013    Chronic rhinitis 10/03/2012    GERD (gastroesophageal reflux disease) 10/03/2012    Raynaud's syndrome 09/19/2012    Interstitial lung disease 09/19/2012    SLE (systemic lupus erythematosus) 09/07/2012    Benign hypertension 09/07/2012       ROS:  Feeling well. No dyspnea or chest pain on exertion. No abdominal pain, change in bowel habits, black or bloody stools. No urinary tract or prostatic symptoms. No neurological complaints.    OBJECTIVE:   The patient appears well, alert, oriented x 3, in no distress.   /86 (BP Location: Right arm, Patient Position: Sitting, BP Method: Large (Manual))   Pulse 91   Temp 98.1 °F (36.7 °C) (Oral)   Resp 20   Ht 5' 4" (1.626 m)   Wt 88.5 kg (195 lb 1.7 oz)   SpO2 98%   BMI 33.49 kg/m²   Wt Readings from Last 5 Encounters:   03/10/22 88.5 kg (195 lb 1.7 oz)   06/03/21 86.1 kg (189 lb 13.1 oz)   06/02/21 83.9 kg (185 lb)   05/20/21 84.6 kg (186 lb 8.2 oz)   05/05/21 84.6 kg (186 lb 8.2 oz)       ENT normal.  Neck supple. No adenopathy or thyromegaly. DOUG. Lungs are clear, good air entry, no wheezes, rhonchi or rales. S1 and S2 normal, no murmurs, regular rate and rhythm. Abdomen is soft without " tenderness, guarding, mass or organomegaly.  exam: deferred.  Extremities show no edema, normal peripheral pulses. Neurological is normal without focal findings.    ASSESSMENT:   1. Systemic lupus erythematosus with lung involvement, unspecified SLE type    2. Interstitial lung disease          PLAN:   Counseled on age appropriate medical preventative services, including age appropriate cancer screenings, over all nutritional health, need for a consistent exercise regimen and an over all push towards maintaining a vigorous and active lifestyle.  Counseled on age appropriate vaccines and discussed upcoming health care needs based on age/gender.  Spent time with patient counseling on need for a good patient/doctor relationship moving forward.  Discussed use of common OTC medications and supplements.  Discussed common dietary aids and use of caffeine and the need for good sleep hygiene and stress management.    Problem List Items Addressed This Visit     Interstitial lung disease    Overview     Noted on CT  GGOs, bronchiectasis, cystic changes, suspect NSIP  PFTs normal and no desat on walk  Monitor q 6 mos with PFTs and walk           Current Assessment & Plan     The current medical regimen is effective;  continue present plan and medications.             Systemic lupus erythematosus with lung involvement    Current Assessment & Plan     The current medical regimen is effective;  continue present plan and medications.                     Answers for HPI/ROS submitted by the patient on 3/10/2022  chest pain: No  chills: No  ear congestion: No  ear pain: No  fever: No  headaches: No  heartburn: No  hemoptysis: No  myalgias: Yes  nasal congestion: No  postnasal drip: No  rash: No  rhinorrhea: No  shortness of breath: Yes  sore throat: No  sweats: No  weight loss: No  wheezing: No  asthma: No  bronchiectasis: No  bronchitis: No  COPD: No  emphysema: No  environmental allergies: No  pneumonia: No

## 2022-03-11 ENCOUNTER — LAB VISIT (OUTPATIENT)
Dept: LAB | Facility: HOSPITAL | Age: 52
End: 2022-03-11
Attending: FAMILY MEDICINE
Payer: COMMERCIAL

## 2022-03-11 DIAGNOSIS — Z00.00 ANNUAL PHYSICAL EXAM: ICD-10-CM

## 2022-03-11 LAB
ALBUMIN SERPL BCP-MCNC: 3.5 G/DL (ref 3.5–5.2)
ALP SERPL-CCNC: 61 U/L (ref 55–135)
ALT SERPL W/O P-5'-P-CCNC: 25 U/L (ref 10–44)
ANION GAP SERPL CALC-SCNC: 10 MMOL/L (ref 8–16)
AST SERPL-CCNC: 28 U/L (ref 10–40)
BACTERIA #/AREA URNS HPF: NORMAL /HPF
BASOPHILS # BLD AUTO: 0.03 K/UL (ref 0–0.2)
BASOPHILS NFR BLD: 0.7 % (ref 0–1.9)
BILIRUB SERPL-MCNC: 0.4 MG/DL (ref 0.1–1)
BILIRUB UR QL STRIP: NEGATIVE
BUN SERPL-MCNC: 13 MG/DL (ref 6–20)
CALCIUM SERPL-MCNC: 8.9 MG/DL (ref 8.7–10.5)
CHLORIDE SERPL-SCNC: 108 MMOL/L (ref 95–110)
CHOLEST SERPL-MCNC: 145 MG/DL (ref 120–199)
CHOLEST/HDLC SERPL: 3.3 {RATIO} (ref 2–5)
CLARITY UR: CLEAR
CO2 SERPL-SCNC: 23 MMOL/L (ref 23–29)
COLOR UR: YELLOW
CREAT SERPL-MCNC: 0.8 MG/DL (ref 0.5–1.4)
DIFFERENTIAL METHOD: ABNORMAL
EOSINOPHIL # BLD AUTO: 0.2 K/UL (ref 0–0.5)
EOSINOPHIL NFR BLD: 4.6 % (ref 0–8)
ERYTHROCYTE [DISTWIDTH] IN BLOOD BY AUTOMATED COUNT: 13.5 % (ref 11.5–14.5)
EST. GFR  (AFRICAN AMERICAN): >60 ML/MIN/1.73 M^2
EST. GFR  (NON AFRICAN AMERICAN): >60 ML/MIN/1.73 M^2
ESTIMATED AVG GLUCOSE: 108 MG/DL (ref 68–131)
GLUCOSE SERPL-MCNC: 76 MG/DL (ref 70–110)
GLUCOSE UR QL STRIP: NEGATIVE
HBA1C MFR BLD: 5.4 % (ref 4–5.6)
HCT VFR BLD AUTO: 39.4 % (ref 40–54)
HDLC SERPL-MCNC: 44 MG/DL (ref 40–75)
HDLC SERPL: 30.3 % (ref 20–50)
HGB BLD-MCNC: 13.1 G/DL (ref 14–18)
HGB UR QL STRIP: ABNORMAL
HYALINE CASTS #/AREA URNS LPF: 0 /LPF
IMM GRANULOCYTES # BLD AUTO: 0 K/UL (ref 0–0.04)
IMM GRANULOCYTES NFR BLD AUTO: 0 % (ref 0–0.5)
KETONES UR QL STRIP: NEGATIVE
LDLC SERPL CALC-MCNC: 90.6 MG/DL (ref 63–159)
LEUKOCYTE ESTERASE UR QL STRIP: NEGATIVE
LYMPHOCYTES # BLD AUTO: 1.7 K/UL (ref 1–4.8)
LYMPHOCYTES NFR BLD: 41 % (ref 18–48)
MCH RBC QN AUTO: 30.3 PG (ref 27–31)
MCHC RBC AUTO-ENTMCNC: 33.2 G/DL (ref 32–36)
MCV RBC AUTO: 91 FL (ref 82–98)
MICROSCOPIC COMMENT: NORMAL
MONOCYTES # BLD AUTO: 0.4 K/UL (ref 0.3–1)
MONOCYTES NFR BLD: 9 % (ref 4–15)
NEUTROPHILS # BLD AUTO: 1.8 K/UL (ref 1.8–7.7)
NEUTROPHILS NFR BLD: 44.7 % (ref 38–73)
NITRITE UR QL STRIP: NEGATIVE
NONHDLC SERPL-MCNC: 101 MG/DL
NRBC BLD-RTO: 0 /100 WBC
PH UR STRIP: 6 [PH] (ref 5–8)
PLATELET # BLD AUTO: 203 K/UL (ref 150–450)
PMV BLD AUTO: 9.5 FL (ref 9.2–12.9)
POTASSIUM SERPL-SCNC: 3.7 MMOL/L (ref 3.5–5.1)
PROT SERPL-MCNC: 8.6 G/DL (ref 6–8.4)
PROT UR QL STRIP: ABNORMAL
RBC # BLD AUTO: 4.33 M/UL (ref 4.6–6.2)
RBC #/AREA URNS HPF: 2 /HPF (ref 0–4)
SODIUM SERPL-SCNC: 141 MMOL/L (ref 136–145)
SP GR UR STRIP: 1.02 (ref 1–1.03)
TRIGL SERPL-MCNC: 52 MG/DL (ref 30–150)
TSH SERPL DL<=0.005 MIU/L-ACNC: 2.65 UIU/ML (ref 0.4–4)
URATE SERPL-MCNC: 6.8 MG/DL (ref 3.4–7)
URN SPEC COLLECT METH UR: ABNORMAL
UROBILINOGEN UR STRIP-ACNC: NEGATIVE EU/DL
WBC # BLD AUTO: 4.12 K/UL (ref 3.9–12.7)
WBC #/AREA URNS HPF: 0 /HPF (ref 0–5)

## 2022-03-11 PROCEDURE — 82040 ASSAY OF SERUM ALBUMIN: CPT | Performed by: FAMILY MEDICINE

## 2022-03-11 PROCEDURE — 83036 HEMOGLOBIN GLYCOSYLATED A1C: CPT | Performed by: FAMILY MEDICINE

## 2022-03-11 PROCEDURE — 81000 URINALYSIS NONAUTO W/SCOPE: CPT | Performed by: FAMILY MEDICINE

## 2022-03-11 PROCEDURE — 87389 HIV-1 AG W/HIV-1&-2 AB AG IA: CPT | Performed by: FAMILY MEDICINE

## 2022-03-11 PROCEDURE — 85025 COMPLETE CBC W/AUTO DIFF WBC: CPT | Performed by: FAMILY MEDICINE

## 2022-03-11 PROCEDURE — 86803 HEPATITIS C AB TEST: CPT | Performed by: FAMILY MEDICINE

## 2022-03-11 PROCEDURE — 80061 LIPID PANEL: CPT | Performed by: FAMILY MEDICINE

## 2022-03-11 PROCEDURE — 87491 CHLMYD TRACH DNA AMP PROBE: CPT | Performed by: FAMILY MEDICINE

## 2022-03-11 PROCEDURE — 36415 COLL VENOUS BLD VENIPUNCTURE: CPT | Performed by: FAMILY MEDICINE

## 2022-03-11 PROCEDURE — 84443 ASSAY THYROID STIM HORMONE: CPT | Performed by: FAMILY MEDICINE

## 2022-03-11 PROCEDURE — 86592 SYPHILIS TEST NON-TREP QUAL: CPT | Performed by: FAMILY MEDICINE

## 2022-03-11 PROCEDURE — 80053 COMPREHEN METABOLIC PANEL: CPT | Performed by: FAMILY MEDICINE

## 2022-03-11 PROCEDURE — 87591 N.GONORRHOEAE DNA AMP PROB: CPT | Performed by: FAMILY MEDICINE

## 2022-03-11 PROCEDURE — 84550 ASSAY OF BLOOD/URIC ACID: CPT | Performed by: FAMILY MEDICINE

## 2022-03-12 LAB — RPR SER QL: NORMAL

## 2022-03-15 LAB
HCV AB SERPL QL IA: NEGATIVE
HIV 1+2 AB+HIV1 P24 AG SERPL QL IA: NEGATIVE

## 2022-03-16 LAB
C TRACH DNA SPEC QL NAA+PROBE: NOT DETECTED
N GONORRHOEA DNA SPEC QL NAA+PROBE: NOT DETECTED

## 2022-03-18 LAB
ALBUMIN SERPL-MCNC: 3.8 G/DL (ref 3.6–5.1)
SHBG SERPL-SCNC: 25 NMOL/L (ref 10–50)
TESTOST FREE SERPL-MCNC: 55.7 PG/ML (ref 46–224)
TESTOST SERPL-MCNC: 331 NG/DL (ref 250–1100)
TESTOSTERONE.FREE+WB SERPL-MCNC: 97.6 NG/DL (ref 110–575)

## 2022-04-20 DIAGNOSIS — I10 BENIGN HYPERTENSION: ICD-10-CM

## 2022-04-20 RX ORDER — AMLODIPINE BESYLATE 10 MG/1
10 TABLET ORAL DAILY
Qty: 90 TABLET | Refills: 3 | Status: SHIPPED | OUTPATIENT
Start: 2022-04-20 | End: 2023-04-26

## 2022-04-20 NOTE — TELEPHONE ENCOUNTER
No new care gaps identified.  Powered by [employer]. Reference number: [id number].   4/20/2022 8:41:24 AM CDT

## 2022-04-20 NOTE — TELEPHONE ENCOUNTER
Last Office Visit Info:   The patient's last visit with Bruce Terrell MD was on 3/10/2022.    The patient's last visit in current department was on 3/10/2022.        Last CBC Results:   Lab Results   Component Value Date    WBC 4.12 03/11/2022    HGB 13.1 (L) 03/11/2022    HCT 39.4 (L) 03/11/2022     03/11/2022       Last CMP Results  Lab Results   Component Value Date     03/11/2022    K 3.7 03/11/2022     03/11/2022    CO2 23 03/11/2022    BUN 13 03/11/2022    CREATININE 0.8 03/11/2022    CALCIUM 8.9 03/11/2022    ALBUMIN 3.5 03/11/2022    ALBUMIN 3.8 03/11/2022    AST 28 03/11/2022    ALT 25 03/11/2022       Last Lipids  Lab Results   Component Value Date    CHOL 145 03/11/2022    TRIG 52 03/11/2022    HDL 44 03/11/2022    LDLCALC 90.6 03/11/2022       Last A1C  Lab Results   Component Value Date    HGBA1C 5.4 03/11/2022       Last TSH  Lab Results   Component Value Date    TSH 2.650 03/11/2022             Current Med Refills  Medication List with Changes/Refills   Current Medications    ALBUTEROL (PROVENTIL/VENTOLIN HFA) 90 MCG/ACTUATION INHALER    INHALE 1 TO 2 PUFFS INTO THE LUNGS EVERY 6 HOURS AS NEEDED FOR WHEEZING       Start Date: 11/11/2021End Date: --    AMLODIPINE (NORVASC) 10 MG TABLET    Take 1 tablet (10 mg total) by mouth once daily.       Start Date: 4/14/2021 End Date: --    CYCLOBENZAPRINE (FLEXERIL) 10 MG TABLET    Take 10 mg by mouth nightly as needed.       Start Date: 11/12/2021End Date: --    DICLOFENAC SODIUM (VOLTAREN) 1 % GEL    Apply 2 g topically 4 (four) times daily.       Start Date: 2/6/2020  End Date: --    ECONAZOLE NITRATE 1 % CREAM    ANTIONE EXT TO THE AFFECTED AND SURROUNDING AREAS OF SKIN 1 TIME PER DAY       Start Date: 11/18/2016End Date: --    FLUTICASONE FUROATE-VILANTEROL (BREO ELLIPTA) 100-25 MCG/DOSE DISKUS INHALER    Inhale 1 puff into the lungs once daily. Controller       Start Date: 4/14/2021 End Date: --    FLUTICASONE PROPIONATE (FLONASE) 50  MCG/ACTUATION NASAL SPRAY    2 sprays (100 mcg total) by Each Nostril route once daily.       Start Date: 2022 End Date: --    GABAPENTIN (NEURONTIN) 100 MG CAPSULE    Take 1 capsule (100 mg total) by mouth 2 (two) times daily.       Start Date: 2021  End Date: 2022    GABAPENTIN (NEURONTIN) 300 MG CAPSULE    Take 2 capsules (600 mg total) by mouth 2 (two) times daily.       Start Date: 3/10/2022 End Date: 3/10/2023    HYDROXYCHLOROQUINE (PLAQUENIL) 200 MG TABLET    Take 2 tablets (400 mg total) by mouth once daily.       Start Date: 2022 End Date: --    LORATADINE (CLARITIN) 10 MG TABLET    Take 1 tablet (10 mg total) by mouth once daily.       Start Date: 6/3/2021  End Date: --    MELOXICAM (MOBIC) 15 MG TABLET    TAKE 1 TABLET(15 MG) BY MOUTH EVERY DAY       Start Date: 2020End Date: --    METHOCARBAMOL (ROBAXIN) 500 MG TAB    SMARTSI Tablet(s) By Mouth Every Evening PRN       Start Date: 2021 End Date: --    PANTOPRAZOLE (PROTONIX) 40 MG TABLET    Take 1 tablet (40 mg total) by mouth once daily.       Start Date: 2020End Date: 2021    PREDNISONE (DELTASONE) 50 MG TAB    Take 1 tablet (50 mg total) by mouth once daily.       Start Date: 3/10/2022 End Date: --    SILDENAFIL (VIAGRA) 100 MG TABLET    Take 1 tablet (100 mg total) by mouth daily as needed for Erectile Dysfunction.       Start Date: 2021  End Date: 2022    TRIAMCINOLONE ACETONIDE 0.1% (KENALOG) 0.1 % CREAM    Apply topically 2 (two) times daily.       Start Date: 2019 End Date: --       Order(s) placed per written order guidelines:     Please advise.

## 2022-04-26 ENCOUNTER — OFFICE VISIT (OUTPATIENT)
Dept: FAMILY MEDICINE | Facility: CLINIC | Age: 52
End: 2022-04-26
Payer: COMMERCIAL

## 2022-04-26 VITALS
OXYGEN SATURATION: 97 % | RESPIRATION RATE: 18 BRPM | HEIGHT: 64 IN | TEMPERATURE: 98 F | DIASTOLIC BLOOD PRESSURE: 84 MMHG | HEART RATE: 82 BPM | WEIGHT: 191 LBS | BODY MASS INDEX: 32.61 KG/M2 | SYSTOLIC BLOOD PRESSURE: 138 MMHG

## 2022-04-26 DIAGNOSIS — R21 RASH: Primary | ICD-10-CM

## 2022-04-26 DIAGNOSIS — Z12.11 ENCOUNTER FOR SCREENING COLONOSCOPY: ICD-10-CM

## 2022-04-26 DIAGNOSIS — Z71.2 ENCOUNTER TO DISCUSS TEST RESULTS: ICD-10-CM

## 2022-04-26 PROCEDURE — 3044F PR MOST RECENT HEMOGLOBIN A1C LEVEL <7.0%: ICD-10-PCS | Mod: CPTII,S$GLB,, | Performed by: INTERNAL MEDICINE

## 2022-04-26 PROCEDURE — 3075F PR MOST RECENT SYSTOLIC BLOOD PRESS GE 130-139MM HG: ICD-10-PCS | Mod: CPTII,S$GLB,, | Performed by: INTERNAL MEDICINE

## 2022-04-26 PROCEDURE — 1159F PR MEDICATION LIST DOCUMENTED IN MEDICAL RECORD: ICD-10-PCS | Mod: CPTII,S$GLB,, | Performed by: INTERNAL MEDICINE

## 2022-04-26 PROCEDURE — 3075F SYST BP GE 130 - 139MM HG: CPT | Mod: CPTII,S$GLB,, | Performed by: INTERNAL MEDICINE

## 2022-04-26 PROCEDURE — 1160F PR REVIEW ALL MEDS BY PRESCRIBER/CLIN PHARMACIST DOCUMENTED: ICD-10-PCS | Mod: CPTII,S$GLB,, | Performed by: INTERNAL MEDICINE

## 2022-04-26 PROCEDURE — 3079F PR MOST RECENT DIASTOLIC BLOOD PRESSURE 80-89 MM HG: ICD-10-PCS | Mod: CPTII,S$GLB,, | Performed by: INTERNAL MEDICINE

## 2022-04-26 PROCEDURE — 3008F BODY MASS INDEX DOCD: CPT | Mod: CPTII,S$GLB,, | Performed by: INTERNAL MEDICINE

## 2022-04-26 PROCEDURE — 1159F MED LIST DOCD IN RCRD: CPT | Mod: CPTII,S$GLB,, | Performed by: INTERNAL MEDICINE

## 2022-04-26 PROCEDURE — 99214 PR OFFICE/OUTPT VISIT, EST, LEVL IV, 30-39 MIN: ICD-10-PCS | Mod: S$GLB,,, | Performed by: INTERNAL MEDICINE

## 2022-04-26 PROCEDURE — 3079F DIAST BP 80-89 MM HG: CPT | Mod: CPTII,S$GLB,, | Performed by: INTERNAL MEDICINE

## 2022-04-26 PROCEDURE — 3044F HG A1C LEVEL LT 7.0%: CPT | Mod: CPTII,S$GLB,, | Performed by: INTERNAL MEDICINE

## 2022-04-26 PROCEDURE — 99214 OFFICE O/P EST MOD 30 MIN: CPT | Mod: S$GLB,,, | Performed by: INTERNAL MEDICINE

## 2022-04-26 PROCEDURE — 99999 PR PBB SHADOW E&M-EST. PATIENT-LVL V: CPT | Mod: PBBFAC,,, | Performed by: INTERNAL MEDICINE

## 2022-04-26 PROCEDURE — 99999 PR PBB SHADOW E&M-EST. PATIENT-LVL V: ICD-10-PCS | Mod: PBBFAC,,, | Performed by: INTERNAL MEDICINE

## 2022-04-26 PROCEDURE — 1160F RVW MEDS BY RX/DR IN RCRD: CPT | Mod: CPTII,S$GLB,, | Performed by: INTERNAL MEDICINE

## 2022-04-26 PROCEDURE — 3008F PR BODY MASS INDEX (BMI) DOCUMENTED: ICD-10-PCS | Mod: CPTII,S$GLB,, | Performed by: INTERNAL MEDICINE

## 2022-04-26 RX ORDER — BETAMETHASONE DIPROPIONATE 0.5 MG/G
OINTMENT TOPICAL 2 TIMES DAILY
Qty: 15 G | Refills: 2 | Status: SHIPPED | OUTPATIENT
Start: 2022-04-26 | End: 2023-03-22

## 2022-04-26 NOTE — PROGRESS NOTES
SUBJECTIVE     Chief Complaint   Patient presents with    Rash     Face       Results       HPI  Ronal Ham is a 51 y.o. male with multiple medical diagnoses as listed in the medical history and problem list that presents for evaluation of a rash on his face x 6 months. Pt reports dry, flaky skin around the nose bilaterally. He sometimes gets pus bumps in the area, so has been applying an unknown cream and the skin turns red.  Patient denies any fevers, chills, or night sweats.    PAST MEDICAL HISTORY:  Past Medical History:   Diagnosis Date    Arthritis     Eye injury     od hit above od    GERD (gastroesophageal reflux disease)     Hypertension     Lupus (systemic lupus erythematosus)     Pulmonary fibrosis     Raynaud phenomenon        PAST SURGICAL HISTORY:  Past Surgical History:   Procedure Laterality Date    HERNIA REPAIR      TRANSFORAMINAL EPIDURAL INJECTION OF STEROID Bilateral 6/2/2021    Procedure: LUMBAR TRANSFORAMINAL BILATERAL L4/5 DIRECT REFERRAL;  Surgeon: Blayne Garcia MD;  Location: [facility];  Service: Pain Management;  Laterality: Bilateral;  NEEDS CONSENT       SOCIAL HISTORY:  Social History     Socioeconomic History    Marital status:     Number of children: 5    Years of education: some colle   Occupational History    Occupation: [employer]      Employer: [employer]   Tobacco Use    Smoking status: Never Smoker    Smokeless tobacco: Never Used   Substance and Sexual Activity    Alcohol use: No    Drug use: No    Sexual activity: Yes     Partners: Female   Social History Narrative    Adult Screenings updated and reviewed    Mammogram( for females)    Pap ( for females)    PSA( for males )    Colonoscopy age  48    Flu shot yearly    Td     Pneumovax recommended one time  at age  65    Zostavax recommended one time at  age  58    Eye exam recommended yearly    Bone density        FAMILY HISTORY:  Family History   Problem Relation Age  of Onset    Heart disease Mother     Heart disease Father     Glaucoma Father     Glaucoma Brother     No Known Problems Sister     No Known Problems Daughter     No Known Problems Son     No Known Problems Maternal Aunt     No Known Problems Maternal Uncle     No Known Problems Paternal Aunt     No Known Problems Paternal Uncle     No Known Problems Maternal Grandmother     No Known Problems Maternal Grandfather     No Known Problems Paternal Grandmother     No Known Problems Paternal Grandfather     Asthma Neg Hx     Emphysema Neg Hx     Amblyopia Neg Hx     Blindness Neg Hx     Cancer Neg Hx     Cataracts Neg Hx     Diabetes Neg Hx     Hypertension Neg Hx     Macular degeneration Neg Hx     Retinal detachment Neg Hx     Strabismus Neg Hx     Stroke Neg Hx     Thyroid disease Neg Hx        ALLERGIES AND MEDICATIONS: updated and reviewed.  Review of patient's allergies indicates:   Allergen Reactions    Carafate  [sucralfate] Rash     Other reaction(s): Hives     Current Outpatient Medications   Medication Sig Dispense Refill    albuterol (PROVENTIL/VENTOLIN HFA) 90 mcg/actuation inhaler INHALE 1 TO 2 PUFFS INTO THE LUNGS EVERY 6 HOURS AS NEEDED FOR WHEEZING 25.5 g 0    amLODIPine (NORVASC) 10 MG tablet Take 1 tablet (10 mg total) by mouth once daily. 90 tablet 3    cyclobenzaprine (FLEXERIL) 10 MG tablet Take 10 mg by mouth nightly as needed.      diclofenac sodium (VOLTAREN) 1 % Gel Apply 2 g topically 4 (four) times daily. 100 g 0    econazole nitrate 1 % cream ANTIONE EXT TO THE AFFECTED AND SURROUNDING AREAS OF SKIN 1 TIME PER DAY  12    fluticasone furoate-vilanteroL (BREO ELLIPTA) 100-25 mcg/dose diskus inhaler Inhale 1 puff into the lungs once daily. Controller 60 each 11    fluticasone propionate (FLONASE) 50 mcg/actuation nasal spray 2 sprays (100 mcg total) by Each Nostril route once daily. 16 g 5    gabapentin (NEURONTIN) 300 MG capsule Take 2 capsules (600 mg total) by  mouth 2 (two) times daily. 120 capsule 11    hydrOXYchloroQUINE (PLAQUENIL) 200 mg tablet Take 2 tablets (400 mg total) by mouth once daily. 180 tablet 3    loratadine (CLARITIN) 10 mg tablet Take 1 tablet (10 mg total) by mouth once daily. 30 tablet 11    pantoprazole (PROTONIX) 40 MG tablet Take 1 tablet (40 mg total) by mouth once daily. 90 tablet 3    predniSONE (DELTASONE) 50 MG Tab Take 1 tablet (50 mg total) by mouth once daily. 7 tablet 0    sildenafiL (VIAGRA) 100 MG tablet Take 1 tablet (100 mg total) by mouth daily as needed for Erectile Dysfunction. 10 tablet 5    triamcinolone acetonide 0.1% (KENALOG) 0.1 % cream Apply topically 2 (two) times daily. 60 g 0    betamethasone dipropionate (DIPROLENE) 0.05 % ointment Apply topically 2 (two) times daily. for 7 days 15 g 2    gabapentin (NEURONTIN) 100 MG capsule Take 1 capsule (100 mg total) by mouth 2 (two) times daily. (Patient not taking: Reported on 2022) 60 capsule 11    meloxicam (MOBIC) 15 MG tablet TAKE 1 TABLET(15 MG) BY MOUTH EVERY DAY (Patient not taking: Reported on 2022) 90 tablet 1    methocarbamoL (ROBAXIN) 500 MG Tab SMARTSI Tablet(s) By Mouth Every Evening PRN       No current facility-administered medications for this visit.       ROS  Review of Systems   Constitutional: Negative for chills and fever.   HENT: Negative for hearing loss and sore throat.    Eyes: Negative for visual disturbance.   Respiratory: Negative for cough and shortness of breath.    Cardiovascular: Negative for chest pain, palpitations and leg swelling.   Gastrointestinal: Negative for abdominal pain, constipation, diarrhea, nausea and vomiting.   Genitourinary: Negative for dysuria, frequency and urgency.   Musculoskeletal: Negative for arthralgias, joint swelling and myalgias.   Skin: Positive for rash. Negative for wound.   Neurological: Negative for headaches.   Psychiatric/Behavioral: Negative for agitation and confusion. The patient is not  "nervous/anxious.          OBJECTIVE     Physical Exam  Vitals:    04/26/22 0856   BP: 138/84   Pulse: 82   Resp: 18   Temp: 98.1 °F (36.7 °C)    Body mass index is 32.79 kg/m².  Weight: 86.6 kg (191 lb)   Height: 5' 4" (162.6 cm)     Physical Exam  Constitutional:       General: He is not in acute distress.     Appearance: He is well-developed.   HENT:      Head: Normocephalic and atraumatic.      Right Ear: External ear normal.      Left Ear: External ear normal.      Nose: Nose normal.   Eyes:      General: No scleral icterus.        Right eye: No discharge.         Left eye: No discharge.      Conjunctiva/sclera: Conjunctivae normal.   Neck:      Vascular: No JVD.      Trachea: No tracheal deviation.   Cardiovascular:      Rate and Rhythm: Normal rate and regular rhythm.      Heart sounds: Normal heart sounds. No murmur heard.    No friction rub. No gallop.   Pulmonary:      Effort: Pulmonary effort is normal. No respiratory distress.      Breath sounds: Normal breath sounds. No wheezing.   Abdominal:      General: Bowel sounds are normal. There is no distension.      Palpations: Abdomen is soft. There is no mass.      Tenderness: There is no abdominal tenderness. There is no guarding or rebound.   Musculoskeletal:         General: No tenderness or deformity. Normal range of motion.      Cervical back: Normal range of motion and neck supple.   Skin:     General: Skin is warm and dry.      Findings: Erythema (skin surrounding nose bilaterally with mild erythema) present. No rash.   Neurological:      Mental Status: He is alert and oriented to person, place, and time.      Motor: No abnormal muscle tone.      Coordination: Coordination normal.   Psychiatric:         Behavior: Behavior normal.         Thought Content: Thought content normal.         Judgment: Judgment normal.           Health Maintenance       Date Due Completion Date    Shingles Vaccine (1 of 2) Never done ---    TETANUS VACCINE 06/02/2020 6/2/2010 "    Influenza Vaccine (1) 09/01/2021 12/11/2017    Colorectal Cancer Screening 01/05/2022 1/5/2021    COVID-19 Vaccine (3 - Booster for Pfizer series) 02/21/2022 9/21/2021    Lipid Panel 03/11/2023 3/11/2022            ASSESSMENT     51 y.o. male with     1. Rash    2. Encounter to discuss test results    3. Encounter for screening colonoscopy        PLAN:     1. Rash  - Will plan for steroid ointment x 7 days  - betamethasone dipropionate (DIPROLENE) 0.05 % ointment; Apply topically 2 (two) times daily. for 7 days  Dispense: 15 g; Refill: 2    2. Encounter to discuss test results  - Discussed recent lab results  - All questions/concerns addressed  - Pt voiced understanding    3. Encounter for screening colonoscopy  - Case Request Endoscopy: COLONOSCOPY        RTC in 1-2 weeks as needed for any acute worsening of current condition or failure to improve       Ramila Hurt MD  04/26/2022 9:20 AM        No follow-ups on file.

## 2022-05-04 DIAGNOSIS — J20.8 ACUTE BACTERIAL BRONCHITIS: ICD-10-CM

## 2022-05-04 DIAGNOSIS — B96.89 ACUTE BACTERIAL BRONCHITIS: ICD-10-CM

## 2022-05-04 NOTE — TELEPHONE ENCOUNTER
No new care gaps identified.  [facility] Embedded Care Gaps. Reference number: [id number]. 5/04/2022   6:06:41 PM CDT

## 2022-05-05 RX ORDER — ALBUTEROL SULFATE 90 UG/1
AEROSOL, METERED RESPIRATORY (INHALATION)
Qty: 25.5 G | Refills: 0 | Status: SHIPPED | OUTPATIENT
Start: 2022-05-05 | End: 2022-12-28

## 2022-05-05 NOTE — TELEPHONE ENCOUNTER
Refill Routing Note   Medication(s) are not appropriate for processing by [facility] for the following reason(s):      - Indication is outside of scope for [facility]    [facility] action(s):  Defer          Medication reconciliation completed: No     Appointments  past 12m or future 3m with PCP    Date Provider   Last Visit   3/10/2022 Bruce Terrell MD   Next Visit   Visit date not found Bruce Terrell MD   ED visits in past 90 days: 0        Note composed:10:13 AM 05/05/2022

## 2022-05-06 ENCOUNTER — TELEPHONE (OUTPATIENT)
Dept: ENDOSCOPY | Facility: HOSPITAL | Age: 52
End: 2022-05-06
Payer: COMMERCIAL

## 2022-05-06 ENCOUNTER — OFFICE VISIT (OUTPATIENT)
Dept: FAMILY MEDICINE | Facility: CLINIC | Age: 52
End: 2022-05-06
Payer: COMMERCIAL

## 2022-05-06 VITALS
HEIGHT: 64 IN | SYSTOLIC BLOOD PRESSURE: 130 MMHG | BODY MASS INDEX: 32.9 KG/M2 | WEIGHT: 192.69 LBS | RESPIRATION RATE: 16 BRPM | DIASTOLIC BLOOD PRESSURE: 84 MMHG | TEMPERATURE: 99 F | OXYGEN SATURATION: 98 % | HEART RATE: 80 BPM

## 2022-05-06 DIAGNOSIS — J40 SINOBRONCHITIS: Primary | ICD-10-CM

## 2022-05-06 DIAGNOSIS — J84.9 INTERSTITIAL LUNG DISEASE: ICD-10-CM

## 2022-05-06 DIAGNOSIS — K21.9 GASTROESOPHAGEAL REFLUX DISEASE: ICD-10-CM

## 2022-05-06 DIAGNOSIS — R05.9 COUGH: ICD-10-CM

## 2022-05-06 DIAGNOSIS — J32.9 SINOBRONCHITIS: Primary | ICD-10-CM

## 2022-05-06 PROCEDURE — 99214 PR OFFICE/OUTPT VISIT, EST, LEVL IV, 30-39 MIN: ICD-10-PCS | Mod: S$GLB,,, | Performed by: NURSE PRACTITIONER

## 2022-05-06 PROCEDURE — 3079F PR MOST RECENT DIASTOLIC BLOOD PRESSURE 80-89 MM HG: ICD-10-PCS | Mod: CPTII,S$GLB,, | Performed by: NURSE PRACTITIONER

## 2022-05-06 PROCEDURE — 99214 OFFICE O/P EST MOD 30 MIN: CPT | Mod: S$GLB,,, | Performed by: NURSE PRACTITIONER

## 2022-05-06 PROCEDURE — 3008F PR BODY MASS INDEX (BMI) DOCUMENTED: ICD-10-PCS | Mod: CPTII,S$GLB,, | Performed by: NURSE PRACTITIONER

## 2022-05-06 PROCEDURE — 3044F HG A1C LEVEL LT 7.0%: CPT | Mod: CPTII,S$GLB,, | Performed by: NURSE PRACTITIONER

## 2022-05-06 PROCEDURE — 3008F BODY MASS INDEX DOCD: CPT | Mod: CPTII,S$GLB,, | Performed by: NURSE PRACTITIONER

## 2022-05-06 PROCEDURE — 1160F RVW MEDS BY RX/DR IN RCRD: CPT | Mod: CPTII,S$GLB,, | Performed by: NURSE PRACTITIONER

## 2022-05-06 PROCEDURE — 99999 PR PBB SHADOW E&M-EST. PATIENT-LVL IV: ICD-10-PCS | Mod: PBBFAC,,, | Performed by: NURSE PRACTITIONER

## 2022-05-06 PROCEDURE — 1159F PR MEDICATION LIST DOCUMENTED IN MEDICAL RECORD: ICD-10-PCS | Mod: CPTII,S$GLB,, | Performed by: NURSE PRACTITIONER

## 2022-05-06 PROCEDURE — 1159F MED LIST DOCD IN RCRD: CPT | Mod: CPTII,S$GLB,, | Performed by: NURSE PRACTITIONER

## 2022-05-06 PROCEDURE — U0005 INFEC AGEN DETEC AMPLI PROBE: HCPCS | Performed by: NURSE PRACTITIONER

## 2022-05-06 PROCEDURE — U0003 INFECTIOUS AGENT DETECTION BY NUCLEIC ACID (DNA OR RNA); SEVERE ACUTE RESPIRATORY SYNDROME CORONAVIRUS 2 (SARS-COV-2) (CORONAVIRUS DISEASE [COVID-19]), AMPLIFIED PROBE TECHNIQUE, MAKING USE OF HIGH THROUGHPUT TECHNOLOGIES AS DESCRIBED BY CMS-2020-01-R: HCPCS | Performed by: NURSE PRACTITIONER

## 2022-05-06 PROCEDURE — 3075F SYST BP GE 130 - 139MM HG: CPT | Mod: CPTII,S$GLB,, | Performed by: NURSE PRACTITIONER

## 2022-05-06 PROCEDURE — 1160F PR REVIEW ALL MEDS BY PRESCRIBER/CLIN PHARMACIST DOCUMENTED: ICD-10-PCS | Mod: CPTII,S$GLB,, | Performed by: NURSE PRACTITIONER

## 2022-05-06 PROCEDURE — 3044F PR MOST RECENT HEMOGLOBIN A1C LEVEL <7.0%: ICD-10-PCS | Mod: CPTII,S$GLB,, | Performed by: NURSE PRACTITIONER

## 2022-05-06 PROCEDURE — 99999 PR PBB SHADOW E&M-EST. PATIENT-LVL IV: CPT | Mod: PBBFAC,,, | Performed by: NURSE PRACTITIONER

## 2022-05-06 PROCEDURE — 3079F DIAST BP 80-89 MM HG: CPT | Mod: CPTII,S$GLB,, | Performed by: NURSE PRACTITIONER

## 2022-05-06 PROCEDURE — 3075F PR MOST RECENT SYSTOLIC BLOOD PRESS GE 130-139MM HG: ICD-10-PCS | Mod: CPTII,S$GLB,, | Performed by: NURSE PRACTITIONER

## 2022-05-06 RX ORDER — HYDROCODONE BITARTRATE AND ACETAMINOPHEN 5; 325 MG/1; MG/1
1 TABLET ORAL NIGHTLY PRN
COMMUNITY
Start: 2022-04-26 | End: 2023-03-22

## 2022-05-06 RX ORDER — PROMETHAZINE HYDROCHLORIDE AND DEXTROMETHORPHAN HYDROBROMIDE 6.25; 15 MG/5ML; MG/5ML
5 SYRUP ORAL EVERY 4 HOURS PRN
Qty: 480 ML | Refills: 0 | Status: SHIPPED | OUTPATIENT
Start: 2022-05-06 | End: 2022-09-09 | Stop reason: SDUPTHER

## 2022-05-06 RX ORDER — PANTOPRAZOLE SODIUM 40 MG/1
40 TABLET, DELAYED RELEASE ORAL DAILY
Qty: 90 TABLET | Refills: 3 | Status: SHIPPED | OUTPATIENT
Start: 2022-05-06 | End: 2023-10-09

## 2022-05-06 RX ORDER — FLUTICASONE FUROATE AND VILANTEROL TRIFENATATE 100; 25 UG/1; UG/1
1 POWDER RESPIRATORY (INHALATION) DAILY
Qty: 60 EACH | Refills: 11 | Status: SHIPPED | OUTPATIENT
Start: 2022-05-06 | End: 2022-11-16 | Stop reason: ALTCHOICE

## 2022-05-06 RX ORDER — PREGABALIN 75 MG/1
75 CAPSULE ORAL 2 TIMES DAILY
COMMUNITY
Start: 2022-04-26 | End: 2023-03-22

## 2022-05-06 NOTE — PROGRESS NOTES
Subjective:      Patient ID: Ronal Ham is a 51 y.o. male.  New to me but seen previously in clinic by a fellow provider. Pt with hx of interstitial lung disease presents with acute sinus symptoms that began a few days ago.   URI   This is a new problem. The current episode started in the past 7 days. The problem has been gradually worsening. There has been no fever. Associated symptoms include congestion, coughing, a plugged ear sensation, rhinorrhea, sinus pain, sneezing, a sore throat and wheezing. Pertinent negatives include no abdominal pain, chest pain, diarrhea, dysuria, ear pain, headaches, joint pain, joint swelling, nausea, neck pain, rash, swollen glands or vomiting. Treatments tried: OTC cough suppresant. The treatment provided no relief.     Review of Systems   Constitutional: Negative for activity change, appetite change, fever and unexpected weight change.   HENT: Positive for nasal congestion, postnasal drip, rhinorrhea, sinus pressure/congestion, sneezing and sore throat. Negative for ear pain and voice change.    Eyes: Negative for pain and visual disturbance.   Respiratory: Positive for cough, chest tightness, shortness of breath and wheezing.    Cardiovascular: Negative for chest pain, palpitations and leg swelling.   Gastrointestinal: Negative for abdominal pain, constipation, diarrhea, nausea and vomiting.   Endocrine: Negative.    Genitourinary: Negative for difficulty urinating and dysuria.   Musculoskeletal: Negative for arthralgias, gait problem, joint pain, myalgias, neck pain and neck stiffness.   Integumentary:  Negative for rash.   Allergic/Immunologic: Negative.    Neurological: Negative for speech difficulty and headaches.   Hematological: Negative.    Psychiatric/Behavioral: Negative.    All other systems reviewed and are negative.        Objective:     Vitals:    05/06/22 1336   BP: 130/84   Pulse: 80   Resp: 16   Temp: 98.5 °F (36.9 °C)   TempSrc: Oral   SpO2: 98%  "  Weight: 87.4 kg (192 lb 10.9 oz)   Height: 5' 4" (1.626 m)     Physical Exam  Vitals and nursing note reviewed.   Constitutional:       General: He is not in acute distress.     Appearance: Normal appearance. He is well-developed, well-groomed and overweight. He is not ill-appearing.   HENT:      Head: Normocephalic and atraumatic.      Right Ear: Ear canal and external ear normal. Tympanic membrane is injected.      Left Ear: Ear canal and external ear normal. Tympanic membrane is injected.      Nose: Mucosal edema, congestion and rhinorrhea present. Rhinorrhea is clear.      Mouth/Throat:      Lips: Pink.      Mouth: Mucous membranes are moist.      Pharynx: Oropharynx is clear. Uvula midline.   Eyes:      General: Lids are normal. Vision grossly intact. Gaze aligned appropriately.      Conjunctiva/sclera: Conjunctivae normal.      Pupils: Pupils are equal, round, and reactive to light.   Neck:      Trachea: Phonation normal.   Cardiovascular:      Rate and Rhythm: Normal rate and regular rhythm.      Pulses: Normal pulses.      Heart sounds: Normal heart sounds.   Pulmonary:      Effort: Pulmonary effort is normal. No accessory muscle usage or respiratory distress.      Breath sounds: Normal air entry. Rhonchi present. No decreased breath sounds, wheezing or rales.   Abdominal:      General: Abdomen is flat. Bowel sounds are normal. There is no distension.      Palpations: Abdomen is soft.      Tenderness: There is no abdominal tenderness.   Musculoskeletal:      Cervical back: Neck supple.      Right lower leg: No edema.      Left lower leg: No edema.   Skin:     General: Skin is warm and dry.      Findings: No rash.   Neurological:      General: No focal deficit present.      Mental Status: He is alert and oriented to person, place, and time. Mental status is at baseline.      Cranial Nerves: Cranial nerves are intact.      Sensory: Sensation is intact.      Motor: Motor function is intact.      Coordination: " Coordination is intact.      Gait: Gait is intact.   Psychiatric:         Attention and Perception: Attention and perception normal.         Mood and Affect: Mood and affect normal.         Speech: Speech normal.         Behavior: Behavior normal. Behavior is cooperative.         Thought Content: Thought content normal.         Cognition and Memory: Cognition and memory normal.         Judgment: Judgment normal.       Assessment and Plan:     1. Sinobronchitis  Symptomatic therapy suggested: rest, increase fluids, gargle prn for sore throat, apply heat to sinuses prn, use mist of vaporizer prn, OTC acetaminophen, ibuprofen, antihistamine-decongestant of choice and call prn if symptoms persist or worsen. Call or return to clinic prn if these symptoms worsen or fail to improve as anticipated.  - promethazine-dextromethorphan (PROMETHAZINE-DM) 6.25-15 mg/5 mL Syrp; Take 5 mLs by mouth every 4 (four) hours as needed (cough and congestion).  Dispense: 480 mL; Refill: 0    2. Interstitial lung disease  Discussed distinction between quick-relief and controlled medications.  Discussed medication dosage, use, side effects, and goals of treatment in detail.    Warning signs of respiratory distress were reviewed with the patient.   Discussed technique for using MDIs and/or nebulizer.  Discussed monitoring symptoms and use of quick-relief medications and contacting us early in the course of exacerbations.  - fluticasone furoate-vilanteroL (BREO ELLIPTA) 100-25 mcg/dose diskus inhaler; Inhale 1 puff into the lungs once daily. Controller  Dispense: 60 each; Refill: 11    3. Gastroesophageal reflux disease  The pathophysiology of reflux is discussed.  Anti-reflux measures such as raising the head of the bed, avoiding tight clothing or belts, avoiding eating late at night and not lying down shortly after mealtime and achieving weight loss are discussed. Avoid ASA, NSAID's, caffeine, peppermints, alcohol and tobacco. OTC H2 blockers  and/or antacids are often very helpful for PRN use. However, for persisting chronic or daily symptoms, prescription strength H2 blockers or a trial of PPI's are often used. Further recommendations to her: Rx for PPI is written. She should alert me if there are persistent symptoms, dysphagia, weight loss or GI bleeding.  - pantoprazole (PROTONIX) 40 MG tablet; Take 1 tablet (40 mg total) by mouth once daily.  Dispense: 90 tablet; Refill: 3           URSZULA Crooks, FNP-C  Family/Internal Medicine  [facility]

## 2022-05-06 NOTE — TELEPHONE ENCOUNTER
Spoke with patient. Pt wanted to get scheduled for a colonoscopy in [place].  Phone number provided.

## 2022-05-08 LAB — SARS-COV-2 RNA RESP QL NAA+PROBE: NOT DETECTED

## 2022-06-14 DIAGNOSIS — Z12.11 SPECIAL SCREENING FOR MALIGNANT NEOPLASMS, COLON: Primary | ICD-10-CM

## 2022-06-14 RX ORDER — SODIUM, POTASSIUM,MAG SULFATES 17.5-3.13G
1 SOLUTION, RECONSTITUTED, ORAL ORAL DAILY
Qty: 1 KIT | Refills: 0 | Status: SHIPPED | OUTPATIENT
Start: 2022-06-14 | End: 2022-06-16

## 2022-06-16 ENCOUNTER — OFFICE VISIT (OUTPATIENT)
Dept: ORTHOPEDICS | Facility: CLINIC | Age: 52
End: 2022-06-16
Payer: COMMERCIAL

## 2022-06-16 DIAGNOSIS — M17.0 PRIMARY OSTEOARTHRITIS OF BOTH KNEES: Primary | ICD-10-CM

## 2022-06-16 PROCEDURE — 99999 PR PBB SHADOW E&M-EST. PATIENT-LVL III: CPT | Mod: PBBFAC,,, | Performed by: NURSE PRACTITIONER

## 2022-06-16 PROCEDURE — 99999 PR PBB SHADOW E&M-EST. PATIENT-LVL III: ICD-10-PCS | Mod: PBBFAC,,, | Performed by: NURSE PRACTITIONER

## 2022-06-16 PROCEDURE — 1159F MED LIST DOCD IN RCRD: CPT | Mod: CPTII,S$GLB,, | Performed by: NURSE PRACTITIONER

## 2022-06-16 PROCEDURE — 3044F HG A1C LEVEL LT 7.0%: CPT | Mod: CPTII,S$GLB,, | Performed by: NURSE PRACTITIONER

## 2022-06-16 PROCEDURE — 99213 PR OFFICE/OUTPT VISIT, EST, LEVL III, 20-29 MIN: ICD-10-PCS | Mod: 25,S$GLB,, | Performed by: NURSE PRACTITIONER

## 2022-06-16 PROCEDURE — 99213 OFFICE O/P EST LOW 20 MIN: CPT | Mod: 25,S$GLB,, | Performed by: NURSE PRACTITIONER

## 2022-06-16 PROCEDURE — 1160F RVW MEDS BY RX/DR IN RCRD: CPT | Mod: CPTII,S$GLB,, | Performed by: NURSE PRACTITIONER

## 2022-06-16 PROCEDURE — 1159F PR MEDICATION LIST DOCUMENTED IN MEDICAL RECORD: ICD-10-PCS | Mod: CPTII,S$GLB,, | Performed by: NURSE PRACTITIONER

## 2022-06-16 PROCEDURE — 3044F PR MOST RECENT HEMOGLOBIN A1C LEVEL <7.0%: ICD-10-PCS | Mod: CPTII,S$GLB,, | Performed by: NURSE PRACTITIONER

## 2022-06-16 PROCEDURE — 20610 DRAIN/INJ JOINT/BURSA W/O US: CPT | Mod: 50,S$GLB,, | Performed by: NURSE PRACTITIONER

## 2022-06-16 PROCEDURE — 1160F PR REVIEW ALL MEDS BY PRESCRIBER/CLIN PHARMACIST DOCUMENTED: ICD-10-PCS | Mod: CPTII,S$GLB,, | Performed by: NURSE PRACTITIONER

## 2022-06-16 PROCEDURE — 20610 PR DRAIN/INJECT LARGE JOINT/BURSA: ICD-10-PCS | Mod: 50,S$GLB,, | Performed by: NURSE PRACTITIONER

## 2022-06-16 RX ADMIN — TRIAMCINOLONE ACETONIDE 80 MG: 40 INJECTION, SUSPENSION INTRA-ARTICULAR; INTRAMUSCULAR at 10:06

## 2022-06-19 RX ORDER — TRIAMCINOLONE ACETONIDE 40 MG/ML
80 INJECTION, SUSPENSION INTRA-ARTICULAR; INTRAMUSCULAR
Status: COMPLETED | OUTPATIENT
Start: 2022-06-16 | End: 2022-06-16

## 2022-06-20 NOTE — PROGRESS NOTES
CC: Follow-up of the Right Knee and Follow-up of the Left Knee      HPI: Pt with c/o bilateral knee pain for the past few weeks. The pain is aching and medial. It is worse with increased activity such as cutting the grass. He has been seen in the past and has had cortisone and gel injections with good relief. He would like to repeat the gel injections again. He was last seen by me in May 2021 and had a right knee cortisone injection which helped his pain until now. He has been seeing back and spine for other issues since then. He is ambulating without assistive device. There is not a limp..    ROS  General: denies fever and chills  Resp: no c/o sob  CVS: no c/o cp  MSK: c/o bilateral knee pain    PE  General: AAOx3, pleasant and cooperative  Resp: respirations even and unlabored  MSK: bilateral knee exam  0 degrees extension  120 degrees flexion  No warmth or erythema   - effusion  + crepitus  + tenderness over the medial joint line  5/5 quad strength    Assessment:  Bilateral knee djd    Plan:  Cortisone injections bilateral knees today  HA injections once approved by insurance  RICE  nsaids prn  F/u for gel injections    Knee Injection Procedure Note  Diagnosis: bilateral knee degenerative arthritis  Indications: bilateral knee pain  Procedure Details: Verbal consent was obtained for the procedure. The injection site was identified and the skin was prepared with alcohol. The bilateral knee was injected from an anterolateral approach with 1 ml of Kenalog and 4 ml Lidocaine each under sterile technique using a 22 gauge needle. The needle was removed and the area cleansed and dressed.  Complications:  Patient tolerated the procedure well.    he was advised to rest the knee today, using ice and elevation as needed for comfort and swelling.Immediate relief of the knee pain may be short lived and secondary to the lidocaine. he may have an increase in discomfort tonight followed by steady improvement over the next  several days. It may take 1-2 weeks following the injection to get the full benefit of the medication.

## 2022-06-29 ENCOUNTER — PATIENT OUTREACH (OUTPATIENT)
Dept: ADMINISTRATIVE | Facility: HOSPITAL | Age: 52
End: 2022-06-29
Payer: COMMERCIAL

## 2022-06-29 ENCOUNTER — TELEPHONE (OUTPATIENT)
Dept: ORTHOPEDICS | Facility: CLINIC | Age: 52
End: 2022-06-29
Payer: COMMERCIAL

## 2022-06-29 NOTE — TELEPHONE ENCOUNTER
Returned call to patient, LM advising to return call to reschedule appointment.       ----- Message from Mara Jain sent at 6/29/2022  1:32 PM CDT -----  Name Of Caller: Ronal     Provider Name: Paulette Sewell,    Does patient feel the need to be seen today? No     Relationship to the Pt?: pt    Contact Preference?: [phone]    What is the nature of the call?:Pt called and would like to reschedule injection appt please call back

## 2022-06-29 NOTE — PROGRESS NOTES
Health Maintenance Due   Topic Date Due    Shingles Vaccine (1 of 2) Never done    TETANUS VACCINE  06/02/2020    COVID-19 Vaccine (3 - Booster for Pfizer series) 02/21/2022     Updates were requested from care everywhere.  Chart was reviewed for overdue Proactive [facility] Encounters (JOHANNE) topics (CRS, Breast Cancer Screening, Eye exam)  Health Maintenance has been updated.  LINKS immunization registry triggered.  Immunizations were reconciled.

## 2022-07-08 ENCOUNTER — PATIENT MESSAGE (OUTPATIENT)
Dept: FAMILY MEDICINE | Facility: CLINIC | Age: 52
End: 2022-07-08
Payer: COMMERCIAL

## 2022-09-02 ENCOUNTER — OFFICE VISIT (OUTPATIENT)
Dept: ORTHOPEDICS | Facility: CLINIC | Age: 52
End: 2022-09-02
Payer: COMMERCIAL

## 2022-09-02 DIAGNOSIS — M17.0 PRIMARY OSTEOARTHRITIS OF BOTH KNEES: Primary | ICD-10-CM

## 2022-09-02 DIAGNOSIS — M25.569 ACUTE KNEE PAIN, UNSPECIFIED LATERALITY: Primary | ICD-10-CM

## 2022-09-02 PROCEDURE — 3044F HG A1C LEVEL LT 7.0%: CPT | Mod: CPTII,S$GLB,, | Performed by: NURSE PRACTITIONER

## 2022-09-02 PROCEDURE — 99499 UNLISTED E&M SERVICE: CPT | Mod: S$GLB,,, | Performed by: NURSE PRACTITIONER

## 2022-09-02 PROCEDURE — 99999 PR PBB SHADOW E&M-EST. PATIENT-LVL III: ICD-10-PCS | Mod: PBBFAC,,, | Performed by: NURSE PRACTITIONER

## 2022-09-02 PROCEDURE — 1160F RVW MEDS BY RX/DR IN RCRD: CPT | Mod: CPTII,S$GLB,, | Performed by: NURSE PRACTITIONER

## 2022-09-02 PROCEDURE — 1159F PR MEDICATION LIST DOCUMENTED IN MEDICAL RECORD: ICD-10-PCS | Mod: CPTII,S$GLB,, | Performed by: NURSE PRACTITIONER

## 2022-09-02 PROCEDURE — 3044F PR MOST RECENT HEMOGLOBIN A1C LEVEL <7.0%: ICD-10-PCS | Mod: CPTII,S$GLB,, | Performed by: NURSE PRACTITIONER

## 2022-09-02 PROCEDURE — 99999 PR PBB SHADOW E&M-EST. PATIENT-LVL III: CPT | Mod: PBBFAC,,, | Performed by: NURSE PRACTITIONER

## 2022-09-02 PROCEDURE — 1160F PR REVIEW ALL MEDS BY PRESCRIBER/CLIN PHARMACIST DOCUMENTED: ICD-10-PCS | Mod: CPTII,S$GLB,, | Performed by: NURSE PRACTITIONER

## 2022-09-02 PROCEDURE — 1159F MED LIST DOCD IN RCRD: CPT | Mod: CPTII,S$GLB,, | Performed by: NURSE PRACTITIONER

## 2022-09-02 PROCEDURE — 99499 NO LOS: ICD-10-PCS | Mod: S$GLB,,, | Performed by: NURSE PRACTITIONER

## 2022-09-02 PROCEDURE — 20610 PR DRAIN/INJECT LARGE JOINT/BURSA: ICD-10-PCS | Mod: 50,S$GLB,, | Performed by: NURSE PRACTITIONER

## 2022-09-02 PROCEDURE — 20610 DRAIN/INJ JOINT/BURSA W/O US: CPT | Mod: 50,S$GLB,, | Performed by: NURSE PRACTITIONER

## 2022-09-02 RX ORDER — TRAMADOL HYDROCHLORIDE 50 MG/1
50 TABLET ORAL EVERY 6 HOURS PRN
Qty: 28 TABLET | Refills: 0 | Status: SHIPPED | OUTPATIENT
Start: 2022-09-02 | End: 2022-09-12

## 2022-09-02 NOTE — PROGRESS NOTES
Ronal Ham presents to clinic today for bilateral knee Synvisc-One injections.     Exam demonstrates the no effusion in the bilateral knee, and the skin is intact.    Radiographs reveal degenerative changes of knee    Diagnosis: primary osteoarthritis of both knees     After time out was performed and patient ID, side, and site were verified, the right knee and the left knee were sterilly prepped in the standard fashion.  A 22-gauge needle was introduced into the right knee and the left knee joint from an danni-lateral site without complication. The right knee and the left knee were then injected with 6 ml of Synvisc-One each. Sterile dressing was applied.  The patient was instructed to resume activities as tolerated and to call with any problems.     Patient will f/u as needed

## 2022-09-09 ENCOUNTER — OFFICE VISIT (OUTPATIENT)
Dept: URGENT CARE | Facility: CLINIC | Age: 52
End: 2022-09-09
Payer: COMMERCIAL

## 2022-09-09 ENCOUNTER — OCCUPATIONAL HEALTH (OUTPATIENT)
Dept: URGENT CARE | Facility: CLINIC | Age: 52
End: 2022-09-09
Payer: COMMERCIAL

## 2022-09-09 VITALS
SYSTOLIC BLOOD PRESSURE: 142 MMHG | HEIGHT: 64 IN | TEMPERATURE: 98 F | OXYGEN SATURATION: 97 % | DIASTOLIC BLOOD PRESSURE: 91 MMHG | RESPIRATION RATE: 17 BRPM | BODY MASS INDEX: 32.78 KG/M2 | HEART RATE: 74 BPM | WEIGHT: 192 LBS

## 2022-09-09 DIAGNOSIS — Z02.89 ENCOUNTER FOR EXAMINATION REQUIRED BY DEPARTMENT OF TRANSPORTATION (DOT): Primary | ICD-10-CM

## 2022-09-09 DIAGNOSIS — J06.9 VIRAL URI WITH COUGH: Primary | ICD-10-CM

## 2022-09-09 LAB
CTP QC/QA: YES
SARS-COV-2 RDRP RESP QL NAA+PROBE: NEGATIVE

## 2022-09-09 PROCEDURE — 3077F SYST BP >= 140 MM HG: CPT | Mod: CPTII,S$GLB,,

## 2022-09-09 PROCEDURE — 3080F PR MOST RECENT DIASTOLIC BLOOD PRESSURE >= 90 MM HG: ICD-10-PCS | Mod: CPTII,S$GLB,,

## 2022-09-09 PROCEDURE — 99499 UNLISTED E&M SERVICE: CPT | Mod: S$GLB,,, | Performed by: PHYSICIAN ASSISTANT

## 2022-09-09 PROCEDURE — 3080F DIAST BP >= 90 MM HG: CPT | Mod: CPTII,S$GLB,,

## 2022-09-09 PROCEDURE — 3008F BODY MASS INDEX DOCD: CPT | Mod: CPTII,S$GLB,,

## 2022-09-09 PROCEDURE — 99203 PR OFFICE/OUTPT VISIT, NEW, LEVL III, 30-44 MIN: ICD-10-PCS | Mod: S$GLB,,,

## 2022-09-09 PROCEDURE — 3044F HG A1C LEVEL LT 7.0%: CPT | Mod: CPTII,S$GLB,,

## 2022-09-09 PROCEDURE — 1160F RVW MEDS BY RX/DR IN RCRD: CPT | Mod: CPTII,S$GLB,,

## 2022-09-09 PROCEDURE — U0002: ICD-10-PCS | Mod: QW,S$GLB,,

## 2022-09-09 PROCEDURE — 99203 OFFICE O/P NEW LOW 30 MIN: CPT | Mod: S$GLB,,,

## 2022-09-09 PROCEDURE — 1160F PR REVIEW ALL MEDS BY PRESCRIBER/CLIN PHARMACIST DOCUMENTED: ICD-10-PCS | Mod: CPTII,S$GLB,,

## 2022-09-09 PROCEDURE — 1159F MED LIST DOCD IN RCRD: CPT | Mod: CPTII,S$GLB,,

## 2022-09-09 PROCEDURE — 3044F PR MOST RECENT HEMOGLOBIN A1C LEVEL <7.0%: ICD-10-PCS | Mod: CPTII,S$GLB,,

## 2022-09-09 PROCEDURE — 3077F PR MOST RECENT SYSTOLIC BLOOD PRESSURE >= 140 MM HG: ICD-10-PCS | Mod: CPTII,S$GLB,,

## 2022-09-09 PROCEDURE — 3008F PR BODY MASS INDEX (BMI) DOCUMENTED: ICD-10-PCS | Mod: CPTII,S$GLB,,

## 2022-09-09 PROCEDURE — 1159F PR MEDICATION LIST DOCUMENTED IN MEDICAL RECORD: ICD-10-PCS | Mod: CPTII,S$GLB,,

## 2022-09-09 PROCEDURE — 99499 PHYSICAL, RECERT DOT/CDL: ICD-10-PCS | Mod: S$GLB,,, | Performed by: PHYSICIAN ASSISTANT

## 2022-09-09 PROCEDURE — U0002 COVID-19 LAB TEST NON-CDC: HCPCS | Mod: QW,S$GLB,,

## 2022-09-09 RX ORDER — PROMETHAZINE HYDROCHLORIDE AND DEXTROMETHORPHAN HYDROBROMIDE 6.25; 15 MG/5ML; MG/5ML
5 SYRUP ORAL EVERY 4 HOURS PRN
Qty: 480 ML | Refills: 0 | Status: SHIPPED | OUTPATIENT
Start: 2022-09-09 | End: 2022-11-16 | Stop reason: ALTCHOICE

## 2022-09-09 RX ORDER — LORATADINE 10 MG/1
10 TABLET ORAL DAILY
Qty: 30 TABLET | Refills: 0 | Status: SHIPPED | OUTPATIENT
Start: 2022-09-09 | End: 2022-11-16 | Stop reason: ALTCHOICE

## 2022-09-09 NOTE — PROGRESS NOTES
"Subjective:       Patient ID: Ronal Ham is a 52 y.o. male.    Vitals:  height is 5' 4.02" (1.626 m) and weight is 87.1 kg (192 lb). His tympanic temperature is 97.5 °F (36.4 °C). His blood pressure is 142/91 (abnormal) and his pulse is 74. His respiration is 17 and oxygen saturation is 97%.     Chief Complaint: Cough    Pt is a 53 y/o M who presents with sore throat, cough, congestion, runny nose, R ear stuffiness x4-5 days. Has been taking Coricidin. Deneis SoB, CP, fevers, N/V/D, abd pain.    Cough  This is a new problem. The current episode started in the past 7 days. The cough is Productive of sputum. Pertinent negatives include no chest pain, chills, ear congestion, ear pain, fever, headaches, heartburn, hemoptysis, myalgias, nasal congestion, postnasal drip, rash, rhinorrhea, sore throat, shortness of breath, sweats, weight loss or wheezing. Treatments tried: Cough syrup. The treatment provided mild relief. There is no history of asthma, bronchiectasis, bronchitis, COPD, emphysema, environmental allergies or pneumonia.     Constitution: Negative for chills and fever.   HENT:  Positive for congestion. Negative for ear pain, postnasal drip and sore throat.    Neck: Negative for neck pain and neck stiffness.   Cardiovascular:  Negative for chest pain.   Eyes:  Negative for eye discharge and eye itching.   Respiratory:  Positive for cough. Negative for chest tightness, bloody sputum, shortness of breath and wheezing.    Gastrointestinal:  Negative for abdominal pain, nausea, vomiting, diarrhea and heartburn.   Musculoskeletal:  Negative for muscle ache.   Skin:  Negative for rash.   Allergic/Immunologic: Negative for environmental allergies and sneezing.   Neurological:  Negative for dizziness and headaches.     Objective:      Physical Exam   Constitutional: He is oriented to person, place, and time. He appears well-developed. He is cooperative.  Non-toxic appearance. He does not appear ill. No " distress.   HENT:   Head: Normocephalic and atraumatic.   Ears:   Right Ear: Hearing, tympanic membrane, external ear and ear canal normal.   Left Ear: Hearing, tympanic membrane, external ear and ear canal normal.   Nose: Rhinorrhea and congestion present. No mucosal edema or nasal deformity. No epistaxis. Right sinus exhibits no maxillary sinus tenderness and no frontal sinus tenderness. Left sinus exhibits no maxillary sinus tenderness and no frontal sinus tenderness.   Mouth/Throat: Uvula is midline, oropharynx is clear and moist and mucous membranes are normal. No trismus in the jaw. Normal dentition. No uvula swelling. No oropharyngeal exudate, posterior oropharyngeal edema or posterior oropharyngeal erythema.   Eyes: Conjunctivae and lids are normal. No scleral icterus.   Neck: Trachea normal and phonation normal. Neck supple. No edema present. No erythema present. No neck rigidity present.   Cardiovascular: Normal rate, regular rhythm, normal heart sounds and normal pulses.   Pulmonary/Chest: Effort normal and breath sounds normal. No respiratory distress. He has no decreased breath sounds. He has no rhonchi.   Abdominal: Normal appearance.   Musculoskeletal: Normal range of motion.         General: No deformity. Normal range of motion.   Neurological: He is alert and oriented to person, place, and time. He exhibits normal muscle tone. Coordination normal.   Skin: Skin is warm, dry, intact, not diaphoretic and not pale.   Psychiatric: His speech is normal and behavior is normal. Judgment and thought content normal.   Nursing note and vitals reviewed.      Results for orders placed or performed in visit on 09/09/22   POCT COVID-19 Rapid Screening   Result Value Ref Range    POC Rapid COVID Negative Negative     Acceptable Yes        Assessment:       1. Viral URI with cough          Plan:         Viral URI with cough  -     POCT COVID-19 Rapid Screening  -     promethazine-dextromethorphan  (PROMETHAZINE-DM) 6.25-15 mg/5 mL Syrp; Take 5 mLs by mouth every 4 (four) hours as needed (cough and congestion).  Dispense: 480 mL; Refill: 0  -     loratadine (CLARITIN) 10 mg tablet; Take 1 tablet (10 mg total) by mouth once daily.  Dispense: 30 tablet; Refill: 0                 Patient Instructions   - Rest.    - Drink plenty of fluids.  - Viral upper respiratory infections typically run their course in 10-14 days.      - You can take over-the-counter claritin, zyrtec, allegra, or xyzal as directed. These are antihistamines that can help with runny nose, nasal congestion, sneezing, and helps to dry up post-nasal drip, which usually causes sore throat and cough.              - If you do NOT have high blood pressure, you may use a decongestant form (D)  of this medication (ie. Claritin- D, zyrtec-D, allegra-D) or if you do not take the D form, you can take sudafed (pseudoephedrine) over the counter, which is a decongestant. Do NOT take two decongestant (D) medications at the same time (such as mucinex-D and claritin-D or plain sudafed and claritin D)    - If you DO have Hypertension, anxiety, or palpitations, it is safe to take Coricidin HBP for relief of sinus symptoms.     - You can use Flonase (fluticasone) nasal spray as directed for sinus congestion and postnasal drip. This is a steroid nasal spray that works locally over time to decrease the inflammation in your nose/sinuses and help with allergic symptoms. This is not an quick- relief spray like afrin, but it works well if used daily.  Discontinue if you develop nose bleed  - use nasal saline prior to Flonase.  - Use Ocean Spray Nasal Saline 1-3 puffs each nostril every 2-3 hours then blow out onto tissue. This is to irrigate the nasal passage way to clear the sinus openings. Use until sinus problem resolved.     - you can take plain Mucinex (guaifenesin) 1200 mg twice a day to help loosen mucous.      -warm salt water gargles can help with sore throat      - warm tea with honey can help with cough. Honey is a natural cough suppressant.     - Dextromethorphan (DM) is a cough suppressant over the counter (ie. mucinex DM, robitussin, delsym; dayquil/nyquil has DM as well.)        - Follow up with your PCP or specialty clinic as directed in the next 1-2 weeks if not improved or as needed.  You can call [phone] to schedule an appointment with the appropriate provider.       - Go to the ER if you develop new or worsening symptoms.      - You must understand that you have received an Urgent Care treatment only and that you may be released before all of your medical problems are known or treated.   - You, the patient, will arrange for follow up care as instructed.   - If your condition worsens or fails to improve we recommend that you receive another evaluation at the ER immediately or contact your PCP to discuss your concerns or return here.

## 2022-09-09 NOTE — PATIENT INSTRUCTIONS

## 2022-09-15 DIAGNOSIS — Z12.11 SCREENING FOR COLON CANCER: ICD-10-CM

## 2022-09-15 DIAGNOSIS — Z12.11 SPECIAL SCREENING FOR MALIGNANT NEOPLASMS, COLON: Primary | ICD-10-CM

## 2022-09-15 RX ORDER — POLYETHYLENE GLYCOL 3350, SODIUM SULFATE ANHYDROUS, SODIUM BICARBONATE, SODIUM CHLORIDE, POTASSIUM CHLORIDE 236; 22.74; 6.74; 5.86; 2.97 G/4L; G/4L; G/4L; G/4L; G/4L
4 POWDER, FOR SOLUTION ORAL ONCE
Qty: 4000 ML | Refills: 0 | Status: SHIPPED | OUTPATIENT
Start: 2022-09-15 | End: 2022-09-15

## 2022-09-19 ENCOUNTER — ANESTHESIA EVENT (OUTPATIENT)
Dept: ENDOSCOPY | Facility: HOSPITAL | Age: 52
End: 2022-09-19
Payer: COMMERCIAL

## 2022-09-19 ENCOUNTER — ANESTHESIA (OUTPATIENT)
Dept: ENDOSCOPY | Facility: HOSPITAL | Age: 52
End: 2022-09-19
Payer: COMMERCIAL

## 2022-09-19 ENCOUNTER — HOSPITAL ENCOUNTER (OUTPATIENT)
Facility: HOSPITAL | Age: 52
Discharge: HOME | End: 2022-09-19
Attending: INTERNAL MEDICINE | Admitting: INTERNAL MEDICINE
Payer: COMMERCIAL

## 2022-09-19 VITALS
RESPIRATION RATE: 23 BRPM | HEIGHT: 64 IN | WEIGHT: 192 LBS | TEMPERATURE: 98 F | SYSTOLIC BLOOD PRESSURE: 163 MMHG | HEART RATE: 69 BPM | BODY MASS INDEX: 32.78 KG/M2 | OXYGEN SATURATION: 97 % | DIASTOLIC BLOOD PRESSURE: 101 MMHG

## 2022-09-19 DIAGNOSIS — Z12.11 COLON CANCER SCREENING: Primary | ICD-10-CM

## 2022-09-19 DIAGNOSIS — Z12.11 SCREENING FOR COLON CANCER: ICD-10-CM

## 2022-09-19 PROCEDURE — E9220 PRA ENDO ANESTHESIA: ICD-10-PCS | Mod: ,,, | Performed by: NURSE ANESTHETIST, CERTIFIED REGISTERED

## 2022-09-19 PROCEDURE — 63600175 PHARM REV CODE 636 W HCPCS: Performed by: NURSE ANESTHETIST, CERTIFIED REGISTERED

## 2022-09-19 PROCEDURE — G0121 COLON CA SCRN NOT HI RSK IND: HCPCS | Performed by: INTERNAL MEDICINE

## 2022-09-19 PROCEDURE — 25000003 PHARM REV CODE 250: Performed by: NURSE ANESTHETIST, CERTIFIED REGISTERED

## 2022-09-19 PROCEDURE — G0121 COLON CA SCRN NOT HI RSK IND: ICD-10-PCS | Mod: ,,, | Performed by: INTERNAL MEDICINE

## 2022-09-19 PROCEDURE — 37000009 HC ANESTHESIA EA ADD 15 MINS: Performed by: INTERNAL MEDICINE

## 2022-09-19 PROCEDURE — G0121 COLON CA SCRN NOT HI RSK IND: HCPCS | Mod: ,,, | Performed by: INTERNAL MEDICINE

## 2022-09-19 PROCEDURE — E9220 PRA ENDO ANESTHESIA: HCPCS | Mod: ,,, | Performed by: NURSE ANESTHETIST, CERTIFIED REGISTERED

## 2022-09-19 PROCEDURE — 25000003 PHARM REV CODE 250: Performed by: INTERNAL MEDICINE

## 2022-09-19 PROCEDURE — 37000008 HC ANESTHESIA 1ST 15 MINUTES: Performed by: INTERNAL MEDICINE

## 2022-09-19 RX ORDER — PROPOFOL 10 MG/ML
VIAL (ML) INTRAVENOUS
Status: DISCONTINUED | OUTPATIENT
Start: 2022-09-19 | End: 2022-09-19

## 2022-09-19 RX ORDER — SODIUM CHLORIDE 9 MG/ML
INJECTION, SOLUTION INTRAVENOUS CONTINUOUS
Status: DISCONTINUED | OUTPATIENT
Start: 2022-09-19 | End: 2022-09-19 | Stop reason: HOSPADM

## 2022-09-19 RX ORDER — PROPOFOL 10 MG/ML
VIAL (ML) INTRAVENOUS CONTINUOUS PRN
Status: DISCONTINUED | OUTPATIENT
Start: 2022-09-19 | End: 2022-09-19

## 2022-09-19 RX ORDER — LIDOCAINE HYDROCHLORIDE 20 MG/ML
INJECTION INTRAVENOUS
Status: DISCONTINUED | OUTPATIENT
Start: 2022-09-19 | End: 2022-09-19

## 2022-09-19 RX ADMIN — LIDOCAINE HYDROCHLORIDE 20 MG: 20 INJECTION INTRAVENOUS at 09:09

## 2022-09-19 RX ADMIN — PROPOFOL 100 MG: 10 INJECTION, EMULSION INTRAVENOUS at 09:09

## 2022-09-19 RX ADMIN — SODIUM CHLORIDE: 0.9 INJECTION, SOLUTION INTRAVENOUS at 08:09

## 2022-09-19 RX ADMIN — Medication 150 MCG/KG/MIN: at 09:09

## 2022-09-19 NOTE — ANESTHESIA POSTPROCEDURE EVALUATION
Anesthesia Post Evaluation    Patient: Ronal Ham    Procedure(s) Performed: Procedure(s) (LRB):  COLONOSCOPY (N/A)    Final Anesthesia Type: general      Patient location during evaluation: PACU  Patient participation: Yes- Able to Participate  Level of consciousness: awake and alert  Post-procedure vital signs: reviewed and stable  Pain management: adequate  Airway patency: patent    PONV status at discharge: No PONV  Anesthetic complications: no      Cardiovascular status: blood pressure returned to baseline  Respiratory status: unassisted  Hydration status: euvolemic  Follow-up not needed.          Vitals Value Taken Time   /95 09/19/22 0955   Temp 98 09/19/22 1016   Pulse 69 09/19/22 0955   Resp 12 09/19/22 0955   SpO2 97 % 09/19/22 0955         Event Time   Out of Recovery 09:58:17         Pain/Dinah Score: Dinah Score: 9 (9/19/2022  9:25 AM)

## 2022-09-19 NOTE — TRANSFER OF CARE
"Anesthesia Transfer of Care Note    Patient: Ronal Ham    Procedure(s) Performed: Procedure(s) (LRB):  COLONOSCOPY (N/A)    Patient location: PACU    Anesthesia Type: general    Transport from OR: Transported from OR on room air with adequate spontaneous ventilation    Post pain: adequate analgesia    Post assessment: no apparent anesthetic complications and tolerated procedure well    Post vital signs: stable    Level of consciousness: awake, alert and oriented    Nausea/Vomiting: no nausea/vomiting    Complications: none    Transfer of care protocol was followed      Last vitals:   Visit Vitals  BP (!) 142/87   Pulse 79   Temp 97.8   Resp 16   Ht 5' 4" (1.626 m)   Wt 87.1 kg (192 lb)   SpO2 98%   BMI 32.96 kg/m²     "

## 2022-09-19 NOTE — ANESTHESIA PREPROCEDURE EVALUATION
09/19/2022  Ronal Ham is a 52 y.o., male.      Pre-op Assessment    I have reviewed the Patient Summary Reports.       I have reviewed the Medications.     Review of Systems  Anesthesia Hx:  History of prior surgery of interest to airway management or planning:  Denies Personal Hx of Anesthesia complications.   Cardiovascular:   Hypertension NL BiV Fxn on 2017 Echo   Pulmonary:   Shortness of breath Interstitial lung disease   Hepatic/GI:   GERD    Musculoskeletal:   Arthritis   Spine Disorders: lumbar        Physical Exam  General: Well nourished    Airway:  Mallampati: III / II  Mouth Opening: Normal  TM Distance: Normal  Tongue: Normal  Neck ROM: Normal ROM    Chest/Lungs:  Normal Respiratory Rate    Heart:  Rate: Normal        Anesthesia Plan  Type of Anesthesia, risks & benefits discussed:    Anesthesia Type: Gen Natural Airway  Intra-op Monitoring Plan: Standard ASA Monitors  Post Op Pain Control Plan: multimodal analgesia  Induction:  IV  Informed Consent: Informed consent signed with the Patient and all parties understand the risks and agree with anesthesia plan.  All questions answered.   ASA Score: 3  Day of Surgery Review of History & Physical: H&P Update referred to the surgeon/provider.  Anesthesia Plan Notes: NPO confirmed.  No history of anesthesia problems.   Breathing feels good / at baseline this AM.    Ready For Surgery From Anesthesia Perspective.     .

## 2022-09-19 NOTE — H&P
Short Stay Endoscopy History and Physical    PCP - Bruce Terrell MD    Procedure - Colonoscopy  Sedation: GA  ASA - per anesthesia  Mallampati - per anesthesia  History of Anesthesia problems - no  Family history Anesthesia problems -  no     HPI:  This is a 52 y.o. male here for evaluation of : Screening for CRC    Reflux - no  Dysphagia - no  Abdominal pain - no  Diarrhea - no    ROS:  Constitutional: No fevers, chills, No weight loss  ENT: No allergies  CV: No chest pain  Pulm: No cough, No shortness of breath  Ophtho: No vision changes  GI: see HPI  Medical History:  has a past medical history of Arthritis, Eye injury, GERD (gastroesophageal reflux disease), Hypertension, Lupus (systemic lupus erythematosus), Pulmonary fibrosis, and Raynaud phenomenon.    Surgical History:  has a past surgical history that includes Hernia repair; Transforaminal epidural injection of steroid (Bilateral, 6/2/2021); and Colonoscopy (N/A, 7/14/2022).    Family History: family history includes Glaucoma in his brother and father; Heart disease in his father and mother; No Known Problems in his daughter, maternal aunt, maternal grandfather, maternal grandmother, maternal uncle, paternal aunt, paternal grandfather, paternal grandmother, paternal uncle, sister, and son.. Otherwise no colon cancer, inflammatory bowel disease, or GI malignancies.    Social History:  reports that he has never smoked. He has never used smokeless tobacco. He reports that he does not drink alcohol and does not use drugs.    Review of patient's allergies indicates:   Allergen Reactions    Carafate  [sucralfate] Rash     Other reaction(s): Hives       Medications:   Medications Prior to Admission   Medication Sig Dispense Refill Last Dose    albuterol (PROVENTIL/VENTOLIN HFA) 90 mcg/actuation inhaler INHALE 1 TO 2 PUFFS INTO THE LUNGS EVERY 6 HOURS AS NEEDED FOR WHEEZING 25.5 g 0     amLODIPine (NORVASC) 10 MG tablet Take 1 tablet (10 mg total) by mouth once  daily. 90 tablet 3     betamethasone dipropionate (DIPROLENE) 0.05 % ointment Apply topically 2 (two) times daily. for 7 days 15 g 2     cyclobenzaprine (FLEXERIL) 10 MG tablet Take 10 mg by mouth nightly as needed.       diclofenac sodium (VOLTAREN) 1 % Gel Apply 2 g topically 4 (four) times daily. 100 g 0     fluticasone furoate-vilanteroL (BREO ELLIPTA) 100-25 mcg/dose diskus inhaler Inhale 1 puff into the lungs once daily. Controller 60 each 11     fluticasone propionate (FLONASE) 50 mcg/actuation nasal spray 2 sprays (100 mcg total) by Each Nostril route once daily. 16 g 5     HYDROcodone-acetaminophen (NORCO) 5-325 mg per tablet Take 1 tablet by mouth nightly as needed.       hydrOXYchloroQUINE (PLAQUENIL) 200 mg tablet Take 2 tablets (400 mg total) by mouth once daily. 180 tablet 3     loratadine (CLARITIN) 10 mg tablet Take 1 tablet (10 mg total) by mouth once daily. 30 tablet 0     pantoprazole (PROTONIX) 40 MG tablet Take 1 tablet (40 mg total) by mouth once daily. 90 tablet 3     pregabalin (LYRICA) 75 MG capsule Take 75 mg by mouth 2 (two) times daily.       promethazine-dextromethorphan (PROMETHAZINE-DM) 6.25-15 mg/5 mL Syrp Take 5 mLs by mouth every 4 (four) hours as needed (cough and congestion). 480 mL 0     sildenafiL (VIAGRA) 100 MG tablet Take 1 tablet (100 mg total) by mouth daily as needed for Erectile Dysfunction. 10 tablet 5        Objective Findings:    Vital Signs: Per nursing notes.    Physical Exam:  General Appearance: Well appearing in no acute distress  Head:   Normocephalic, without obvious abnormality  Eyes:    No scleral icterus  Airway: Open  Neck: No restriction in mobility  Lungs: CTA bilaterally in anterior and posterior fields, no wheezes, no crackles.  Heart:  Regular rate and rhythm, S1, S2 normal, no murmurs heard  Abdomen: Soft, non tender, non distended      Labs:  Lab Results   Component Value Date    WBC 4.12 03/11/2022    HGB 13.1 (L) 03/11/2022    HCT 39.4 (L)  03/11/2022     03/11/2022    CHOL 145 03/11/2022    TRIG 52 03/11/2022    HDL 44 03/11/2022    ALT 25 03/11/2022    AST 28 03/11/2022     03/11/2022    K 3.7 03/11/2022     03/11/2022    CREATININE 0.8 03/11/2022    BUN 13 03/11/2022    CO2 23 03/11/2022    TSH 2.650 03/11/2022    PSA 1.3 04/13/2021    INR 1.0 10/27/2015    HGBA1C 5.4 03/11/2022         I have explained the risks and benefits of endoscopy procedures to the patient including but not limited to bleeding, perforation, infection, and death.    Thank you so much for allowing me to participate in the care of Ronal Chavarria MD

## 2022-09-19 NOTE — PROVATION PATIENT INSTRUCTIONS
Discharge Summary/Instructions after an Endoscopic Procedure  Patient Name: Ronal Ham  Patient MRN: [id number]  Patient YOB: 1970 Monday, September 19, 2022  Guicho Chavarria MD  Dear patient,  As a result of recent federal legislation (The Federal Cures Act), you may   receive lab or pathology results from your procedure in your [facility]   account before your physician is able to contact you. Your physician or   their representative will relay the results to you with their   recommendations at their soonest availability.  Thank you,  RESTRICTIONS:  During your procedure today, you received medications for sedation.  These   medications may affect your judgment, balance and coordination.  Therefore,   for 24 hours, you have the following restrictions:   - DO NOT drive a car, operate machinery, make legal/financial decisions,   sign important papers or drink alcohol.    ACTIVITY:  Today: no heavy lifting, straining or running due to procedural   sedation/anesthesia.  The following day: return to full activity including work.  DIET:  Eat and drink normally unless instructed otherwise.     TREATMENT FOR COMMON SIDE EFFECTS:  - Mild abdominal pain, nausea, belching, bloating or excessive gas:  rest,   eat lightly and use a heating pad.  - Sore Throat: treat with throat lozenges and/or gargle with warm salt   water.  - Because air was used during the procedure, expelling large amounts of air   from your rectum or belching is normal.  - If a bowel prep was taken, you may not have a bowel movement for 1-3 days.    This is normal.  SYMPTOMS TO WATCH FOR AND REPORT TO YOUR PHYSICIAN:  1. Abdominal pain or bloating, other than gas cramps.  2. Chest pain.  3. Back pain.  4. Signs of infection such as: chills or fever occurring within 24 hours   after the procedure.  5. Rectal bleeding, which would show as bright red, maroon, or black stools.   (A tablespoon of blood from the rectum is not serious, especially  if   hemorrhoids are present.)  6. Vomiting.  7. Weakness or dizziness.  GO DIRECTLY TO THE NEAREST EMERGENCY ROOM IF YOU HAVE ANY OF THE FOLLOWING:      Difficulty breathing              Chills and/or fever over 101 F   Persistent vomiting and/or vomiting blood   Severe abdominal pain   Severe chest pain   Black, tarry stools   Bleeding- more than one tablespoon   Any other symptom or condition that you feel may need urgent attention  Your doctor recommends these additional instructions:  If any biopsies were taken, your doctors clinic will contact you in 1 to 2   weeks with any results.  - Patient has a contact number available for emergencies.  The signs and   symptoms of potential delayed complications were discussed with the   patient.  Return to normal activities tomorrow.  Written discharge   instructions were provided to the patient.   - Discharge patient to home.   - Resume previous diet.   - Continue present medications.   - Repeat colonoscopy in 5 years for screening purposes.   For questions, problems or results please call your physician - Guicho Chavarria MD at Work:  [phone].  [facility] [place], EMERGENCY ROOM PHONE NUMBER: [phone]  IF A COMPLICATION OR EMERGENCY SITUATION ARISES AND YOU ARE UNABLE TO REACH   YOUR PHYSICIAN - GO DIRECTLY TO THE EMERGENCY ROOM.  Guicho Chavarria MD  9/19/2022 9:19:03 AM  This report has been verified and signed electronically.  Dear patient,  As a result of recent federal legislation (The Federal Cures Act), you may   receive lab or pathology results from your procedure in your [facility]   account before your physician is able to contact you. Your physician or   their representative will relay the results to you with their   recommendations at their soonest availability.  Thank you,  PROVATION

## 2022-10-12 ENCOUNTER — OFFICE VISIT (OUTPATIENT)
Dept: ORTHOPEDICS | Facility: CLINIC | Age: 52
End: 2022-10-12
Payer: COMMERCIAL

## 2022-10-12 VITALS — HEIGHT: 67 IN | WEIGHT: 196.19 LBS | BODY MASS INDEX: 30.79 KG/M2

## 2022-10-12 DIAGNOSIS — M17.11 PRIMARY OSTEOARTHRITIS OF RIGHT KNEE: Primary | ICD-10-CM

## 2022-10-12 PROCEDURE — 1159F MED LIST DOCD IN RCRD: CPT | Mod: CPTII,S$GLB,, | Performed by: NURSE PRACTITIONER

## 2022-10-12 PROCEDURE — 20610 PR DRAIN/INJECT LARGE JOINT/BURSA: ICD-10-PCS | Mod: RT,S$GLB,, | Performed by: NURSE PRACTITIONER

## 2022-10-12 PROCEDURE — 99999 PR PBB SHADOW E&M-EST. PATIENT-LVL III: ICD-10-PCS | Mod: PBBFAC,,, | Performed by: NURSE PRACTITIONER

## 2022-10-12 PROCEDURE — 99499 UNLISTED E&M SERVICE: CPT | Mod: S$GLB,,, | Performed by: NURSE PRACTITIONER

## 2022-10-12 PROCEDURE — 20610 DRAIN/INJ JOINT/BURSA W/O US: CPT | Mod: RT,S$GLB,, | Performed by: NURSE PRACTITIONER

## 2022-10-12 PROCEDURE — 1159F PR MEDICATION LIST DOCUMENTED IN MEDICAL RECORD: ICD-10-PCS | Mod: CPTII,S$GLB,, | Performed by: NURSE PRACTITIONER

## 2022-10-12 PROCEDURE — 1160F RVW MEDS BY RX/DR IN RCRD: CPT | Mod: CPTII,S$GLB,, | Performed by: NURSE PRACTITIONER

## 2022-10-12 PROCEDURE — 3044F HG A1C LEVEL LT 7.0%: CPT | Mod: CPTII,S$GLB,, | Performed by: NURSE PRACTITIONER

## 2022-10-12 PROCEDURE — 99999 PR PBB SHADOW E&M-EST. PATIENT-LVL III: CPT | Mod: PBBFAC,,, | Performed by: NURSE PRACTITIONER

## 2022-10-12 PROCEDURE — 1160F PR REVIEW ALL MEDS BY PRESCRIBER/CLIN PHARMACIST DOCUMENTED: ICD-10-PCS | Mod: CPTII,S$GLB,, | Performed by: NURSE PRACTITIONER

## 2022-10-12 PROCEDURE — 3044F PR MOST RECENT HEMOGLOBIN A1C LEVEL <7.0%: ICD-10-PCS | Mod: CPTII,S$GLB,, | Performed by: NURSE PRACTITIONER

## 2022-10-12 PROCEDURE — 99499 NO LOS: ICD-10-PCS | Mod: S$GLB,,, | Performed by: NURSE PRACTITIONER

## 2022-10-12 RX ORDER — POLYETHYLENE GLYCOL 3350, SODIUM SULFATE ANHYDROUS, SODIUM BICARBONATE, SODIUM CHLORIDE, POTASSIUM CHLORIDE 236; 22.74; 6.74; 5.86; 2.97 G/4L; G/4L; G/4L; G/4L; G/4L
4000 POWDER, FOR SOLUTION ORAL
COMMUNITY
Start: 2022-09-18 | End: 2023-03-22

## 2022-10-12 RX ORDER — AMOXICILLIN 500 MG/1
500 CAPSULE ORAL EVERY 8 HOURS
COMMUNITY
Start: 2022-09-15 | End: 2023-03-22 | Stop reason: ALTCHOICE

## 2022-10-12 RX ORDER — IBUPROFEN 800 MG/1
800 TABLET ORAL EVERY 8 HOURS PRN
COMMUNITY
Start: 2022-09-15 | End: 2022-10-20 | Stop reason: SDUPTHER

## 2022-10-12 RX ADMIN — TRIAMCINOLONE ACETONIDE 40 MG: 40 INJECTION, SUSPENSION INTRA-ARTICULAR; INTRAMUSCULAR at 04:10

## 2022-10-16 RX ORDER — TRIAMCINOLONE ACETONIDE 40 MG/ML
40 INJECTION, SUSPENSION INTRA-ARTICULAR; INTRAMUSCULAR
Status: COMPLETED | OUTPATIENT
Start: 2022-10-12 | End: 2022-10-12

## 2022-10-16 NOTE — PROGRESS NOTES
Pt had bilateral Synvisc-One injections on 9/2/2022. Since then he has gotten some relief in the left knee, but the right knee hurts worse than it did before. Will give a cortisone injection in the right knee today for relief of the knee pain.    Knee Injection Procedure Note  Diagnosis: right knee degenerative arthritis  Indications: right knee pain  Procedure Details: Verbal consent was obtained for the procedure. The injection site was identified and the skin was prepared with alcohol. The right knee was injected from an anterolateral approach with 1 ml of Kenalog and 4 ml Lidocaine under sterile technique using a 22 gauge needle. The needle was removed and the area cleansed and dressed.  Complications:  Patient tolerated the procedure well.    he was advised to rest the knee today, using ice and elevation as needed for comfort and swelling.Immediate relief of the knee pain may be short lived and secondary to the lidocaine. he may have an increase in discomfort tonight followed by steady improvement over the next several days. It may take 1-2 weeks following the injection to get the full benefit of the medication.

## 2022-10-20 ENCOUNTER — OFFICE VISIT (OUTPATIENT)
Dept: FAMILY MEDICINE | Facility: CLINIC | Age: 52
End: 2022-10-20
Payer: COMMERCIAL

## 2022-10-20 VITALS
OXYGEN SATURATION: 97 % | RESPIRATION RATE: 18 BRPM | HEART RATE: 95 BPM | SYSTOLIC BLOOD PRESSURE: 128 MMHG | DIASTOLIC BLOOD PRESSURE: 80 MMHG | BODY MASS INDEX: 30.76 KG/M2 | HEIGHT: 67 IN | TEMPERATURE: 98 F | WEIGHT: 196 LBS

## 2022-10-20 DIAGNOSIS — Z79.899 DRUG-INDUCED IMMUNODEFICIENCY: ICD-10-CM

## 2022-10-20 DIAGNOSIS — K12.2 UVULITIS: ICD-10-CM

## 2022-10-20 DIAGNOSIS — D84.821 DRUG-INDUCED IMMUNODEFICIENCY: ICD-10-CM

## 2022-10-20 DIAGNOSIS — M17.11 PRIMARY OSTEOARTHRITIS OF RIGHT KNEE: Primary | ICD-10-CM

## 2022-10-20 PROCEDURE — 99999 PR PBB SHADOW E&M-EST. PATIENT-LVL V: CPT | Mod: PBBFAC,,, | Performed by: INTERNAL MEDICINE

## 2022-10-20 PROCEDURE — 3079F DIAST BP 80-89 MM HG: CPT | Mod: CPTII,S$GLB,, | Performed by: INTERNAL MEDICINE

## 2022-10-20 PROCEDURE — 1159F PR MEDICATION LIST DOCUMENTED IN MEDICAL RECORD: ICD-10-PCS | Mod: CPTII,S$GLB,, | Performed by: INTERNAL MEDICINE

## 2022-10-20 PROCEDURE — 99999 PR PBB SHADOW E&M-EST. PATIENT-LVL V: ICD-10-PCS | Mod: PBBFAC,,, | Performed by: INTERNAL MEDICINE

## 2022-10-20 PROCEDURE — 3044F PR MOST RECENT HEMOGLOBIN A1C LEVEL <7.0%: ICD-10-PCS | Mod: CPTII,S$GLB,, | Performed by: INTERNAL MEDICINE

## 2022-10-20 PROCEDURE — 3044F HG A1C LEVEL LT 7.0%: CPT | Mod: CPTII,S$GLB,, | Performed by: INTERNAL MEDICINE

## 2022-10-20 PROCEDURE — 3079F PR MOST RECENT DIASTOLIC BLOOD PRESSURE 80-89 MM HG: ICD-10-PCS | Mod: CPTII,S$GLB,, | Performed by: INTERNAL MEDICINE

## 2022-10-20 PROCEDURE — 99214 OFFICE O/P EST MOD 30 MIN: CPT | Mod: S$GLB,,, | Performed by: INTERNAL MEDICINE

## 2022-10-20 PROCEDURE — 99214 PR OFFICE/OUTPT VISIT, EST, LEVL IV, 30-39 MIN: ICD-10-PCS | Mod: S$GLB,,, | Performed by: INTERNAL MEDICINE

## 2022-10-20 PROCEDURE — 3074F PR MOST RECENT SYSTOLIC BLOOD PRESSURE < 130 MM HG: ICD-10-PCS | Mod: CPTII,S$GLB,, | Performed by: INTERNAL MEDICINE

## 2022-10-20 PROCEDURE — 1160F PR REVIEW ALL MEDS BY PRESCRIBER/CLIN PHARMACIST DOCUMENTED: ICD-10-PCS | Mod: CPTII,S$GLB,, | Performed by: INTERNAL MEDICINE

## 2022-10-20 PROCEDURE — 1159F MED LIST DOCD IN RCRD: CPT | Mod: CPTII,S$GLB,, | Performed by: INTERNAL MEDICINE

## 2022-10-20 PROCEDURE — 1160F RVW MEDS BY RX/DR IN RCRD: CPT | Mod: CPTII,S$GLB,, | Performed by: INTERNAL MEDICINE

## 2022-10-20 PROCEDURE — 3074F SYST BP LT 130 MM HG: CPT | Mod: CPTII,S$GLB,, | Performed by: INTERNAL MEDICINE

## 2022-10-20 RX ORDER — IBUPROFEN 800 MG/1
800 TABLET ORAL EVERY 8 HOURS PRN
Qty: 60 TABLET | Refills: 2 | Status: SHIPPED | OUTPATIENT
Start: 2022-10-20 | End: 2023-03-22 | Stop reason: SDUPTHER

## 2022-10-20 NOTE — PROGRESS NOTES
"SUBJECTIVE     Chief Complaint   Patient presents with    Knee Pain    Cough       HPI  Ronal Ham is a 52 y.o. male with multiple medical diagnoses as listed in the medical history and problem list that presents for follow-up for continued R knee pain x 2-3 weeks. Pt reports having a recent steroid injection that worked for a few days, but the pain has again recurred. His pain is stabbing at a 7/10 intermittently. Pt reports the knee has decreased strength and is "giving out on him." Patient wonders if the knee is now being worsened by his chronic low back pain as he awaits a possible nerve block for further treatment.  He has been wearing a knee brace with some improvement of the pain.  Otherwise, patient reports some swelling and pain at the uvula.    PAST MEDICAL HISTORY:  Past Medical History:   Diagnosis Date    Arthritis     Eye injury     od hit above od    GERD (gastroesophageal reflux disease)     Hypertension     Lupus (systemic lupus erythematosus)     Pulmonary fibrosis     Raynaud phenomenon        PAST SURGICAL HISTORY:  Past Surgical History:   Procedure Laterality Date    COLONOSCOPY N/A 7/14/2022    Procedure: COLONOSCOPY;  Surgeon: Raman Brenner MD;  Location: [address]);  Service: Endoscopy;  Laterality: N/A;  fully vaccinated  instructions emailed    COLONOSCOPY N/A 9/19/2022    Procedure: COLONOSCOPY;  Surgeon: Guicho Chavarria MD;  Location: [address]);  Service: Endoscopy;  Laterality: N/A;  previous order placed by Dr. RANDA Hurt on 4/26/22 unusable  fully vaccinated, prep instr emailed -ml    HERNIA REPAIR      TRANSFORAMINAL EPIDURAL INJECTION OF STEROID Bilateral 6/2/2021    Procedure: LUMBAR TRANSFORAMINAL BILATERAL L4/5 DIRECT REFERRAL;  Surgeon: Blayne Garcia MD;  Location: [facility];  Service: Pain Management;  Laterality: Bilateral;  NEEDS CONSENT       SOCIAL HISTORY:  Social History     Socioeconomic History    Marital status:     " Number of children: 5    Years of education: some colle   Occupational History    Occupation: [employer] [place]      Employer: [email or web address]   Tobacco Use    Smoking status: Never    Smokeless tobacco: Never   Substance and Sexual Activity    Alcohol use: No    Drug use: No    Sexual activity: Yes     Partners: Female   Social History Narrative    Adult Screenings updated and reviewed    Mammogram( for females)    Pap ( for females)    PSA( for males )    Colonoscopy age  48    Flu shot yearly    Td     Pneumovax recommended one time  at age  65    Zostavax recommended one time at  age  60    Eye exam recommended yearly    Bone density        FAMILY HISTORY:  Family History   Problem Relation Age of Onset    Heart disease Mother     Heart disease Father     Glaucoma Father     Glaucoma Brother     No Known Problems Sister     No Known Problems Daughter     No Known Problems Son     No Known Problems Maternal Aunt     No Known Problems Maternal Uncle     No Known Problems Paternal Aunt     No Known Problems Paternal Uncle     No Known Problems Maternal Grandmother     No Known Problems Maternal Grandfather     No Known Problems Paternal Grandmother     No Known Problems Paternal Grandfather     Asthma Neg Hx     Emphysema Neg Hx     Amblyopia Neg Hx     Blindness Neg Hx     Cancer Neg Hx     Cataracts Neg Hx     Diabetes Neg Hx     Hypertension Neg Hx     Macular degeneration Neg Hx     Retinal detachment Neg Hx     Strabismus Neg Hx     Stroke Neg Hx     Thyroid disease Neg Hx        ALLERGIES AND MEDICATIONS: updated and reviewed.  Review of patient's allergies indicates:   Allergen Reactions    Carafate  [sucralfate] Rash     Other reaction(s): Hives     Current Outpatient Medications   Medication Sig Dispense Refill    albuterol (PROVENTIL/VENTOLIN HFA) 90 mcg/actuation inhaler INHALE 1 TO 2 PUFFS INTO THE LUNGS EVERY 6 HOURS AS NEEDED FOR WHEEZING 25.5 g 0    amLODIPine (NORVASC) 10 MG tablet Take 1 tablet  (10 mg total) by mouth once daily. 90 tablet 3    cyclobenzaprine (FLEXERIL) 10 MG tablet Take 10 mg by mouth nightly as needed.      diclofenac sodium (VOLTAREN) 1 % Gel Apply 2 g topically 4 (four) times daily. 100 g 0    fluticasone furoate-vilanteroL (BREO ELLIPTA) 100-25 mcg/dose diskus inhaler Inhale 1 puff into the lungs once daily. Controller 60 each 11    fluticasone propionate (FLONASE) 50 mcg/actuation nasal spray 2 sprays (100 mcg total) by Each Nostril route once daily. 16 g 5    HYDROcodone-acetaminophen (NORCO) 5-325 mg per tablet Take 1 tablet by mouth nightly as needed.      hydrOXYchloroQUINE (PLAQUENIL) 200 mg tablet Take 2 tablets (400 mg total) by mouth once daily. 180 tablet 3    pantoprazole (PROTONIX) 40 MG tablet Take 1 tablet (40 mg total) by mouth once daily. 90 tablet 3    polyethylene glycol (GOLYTELY) 236-22.74-6.74 -5.86 gram suspension Take 4,000 mLs by mouth.      pregabalin (LYRICA) 75 MG capsule Take 75 mg by mouth 2 (two) times daily.      promethazine-dextromethorphan (PROMETHAZINE-DM) 6.25-15 mg/5 mL Syrp Take 5 mLs by mouth every 4 (four) hours as needed (cough and congestion). 480 mL 0    amoxicillin (AMOXIL) 500 MG capsule Take 500 mg by mouth every 8 (eight) hours.      betamethasone dipropionate (DIPROLENE) 0.05 % ointment Apply topically 2 (two) times daily. for 7 days 15 g 2    ibuprofen (ADVIL,MOTRIN) 800 MG tablet Take 1 tablet (800 mg total) by mouth every 8 (eight) hours as needed (TAKE WITH MEALS). 60 tablet 2    loratadine (CLARITIN) 10 mg tablet Take 1 tablet (10 mg total) by mouth once daily. 30 tablet 0    sildenafiL (VIAGRA) 100 MG tablet Take 1 tablet (100 mg total) by mouth daily as needed for Erectile Dysfunction. 10 tablet 5     No current facility-administered medications for this visit.       ROS  Review of Systems   Constitutional:  Negative for chills and fever.   HENT:  Positive for sore throat. Negative for hearing loss.    Eyes:  Negative for visual  "disturbance.   Respiratory:  Negative for cough and shortness of breath.    Cardiovascular:  Negative for chest pain, palpitations and leg swelling.   Gastrointestinal:  Negative for abdominal pain, constipation, diarrhea, nausea and vomiting.   Genitourinary:  Negative for dysuria, frequency and urgency.   Musculoskeletal:  Positive for arthralgias. Negative for joint swelling and myalgias.   Skin:  Negative for rash and wound.   Neurological:  Negative for headaches.   Psychiatric/Behavioral:  Negative for agitation and confusion. The patient is not nervous/anxious.        OBJECTIVE     Physical Exam  Vitals:    10/20/22 1147   BP: 128/80   Pulse: 95   Resp: 18   Temp: 98.3 °F (36.8 °C)    Body mass index is 30.7 kg/m².  Weight: 88.9 kg (196 lb)   Height: 5' 7" (170.2 cm)     Physical Exam  Constitutional:       General: He is not in acute distress.     Appearance: He is well-developed.   HENT:      Head: Normocephalic and atraumatic.      Right Ear: External ear normal.      Left Ear: External ear normal.      Nose: Nose normal.      Mouth/Throat:      Pharynx: Uvula swelling present.   Eyes:      General: No scleral icterus.        Right eye: No discharge.         Left eye: No discharge.      Conjunctiva/sclera: Conjunctivae normal.   Neck:      Vascular: No JVD.      Trachea: No tracheal deviation.   Cardiovascular:      Rate and Rhythm: Normal rate and regular rhythm.      Heart sounds: Normal heart sounds. No murmur heard.    No friction rub. No gallop.   Pulmonary:      Effort: Pulmonary effort is normal. No respiratory distress.      Breath sounds: Normal breath sounds. No wheezing.   Abdominal:      General: Bowel sounds are normal. There is no distension.      Palpations: Abdomen is soft. There is no mass.      Tenderness: There is no abdominal tenderness. There is no guarding or rebound.   Musculoskeletal:         General: No tenderness or deformity. Normal range of motion.      Cervical back: Normal " range of motion and neck supple.   Skin:     General: Skin is warm and dry.      Findings: No erythema or rash.   Neurological:      Mental Status: He is alert and oriented to person, place, and time.      Motor: No abnormal muscle tone.      Coordination: Coordination normal.   Psychiatric:         Behavior: Behavior normal.         Thought Content: Thought content normal.         Judgment: Judgment normal.         Health Maintenance         Date Due Completion Date    Pneumococcal Vaccines (Age 0-64) (1 - PCV) Never done ---    Shingles Vaccine (1 of 2) Never done ---    TETANUS VACCINE 06/02/2020 6/2/2010    COVID-19 Vaccine (3 - Booster for Pfizer series) 11/16/2021 9/21/2021    Influenza Vaccine (1) 09/01/2022 12/11/2017    Lipid Panel 03/11/2023 3/11/2022    Colorectal Cancer Screening 09/19/2027 9/19/2022              ASSESSMENT     52 y.o. male with     1. Primary osteoarthritis of right knee    2. Uvulitis    3. Drug-induced immunodeficiency        PLAN:     1. Primary osteoarthritis of right knee  - Likely 2/2 pt compensating for chronic low back pain  -  will have patient continue ibuprofen and start PT  - ibuprofen (ADVIL,MOTRIN) 800 MG tablet; Take 1 tablet (800 mg total) by mouth every 8 (eight) hours as needed (TAKE WITH MEALS).  Dispense: 60 tablet; Refill: 2  - Ambulatory referral/consult to Physical/Occupational Therapy; Future    2. Uvulitis  - Continue symptomatic treatment with rest, increase fluid intake, tylenol or ibuprofen PRN fever(temp >/= 100.4) or body aches. Okay to take OTC antihistamines, i.e. Bendaryl, Claritin, Allegra, etc. as needed.  - Okay to gargle with warm, salt water or use throat lozenges as needed    3. Drug-induced immunodeficiency  - Stable; no acute issues        RTC in 1-2 weeks as needed for any acute worsening of current condition or failure to improve       Ramila Hurt MD  10/20/2022 12:16 PM        No follow-ups on file.

## 2022-10-25 ENCOUNTER — CLINICAL SUPPORT (OUTPATIENT)
Dept: REHABILITATION | Facility: OTHER | Age: 52
End: 2022-10-25
Payer: COMMERCIAL

## 2022-10-25 DIAGNOSIS — Z74.09 IMPAIRED FUNCTIONAL MOBILITY, BALANCE, GAIT, AND ENDURANCE: ICD-10-CM

## 2022-10-25 DIAGNOSIS — R53.1 DECREASED RANGE OF MOTION WITH DECREASED STRENGTH: ICD-10-CM

## 2022-10-25 DIAGNOSIS — M25.461 PAIN AND SWELLING OF RIGHT KNEE: ICD-10-CM

## 2022-10-25 DIAGNOSIS — M17.11 PRIMARY OSTEOARTHRITIS OF RIGHT KNEE: ICD-10-CM

## 2022-10-25 DIAGNOSIS — M25.60 DECREASED RANGE OF MOTION WITH DECREASED STRENGTH: ICD-10-CM

## 2022-10-25 DIAGNOSIS — M25.561 PAIN AND SWELLING OF RIGHT KNEE: ICD-10-CM

## 2022-10-25 PROCEDURE — 97163 PT EVAL HIGH COMPLEX 45 MIN: CPT | Mod: PN

## 2022-10-25 PROCEDURE — 97110 THERAPEUTIC EXERCISES: CPT | Mod: PN

## 2022-10-26 PROBLEM — M25.561 PAIN AND SWELLING OF RIGHT KNEE: Status: ACTIVE | Noted: 2022-10-26

## 2022-10-26 PROBLEM — M25.461 PAIN AND SWELLING OF RIGHT KNEE: Status: ACTIVE | Noted: 2022-10-26

## 2022-10-26 PROBLEM — R53.1 DECREASED RANGE OF MOTION WITH DECREASED STRENGTH: Status: ACTIVE | Noted: 2022-10-26

## 2022-10-26 PROBLEM — Z74.09 IMPAIRED FUNCTIONAL MOBILITY, BALANCE, GAIT, AND ENDURANCE: Status: ACTIVE | Noted: 2022-10-26

## 2022-10-26 PROBLEM — M25.60 DECREASED RANGE OF MOTION WITH DECREASED STRENGTH: Status: ACTIVE | Noted: 2022-10-26

## 2022-10-26 NOTE — PLAN OF CARE
"[facility]  Physical Therapy Initial Evaluation    Name: Ronal Ham  Clinic Number: [id number]    Therapy Diagnosis:   Encounter Diagnoses   Name Primary?    Primary osteoarthritis of right knee     Pain and swelling of right knee     Decreased range of motion with decreased strength     Impaired functional mobility, balance, gait, and endurance      Physician: Ramila Hurt MD    Physician Orders: PT Eval and Treat   Medical Diagnosis from Referral: M17.11 (ICD-10-CM) - Primary osteoarthritis of right knee   Evaluation Date: 10/25/2022  Authorization Period Expiration: 12/31/2022  Plan of Care Expiration: 1/25/2023  Progress note due: 11/15/2022  Visit # / Visits authorized: 1/ 1  FOTO #: 1/3 on 10/25/2022    Time In: 2:15  Time Out: 3:05  Total Billable Time: 50 minutes    Precautions: Standard, pulmonary fibrosis, HTN, Lupus, Raynaud's phenomenon    Subjective     Date of onset: chronic, acute worsening 2 months ago    History of current condition: - Ronal reports: chronic intermittent knee pain (R>L) due to Hx of OA. Reports acute worsening beginning 2 months ago without VANIA or trauma; believes it started because he was "walking funny" due to having low back pain. "My knee might be getting worse because of my back." Recently got an injection that he says helped with pain but did not help with his knee swelling. R knee will swell more than the left with standing, walking, stair climbing, and getting up from prolonged sitting. Denies radiating pain but reports sx of intermittent giving way and crepitus.      Past Medical History:   Diagnosis Date    Arthritis     Eye injury     od hit above od    GERD (gastroesophageal reflux disease)     Hypertension     Lupus (systemic lupus erythematosus)     Pulmonary fibrosis     Raynaud phenomenon      Ronal Ham  has a past surgical history that includes Hernia repair; Transforaminal epidural injection of steroid " (Bilateral, 6/2/2021); Colonoscopy (N/A, 7/14/2022); and Colonoscopy (N/A, 9/19/2022).    Ronal has a current medication list which includes the following prescription(s): albuterol, amlodipine, amoxicillin, betamethasone dipropionate, cyclobenzaprine, diclofenac sodium, breo ellipta, fluticasone propionate, hydrocodone-acetaminophen, hydroxychloroquine, ibuprofen, loratadine, pantoprazole, polyethylene glycol, pregabalin, promethazine-dextromethorphan, and sildenafil.    Review of patient's allergies indicates:   Allergen Reactions    Carafate  [sucralfate] Rash     Other reaction(s): Hives        Imaging, MRI studies, knee, 2020: 1. Tricompartment DJD changes. 2. No fracture or dislocation.    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  Prior Level of Function: independent  Current Level of Function: pain and difficulty with all functional activities    Pain:  Current 5/10, worst 7/10, best 5/10   Location: jeremy knees (R>L) - around R patella and inside knee (medial joint line, MCL)  Description: Aching, Dull, Tight, and occasionally sharp  Aggravating Factors: prolonged Sitting, Standing, Walking, and Getting out of bed/chair, stairs  Easing Factors: ice, injection, rest, and OTC meds (ibuprofen 800), exercise (LAQ, seated knee bends, heel raises, squatting), occasionally a brace    Pts goals: reduce pain, be able to work    Objective     Observation: elevated R iliac crest in standing with R trunk sidebend (mild), apparent genu varus R>L (mild)    Knee Right Left Pain/Dysfunction with Movement   AROM/PROM      flexion  110/128  120/130 Stiffness prior to endrange with flexion   extension  Hypo 6/0  0/hyper 2      *denotes pain       LE MMT Right Left Pain/Dysfunction with Movement   (approx 4 sec hold w/ max contraction)   Hip Flexion  4-/5  4/5    Hip Abduction  4-/5  5/5     Hip extension 4/5 4/5 Able to bridge   Knee Extension 3+/5  4-/5     Knee  "Flexion  3-/5  3+/5 Notes pain with MMT     TUG:  NT    30 second sit-to-stand test (without U/E support): 10x - WBOS, WS to LLE    5X Sit to Stand: 18 s (not req)*  Norms:   11.4 seconds for 60-69 years age groups  12.6 seconds for 70 - 79 years old  14.8 seconds for  80-89 years old        Girth (mid patella): R = 42 cm, L = 41 cm  Valgus angle (tibiofemoral): R = 5 deg, L = 5 deg    Hip ROM screen: max navarrete jeremy ER (<15 deg)  Ankle ROM screen: WFL    Flexibility:   Deon test: poor hip flexors/quads R>L  Ely's: poor quads  Passive SLR (HS) R = 60 deg, L = 70 deg  Ifeanyi test: NT  Gastroc/soleus: mod hypo    Joint Mobility: max hypo tibiofemoral glides, mod-max hypo patellar glides all directions    Functional Movement Analysis:   Gait: I, antalgic  Sit-stand T/F: mod I, HHAx1 on arm rest  Bed mobility: I  Balance: SLS = UA without LOB      Impairment/Limitation/Restriction for Knee    Therapist reviewed KOOS scores for Ronal Ham on 10/25/2022.   KOOS documents entered into [employer] - see Media section.    Limitation Score: 46%           TREATMENT     Total Treatment time separate from Evaluation: 25 minutes    Ronal received therapeutic exercises to develop strength, endurance, ROM, flexibility, posture, and core stabilization for 20 minutes including:    Heel prop x 3'  QS towel roll under knee x 15 x 5"  QS into SLR x 15  SL hip abd x 15  HL bridge x 15  LAQ with ball squeeze x 15  Patient edu x 5'      Ronal received the following manual therapy techniques: Joint mobilizations were applied to the: R knee for 5 minutes, including:  Patellar glides in all directions        Home Exercises and Patient Education Provided    Education provided:   - Patient educated regarding pathogenesis, diagnosis, protocol, prognosis, POC, and HEP, including use of visual assistance for understanding of anatomy and dysfunction. Written Home Exercises Provided with written and verbal instructions for frequency and duration " of the following exercises: see list above. Pt educated on HEP and activity modifications to reduce c/o pain and improve overall function.   - Pt was educated in posture and body mechanics.  Use of a lumbar roll was recommended and demonstrated here today.  Purchase information provided.   - Pt also educated on use of modalities prn to reduce c/o pain and dysfunction.       Written Home Exercises Provided: yes.  Exercises were reviewed and Ronal was able to demonstrate them prior to the end of the session.  Ronal demonstrated good  understanding of the education provided.     See EMR under Patient Instructions for exercises provided 10/25/2022.    Assessment     Ronal is a 52 y.o. male referred to outpatient Physical Therapy with a medical diagnosis of Primary osteoarthritis of right knee . Pt presents to PT with pain, decreased right knee ROM, decreased strength, poor posture, impaired balance and gait, and functional deficits with walking. Pt would benefit from skilled PT consisting of manual therapy including STM/MFR right, left knee, patellar mobs, knee flex/ext mobs/stretching; therapeutic exercise including LE strengthening/stretching, postural education, balance and gait training, and modalities prn to address limitations and increase functional mobility.    Pt prognosis is Good.   Pt will benefit from skilled outpatient Physical Therapy to address the deficits stated above and in the chart below, provide pt/family education, and to maximize pt's level of independence.     Plan of care discussed with patient: Yes  Pt's spiritual, cultural and educational needs considered and patient is agreeable to the plan of care and goals as stated below:     Anticipated Barriers for therapy: standard, Raynaud's phenomenon     Medical Necessity is demonstrated by the following  History  Co-morbidities and personal factors that may impact the plan of care Co-morbidities:   coping style/mechanism, difficulty sleeping,  high BMI, and level of undertstanding of current condition,   Lupus (systemic lupus erythematosus)   Pulmonary fibrosis       Personal Factors:   age  social background  lifestyle     high   Examination  Body Structures and Functions, activity limitations and participation restrictions that may impact the plan of care Body Regions:   lower extremities    Body Systems:    gross symmetry  ROM  strength  gross coordinated movement  balance  gait  transfers  transitions  motor control  motor learning  Flexibility, joint mobility, edema    Participation Restrictions:   ADLs, HHCs, self care, work, stair climbing, prolonged driving    Activity limitations:   Learning and applying knowledge  no deficits    General Tasks and Commands  no deficits    Communication  no deficits    Mobility  lifting and carrying objects  walking  driving (bike, car, motorcycle)    Self care  looking after one's health    Domestic Life  shopping  cooking  doing house work (cleaning house, washing dishes, laundry)    Interactions/Relationships  no deficits    Life Areas  employment    Community and Social Life  community life  recreation and leisure         high   Clinical Presentation unstable clinical presentation with unpredictable characteristics high   Decision Making/ Complexity Score: high     Goals:  Short Term Goals (6 weeks)  1. Pt will demonstrate improvements in righ knee ROM to 0-5-120 for ease with ambulation  2. Pt will demonstrate improvements in right hip and knee strength to 4/5 for ease with getting out of a chair.  3. Pt will be able to ambulate 200 ft without the presence of antalgic gait pattern  4. Pt will report <4/10 pain within the right knee for ease with ADL's    Long Term Goals (12 weeks)  1. Pt will demonstrate improvements in right knee ROM to 0-2-125 for ease with ambulation  2. Pt will demonstrate 5/5 strength within the right hip and knee for ease with house hold chores and climbing stairs  3. Pt will report  "being independent with his HEP for maintenance of improvements gained during therapy sessions  4. Pt will report <2/10 pain within the right knee for ease with ADLs  5. Pt will demonstrate ambulation x 300 ft without AD or antalgic gait.   6. Pt will perform TUG in < 10 seconds without AD in order to demo improved gait speed  7. Pt will perform at least 12 sit to stands without UE support on 30 second sit to stand test in order to demo improved ability to perform transfers        TUG Cutoff Scores:        30" sit to stand Cutoff Scores:          Plan     Plan of care Certification: 10/25/2022 to 1/25/2023.    Outpatient Physical Therapy 2 times weekly for 12 weeks to include the following interventions: Gait Training, Manual Therapy, Moist Heat/ Ice, Neuromuscular Re-ed, Orthotic Management and Training, ionto with dexamethasone prn, Patient Education, Therapeutic Activites and Therapeutic Exercise, ASTYM, Kinesiotape. Progress HEP towards D/C. Recommend F/U with MD if symptoms worsen or do not resolve. Patient may be seen by a PTA for treatment to carry out their plan of care.  Face-to-face conferences will be held.      Angelica Marie, PT, DPT      "

## 2022-11-01 ENCOUNTER — CLINICAL SUPPORT (OUTPATIENT)
Dept: REHABILITATION | Facility: OTHER | Age: 52
End: 2022-11-01
Payer: COMMERCIAL

## 2022-11-01 DIAGNOSIS — M25.60 DECREASED RANGE OF MOTION WITH DECREASED STRENGTH: ICD-10-CM

## 2022-11-01 DIAGNOSIS — R53.1 DECREASED RANGE OF MOTION WITH DECREASED STRENGTH: ICD-10-CM

## 2022-11-01 DIAGNOSIS — M25.461 PAIN AND SWELLING OF RIGHT KNEE: Primary | ICD-10-CM

## 2022-11-01 DIAGNOSIS — M25.561 PAIN AND SWELLING OF RIGHT KNEE: Primary | ICD-10-CM

## 2022-11-01 DIAGNOSIS — Z74.09 IMPAIRED FUNCTIONAL MOBILITY, BALANCE, GAIT, AND ENDURANCE: ICD-10-CM

## 2022-11-01 PROCEDURE — 97140 MANUAL THERAPY 1/> REGIONS: CPT | Mod: PN

## 2022-11-01 PROCEDURE — 97110 THERAPEUTIC EXERCISES: CPT | Mod: PN

## 2022-11-01 NOTE — PROGRESS NOTES
[address] [place] [address]    Phone:  [phone]    Fax:  [phone]       Thank You for choosing us for your health care visit. We are glad to serve you and happy to provide you with this summary of your visit. Please help us to ensure we have accurate records. If you find anything that needs to be changed, please let our staff know as soon as possible.          Your Demographic Information     Patient Name Sex Eliceo Tomlinson Male 1948       Ethnic Group Patient Race    Not of  or  Origin White      Your Visit Details     Date & Time Provider Department    2017 10:30 AM Boni Webber MD [facility]      Your Upcoming Appointment*(Max 10)     2017 10:00 AM CDT   Lab Visit with [facility]   [facility] ([facility])    [address] [place] [address] [address] [place] [address]   [phone]            2017  1:15 PM CDT   Medicare Well Visit with Boni Webber MD, [facility] NURSE   [facility] ([facility])    [address] [place] [address] [address] [place] [address]   [phone]              Your To Do List     Future Orders Please Complete On or Around Expires    URINALYSIS WITH MICRO & CULTURE IF INDICATED      Follow-Up    Return if symptoms worsen or fail to improve.      We Ordered or Performed the Following     URINE CULTURE       Conditions Discussed Today or Order-Related Diagnoses        Comments    Hematuria    -  Primary     Acute UTI         Chronic bilateral low back pain without sciatica         Other osteoarthritis of spine, cervical region           Your Vitals Were     BP Pulse Temp Height Weight BMI    124/70 (BP Location: [facility], Patient Position: Sitting, Cuff Size: Large Adult) 82 97.4 °F (36.3 °C)    Dry Needling Daily Note     Patient ID: Ronal Ham is a 52 y.o. male.  Diagnosis:   1. Pain and swelling of right knee        2. Decreased range of motion with decreased strength        3. Impaired functional mobility, balance, gait, and endurance          Date:  11/01/2022    Total Time:  20 min      Subjective:     Pt reports: continued R knee pain (medial) with walking, squatting.  Pain Scale: Ronal rates pain on a scale of 0-10 to be 6 currently.    Objective:     Pt signed written consent to dry needling Rx.  Pt gave verbal consent for DN.   Pt rec'd dry needling to R knee with 1-2 in needles with no adverse effects.    Homeostatic points:  1. Deep Radial  2. Greater Auricular  3. Spinal Accessory  4. Saphenous  5. Deep Fibular  6. Tibial  7. Greater Occipital  8. Suprascapular ( infraspinatus)  9. Lateral Antebrachial Cutaneous  10. Sural  11. Lateral Popliteal  12. Superficial Radial  13. Dorsal Scapular  14. Superior Cluneal  15. Posterior Cutaneous L 2  16. Inferior Gluteal  17. Lateral Pectoral  18. Ilitotibal  19. Infraorbital  20. Spinous process T7  21. Posterior cutaneous  T6  22. Posterior cutaneous L 5  23. Supraorbital  24. Common fibular    Paravertebral Points:  none    Symptomatic Points:   VMO, vastus lat, 1 fingerwidth to patellar base, distal ITB, 3 fingerwidth inferior to medial tibial plateau (popliteus)    Assessment:     Patient demonstrated appropriate response to FDN. Knee OA protocol intiaited today but pt UA to tolerate full program due to discomfort and fear of pain. Initiated intramuscular estim today, level 4 with 2 Hz. Pt in reclined HL position. Pt presents with improvement in ambulation with good pain modulation by end of session.    Patient Education/Response:     Education provided re:   Purpose, benefits, and potential side effects of dry needling.   Educated pt to use heat following treatment sessions to reduce c/o pain or soreness and to improve circulation to  (Tympanic) 5' 8\" (1.727 m) 221 lb (100.2 kg) 33.6 kg/m2    Smoking Status                   Former Smoker           Medications Prescribed or Re-Ordered Today     nitrofurantoin, macrocrystal-monohydrate, (MACROBID) 100 MG capsule    Sig - Route: Take 1 capsule by mouth 2 times daily. - Oral    Class: Eprescribe    Pharmacy: [facility] [address] AT [facility] Ph #: [phone]    HYDROcodone-acetaminophen (NORCO) 5-325 MG per tablet    Sig - Route: Take 1 tablet by mouth every 6 hours as needed for Pain. - Oral    Class: Normal    Pharmacy: [facility] [address]  AT [facility] Ph #: [phone]    cyclobenzaprine (FLEXERIL) 10 MG tablet    Si/2 to 1 tab po qhs.  May use up to tid if needed.    Class: Eprescribe    Pharmacy: [facility] [address] [place] [address]  AT [facility] Ph #: [phone]      Your Current Medications Are        Disp Refills Start End    HYDROcodone-acetaminophen (NORCO) 5-325 MG per tablet 30 tablet 0 2017     Sig - Route: Take 1 tablet by mouth every 6 hours as needed for Pain. - Oral    cyclobenzaprine (FLEXERIL) 10 MG tablet 15 tablet 0 2017     Si/2 to 1 tab po qhs.  May use up to tid if needed.    Class: Eprescribe    naproxen (NAPROSYN) 500 MG tablet 60 tablet 0 2017     Sig: TAKE 1 TABLET BY MOUTH TWICE DAILY    Class: Eprescribe    metFORMIN (GLUCOPHAGE) 500 MG tablet 180 tablet 1 2016     Sig - Route: Take 1 tablet by mouth 2 times daily (with meals). - Oral    Class: Eprescribe    Notes to Pharmacy: **Patient requests 90 days supply**    simvastatin (ZOCOR) 40 MG tablet 90 tablet 1 2016     Sig - Route: Take 1 tablet by mouth Every evening. - Oral    Class: Eprescribe    ONETOUCH VERIO 60 strip 11 2016     Si each daily. Test blood sugar 1-2 times daily as directed.  Meter: One  needled sites.   Encouraged pt to continue with HEP to maintain flexibility, ROM, and functional mobility.  Ronal verbalized good understanding of education     Plans and Goals:     Monitor response to FDN. Continue with FDN in POC as tolerated.     Angelica Marie, PT  11/1/2022               Touch Verio    Class: Eprescribe    Notes to Pharmacy: Change in directions 16    ONETOUCH DELICA LANCETS 33G Misc 100 each 11 2016     Si time per day    Class: Eprescribe    Cosign for Ordering: Accepted by Boni Webber MD on 2016 12:00 PM    Blood Glucose Monitoring Suppl (ONETOUCH VERIO) W/DEVICE Kit 1 kit 0 2016     Sig - Route: 1 each daily. Lot No [id number]; Exp 2017 - Does not apply    Class: Sample    Cosign for Ordering: Accepted by Boni Webber MD on 2016 12:00 PM    sharps container 1 each 3 2016     Sig: For lancets    Class: Eprescribe    Cosign for Ordering: Accepted by Boni Webber MD on 2016 12:00 PM    nitrofurantoin, macrocrystal-monohydrate, (MACROBID) 100 MG capsule 20 capsule 0 2017     Sig - Route: Take 1 capsule by mouth 2 times daily. - Oral    Class: Eprescribe      Allergies     Penicillins HIVES      Immunizations History as of 2017     Name Date    Influenza 2015, 10/28/2013, 10/24/2012      Problem List as of 2017     Former smoker    Type 2 diabetes mellitus without complication (CMS/[facility])    Osteoarthritis cervical spine    Mixed hyperlipidemia    Chronic bilateral low back pain without sciatica      Results of Testing Done Today       Patient Portal Signup     Manage health care for you and your family anytime, anywhere with the new [email or web address] your free online resource for quick and easy access to personal health information, scheduling appointments, refilling prescriptions, viewing test results, paying bills and more.  Sign up for a free and secure account. Please follow the instructions below to securely complete your enrollment.     1. Go to [email or web address]  2. Click Sign Up Now   3. Enter the Activation Code when prompted     Activation Code: Activation code not generated  Current [employer] Status: Account disabled    If you have questions related to [employer], you can email [email or web address]  or call [phone] to talk to our [facility] staff.  For questions related to your health, contact your physician’s office.  Please remember to dial 911 for medical emergencies.                Patient Instructions    · Fluids  · Rest  · Over the counter analgesics  · Will call with urine culture results   · Follow-up with pcp in 3-5 days if not better or seek medical attention earlier if worsens. Worsening symptoms include fevers, chills or back pain along with nausea or vomiting.  · Repeat UA in 3-4 weeks to make sure blood has cleared

## 2022-11-01 NOTE — PROGRESS NOTES
"[facility]   Physical Therapy Treatment Note     Name: Ronal Ham  Clinic Number: [id number]    Therapy Diagnosis:   Encounter Diagnoses   Name Primary?    Pain and swelling of right knee Yes    Decreased range of motion with decreased strength     Impaired functional mobility, balance, gait, and endurance      Physician: Ramila Hurt MD    Visit Date: 11/1/2022    Physician Orders: PT Eval and Treat   Medical Diagnosis from Referral: M17.11 (ICD-10-CM) - Primary osteoarthritis of right knee   Evaluation Date: 10/25/2022  Authorization Period Expiration: 12/31/2022  Plan of Care Expiration: 1/25/2023  Progress note due: 11/15/2022  Visit # / Visits authorized: 1/ 1  FOTO #: 1/3 on 10/25/2022     Precautions: Standard, pulmonary fibrosis, HTN, Lupus, Raynaud's phenomenon    PTA Visit #: 0/5     Time In: 2:27  Time Out: 3:05  Total Billable Time: 38 minutes    SUBJECTIVE     Pt reports: knee is feeling better. Still has pain with walking.  He was compliant with home exercise program.  Response to previous treatment: fair  Functional change: none    Pain: 6/10  Location: jeremy knees (R>L) - around R patella and inside knee (medial joint line, MCL)     OBJECTIVE     Objective Measures updated at progress report unless specified.     Treatment     Ronal received the treatments listed below:      **Exercises in BOLD performed today**    Ronal received therapeutic exercises to develop strength, endurance, ROM, flexibility, posture, and core stabilization for 13 minutes including:     Heel prop x 3' - with MHP on knee for increased AP glide  QS towel roll under knee x 20 x 5"  QS into SLR x 15  SL hip abd x 15  HL bridge x 15  LAQ with ball squeeze x 15  Patient edu x 5'        Ronal received the following manual therapy techniques: Joint mobilizations were applied to the: R knee for 25 minutes, including:  Patellar glides in all directions x 5 min  20 min x dry needling - see " note by Angelica Guzman, PT    neuromuscular re-education activities to improve: Balance, Coordination, Kinesthetic, Sense, Proprioception, and Posture for 00 minutes. The following activities were included:  None today.    therapeutic activities to improve functional performance for 00  minutes, including:  None today.          Patient Education and Home Exercises     Home Exercises Provided and Patient Education Provided     Education provided:   -none  Written Home Exercises Provided: Patient instructed to cont prior HEP. Exercises were reviewed and Ronal was able to demonstrate them prior to the end of the session.  Ronal demonstrated good  understanding of the education provided. See EMR under Patient Instructions for exercises provided during therapy sessions    ASSESSMENT     Dry needling initiated today with OA protocol due to continued abnormal mm pull and c/o pain, OA. Good tolerance with improved pain modulation with ambulation following DN session. Limited progression due to time constraints. Plan to progress next visit pending pt tolerance.    Ronal Is progressing well towards his goals.   Pt prognosis is Good.     Pt will continue to benefit from skilled outpatient physical therapy to address the deficits listed in the problem list box on initial evaluation, provide pt/family education and to maximize pt's level of independence in the home and community environment.     Pt's spiritual, cultural and educational needs considered and pt agreeable to plan of care and goals.     Anticipated barriers to physical therapy: standard, Raynaud's phenomenon     Goals: updated 11/1/2022   Short Term Goals (6 weeks)  1. Pt will demonstrate improvements in righ knee ROM to 0-5-120 for ease with ambulation (not met, progressing)  2. Pt will demonstrate improvements in right hip and knee strength to 4/5 for ease with getting out of a chair. (not met, progressing)  3. Pt will be able to ambulate 200 ft without  the presence of antalgic gait pattern (not met, progressing)  4. Pt will report <4/10 pain within the right knee for ease with ADL's (not met, progressing)     Long Term Goals (12 weeks)  1. Pt will demonstrate improvements in right knee ROM to 0-2-125 for ease with ambulation (not met, progressing)  2. Pt will demonstrate 5/5 strength within the right hip and knee for ease with house hold chores and climbing stairs (not met, progressing)  3. Pt will report being independent with his HEP for maintenance of improvements gained during therapy sessions (not met, progressing)  4. Pt will report <2/10 pain within the right knee for ease with ADLs (not met, progressing)  5. Pt will demonstrate ambulation x 300 ft without AD or antalgic gait.  (not met, progressing)  6. Pt will perform TUG in < 10 seconds without AD in order to demo improved gait speed (not met, progressing)  7. Pt will perform at least 12 sit to stands without UE support on 30 second sit to stand test in order to demo improved ability to perform transfers (not met, progressing)       PLAN     Continue with POC for strength and stability of BLE.    Angelica Marie, PT

## 2022-11-03 ENCOUNTER — CLINICAL SUPPORT (OUTPATIENT)
Dept: REHABILITATION | Facility: OTHER | Age: 52
End: 2022-11-03
Payer: COMMERCIAL

## 2022-11-03 DIAGNOSIS — M25.561 PAIN AND SWELLING OF RIGHT KNEE: Primary | ICD-10-CM

## 2022-11-03 DIAGNOSIS — M25.461 PAIN AND SWELLING OF RIGHT KNEE: Primary | ICD-10-CM

## 2022-11-03 DIAGNOSIS — M25.60 DECREASED RANGE OF MOTION WITH DECREASED STRENGTH: ICD-10-CM

## 2022-11-03 DIAGNOSIS — Z74.09 IMPAIRED FUNCTIONAL MOBILITY, BALANCE, GAIT, AND ENDURANCE: ICD-10-CM

## 2022-11-03 DIAGNOSIS — R53.1 DECREASED RANGE OF MOTION WITH DECREASED STRENGTH: ICD-10-CM

## 2022-11-03 PROCEDURE — 97110 THERAPEUTIC EXERCISES: CPT | Mod: PN

## 2022-11-03 NOTE — PROGRESS NOTES
"[facility]   Physical Therapy Treatment Note     Name: Ronal Ham  Clinic Number: [id number]    Therapy Diagnosis:   Encounter Diagnoses   Name Primary?    Pain and swelling of right knee Yes    Decreased range of motion with decreased strength     Impaired functional mobility, balance, gait, and endurance      Physician: Ramila Hurt MD    Visit Date: 11/3/2022    Physician Orders: PT Eval and Treat   Medical Diagnosis from Referral: M17.11 (ICD-10-CM) - Primary osteoarthritis of right knee   Evaluation Date: 10/25/2022  Authorization Period Expiration: 12/31/2022  Plan of Care Expiration: 1/25/2023  Progress note due: 11/15/2022  Visit # / Visits authorized: 3/ 1  FOTO #: 1/3 on 10/25/2022     Precautions: Standard, pulmonary fibrosis, HTN, Lupus, Raynaud's phenomenon    PTA Visit #: 0/5     Time In: 11:15  Time Out: 12;00  Total Billable Time: 45 minutes    SUBJECTIVE     Pt reports: knee is feeling better with the needling. Wants to work more on exercise today.  He was compliant with home exercise program.  Response to previous treatment: fair  Functional change: none    Pain: 6/10  Location: jeremy knees (R>L) - around R patella and inside knee (medial joint line, MCL)     OBJECTIVE     Objective Measures updated at progress report unless specified.     Treatment     Ronal received the treatments listed below:      **Exercises in BOLD performed today**    Ronal received therapeutic exercises to develop strength, endurance, ROM, flexibility, posture, and core stabilization for 45 minutes including:     Heel prop x 4' x 6# cw (3# on either side of the patella)  QS towel roll under knee x 20 x 5" -NP  QS into SLR x 20 x 3# cw  SL hip abd x 20 x 3# cw  +clams 10 x 10" holds  +supine HS stretch with strap 3 x 30"  +supine hip flexor stretch with strap 3 x 30"  +SKTC + towel roll in knee crease 3 x 30"  +piriformis stretch - nv?    HL bridge x 20  LAQ with ball squeeze x 15 - " NP    +standing TKE x 20 x red power band  +mini squats with power band behind R knee x 20  +incline board stretch 2 x 1'    +SLS on RB (AP) dynamic - nv  +SLS on RB (AP) static holds - nv    Ronal received the following manual therapy techniques: Joint mobilizations were applied to the: R knee for 00 minutes, including:  Patellar glides in all directions x 00 min  00 min x dry needling - see note by Angelica Guzman, PT    neuromuscular re-education activities to improve: Balance, Coordination, Kinesthetic, Sense, Proprioception, and Posture for 00 minutes. The following activities were included:  None today.    therapeutic activities to improve functional performance for 00  minutes, including:  None today.          Patient Education and Home Exercises     Home Exercises Provided and Patient Education Provided     Education provided:   -none  Written Home Exercises Provided: Patient instructed to cont prior HEP. Exercises were reviewed and Ronal was able to demonstrate them prior to the end of the session.  Ronal demonstrated good  understanding of the education provided. See EMR under Patient Instructions for exercises provided during therapy sessions    ASSESSMENT     Defer dry needling per pt request. Updated therex program to promote ROM, flexibility, and strength. Good toelrance overall with appropriatr training effect noted. Plan to progress SL balance and dynamic strength next visit pending pt tolerance.    Ronal Is progressing well towards his goals.   Pt prognosis is Good.     Pt will continue to benefit from skilled outpatient physical therapy to address the deficits listed in the problem list box on initial evaluation, provide pt/family education and to maximize pt's level of independence in the home and community environment.     Pt's spiritual, cultural and educational needs considered and pt agreeable to plan of care and goals.     Anticipated barriers to physical therapy: standard, Raynaud's  phenomenon     Goals: updated 11/3/2022   Short Term Goals (6 weeks)  1. Pt will demonstrate improvements in righ knee ROM to 0-5-120 for ease with ambulation (not met, progressing)  2. Pt will demonstrate improvements in right hip and knee strength to 4/5 for ease with getting out of a chair. (not met, progressing)  3. Pt will be able to ambulate 200 ft without the presence of antalgic gait pattern (not met, progressing)  4. Pt will report <4/10 pain within the right knee for ease with ADL's (not met, progressing)     Long Term Goals (12 weeks)  1. Pt will demonstrate improvements in right knee ROM to 0-2-125 for ease with ambulation (not met, progressing)  2. Pt will demonstrate 5/5 strength within the right hip and knee for ease with house hold chores and climbing stairs (not met, progressing)  3. Pt will report being independent with his HEP for maintenance of improvements gained during therapy sessions (not met, progressing)  4. Pt will report <2/10 pain within the right knee for ease with ADLs (not met, progressing)  5. Pt will demonstrate ambulation x 300 ft without AD or antalgic gait.  (not met, progressing)  6. Pt will perform TUG in < 10 seconds without AD in order to demo improved gait speed (not met, progressing)  7. Pt will perform at least 12 sit to stands without UE support on 30 second sit to stand test in order to demo improved ability to perform transfers (not met, progressing)       PLAN     Continue with POC for strength and stability of BLE.    Angelica Marie, PT

## 2022-11-08 ENCOUNTER — CLINICAL SUPPORT (OUTPATIENT)
Dept: REHABILITATION | Facility: OTHER | Age: 52
End: 2022-11-08
Payer: COMMERCIAL

## 2022-11-08 DIAGNOSIS — M25.561 PAIN AND SWELLING OF RIGHT KNEE: Primary | ICD-10-CM

## 2022-11-08 DIAGNOSIS — M25.60 DECREASED RANGE OF MOTION WITH DECREASED STRENGTH: ICD-10-CM

## 2022-11-08 DIAGNOSIS — R53.1 DECREASED RANGE OF MOTION WITH DECREASED STRENGTH: ICD-10-CM

## 2022-11-08 DIAGNOSIS — M25.461 PAIN AND SWELLING OF RIGHT KNEE: Primary | ICD-10-CM

## 2022-11-08 DIAGNOSIS — Z74.09 IMPAIRED FUNCTIONAL MOBILITY, BALANCE, GAIT, AND ENDURANCE: ICD-10-CM

## 2022-11-08 PROCEDURE — 97110 THERAPEUTIC EXERCISES: CPT | Mod: PN | Performed by: PHYSICAL THERAPIST

## 2022-11-08 NOTE — PROGRESS NOTES
"[facility]   Physical Therapy Treatment Note     Name: Ronal Ham  Clinic Number: [id number]    Therapy Diagnosis:   Encounter Diagnoses   Name Primary?    Pain and swelling of right knee Yes    Decreased range of motion with decreased strength     Impaired functional mobility, balance, gait, and endurance      Physician: Ramila Hurt MD    Visit Date: 11/8/2022    Physician Orders: PT Eval and Treat   Medical Diagnosis from Referral: M17.11 (ICD-10-CM) - Primary osteoarthritis of right knee   Evaluation Date: 10/25/2022  Authorization Period Expiration: 12/31/2022  Plan of Care Expiration: 1/25/2023  Progress note due: 11/15/2022  Visit # / Visits authorized: 4/ 21  FOTO #: 1/3 on 10/25/2022     Precautions: Standard, pulmonary fibrosis, HTN, Lupus, Raynaud's phenomenon    PTA Visit #: 0/5     Time In: 1500  Time Out: 1545  Total Billable Time: 45 minutes    SUBJECTIVE     Pt reports: knee is feeling a little better today. He says he would prefer to stick with exercise and hold on the needling for now.   He was compliant with home exercise program.  Response to previous treatment: fair  Functional change: none    Pain: 5/10  Location: jeremy knees (R>L) - around R patella and inside knee (medial joint line, MCL)     OBJECTIVE     Objective Measures updated at progress report unless specified.     Treatment     Ronal received the treatments listed below:      **Exercises in BOLD performed today**    Ronal received therapeutic exercises to develop strength, endurance, ROM, flexibility, posture, and core stabilization for 45 minutes including:     Heel prop x 4' x 6# cw (3# on either side of the patella)  QS towel roll under knee x 20 x 5" -NP  QS into SLR x 20 x 3# cw  SL hip abd x 20 x 3# cw  clams 10 x 10" holds  supine HS stretch with strap 3 x 30"  supine hip flexor stretch with strap 3 x 30"  SKTC + towel roll in knee crease 3 x 30"  +piriformis stretch 3 x 30"    HL " bridge x 20  LAQ with ball squeeze x 15 - NP    standing TKE x 20 x red power band  mini squats with power band behind R knee x 20  +Sidestepping with RTB at forefoot 4 x 20 ft  incline board stretch 2 x 1'    +SLS on RB (AP) dynamic - nv  +SLS on RB (AP) static holds - nv    Ronal received the following manual therapy techniques: Joint mobilizations were applied to the: R knee for 00 minutes, including:  Patellar glides in all directions x 00 min  00 min x dry needling - see note by Angelica Guzman, PT 11/1/2022    neuromuscular re-education activities to improve: Balance, Coordination, Kinesthetic, Sense, Proprioception, and Posture for 00 minutes. The following activities were included:  None today.    therapeutic activities to improve functional performance for 00  minutes, including:  None today.          Patient Education and Home Exercises     Home Exercises Provided and Patient Education Provided     Education provided:   -none  Written Home Exercises Provided: Patient instructed to cont prior HEP. Exercises were reviewed and Ronal was able to demonstrate them prior to the end of the session.  Ronal demonstrated good  understanding of the education provided. See EMR under Patient Instructions for exercises provided during therapy sessions    ASSESSMENT     Good tolerance to treatment today. Min c/o pinching to anterior hip with piriformis stretch, may benefit from trial of ER next visit. Appropriate training effect noted with progression of dynamic exercises.     Ronal Is progressing well towards his goals.   Pt prognosis is Good.     Pt will continue to benefit from skilled outpatient physical therapy to address the deficits listed in the problem list box on initial evaluation, provide pt/family education and to maximize pt's level of independence in the home and community environment.     Pt's spiritual, cultural and educational needs considered and pt agreeable to plan of care and goals.      Anticipated barriers to physical therapy: standard, Raynaud's phenomenon     Goals: updated 11/8/2022   Short Term Goals (6 weeks)  1. Pt will demonstrate improvements in righ knee ROM to 0-5-120 for ease with ambulation (not met, progressing)  2. Pt will demonstrate improvements in right hip and knee strength to 4/5 for ease with getting out of a chair. (not met, progressing)  3. Pt will be able to ambulate 200 ft without the presence of antalgic gait pattern (not met, progressing)  4. Pt will report <4/10 pain within the right knee for ease with ADL's (not met, progressing)     Long Term Goals (12 weeks)  1. Pt will demonstrate improvements in right knee ROM to 0-2-125 for ease with ambulation (not met, progressing)  2. Pt will demonstrate 5/5 strength within the right hip and knee for ease with house hold chores and climbing stairs (not met, progressing)  3. Pt will report being independent with his HEP for maintenance of improvements gained during therapy sessions (not met, progressing)  4. Pt will report <2/10 pain within the right knee for ease with ADLs (not met, progressing)  5. Pt will demonstrate ambulation x 300 ft without AD or antalgic gait.  (not met, progressing)  6. Pt will perform TUG in < 10 seconds without AD in order to demo improved gait speed (not met, progressing)  7. Pt will perform at least 12 sit to stands without UE support on 30 second sit to stand test in order to demo improved ability to perform transfers (not met, progressing)       PLAN     Continue with POC for strength and stability of BLE.    Nolvia Snyder, PT

## 2022-11-16 ENCOUNTER — CLINICAL SUPPORT (OUTPATIENT)
Dept: REHABILITATION | Facility: OTHER | Age: 52
End: 2022-11-16
Payer: COMMERCIAL

## 2022-11-16 ENCOUNTER — OFFICE VISIT (OUTPATIENT)
Dept: FAMILY MEDICINE | Facility: CLINIC | Age: 52
End: 2022-11-16
Payer: COMMERCIAL

## 2022-11-16 VITALS
DIASTOLIC BLOOD PRESSURE: 70 MMHG | HEART RATE: 88 BPM | SYSTOLIC BLOOD PRESSURE: 128 MMHG | HEIGHT: 64 IN | BODY MASS INDEX: 33.61 KG/M2 | TEMPERATURE: 98 F | WEIGHT: 196.88 LBS | OXYGEN SATURATION: 96 %

## 2022-11-16 DIAGNOSIS — J84.9 INTERSTITIAL LUNG DISEASE: Primary | ICD-10-CM

## 2022-11-16 DIAGNOSIS — J06.9 VIRAL URI WITH COUGH: ICD-10-CM

## 2022-11-16 DIAGNOSIS — R53.1 DECREASED RANGE OF MOTION WITH DECREASED STRENGTH: ICD-10-CM

## 2022-11-16 DIAGNOSIS — M25.461 PAIN AND SWELLING OF RIGHT KNEE: Primary | ICD-10-CM

## 2022-11-16 DIAGNOSIS — Z74.09 IMPAIRED FUNCTIONAL MOBILITY, BALANCE, GAIT, AND ENDURANCE: ICD-10-CM

## 2022-11-16 DIAGNOSIS — M25.561 PAIN AND SWELLING OF RIGHT KNEE: Primary | ICD-10-CM

## 2022-11-16 DIAGNOSIS — M25.60 DECREASED RANGE OF MOTION WITH DECREASED STRENGTH: ICD-10-CM

## 2022-11-16 PROCEDURE — 99214 PR OFFICE/OUTPT VISIT, EST, LEVL IV, 30-39 MIN: ICD-10-PCS | Mod: S$GLB,,, | Performed by: FAMILY MEDICINE

## 2022-11-16 PROCEDURE — 99214 OFFICE O/P EST MOD 30 MIN: CPT | Mod: S$GLB,,, | Performed by: FAMILY MEDICINE

## 2022-11-16 PROCEDURE — 1160F RVW MEDS BY RX/DR IN RCRD: CPT | Mod: CPTII,S$GLB,, | Performed by: FAMILY MEDICINE

## 2022-11-16 PROCEDURE — 3078F DIAST BP <80 MM HG: CPT | Mod: CPTII,S$GLB,, | Performed by: FAMILY MEDICINE

## 2022-11-16 PROCEDURE — 3008F BODY MASS INDEX DOCD: CPT | Mod: CPTII,S$GLB,, | Performed by: FAMILY MEDICINE

## 2022-11-16 PROCEDURE — 3074F SYST BP LT 130 MM HG: CPT | Mod: CPTII,S$GLB,, | Performed by: FAMILY MEDICINE

## 2022-11-16 PROCEDURE — 97110 THERAPEUTIC EXERCISES: CPT | Mod: PN

## 2022-11-16 PROCEDURE — 3044F HG A1C LEVEL LT 7.0%: CPT | Mod: CPTII,S$GLB,, | Performed by: FAMILY MEDICINE

## 2022-11-16 PROCEDURE — 3074F PR MOST RECENT SYSTOLIC BLOOD PRESSURE < 130 MM HG: ICD-10-PCS | Mod: CPTII,S$GLB,, | Performed by: FAMILY MEDICINE

## 2022-11-16 PROCEDURE — 3078F PR MOST RECENT DIASTOLIC BLOOD PRESSURE < 80 MM HG: ICD-10-PCS | Mod: CPTII,S$GLB,, | Performed by: FAMILY MEDICINE

## 2022-11-16 PROCEDURE — 3008F PR BODY MASS INDEX (BMI) DOCUMENTED: ICD-10-PCS | Mod: CPTII,S$GLB,, | Performed by: FAMILY MEDICINE

## 2022-11-16 PROCEDURE — 99999 PR PBB SHADOW E&M-EST. PATIENT-LVL V: CPT | Mod: PBBFAC,,, | Performed by: FAMILY MEDICINE

## 2022-11-16 PROCEDURE — 99999 PR PBB SHADOW E&M-EST. PATIENT-LVL V: ICD-10-PCS | Mod: PBBFAC,,, | Performed by: FAMILY MEDICINE

## 2022-11-16 PROCEDURE — 3044F PR MOST RECENT HEMOGLOBIN A1C LEVEL <7.0%: ICD-10-PCS | Mod: CPTII,S$GLB,, | Performed by: FAMILY MEDICINE

## 2022-11-16 PROCEDURE — 1159F PR MEDICATION LIST DOCUMENTED IN MEDICAL RECORD: ICD-10-PCS | Mod: CPTII,S$GLB,, | Performed by: FAMILY MEDICINE

## 2022-11-16 PROCEDURE — 1159F MED LIST DOCD IN RCRD: CPT | Mod: CPTII,S$GLB,, | Performed by: FAMILY MEDICINE

## 2022-11-16 PROCEDURE — 1160F PR REVIEW ALL MEDS BY PRESCRIBER/CLIN PHARMACIST DOCUMENTED: ICD-10-PCS | Mod: CPTII,S$GLB,, | Performed by: FAMILY MEDICINE

## 2022-11-16 RX ORDER — DOXYCYCLINE HYCLATE 100 MG
100 TABLET ORAL 2 TIMES DAILY
Qty: 28 TABLET | Refills: 0 | Status: SHIPPED | OUTPATIENT
Start: 2022-11-16 | End: 2023-03-22

## 2022-11-16 RX ORDER — DEXAMETHASONE 2 MG/1
2 TABLET ORAL 2 TIMES DAILY WITH MEALS
Qty: 20 TABLET | Refills: 0 | Status: SHIPPED | OUTPATIENT
Start: 2022-11-16 | End: 2022-11-26

## 2022-11-16 RX ORDER — BUDESONIDE, GLYCOPYRROLATE, AND FORMOTEROL FUMARATE 160; 9; 4.8 UG/1; UG/1; UG/1
1 AEROSOL, METERED RESPIRATORY (INHALATION) 2 TIMES DAILY
Qty: 10.7 G | Refills: 11 | Status: SHIPPED | OUTPATIENT
Start: 2022-11-16 | End: 2023-01-11 | Stop reason: SDUPTHER

## 2022-11-16 RX ORDER — LORATADINE 10 MG/1
10 TABLET ORAL DAILY
Qty: 90 TABLET | Refills: 3 | Status: SHIPPED | OUTPATIENT
Start: 2022-11-16 | End: 2023-06-08 | Stop reason: ALTCHOICE

## 2022-11-16 RX ORDER — PROMETHAZINE HYDROCHLORIDE AND DEXTROMETHORPHAN HYDROBROMIDE 6.25; 15 MG/5ML; MG/5ML
5 SYRUP ORAL EVERY 4 HOURS PRN
Qty: 480 ML | Refills: 0 | Status: SHIPPED | OUTPATIENT
Start: 2022-11-16 | End: 2023-02-09

## 2022-11-16 NOTE — PROGRESS NOTES
"HISTORY OF PRESENT ILLNESS:  Ronal Ham is a 52 y.o. male who presents to the clinic today for Back Pain and Knee Pain  .     Back pain is improved  The knee pain is still there  He is seeing specialist  The lungs are flared up  More cough than normal and he is trying to get back to work      Patient Active Problem List   Diagnosis    SLE (systemic lupus erythematosus)    Benign hypertension    Raynaud's syndrome    Interstitial lung disease    Chronic rhinitis    GERD (gastroesophageal reflux disease)    Pain in limb    Tinea pedis    Male erectile disorder    Encounter for vitamin deficiency screening    Chronic cough    Allergic rhinitis    Prediabetes    Effusion of left knee joint    Chondromalacia of left patellofemoral joint    Systemic lupus erythematosus with lung involvement    Low testosterone in male    SOB (shortness of breath)    Chronic pain    Chronic midline low back pain with bilateral sciatica    Drug-induced immunodeficiency    Primary osteoarthritis of right knee    Pain and swelling of right knee    Decreased range of motion with decreased strength    Impaired functional mobility, balance, gait, and endurance           CARE TEAM:  Patient Care Team:  Bruce Terrell MD as PCP - General (Family Medicine)  Paulette Sewell NP as Nurse Practitioner (Orthopedic Surgery)  Sherman Osman MD as Consulting Physician (Rheumatology)         ROS        PHYSICAL EXAM:  /70   Pulse 88   Temp 98.1 °F (36.7 °C) (Oral)   Ht 5' 4" (1.626 m)   Wt 89.3 kg (196 lb 13.9 oz)   SpO2 96%   BMI 33.79 kg/m²   Wt Readings from Last 5 Encounters:   11/16/22 89.3 kg (196 lb 13.9 oz)   10/20/22 88.9 kg (196 lb)   10/12/22 89 kg (196 lb 3.4 oz)   09/19/22 87.1 kg (192 lb)   09/09/22 87.1 kg (192 lb)     BP Readings from Last 5 Encounters:   11/16/22 128/70   10/20/22 128/80   09/19/22 (!) 163/101   09/09/22 (!) 142/91   05/06/22 130/84           He appears well, in no apparent distress.  Alert and " oriented times three, pleasant and cooperative. Vital signs are as documented in vital signs section.  Chronic lung changes.    More hoarseness.      Medication List with Changes/Refills   New Medications    DEXAMETHASONE (DECADRON) 2 MG TABLET    Take 1 tablet (2 mg total) by mouth 2 (two) times daily with meals. for 10 days    DOXYCYCLINE (VIBRA-TABS) 100 MG TABLET    Take 1 tablet (100 mg total) by mouth 2 (two) times daily.   Current Medications    ALBUTEROL (PROVENTIL/VENTOLIN HFA) 90 MCG/ACTUATION INHALER    INHALE 1 TO 2 PUFFS INTO THE LUNGS EVERY 6 HOURS AS NEEDED FOR WHEEZING    AMLODIPINE (NORVASC) 10 MG TABLET    Take 1 tablet (10 mg total) by mouth once daily.    AMOXICILLIN (AMOXIL) 500 MG CAPSULE    Take 500 mg by mouth every 8 (eight) hours.    BETAMETHASONE DIPROPIONATE (DIPROLENE) 0.05 % OINTMENT    Apply topically 2 (two) times daily. for 7 days    CYCLOBENZAPRINE (FLEXERIL) 10 MG TABLET    Take 10 mg by mouth nightly as needed.    FLUTICASONE PROPIONATE (FLONASE) 50 MCG/ACTUATION NASAL SPRAY    2 sprays (100 mcg total) by Each Nostril route once daily.    HYDROCODONE-ACETAMINOPHEN (NORCO) 5-325 MG PER TABLET    Take 1 tablet by mouth nightly as needed.    HYDROXYCHLOROQUINE (PLAQUENIL) 200 MG TABLET    Take 2 tablets (400 mg total) by mouth once daily.    IBUPROFEN (ADVIL,MOTRIN) 800 MG TABLET    Take 1 tablet (800 mg total) by mouth every 8 (eight) hours as needed (TAKE WITH MEALS).    PANTOPRAZOLE (PROTONIX) 40 MG TABLET    Take 1 tablet (40 mg total) by mouth once daily.    POLYETHYLENE GLYCOL (GOLYTELY) 236-22.74-6.74 -5.86 GRAM SUSPENSION    Take 4,000 mLs by mouth.    PREGABALIN (LYRICA) 75 MG CAPSULE    Take 75 mg by mouth 2 (two) times daily.    SILDENAFIL (VIAGRA) 100 MG TABLET    Take 1 tablet (100 mg total) by mouth daily as needed for Erectile Dysfunction.   Discontinued Medications    DICLOFENAC SODIUM (VOLTAREN) 1 % GEL    Apply 2 g topically 4 (four) times daily.    FLUTICASONE  FUROATE-VILANTEROL (BREO ELLIPTA) 100-25 MCG/DOSE DISKUS INHALER    Inhale 1 puff into the lungs once daily. Controller    LORATADINE (CLARITIN) 10 MG TABLET    Take 1 tablet (10 mg total) by mouth once daily.    PROMETHAZINE-DEXTROMETHORPHAN (PROMETHAZINE-DM) 6.25-15 MG/5 ML SYRP    Take 5 mLs by mouth every 4 (four) hours as needed (cough and congestion).       ASSESSMENT AND PLAN:    Problem List Items Addressed This Visit       Interstitial lung disease - Primary    Overview     Noted on CT  GGOs, bronchiectasis, cystic changes, suspect NSIP  PFTs normal and no desat on walk  Monitor q 6 mos with PFTs and walk         Relevant Medications    dexAMETHasone (DECADRON) 2 MG tablet    doxycycline (VIBRA-TABS) 100 MG tablet    Other Relevant Orders    Complete PFT with bronchodilator    PULSE OXIMETRY WITH REST - PULM    Stress test, pulmonary       Future Appointments   Date Time Provider Department Center   11/17/2022  1:30 PM Angelica Guzman, PT [facility]   11/22/2022  2:15 PM Angelica Guzman, PT [facility]   11/25/2022  1:30 PM Angelica Guzman, PT [facility]   11/29/2022  2:15 PM Angelica Guzman, PT [facility]   12/1/2022  2:15 PM Angelica Guzman, PT [facility]   12/6/2022  2:15 PM Angelica Guzman, PT [facility]   12/8/2022  2:15 PM Nolvia Snyder, PT [facility]   12/13/2022  2:15 PM Nolvia Snyder, PT [facility] [place]   12/15/2022  2:15 PM Nolvia Snyder, PT [facility]   12/20/2022  2:15 PM Angelica Guzman, PT [facility] [place]   12/22/2022  2:15 PM Nolvia Snyder, PT [facility]       Follow up in about 3 months (around 2/16/2023) for assess treatment plan. or sooner as needed.

## 2022-11-16 NOTE — PROGRESS NOTES
"[facility]   Physical Therapy Treatment Note     Name: Ronal Ham  Clinic Number: [id number]    Therapy Diagnosis:   Encounter Diagnoses   Name Primary?    Pain and swelling of right knee Yes    Decreased range of motion with decreased strength     Impaired functional mobility, balance, gait, and endurance      Physician: Ramila Hurt MD    Visit Date: 11/16/2022    Physician Orders: PT Eval and Treat   Medical Diagnosis from Referral: M17.11 (ICD-10-CM) - Primary osteoarthritis of right knee   Evaluation Date: 10/25/2022  Authorization Period Expiration: 12/31/2022  Plan of Care Expiration: 1/25/2023  Progress note due: 11/15/2022  Visit # / Visits authorized: 5/ 21  FOTO #: 1/3 on 10/25/2022     Precautions: Standard, pulmonary fibrosis, HTN, Lupus, Raynaud's phenomenon    PTA Visit #: 0/5     Time In: 10:11  Time Out: 11:01  Total Billable Time: 38 minutes 1:1 (total 50 min)    SUBJECTIVE     Pt reports: knee feels stiff with colder weather.    He was compliant with home exercise program.  Response to previous treatment: fair  Functional change: none    Pain: 5/10  Location: jeremy knees (R>L) - around R patella and inside knee (medial joint line, MCL)     OBJECTIVE     Objective Measures updated at progress report unless specified.     Treatment     Ronal received the treatments listed below:      **Exercises in BOLD performed today**    Ronal received therapeutic exercises to develop strength, endurance, ROM, flexibility, posture, and core stabilization for 50 minutes including:     Heel prop x 4' x 6# cw (3# on either side of the patella)  QS towel roll under knee x 20 x 5" -NP  QS into SLR x 20 x 3# cw  SL hip abd x 20 x 3# cw  clams 10 x 10" holds x red TB at knees  HL bridge x 20 x red TB at knees    supine HS stretch with strap 3 x 30"  supine hip flexor stretch with strap 3 x 30"  SKTC + towel roll in knee crease 3 x 30"  piriformis stretch 3 x 30"      LAQ with " ball squeeze x 15 - NP    standing TKE x 20 x red power band  mini squats with power band behind R knee x 20  Sidestepping with RTB at forefoot 4 x 20 ft  incline board stretch 2 x 1'    +SLS on RB (AP) dynamic - nv  +SLS on RB (AP) static holds - nv    Ronal received the following manual therapy techniques: Joint mobilizations were applied to the: R knee for 00 minutes, including:  Patellar glides in all directions x 00 min  00 min x dry needling - see note by Angelica Guzman, PT 11/1/2022    neuromuscular re-education activities to improve: Balance, Coordination, Kinesthetic, Sense, Proprioception, and Posture for 00 minutes. The following activities were included:  None today.    therapeutic activities to improve functional performance for 00  minutes, including:  None today.          Patient Education and Home Exercises     Home Exercises Provided and Patient Education Provided     Education provided:   -none  Written Home Exercises Provided: Patient instructed to cont prior HEP. Exercises were reviewed and Ronal was able to demonstrate them prior to the end of the session.  Ronal demonstrated good  understanding of the education provided. See EMR under Patient Instructions for exercises provided during therapy sessions    ASSESSMENT     Added resistance to clams and bridges to progress strength. Good tolerance with minor fatigue indicating good improvements in mm activation and strength.    Ronal Is progressing well towards his goals.   Pt prognosis is Good.     Pt will continue to benefit from skilled outpatient physical therapy to address the deficits listed in the problem list box on initial evaluation, provide pt/family education and to maximize pt's level of independence in the home and community environment.     Pt's spiritual, cultural and educational needs considered and pt agreeable to plan of care and goals.     Anticipated barriers to physical therapy: standard, Raynaud's phenomenon      Goals: updated 11/16/2022   Short Term Goals (6 weeks)  1. Pt will demonstrate improvements in righ knee ROM to 0-5-120 for ease with ambulation (not met, progressing)  2. Pt will demonstrate improvements in right hip and knee strength to 4/5 for ease with getting out of a chair. (not met, progressing)  3. Pt will be able to ambulate 200 ft without the presence of antalgic gait pattern (not met, progressing)  4. Pt will report <4/10 pain within the right knee for ease with ADL's (not met, progressing)     Long Term Goals (12 weeks)  1. Pt will demonstrate improvements in right knee ROM to 0-2-125 for ease with ambulation (not met, progressing)  2. Pt will demonstrate 5/5 strength within the right hip and knee for ease with house hold chores and climbing stairs (not met, progressing)  3. Pt will report being independent with his HEP for maintenance of improvements gained during therapy sessions (not met, progressing)  4. Pt will report <2/10 pain within the right knee for ease with ADLs (not met, progressing)  5. Pt will demonstrate ambulation x 300 ft without AD or antalgic gait.  (not met, progressing)  6. Pt will perform TUG in < 10 seconds without AD in order to demo improved gait speed (not met, progressing)  7. Pt will perform at least 12 sit to stands without UE support on 30 second sit to stand test in order to demo improved ability to perform transfers (not met, progressing)       PLAN     Continue with POC for strength and stability of BLE.    Angelica Marie, PT

## 2022-11-23 ENCOUNTER — PATIENT MESSAGE (OUTPATIENT)
Dept: REHABILITATION | Facility: OTHER | Age: 52
End: 2022-11-23
Payer: COMMERCIAL

## 2023-01-20 RX ORDER — SILDENAFIL 100 MG/1
100 TABLET, FILM COATED ORAL DAILY PRN
Qty: 10 TABLET | Refills: 5 | Status: SHIPPED | OUTPATIENT
Start: 2023-01-20 | End: 2024-01-20

## 2023-01-20 NOTE — TELEPHONE ENCOUNTER
No new care gaps identified.  [facility] Embedded Care Gaps. Reference number: [id number]. 1/20/2023   9:11:26 AM CST

## 2023-03-03 NOTE — TELEPHONE ENCOUNTER
Left message regarding referral, letter sent.   84 yo diabetic htn female with multiple large wounds to scalp that are leaking pus. Not on AB. [place] got [place] from [place]. Also lost approx 20 kg over past three months as per daughter

## 2023-03-22 ENCOUNTER — OFFICE VISIT (OUTPATIENT)
Dept: FAMILY MEDICINE | Facility: CLINIC | Age: 53
End: 2023-03-22
Payer: COMMERCIAL

## 2023-03-22 VITALS
BODY MASS INDEX: 32.74 KG/M2 | SYSTOLIC BLOOD PRESSURE: 136 MMHG | TEMPERATURE: 98 F | HEART RATE: 77 BPM | OXYGEN SATURATION: 99 % | RESPIRATION RATE: 16 BRPM | WEIGHT: 191.81 LBS | HEIGHT: 64 IN | DIASTOLIC BLOOD PRESSURE: 96 MMHG

## 2023-03-22 DIAGNOSIS — L85.3 DRY SKIN DERMATITIS: ICD-10-CM

## 2023-03-22 DIAGNOSIS — J31.0 CHRONIC RHINITIS: ICD-10-CM

## 2023-03-22 DIAGNOSIS — I10 BENIGN HYPERTENSION: Primary | ICD-10-CM

## 2023-03-22 DIAGNOSIS — M17.11 PRIMARY OSTEOARTHRITIS OF RIGHT KNEE: ICD-10-CM

## 2023-03-22 PROCEDURE — 1160F RVW MEDS BY RX/DR IN RCRD: CPT | Mod: CPTII,S$GLB,, | Performed by: NURSE PRACTITIONER

## 2023-03-22 PROCEDURE — 3080F DIAST BP >= 90 MM HG: CPT | Mod: CPTII,S$GLB,, | Performed by: NURSE PRACTITIONER

## 2023-03-22 PROCEDURE — 3075F PR MOST RECENT SYSTOLIC BLOOD PRESS GE 130-139MM HG: ICD-10-PCS | Mod: CPTII,S$GLB,, | Performed by: NURSE PRACTITIONER

## 2023-03-22 PROCEDURE — 1160F PR REVIEW ALL MEDS BY PRESCRIBER/CLIN PHARMACIST DOCUMENTED: ICD-10-PCS | Mod: CPTII,S$GLB,, | Performed by: NURSE PRACTITIONER

## 2023-03-22 PROCEDURE — 1159F MED LIST DOCD IN RCRD: CPT | Mod: CPTII,S$GLB,, | Performed by: NURSE PRACTITIONER

## 2023-03-22 PROCEDURE — 3008F PR BODY MASS INDEX (BMI) DOCUMENTED: ICD-10-PCS | Mod: CPTII,S$GLB,, | Performed by: NURSE PRACTITIONER

## 2023-03-22 PROCEDURE — 99214 OFFICE O/P EST MOD 30 MIN: CPT | Mod: S$GLB,,, | Performed by: NURSE PRACTITIONER

## 2023-03-22 PROCEDURE — 3075F SYST BP GE 130 - 139MM HG: CPT | Mod: CPTII,S$GLB,, | Performed by: NURSE PRACTITIONER

## 2023-03-22 PROCEDURE — 3080F PR MOST RECENT DIASTOLIC BLOOD PRESSURE >= 90 MM HG: ICD-10-PCS | Mod: CPTII,S$GLB,, | Performed by: NURSE PRACTITIONER

## 2023-03-22 PROCEDURE — 3008F BODY MASS INDEX DOCD: CPT | Mod: CPTII,S$GLB,, | Performed by: NURSE PRACTITIONER

## 2023-03-22 PROCEDURE — 99999 PR PBB SHADOW E&M-EST. PATIENT-LVL IV: ICD-10-PCS | Mod: PBBFAC,,, | Performed by: NURSE PRACTITIONER

## 2023-03-22 PROCEDURE — 99214 PR OFFICE/OUTPT VISIT, EST, LEVL IV, 30-39 MIN: ICD-10-PCS | Mod: S$GLB,,, | Performed by: NURSE PRACTITIONER

## 2023-03-22 PROCEDURE — 1159F PR MEDICATION LIST DOCUMENTED IN MEDICAL RECORD: ICD-10-PCS | Mod: CPTII,S$GLB,, | Performed by: NURSE PRACTITIONER

## 2023-03-22 PROCEDURE — 99999 PR PBB SHADOW E&M-EST. PATIENT-LVL IV: CPT | Mod: PBBFAC,,, | Performed by: NURSE PRACTITIONER

## 2023-03-22 RX ORDER — IBUPROFEN 800 MG/1
800 TABLET ORAL EVERY 8 HOURS PRN
Qty: 60 TABLET | Refills: 2 | Status: SHIPPED | OUTPATIENT
Start: 2023-03-22 | End: 2023-06-05 | Stop reason: SDUPTHER

## 2023-03-22 RX ORDER — FLUTICASONE PROPIONATE 50 MCG
2 SPRAY, SUSPENSION (ML) NASAL DAILY
Qty: 16 G | Refills: 5 | Status: SHIPPED | OUTPATIENT
Start: 2023-03-22 | End: 2023-06-08

## 2023-03-22 NOTE — PROGRESS NOTES
Subjective:      Patient ID: Ronal Ham is a 52 y.o. male.  Pt returns to clinic with a few chronic symptoms that have all worsened after cutting gras a couple of weeks ago. He has a hx of SLE on plaquenil and reports a hx of chronic knee pain that has flared up along with chronic allergies runny nose, itchy red eyes and dry itchy skin.     Knee Pain   The incident occurred more than 1 week ago. The incident occurred at home. There was no injury mechanism (denies new trauma, injury or fall). The pain is present in the right knee. The quality of the pain is described as aching and cramping. The pain is moderate. The pain has been Constant since onset. Associated symptoms include a loss of motion and muscle weakness. Pertinent negatives include no inability to bear weight, loss of sensation, numbness or tingling. He reports no foreign bodies present. The symptoms are aggravated by movement, palpation and weight bearing. He has tried heat and ice (gabapentin) for the symptoms. The treatment provided mild relief.   Sinus Problem  This is a chronic problem. The current episode started 1 to 4 weeks ago (acute flare ~2wks ago). The problem is unchanged. There has been no fever. The pain is mild. Associated symptoms include sinus pressure and sneezing. Pertinent negatives include no chills, congestion, coughing, diaphoresis, ear pain, headaches, hoarse voice, neck pain, shortness of breath, sore throat or swollen glands. Treatments tried: claritin. The treatment provided no relief.   Review of Systems   Constitutional:  Negative for activity change, appetite change, chills, diaphoresis, fever and unexpected weight change.   HENT:  Positive for postnasal drip, rhinorrhea, sinus pressure/congestion and sneezing. Negative for nasal congestion, ear pain, hoarse voice, sore throat, trouble swallowing and voice change.    Eyes:  Positive for redness, itching and eye dryness. Negative for photophobia, pain, discharge  "and visual disturbance.   Respiratory:  Negative for cough, chest tightness and shortness of breath.    Cardiovascular:  Negative for chest pain, palpitations and leg swelling.   Gastrointestinal:  Negative for abdominal pain, change in bowel habit, constipation, diarrhea, nausea, vomiting and change in bowel habit.   Endocrine: Negative.    Genitourinary:  Negative for difficulty urinating.   Musculoskeletal:  Positive for arthralgias, joint swelling and leg pain. Negative for back pain, gait problem, myalgias, neck pain, neck stiffness and joint deformity.   Integumentary:  Negative for rash.        Dry and itchy   Allergic/Immunologic: Negative.    Neurological:  Negative for tingling, speech difficulty, numbness and headaches.   Hematological: Negative.    Psychiatric/Behavioral: Negative.     All other systems reviewed and are negative.      Objective:     Vitals:    03/22/23 1151   BP: (!) 136/96   Pulse: 77   Resp: 16   Temp: 97.8 °F (36.6 °C)   TempSrc: Oral   SpO2: 99%   Weight: 87 kg (191 lb 12.8 oz)   Height: 5' 4" (1.626 m)     Physical Exam  Vitals and nursing note reviewed.   Constitutional:       General: He is not in acute distress.     Appearance: Normal appearance. He is well-developed and well-groomed. He is not ill-appearing.   HENT:      Head: Normocephalic and atraumatic.      Right Ear: Tympanic membrane, ear canal and external ear normal.      Left Ear: Tympanic membrane, ear canal and external ear normal.      Nose: Mucosal edema and rhinorrhea present. No congestion.      Mouth/Throat:      Lips: Pink.      Mouth: Mucous membranes are moist.      Pharynx: Oropharynx is clear. Uvula midline.   Eyes:      General: Lids are normal. Vision grossly intact. Gaze aligned appropriately. Allergic shiner present.         Right eye: No discharge.         Left eye: No discharge.      Conjunctiva/sclera:      Right eye: Right conjunctiva is injected.      Left eye: Left conjunctiva is injected.      " Pupils: Pupils are equal, round, and reactive to light.   Neck:      Trachea: Phonation normal.   Cardiovascular:      Rate and Rhythm: Normal rate and regular rhythm.      Heart sounds: Normal heart sounds.   Pulmonary:      Effort: Pulmonary effort is normal. No accessory muscle usage or respiratory distress.      Breath sounds: Normal breath sounds and air entry. No wheezing or rales.   Abdominal:      General: Abdomen is flat. Bowel sounds are normal. There is no distension.      Palpations: Abdomen is soft.      Tenderness: There is no abdominal tenderness.   Musculoskeletal:      Cervical back: Neck supple.      Right upper leg: Normal.      Left upper leg: Normal.      Right knee: Swelling and crepitus present. No deformity, effusion, erythema or bony tenderness. Normal range of motion. Tenderness present over the medial joint line and patellar tendon.      Left knee: Normal.      Right lower leg: Normal. No edema.      Left lower leg: Normal. No edema.   Skin:     General: Skin is warm and dry.      Findings: No rash.      Comments: Diffuse dry flaky skin    Neurological:      General: No focal deficit present.      Mental Status: He is alert and oriented to person, place, and time. Mental status is at baseline.      Sensory: Sensation is intact.      Motor: Motor function is intact.      Coordination: Coordination is intact.      Gait: Gait is intact.   Psychiatric:         Attention and Perception: Attention and perception normal.         Mood and Affect: Mood and affect normal.         Speech: Speech normal.         Behavior: Behavior normal. Behavior is cooperative.         Thought Content: Thought content normal.         Cognition and Memory: Cognition and memory normal.         Judgment: Judgment normal.     Assessment and Plan:     1. Benign hypertension  BP mildly elevated today 136/96, pt is in pain, take amlodipine 10mg daily, has not taken today's dose, neurovascularly stable, will take home meds  and report home BP, knows to RTC for elevated BP or if symptoms worsen     2. Primary osteoarthritis of right knee  Atraumatic arthritic pain triggered by activity and overuse  Natural history and expected course discussed. Questions answered.  Home exercises discussed.  NSAIDs per medication orders.  OTC analgesics as needed.  RICE  Has seen ortho in the past and will RTC if fails to improve or worsen   - ibuprofen (ADVIL,MOTRIN) 800 MG tablet; Take 1 tablet (800 mg total) by mouth every 8 (eight) hours as needed (TAKE WITH MEALS).  Dispense: 60 tablet; Refill: 2    3. Chronic rhinitis  Chronic exacerbation without acute infection  Symptomatic therapy suggested: rest, increase fluids, gargle prn for sore throat, apply heat to sinuses prn, use mist of vaporizer prn, OTC acetaminophen, ibuprofen, antihistamine-decongestant of choice, cough suppressant of choice, and call prn if symptoms persist or worsen. Call or return to clinic prn if these symptoms worsen or fail to improve as anticipated.  - fluticasone propionate (FLONASE) 50 mcg/actuation nasal spray; 2 sprays (100 mcg total) by Each Nostril route once daily.  Dispense: 16 g; Refill: 5    4. Dry skin dermatitis  Good skin care regimen discussed including limiting to one bath or shower/day, using lukewarm water with mild soap and moisturizing cream to skin 1 - 2x/day.              URSZULA Crooks, FNP-C  Family/Internal Medicine  [facility]

## 2023-04-24 DIAGNOSIS — I10 BENIGN HYPERTENSION: ICD-10-CM

## 2023-04-24 NOTE — TELEPHONE ENCOUNTER
No new care gaps identified.  [facility] Embedded Care Gaps. Reference number: [id number]. 4/24/2023   10:16:04 AM DENZELT

## 2023-04-26 RX ORDER — AMLODIPINE BESYLATE 10 MG/1
TABLET ORAL
Qty: 90 TABLET | Refills: 3 | Status: SHIPPED | OUTPATIENT
Start: 2023-04-26 | End: 2024-04-03

## 2023-05-05 ENCOUNTER — OFFICE VISIT (OUTPATIENT)
Dept: FAMILY MEDICINE | Facility: CLINIC | Age: 53
End: 2023-05-05
Payer: COMMERCIAL

## 2023-05-05 VITALS
BODY MASS INDEX: 33.61 KG/M2 | TEMPERATURE: 99 F | RESPIRATION RATE: 17 BRPM | OXYGEN SATURATION: 97 % | DIASTOLIC BLOOD PRESSURE: 74 MMHG | WEIGHT: 196.88 LBS | HEART RATE: 85 BPM | HEIGHT: 64 IN | SYSTOLIC BLOOD PRESSURE: 120 MMHG

## 2023-05-05 DIAGNOSIS — J84.9 INTERSTITIAL LUNG DISEASE: Primary | ICD-10-CM

## 2023-05-05 DIAGNOSIS — J30.9 ALLERGIC RHINITIS, UNSPECIFIED SEASONALITY, UNSPECIFIED TRIGGER: ICD-10-CM

## 2023-05-05 DIAGNOSIS — R05.1 ACUTE COUGH: ICD-10-CM

## 2023-05-05 DIAGNOSIS — R06.09 DYSPNEA ON EXERTION: ICD-10-CM

## 2023-05-05 DIAGNOSIS — M32.13 SYSTEMIC LUPUS ERYTHEMATOSUS WITH LUNG INVOLVEMENT, UNSPECIFIED SLE TYPE: ICD-10-CM

## 2023-05-05 DIAGNOSIS — M17.11 PRIMARY OSTEOARTHRITIS OF RIGHT KNEE: ICD-10-CM

## 2023-05-05 PROCEDURE — 1159F MED LIST DOCD IN RCRD: CPT | Mod: CPTII,S$GLB,, | Performed by: NURSE PRACTITIONER

## 2023-05-05 PROCEDURE — 3008F BODY MASS INDEX DOCD: CPT | Mod: CPTII,S$GLB,, | Performed by: NURSE PRACTITIONER

## 2023-05-05 PROCEDURE — 3008F PR BODY MASS INDEX (BMI) DOCUMENTED: ICD-10-PCS | Mod: CPTII,S$GLB,, | Performed by: NURSE PRACTITIONER

## 2023-05-05 PROCEDURE — 3078F DIAST BP <80 MM HG: CPT | Mod: CPTII,S$GLB,, | Performed by: NURSE PRACTITIONER

## 2023-05-05 PROCEDURE — 3074F PR MOST RECENT SYSTOLIC BLOOD PRESSURE < 130 MM HG: ICD-10-PCS | Mod: CPTII,S$GLB,, | Performed by: NURSE PRACTITIONER

## 2023-05-05 PROCEDURE — 99999 PR PBB SHADOW E&M-EST. PATIENT-LVL IV: CPT | Mod: PBBFAC,,, | Performed by: NURSE PRACTITIONER

## 2023-05-05 PROCEDURE — 1160F PR REVIEW ALL MEDS BY PRESCRIBER/CLIN PHARMACIST DOCUMENTED: ICD-10-PCS | Mod: CPTII,S$GLB,, | Performed by: NURSE PRACTITIONER

## 2023-05-05 PROCEDURE — 99214 PR OFFICE/OUTPT VISIT, EST, LEVL IV, 30-39 MIN: ICD-10-PCS | Mod: S$GLB,,, | Performed by: NURSE PRACTITIONER

## 2023-05-05 PROCEDURE — 99999 PR PBB SHADOW E&M-EST. PATIENT-LVL IV: ICD-10-PCS | Mod: PBBFAC,,, | Performed by: NURSE PRACTITIONER

## 2023-05-05 PROCEDURE — 99214 OFFICE O/P EST MOD 30 MIN: CPT | Mod: S$GLB,,, | Performed by: NURSE PRACTITIONER

## 2023-05-05 PROCEDURE — 3074F SYST BP LT 130 MM HG: CPT | Mod: CPTII,S$GLB,, | Performed by: NURSE PRACTITIONER

## 2023-05-05 PROCEDURE — 1160F RVW MEDS BY RX/DR IN RCRD: CPT | Mod: CPTII,S$GLB,, | Performed by: NURSE PRACTITIONER

## 2023-05-05 PROCEDURE — 3078F PR MOST RECENT DIASTOLIC BLOOD PRESSURE < 80 MM HG: ICD-10-PCS | Mod: CPTII,S$GLB,, | Performed by: NURSE PRACTITIONER

## 2023-05-05 PROCEDURE — 1159F PR MEDICATION LIST DOCUMENTED IN MEDICAL RECORD: ICD-10-PCS | Mod: CPTII,S$GLB,, | Performed by: NURSE PRACTITIONER

## 2023-05-05 RX ORDER — ALBUTEROL SULFATE 90 UG/1
1-2 AEROSOL, METERED RESPIRATORY (INHALATION) EVERY 6 HOURS PRN
Qty: 18 G | Refills: 0 | Status: SHIPPED | OUTPATIENT
Start: 2023-05-05 | End: 2023-07-27

## 2023-05-05 RX ORDER — MELOXICAM 15 MG/1
15 TABLET ORAL DAILY
Qty: 30 TABLET | Refills: 0 | Status: SHIPPED | OUTPATIENT
Start: 2023-05-05

## 2023-05-05 RX ORDER — PROMETHAZINE HYDROCHLORIDE AND DEXTROMETHORPHAN HYDROBROMIDE 6.25; 15 MG/5ML; MG/5ML
5 SYRUP ORAL EVERY 8 HOURS PRN
Qty: 180 ML | Refills: 0 | Status: SHIPPED | OUTPATIENT
Start: 2023-05-05 | End: 2023-06-05 | Stop reason: SDUPTHER

## 2023-05-05 RX ORDER — MONTELUKAST SODIUM 10 MG/1
10 TABLET ORAL NIGHTLY
Qty: 30 TABLET | Refills: 0 | Status: SHIPPED | OUTPATIENT
Start: 2023-05-05 | End: 2023-06-05 | Stop reason: SDUPTHER

## 2023-05-05 RX ORDER — BUDESONIDE, GLYCOPYRROLATE, AND FORMOTEROL FUMARATE 160; 9; 4.8 UG/1; UG/1; UG/1
1 AEROSOL, METERED RESPIRATORY (INHALATION) 2 TIMES DAILY
Qty: 10.7 G | Refills: 3 | Status: SHIPPED | OUTPATIENT
Start: 2023-05-05 | End: 2023-07-27 | Stop reason: SDUPTHER

## 2023-05-05 NOTE — PROGRESS NOTES
Subjective:       Patient ID: Ronal Ham is a 52 y.o. male.    Chief Complaint: Knee Pain and Shortness of Breath    HPI     Ronal Ham is a 52 y.o. male patient that presents to clinic with complaints of shortness of breath and knee pain. Past medical and surgical history reviewed as listed.     Knee Pain   Incident onset: months ago. The pain is present in the right knee and left foot. The quality of the pain is described as burning (R>L). The pain is moderate. The pain has been Fluctuating since onset. Associated symptoms include muscle weakness. Pertinent negatives include no inability to bear weight, numbness or tingling. The symptoms are aggravated by movement. He has tried NSAIDs for the symptoms. The treatment provided mild relief.   Shortness of Breath  This is a new problem. The current episode started 1 to 4 weeks ago. The problem has been gradually worsening. Associated symptoms include rhinorrhea and wheezing. Pertinent negatives include no chest pain or leg swelling. Exacerbated by: walking up an incline (stairs) and cough. The patient has no known risk factors for DVT/PE. He has tried beta agonist inhalers and rest for the symptoms. The treatment provided mild relief. His past medical history is significant for chronic lung disease. There is no history of asthma or bronchiolitis.     Dx'd with lupus 20's.    Lupus with lung involvement     Dyspnea with exertion.   Past Medical History:   Diagnosis Date    Arthritis     Eye injury     od hit above od    GERD (gastroesophageal reflux disease)     Hypertension     Lupus (systemic lupus erythematosus)     Pulmonary fibrosis     Raynaud phenomenon       Past Surgical History:   Procedure Laterality Date    COLONOSCOPY N/A 7/14/2022    Procedure: COLONOSCOPY;  Surgeon: Raman Brenner MD;  Location: [address];  Service: Endoscopy;  Laterality: N/A;  fully vaccinated  instructions emailed    COLONOSCOPY N/A 9/19/2022     Procedure: COLONOSCOPY;  Surgeon: Guicho Chavarria MD;  Location: [facility] ENDO (4TH FLR);  Service: Endoscopy;  Laterality: N/A;  previous order placed by Dr. RANDA Hurt on 4/26/22 unusable  fully vaccinated, prep instr emailed -ml    HERNIA REPAIR      TRANSFORAMINAL EPIDURAL INJECTION OF STEROID Bilateral 6/2/2021    Procedure: LUMBAR TRANSFORAMINAL BILATERAL L4/5 DIRECT REFERRAL;  Surgeon: Blayne Garcia MD;  Location: [facility];  Service: Pain Management;  Laterality: Bilateral;  NEEDS CONSENT      Family History   Problem Relation Age of Onset    Heart disease Mother     Heart disease Father     Glaucoma Father     Glaucoma Brother     No Known Problems Sister     No Known Problems Daughter     No Known Problems Son     No Known Problems Maternal Aunt     No Known Problems Maternal Uncle     No Known Problems Paternal Aunt     No Known Problems Paternal Uncle     No Known Problems Maternal Grandmother     No Known Problems Maternal Grandfather     No Known Problems Paternal Grandmother     No Known Problems Paternal Grandfather     Asthma Neg Hx     Emphysema Neg Hx     Amblyopia Neg Hx     Blindness Neg Hx     Cancer Neg Hx     Cataracts Neg Hx     Diabetes Neg Hx     Hypertension Neg Hx     Macular degeneration Neg Hx     Retinal detachment Neg Hx     Strabismus Neg Hx     Stroke Neg Hx     Thyroid disease Neg Hx       Review of patient's allergies indicates:   Allergen Reactions    Carafate  [sucralfate] Rash     Other reaction(s): Hives     Review of Systems   HENT:  Positive for postnasal drip and rhinorrhea. Negative for sinus pressure and sinus pain.    Respiratory:  Positive for cough, chest tightness, shortness of breath and wheezing.    Cardiovascular:  Positive for palpitations. Negative for chest pain and leg swelling.   Neurological:  Negative for tingling and numbness.     Objective:      Vitals:    05/05/23 1533   BP: 120/74   BP Location: Right arm   Patient Position: Sitting   BP Method:  "Large (Manual)   Pulse: 85   Resp: 17   Temp: 98.8 °F (37.1 °C)   TempSrc: Oral   SpO2: 97%   Weight: 89.3 kg (196 lb 13.9 oz)   Height: 5' 4" (1.626 m)      Physical Exam  Vitals and nursing note reviewed.   Constitutional:       General: He is not in acute distress.     Appearance: Normal appearance.   HENT:      Head: Normocephalic and atraumatic.      Right Ear: Tympanic membrane normal.      Left Ear: Tympanic membrane normal.   Eyes:      Extraocular Movements: Extraocular movements intact.      Conjunctiva/sclera: Conjunctivae normal.      Pupils: Pupils are equal, round, and reactive to light.   Cardiovascular:      Rate and Rhythm: Normal rate and regular rhythm.      Heart sounds: Normal heart sounds. No murmur heard.    No gallop.   Pulmonary:      Effort: Pulmonary effort is normal. No respiratory distress.      Breath sounds: Normal breath sounds. No wheezing.   Abdominal:      General: Bowel sounds are normal.      Palpations: Abdomen is soft.      Tenderness: There is no abdominal tenderness.   Musculoskeletal:      Cervical back: Normal range of motion.      Right knee: Normal range of motion. Tenderness present over the lateral joint line.      Left knee: Decreased range of motion.      Right lower leg: No edema.      Left lower leg: No edema.   Skin:     General: Skin is warm and dry.   Neurological:      Mental Status: He is alert and oriented to person, place, and time.      Gait: Gait normal.   Psychiatric:         Mood and Affect: Mood normal.       Lab Results   Component Value Date    WBC 4.23 02/14/2023    HGB 12.7 (L) 02/14/2023    HCT 38.5 (L) 02/14/2023     02/14/2023    CHOL 145 03/11/2022    TRIG 52 03/11/2022    HDL 44 03/11/2022    ALT 20 02/14/2023    AST 31 02/14/2023     02/14/2023    K 3.7 02/14/2023     02/14/2023    CREATININE 0.8 02/14/2023    BUN 8 02/14/2023    CO2 27 02/14/2023    TSH 2.650 03/11/2022    PSA 1.3 04/13/2021    INR 1.0 10/27/2015    HGBA1C " 5.4 03/11/2022      Assessment:       1. Interstitial lung disease    2. Dyspnea on exertion    3. Acute cough    4. Primary osteoarthritis of right knee    5. Systemic lupus erythematosus with lung involvement, unspecified SLE type    6. Allergic rhinitis, unspecified seasonality, unspecified trigger        Plan:       Interstitial lung disease  -     budesonide-glycopyr-formoterol (BREZTRI AEROSPHERE) 160-9-4.8 mcg/actuation HFAA; Inhale 1 Inhaler into the lungs 2 (two) times daily.  Dispense: 10.7 g; Refill: 3  -     albuterol (PROVENTIL/VENTOLIN HFA) 90 mcg/actuation inhaler; Inhale 1-2 puffs into the lungs every 6 (six) hours as needed for Wheezing or Shortness of Breath. Rescue  Dispense: 18 g; Refill: 0  -     montelukast (SINGULAIR) 10 mg tablet; Take 1 tablet (10 mg total) by mouth every evening.  Dispense: 30 tablet; Refill: 0  -     CBC W/ AUTO DIFFERENTIAL; Future; Expected date: 05/05/2023  -     COMPREHENSIVE METABOLIC PANEL; Future; Expected date: 05/05/2023    Dyspnea on exertion    Acute cough  -     promethazine-dextromethorphan (PROMETHAZINE-DM) 6.25-15 mg/5 mL Syrp; Take 5 mLs by mouth every 8 (eight) hours as needed (cough).  Dispense: 180 mL; Refill: 0    Primary osteoarthritis of right knee  -     meloxicam (MOBIC) 15 MG tablet; Take 1 tablet (15 mg total) by mouth once daily.  Dispense: 30 tablet; Refill: 0    Systemic lupus erythematosus with lung involvement, unspecified SLE type  -     LIPID PANEL; Future; Expected date: 05/05/2023  -     TESTOSTERONE; Future; Expected date: 05/05/2023  -     URIC ACID; Future; Expected date: 05/05/2023  -     Hemoglobin A1C; Future; Expected date: 05/05/2023    Allergic rhinitis, unspecified seasonality, unspecified trigger  -     montelukast (SINGULAIR) 10 mg tablet; Take 1 tablet (10 mg total) by mouth every evening.  Dispense: 30 tablet; Refill: 0      Medication List with Changes/Refills   New Medications    MELOXICAM (MOBIC) 15 MG TABLET    Take 1  tablet (15 mg total) by mouth once daily.    MONTELUKAST (SINGULAIR) 10 MG TABLET    Take 1 tablet (10 mg total) by mouth every evening.   Current Medications    AMLODIPINE (NORVASC) 10 MG TABLET    TAKE 1 TABLET(10 MG) BY MOUTH EVERY DAY    FLUTICASONE PROPIONATE (FLONASE) 50 MCG/ACTUATION NASAL SPRAY    2 sprays (100 mcg total) by Each Nostril route once daily.    HYDROXYCHLOROQUINE (PLAQUENIL) 200 MG TABLET    TAKE 2 TABLETS(400 MG) BY MOUTH EVERY DAY    IBUPROFEN (ADVIL,MOTRIN) 800 MG TABLET    Take 1 tablet (800 mg total) by mouth every 8 (eight) hours as needed (TAKE WITH MEALS).    LORATADINE (CLARITIN) 10 MG TABLET    Take 1 tablet (10 mg total) by mouth once daily.    PANTOPRAZOLE (PROTONIX) 40 MG TABLET    Take 1 tablet (40 mg total) by mouth once daily.    SILDENAFIL (VIAGRA) 100 MG TABLET    Take 1 tablet (100 mg total) by mouth daily as needed for Erectile Dysfunction.   Changed and/or Refilled Medications    Modified Medication Previous Medication    ALBUTEROL (PROVENTIL/VENTOLIN HFA) 90 MCG/ACTUATION INHALER albuterol (PROVENTIL/VENTOLIN HFA) 90 mcg/actuation inhaler       Inhale 1-2 puffs into the lungs every 6 (six) hours as needed for Wheezing or Shortness of Breath. Rescue    Inhale 1-2 puffs into the lungs every 6 (six) hours as needed. Rescue    BUDESONIDE-GLYCOPYR-FORMOTEROL (BREZTRI AEROSPHERE) 160-9-4.8 MCG/ACTUATION HFAA budesonide-glycopyr-formoterol (BREZTRI AEROSPHERE) 160-9-4.8 mcg/actuation HFAA       Inhale 1 Inhaler into the lungs 2 (two) times daily.    Inhale 1 Inhaler into the lungs 2 (two) times daily.    PROMETHAZINE-DEXTROMETHORPHAN (PROMETHAZINE-DM) 6.25-15 MG/5 ML SYRP promethazine-dextromethorphan (PROMETHAZINE-DM) 6.25-15 mg/5 mL Syrp       Take 5 mLs by mouth every 8 (eight) hours as needed (cough).    TAKE 5 ML BY MOUTH EVERY 4 HOURS AS NEEDED FOR COUGH AND CONGESTION       Follow up in about 1 month (around 6/5/2023), or if symptoms worsen or fail to improve, for Dr. Barrera  Xander.      Holly Ball, DNP, APRN, FNP-C  [facility]

## 2023-06-05 ENCOUNTER — LAB VISIT (OUTPATIENT)
Dept: LAB | Facility: HOSPITAL | Age: 53
End: 2023-06-05
Payer: COMMERCIAL

## 2023-06-05 ENCOUNTER — OFFICE VISIT (OUTPATIENT)
Dept: FAMILY MEDICINE | Facility: CLINIC | Age: 53
End: 2023-06-05
Payer: COMMERCIAL

## 2023-06-05 VITALS
BODY MASS INDEX: 31.58 KG/M2 | TEMPERATURE: 99 F | DIASTOLIC BLOOD PRESSURE: 86 MMHG | HEART RATE: 80 BPM | HEIGHT: 64 IN | SYSTOLIC BLOOD PRESSURE: 124 MMHG | WEIGHT: 185 LBS | OXYGEN SATURATION: 99 %

## 2023-06-05 DIAGNOSIS — M32.13 SYSTEMIC LUPUS ERYTHEMATOSUS WITH LUNG INVOLVEMENT, UNSPECIFIED SLE TYPE: ICD-10-CM

## 2023-06-05 DIAGNOSIS — J84.9 INTERSTITIAL LUNG DISEASE: Primary | ICD-10-CM

## 2023-06-05 DIAGNOSIS — J30.9 ALLERGIC RHINITIS, UNSPECIFIED SEASONALITY, UNSPECIFIED TRIGGER: ICD-10-CM

## 2023-06-05 DIAGNOSIS — R05.1 ACUTE COUGH: ICD-10-CM

## 2023-06-05 DIAGNOSIS — R05.3 CHRONIC COUGH: ICD-10-CM

## 2023-06-05 DIAGNOSIS — M17.11 PRIMARY OSTEOARTHRITIS OF RIGHT KNEE: ICD-10-CM

## 2023-06-05 DIAGNOSIS — J84.9 INTERSTITIAL LUNG DISEASE: ICD-10-CM

## 2023-06-05 LAB
ALBUMIN SERPL BCP-MCNC: 3.5 G/DL (ref 3.5–5.2)
ALP SERPL-CCNC: 57 U/L (ref 55–135)
ALT SERPL W/O P-5'-P-CCNC: 24 U/L (ref 10–44)
ANION GAP SERPL CALC-SCNC: 7 MMOL/L (ref 8–16)
AST SERPL-CCNC: 31 U/L (ref 10–40)
BASOPHILS # BLD AUTO: 0.03 K/UL (ref 0–0.2)
BASOPHILS NFR BLD: 0.6 % (ref 0–1.9)
BILIRUB SERPL-MCNC: 0.3 MG/DL (ref 0.1–1)
BUN SERPL-MCNC: 13 MG/DL (ref 6–20)
CALCIUM SERPL-MCNC: 9.2 MG/DL (ref 8.7–10.5)
CHLORIDE SERPL-SCNC: 106 MMOL/L (ref 95–110)
CHOLEST SERPL-MCNC: 159 MG/DL (ref 120–199)
CHOLEST/HDLC SERPL: 3.1 {RATIO} (ref 2–5)
CO2 SERPL-SCNC: 27 MMOL/L (ref 23–29)
CREAT SERPL-MCNC: 0.9 MG/DL (ref 0.5–1.4)
DIFFERENTIAL METHOD: ABNORMAL
EOSINOPHIL # BLD AUTO: 0.4 K/UL (ref 0–0.5)
EOSINOPHIL NFR BLD: 8.9 % (ref 0–8)
ERYTHROCYTE [DISTWIDTH] IN BLOOD BY AUTOMATED COUNT: 14.6 % (ref 11.5–14.5)
EST. GFR  (NO RACE VARIABLE): >60 ML/MIN/1.73 M^2
GLUCOSE SERPL-MCNC: 79 MG/DL (ref 70–110)
HCT VFR BLD AUTO: 41.5 % (ref 40–54)
HDLC SERPL-MCNC: 51 MG/DL (ref 40–75)
HDLC SERPL: 32.1 % (ref 20–50)
HGB BLD-MCNC: 13.2 G/DL (ref 14–18)
IMM GRANULOCYTES # BLD AUTO: 0.01 K/UL (ref 0–0.04)
IMM GRANULOCYTES NFR BLD AUTO: 0.2 % (ref 0–0.5)
LDLC SERPL CALC-MCNC: 99 MG/DL (ref 63–159)
LYMPHOCYTES # BLD AUTO: 1.7 K/UL (ref 1–4.8)
LYMPHOCYTES NFR BLD: 36.4 % (ref 18–48)
MCH RBC QN AUTO: 28.8 PG (ref 27–31)
MCHC RBC AUTO-ENTMCNC: 31.8 G/DL (ref 32–36)
MCV RBC AUTO: 91 FL (ref 82–98)
MONOCYTES # BLD AUTO: 0.5 K/UL (ref 0.3–1)
MONOCYTES NFR BLD: 10.6 % (ref 4–15)
NEUTROPHILS # BLD AUTO: 2 K/UL (ref 1.8–7.7)
NEUTROPHILS NFR BLD: 43.3 % (ref 38–73)
NONHDLC SERPL-MCNC: 108 MG/DL
NRBC BLD-RTO: 0 /100 WBC
PLATELET # BLD AUTO: 217 K/UL (ref 150–450)
PMV BLD AUTO: 10.2 FL (ref 9.2–12.9)
POTASSIUM SERPL-SCNC: 4.1 MMOL/L (ref 3.5–5.1)
PROT SERPL-MCNC: 8.1 G/DL (ref 6–8.4)
RBC # BLD AUTO: 4.58 M/UL (ref 4.6–6.2)
SODIUM SERPL-SCNC: 140 MMOL/L (ref 136–145)
TRIGL SERPL-MCNC: 45 MG/DL (ref 30–150)
URATE SERPL-MCNC: 8 MG/DL (ref 3.4–7)
WBC # BLD AUTO: 4.72 K/UL (ref 3.9–12.7)

## 2023-06-05 PROCEDURE — 36415 COLL VENOUS BLD VENIPUNCTURE: CPT | Mod: PO | Performed by: NURSE PRACTITIONER

## 2023-06-05 PROCEDURE — 3079F DIAST BP 80-89 MM HG: CPT | Mod: CPTII,S$GLB,, | Performed by: FAMILY MEDICINE

## 2023-06-05 PROCEDURE — 3074F PR MOST RECENT SYSTOLIC BLOOD PRESSURE < 130 MM HG: ICD-10-PCS | Mod: CPTII,S$GLB,, | Performed by: FAMILY MEDICINE

## 2023-06-05 PROCEDURE — 80053 COMPREHEN METABOLIC PANEL: CPT | Performed by: NURSE PRACTITIONER

## 2023-06-05 PROCEDURE — 3074F SYST BP LT 130 MM HG: CPT | Mod: CPTII,S$GLB,, | Performed by: FAMILY MEDICINE

## 2023-06-05 PROCEDURE — 99215 PR OFFICE/OUTPT VISIT, EST, LEVL V, 40-54 MIN: ICD-10-PCS | Mod: S$GLB,,, | Performed by: FAMILY MEDICINE

## 2023-06-05 PROCEDURE — 3079F PR MOST RECENT DIASTOLIC BLOOD PRESSURE 80-89 MM HG: ICD-10-PCS | Mod: CPTII,S$GLB,, | Performed by: FAMILY MEDICINE

## 2023-06-05 PROCEDURE — 85025 COMPLETE CBC W/AUTO DIFF WBC: CPT | Performed by: NURSE PRACTITIONER

## 2023-06-05 PROCEDURE — 3008F PR BODY MASS INDEX (BMI) DOCUMENTED: ICD-10-PCS | Mod: CPTII,S$GLB,, | Performed by: FAMILY MEDICINE

## 2023-06-05 PROCEDURE — 1159F MED LIST DOCD IN RCRD: CPT | Mod: CPTII,S$GLB,, | Performed by: FAMILY MEDICINE

## 2023-06-05 PROCEDURE — 84403 ASSAY OF TOTAL TESTOSTERONE: CPT | Performed by: NURSE PRACTITIONER

## 2023-06-05 PROCEDURE — 99999 PR PBB SHADOW E&M-EST. PATIENT-LVL IV: CPT | Mod: PBBFAC,,, | Performed by: FAMILY MEDICINE

## 2023-06-05 PROCEDURE — 83036 HEMOGLOBIN GLYCOSYLATED A1C: CPT | Performed by: NURSE PRACTITIONER

## 2023-06-05 PROCEDURE — 99215 OFFICE O/P EST HI 40 MIN: CPT | Mod: S$GLB,,, | Performed by: FAMILY MEDICINE

## 2023-06-05 PROCEDURE — 84550 ASSAY OF BLOOD/URIC ACID: CPT | Performed by: NURSE PRACTITIONER

## 2023-06-05 PROCEDURE — 1159F PR MEDICATION LIST DOCUMENTED IN MEDICAL RECORD: ICD-10-PCS | Mod: CPTII,S$GLB,, | Performed by: FAMILY MEDICINE

## 2023-06-05 PROCEDURE — 3008F BODY MASS INDEX DOCD: CPT | Mod: CPTII,S$GLB,, | Performed by: FAMILY MEDICINE

## 2023-06-05 PROCEDURE — 80061 LIPID PANEL: CPT | Performed by: NURSE PRACTITIONER

## 2023-06-05 PROCEDURE — 99999 PR PBB SHADOW E&M-EST. PATIENT-LVL IV: ICD-10-PCS | Mod: PBBFAC,,, | Performed by: FAMILY MEDICINE

## 2023-06-05 RX ORDER — TRAMADOL HYDROCHLORIDE 50 MG/1
50 TABLET ORAL EVERY 6 HOURS
Qty: 28 TABLET | Refills: 5 | Status: SHIPPED | OUTPATIENT
Start: 2023-06-05 | End: 2023-08-31 | Stop reason: ALTCHOICE

## 2023-06-05 RX ORDER — IBUPROFEN 800 MG/1
800 TABLET ORAL EVERY 8 HOURS PRN
Qty: 60 TABLET | Refills: 2 | Status: SHIPPED | OUTPATIENT
Start: 2023-06-05 | End: 2024-03-11 | Stop reason: SDUPTHER

## 2023-06-05 RX ORDER — MONTELUKAST SODIUM 10 MG/1
10 TABLET ORAL NIGHTLY
Qty: 90 TABLET | Refills: 3 | Status: SHIPPED | OUTPATIENT
Start: 2023-06-05 | End: 2023-08-31 | Stop reason: SDUPTHER

## 2023-06-05 RX ORDER — PROMETHAZINE HYDROCHLORIDE AND DEXTROMETHORPHAN HYDROBROMIDE 6.25; 15 MG/5ML; MG/5ML
5 SYRUP ORAL EVERY 8 HOURS PRN
Qty: 180 ML | Refills: 0 | Status: SHIPPED | OUTPATIENT
Start: 2023-06-05 | End: 2023-08-31 | Stop reason: SDUPTHER

## 2023-06-05 RX ORDER — PREDNISONE 50 MG/1
50 TABLET ORAL
Qty: 7 TABLET | Refills: 0 | Status: SHIPPED | OUTPATIENT
Start: 2023-06-05 | End: 2023-07-27 | Stop reason: SDUPTHER

## 2023-06-05 RX ORDER — AMOXICILLIN AND CLAVULANATE POTASSIUM 875; 125 MG/1; MG/1
1 TABLET, FILM COATED ORAL EVERY 12 HOURS
Qty: 14 TABLET | Refills: 0 | Status: SHIPPED | OUTPATIENT
Start: 2023-06-05 | End: 2023-06-12

## 2023-06-05 NOTE — PROGRESS NOTES
"HISTORY OF PRESENT ILLNESS:  Ronal Ham is a 52 y.o. male who presents to the clinic today for Annual Exam and Shortness of Breath  .     More KNIGHT and SOB with mucus and no fever noted.  She has knee with pain and increased swelling.  Problems with going up the stairs.      Patient Active Problem List   Diagnosis    SLE (systemic lupus erythematosus)    Benign hypertension    Raynaud's syndrome    Interstitial lung disease    Chronic rhinitis    GERD (gastroesophageal reflux disease)    Pain in limb    Tinea pedis    Male erectile disorder    Chronic cough    Allergic rhinitis    Prediabetes    Effusion of left knee joint    Chondromalacia of left patellofemoral joint    Systemic lupus erythematosus with lung involvement    Low testosterone in male    Chronic pain    Chronic midline low back pain with bilateral sciatica    Drug-induced immunodeficiency    Primary osteoarthritis of right knee    Pain and swelling of right knee    Decreased range of motion with decreased strength    Impaired functional mobility, balance, gait, and endurance           CARE TEAM:  Patient Care Team:  Bruce Terrell MD as PCP - General (Family Medicine)  Paulette Sewell NP as Nurse Practitioner (Orthopedic Surgery)  Sherman Osman MD as Consulting Physician (Rheumatology)         ROS  Per HPI        PHYSICAL EXAM:  /86   Pulse 80   Temp 98.5 °F (36.9 °C) (Oral)   Ht 5' 4" (1.626 m)   Wt 83.9 kg (185 lb)   SpO2 99%   BMI 31.76 kg/m²   Wt Readings from Last 5 Encounters:   06/05/23 83.9 kg (185 lb)   05/05/23 89.3 kg (196 lb 13.9 oz)   03/22/23 87 kg (191 lb 12.8 oz)   02/14/23 88.4 kg (194 lb 14.2 oz)   11/16/22 89.3 kg (196 lb 13.9 oz)     BP Readings from Last 5 Encounters:   06/05/23 124/86   05/05/23 120/74   03/22/23 (!) 136/96   02/14/23 (!) 151/91   11/16/22 128/70           He appears well, in no apparent distress.  Alert and oriented times three, pleasant and cooperative. Vital signs are as documented in " vital signs section.  Hoarse  Nose with coryza  Throat with PND  Audibly can hear coarse BS  Has chronic changes.  No acute hypoxia noted      Medication List with Changes/Refills   New Medications    AMOXICILLIN-CLAVULANATE 875-125MG (AUGMENTIN) 875-125 MG PER TABLET    Take 1 tablet by mouth every 12 (twelve) hours. for 7 days    PREDNISONE (DELTASONE) 50 MG TAB    Take 1 tablet (50 mg total) by mouth every 7 days.    TRAMADOL (ULTRAM) 50 MG TABLET    Take 1 tablet (50 mg total) by mouth every 6 (six) hours.   Current Medications    ALBUTEROL (PROVENTIL/VENTOLIN HFA) 90 MCG/ACTUATION INHALER    Inhale 1-2 puffs into the lungs every 6 (six) hours as needed for Wheezing or Shortness of Breath. Rescue    AMLODIPINE (NORVASC) 10 MG TABLET    TAKE 1 TABLET(10 MG) BY MOUTH EVERY DAY    BUDESONIDE-GLYCOPYR-FORMOTEROL (BREZTRI AEROSPHERE) 160-9-4.8 MCG/ACTUATION HFAA    Inhale 1 Inhaler into the lungs 2 (two) times daily.    FLUTICASONE PROPIONATE (FLONASE) 50 MCG/ACTUATION NASAL SPRAY    2 sprays (100 mcg total) by Each Nostril route once daily.    HYDROXYCHLOROQUINE (PLAQUENIL) 200 MG TABLET    TAKE 2 TABLETS(400 MG) BY MOUTH EVERY DAY    LORATADINE (CLARITIN) 10 MG TABLET    Take 1 tablet (10 mg total) by mouth once daily.    MELOXICAM (MOBIC) 15 MG TABLET    Take 1 tablet (15 mg total) by mouth once daily.    PANTOPRAZOLE (PROTONIX) 40 MG TABLET    Take 1 tablet (40 mg total) by mouth once daily.    SILDENAFIL (VIAGRA) 100 MG TABLET    Take 1 tablet (100 mg total) by mouth daily as needed for Erectile Dysfunction.   Changed and/or Refilled Medications    Modified Medication Previous Medication    IBUPROFEN (ADVIL,MOTRIN) 800 MG TABLET ibuprofen (ADVIL,MOTRIN) 800 MG tablet       Take 1 tablet (800 mg total) by mouth every 8 (eight) hours as needed (TAKE WITH MEALS).    Take 1 tablet (800 mg total) by mouth every 8 (eight) hours as needed (TAKE WITH MEALS).    MONTELUKAST (SINGULAIR) 10 MG TABLET montelukast  (SINGULAIR) 10 mg tablet       Take 1 tablet (10 mg total) by mouth every evening.    Take 1 tablet (10 mg total) by mouth every evening.    PROMETHAZINE-DEXTROMETHORPHAN (PROMETHAZINE-DM) 6.25-15 MG/5 ML SYRP promethazine-dextromethorphan (PROMETHAZINE-DM) 6.25-15 mg/5 mL Syrp       Take 5 mLs by mouth every 8 (eight) hours as needed (cough).    Take 5 mLs by mouth every 8 (eight) hours as needed (cough).       ASSESSMENT AND PLAN:    Problem List Items Addressed This Visit       Interstitial lung disease - Primary    Overview     Noted on CT  GGOs, bronchiectasis, cystic changes, suspect NSIP  PFTs normal and no desat on walk  Monitor q 6 mos with PFTs and walk         Relevant Medications    predniSONE (DELTASONE) 50 MG Tab    amoxicillin-clavulanate 875-125mg (AUGMENTIN) 875-125 mg per tablet    montelukast (SINGULAIR) 10 mg tablet    Other Relevant Orders    Ambulatory referral/consult to Pulmonology    Chronic cough    Overview     Trial saline nasal rinses, oral antihistamine, Flonase nasal spray and Delsym cough syrup BID as outlined in AVS.  Start ICS/LABA  PRN CLAUDIA         Relevant Medications    predniSONE (DELTASONE) 50 MG Tab    amoxicillin-clavulanate 875-125mg (AUGMENTIN) 875-125 mg per tablet    Allergic rhinitis    Relevant Medications    montelukast (SINGULAIR) 10 mg tablet    Systemic lupus erythematosus with lung involvement    Relevant Medications    predniSONE (DELTASONE) 50 MG Tab    Other Relevant Orders    Ambulatory referral/consult to Pulmonology    Primary osteoarthritis of right knee    Relevant Medications    ibuprofen (ADVIL,MOTRIN) 800 MG tablet    traMADoL (ULTRAM) 50 mg tablet     Other Visit Diagnoses       Acute cough        Relevant Medications    promethazine-dextromethorphan (PROMETHAZINE-DM) 6.25-15 mg/5 mL Syrp            Future Appointments   Date Time Provider Department Center   6/5/2023  4:00 PM Paulette Melbourne, NP [facility] [address] [place]       Follow up in about 1 week  (around 6/12/2023) for reassess acute condition. or sooner as needed.

## 2023-06-06 LAB
ESTIMATED AVG GLUCOSE: 103 MG/DL (ref 68–131)
HBA1C MFR BLD: 5.2 % (ref 4–5.6)
TESTOST SERPL-MCNC: 387 NG/DL (ref 304–1227)

## 2023-06-08 ENCOUNTER — OFFICE VISIT (OUTPATIENT)
Dept: PULMONOLOGY | Facility: CLINIC | Age: 53
End: 2023-06-08
Payer: COMMERCIAL

## 2023-06-08 VITALS
HEART RATE: 67 BPM | DIASTOLIC BLOOD PRESSURE: 76 MMHG | SYSTOLIC BLOOD PRESSURE: 132 MMHG | OXYGEN SATURATION: 93 % | BODY MASS INDEX: 32.58 KG/M2 | WEIGHT: 195.56 LBS | HEIGHT: 65 IN

## 2023-06-08 DIAGNOSIS — J84.9 INTERSTITIAL LUNG DISEASE: Primary | ICD-10-CM

## 2023-06-08 DIAGNOSIS — D84.821 DRUG-INDUCED IMMUNODEFICIENCY: ICD-10-CM

## 2023-06-08 DIAGNOSIS — Z79.899 DRUG-INDUCED IMMUNODEFICIENCY: ICD-10-CM

## 2023-06-08 DIAGNOSIS — M32.13 SYSTEMIC LUPUS ERYTHEMATOSUS WITH LUNG INVOLVEMENT, UNSPECIFIED SLE TYPE: ICD-10-CM

## 2023-06-08 DIAGNOSIS — J32.9 CHRONIC RHINOSINUSITIS: ICD-10-CM

## 2023-06-08 DIAGNOSIS — R05.3 CHRONIC COUGH: ICD-10-CM

## 2023-06-08 PROCEDURE — 99999 PR PBB SHADOW E&M-EST. PATIENT-LVL IV: CPT | Mod: PBBFAC,,, | Performed by: STUDENT IN AN ORGANIZED HEALTH CARE EDUCATION/TRAINING PROGRAM

## 2023-06-08 PROCEDURE — 3044F PR MOST RECENT HEMOGLOBIN A1C LEVEL <7.0%: ICD-10-PCS | Mod: CPTII,S$GLB,, | Performed by: STUDENT IN AN ORGANIZED HEALTH CARE EDUCATION/TRAINING PROGRAM

## 2023-06-08 PROCEDURE — 3078F PR MOST RECENT DIASTOLIC BLOOD PRESSURE < 80 MM HG: ICD-10-PCS | Mod: CPTII,S$GLB,, | Performed by: STUDENT IN AN ORGANIZED HEALTH CARE EDUCATION/TRAINING PROGRAM

## 2023-06-08 PROCEDURE — 99215 OFFICE O/P EST HI 40 MIN: CPT | Mod: S$GLB,,, | Performed by: STUDENT IN AN ORGANIZED HEALTH CARE EDUCATION/TRAINING PROGRAM

## 2023-06-08 PROCEDURE — 3075F SYST BP GE 130 - 139MM HG: CPT | Mod: CPTII,S$GLB,, | Performed by: STUDENT IN AN ORGANIZED HEALTH CARE EDUCATION/TRAINING PROGRAM

## 2023-06-08 PROCEDURE — 3044F HG A1C LEVEL LT 7.0%: CPT | Mod: CPTII,S$GLB,, | Performed by: STUDENT IN AN ORGANIZED HEALTH CARE EDUCATION/TRAINING PROGRAM

## 2023-06-08 PROCEDURE — 3075F PR MOST RECENT SYSTOLIC BLOOD PRESS GE 130-139MM HG: ICD-10-PCS | Mod: CPTII,S$GLB,, | Performed by: STUDENT IN AN ORGANIZED HEALTH CARE EDUCATION/TRAINING PROGRAM

## 2023-06-08 PROCEDURE — 99999 PR PBB SHADOW E&M-EST. PATIENT-LVL IV: ICD-10-PCS | Mod: PBBFAC,,, | Performed by: STUDENT IN AN ORGANIZED HEALTH CARE EDUCATION/TRAINING PROGRAM

## 2023-06-08 PROCEDURE — 3008F BODY MASS INDEX DOCD: CPT | Mod: CPTII,S$GLB,, | Performed by: STUDENT IN AN ORGANIZED HEALTH CARE EDUCATION/TRAINING PROGRAM

## 2023-06-08 PROCEDURE — 99215 PR OFFICE/OUTPT VISIT, EST, LEVL V, 40-54 MIN: ICD-10-PCS | Mod: S$GLB,,, | Performed by: STUDENT IN AN ORGANIZED HEALTH CARE EDUCATION/TRAINING PROGRAM

## 2023-06-08 PROCEDURE — 3078F DIAST BP <80 MM HG: CPT | Mod: CPTII,S$GLB,, | Performed by: STUDENT IN AN ORGANIZED HEALTH CARE EDUCATION/TRAINING PROGRAM

## 2023-06-08 PROCEDURE — 1159F PR MEDICATION LIST DOCUMENTED IN MEDICAL RECORD: ICD-10-PCS | Mod: CPTII,S$GLB,, | Performed by: STUDENT IN AN ORGANIZED HEALTH CARE EDUCATION/TRAINING PROGRAM

## 2023-06-08 PROCEDURE — 3008F PR BODY MASS INDEX (BMI) DOCUMENTED: ICD-10-PCS | Mod: CPTII,S$GLB,, | Performed by: STUDENT IN AN ORGANIZED HEALTH CARE EDUCATION/TRAINING PROGRAM

## 2023-06-08 PROCEDURE — 1159F MED LIST DOCD IN RCRD: CPT | Mod: CPTII,S$GLB,, | Performed by: STUDENT IN AN ORGANIZED HEALTH CARE EDUCATION/TRAINING PROGRAM

## 2023-06-08 RX ORDER — AZELASTINE 1 MG/ML
2 SPRAY, METERED NASAL 2 TIMES DAILY
Qty: 60 ML | Refills: 3 | Status: SHIPPED | OUTPATIENT
Start: 2023-06-08 | End: 2023-06-09 | Stop reason: SDUPTHER

## 2023-06-08 RX ORDER — LEVOCETIRIZINE DIHYDROCHLORIDE 5 MG/1
5 TABLET, FILM COATED ORAL NIGHTLY
Qty: 90 TABLET | Refills: 3 | Status: SHIPPED | OUTPATIENT
Start: 2023-06-08 | End: 2023-06-09 | Stop reason: SDUPTHER

## 2023-06-08 RX ORDER — FLUTICASONE PROPIONATE 50 MCG
1 SPRAY, SUSPENSION (ML) NASAL 2 TIMES DAILY
Qty: 16 G | Refills: 5 | Status: SHIPPED | OUTPATIENT
Start: 2023-06-08 | End: 2023-06-09 | Stop reason: SDUPTHER

## 2023-06-08 NOTE — PROGRESS NOTES
"Subjective:     Reason for visit:  systemic lupus erythematosus with lung involvement)    Patient ID:  Ronal Ham is a 52 y.o. male with SLE, Raynaud's phenomenon, GERD, HTN    Patient is new to me.  Last seen 04/25/2021.    Interval History:  Over the past couple of months, more short of breath when going up stairs or exerting himself.  Prior to that, he was at baseline.  More mucous production and sinus congestion with cough now on steroids and ABx with some improvement over the past week.       Additional Pulmonary History:  Childhood Illnesses:  None   Occupational/Environmental: Unknown  Tobacco/Smoking:  Never    Objective:     Vitals:    06/08/23 1538   BP: 132/76   Pulse: 67   SpO2: (!) 93%   Weight: 88.7 kg (195 lb 8.8 oz)   Height: 5' 5" (1.651 m)         Physical Exam  Vitals and nursing note reviewed.   Constitutional:       General: He is not in acute distress.     Appearance: He is not ill-appearing, toxic-appearing or diaphoretic.   HENT:      Head: Normocephalic and atraumatic.      Nose: No rhinorrhea.      Mouth/Throat:      Mouth: Mucous membranes are moist.   Eyes:      General: No scleral icterus.     Extraocular Movements: Extraocular movements intact.   Cardiovascular:      Rate and Rhythm: Normal rate and regular rhythm.   Pulmonary:      Effort: No tachypnea, accessory muscle usage, respiratory distress or retractions.      Breath sounds: Rales (bases) present. No rhonchi.   Abdominal:      General: There is no distension.   Musculoskeletal:      Right lower leg: Edema present.      Left lower leg: Edema present.   Skin:     General: Skin is warm and dry.      Coloration: Skin is not jaundiced.      Findings: No rash.   Neurological:      General: No focal deficit present.      Mental Status: Mental status is at baseline.        Personal Diagnostic Review and Interpretation  02/14/2023 CXR:  Relatively similar prominent interstitium but possible LLL opacity.  Aortic " atherosclerosis.    04/20/2021 CT chest without contrast:  Basilar and peripheral GGOs with reticulations and varicoid bronchiectasis    11/05/2014 CT chest with contrast:  Bibasilar GGOs with upper lung zone bullous disease      Pertinent Studies Reviewed & Interpreted:     Pulmonary Function Tests:   04/06/2021:  FEV1 88, FVC 92, FEV1/FVC 80; no bronchodilator response.  TLC 74, FRC 56, RV 54.  DLCO 74    05/13/2019:  FEV1 68, FVC 73, FEV1/FVC 93, FEF 25-75 51; no bronchodilator response.  TLC 56, FRC 54, RV 30.  DLCO 46    6 Minute Walk Tests:   04/20/2021: 406 m (451 m).  SpO2 91% on room air peak exercise.  Appropriate tachycardic and hypertensive response    Echocardiograms:   05/10/2017 stress echo:  EF 55%; 11 Mets; concentric remodeling with LAE (VILMA 33)      Assessment & Plan:       Problem List Items Addressed This Visit          ENT    Chronic rhinosinusitis    Overview     Starting levo cetirizine, Azelastine and Flonase.  See the section on cough         Relevant Medications    levocetirizine (XYZAL) 5 MG tablet    fluticasone propionate (FLONASE) 50 mcg/actuation nasal spray    azelastine (ASTELIN) 137 mcg (0.1 %) nasal spray       Pulmonary    Interstitial lung disease - Primary    Overview     Recent CT with peripheral and basilar GGOs with traction bronchiectasis, reticulations and honeycombing.  Suspect this is a NSIP fibrotic type.  Does not appear to be progressive either radiographically or on PFTs.  Fibrotic phenotype suggests a poor response to antifibrotic/new modulating therapies.  That being said, if continues to progress can do a trial of antifibrotic therapy monitor for response.  Repeat PFTs, chest CT in 6 MWT.         Relevant Orders    Stress test, pulmonary    DLCO-Carbon Monoxide Diffusing Capacity    Lung Volumes    Spirometry with/without bronchodilator    CT Chest Without Contrast    Chronic cough    Overview     Suspect this is the manifestation of chronic sinus issues.   Reports being on steroids and ABx per his PCP.  Going to start a more aggressive sinus regimen and see if it improves.         Relevant Medications    levocetirizine (XYZAL) 5 MG tablet    fluticasone propionate (FLONASE) 50 mcg/actuation nasal spray    azelastine (ASTELIN) 137 mcg (0.1 %) nasal spray       Immunology/Multi System    Systemic lupus erythematosus with lung involvement    Overview     Treatment of the lupus will effectively treat underlying lung disease.  See the section on interstitial lung disease and lupus         Drug-induced immunodeficiency    Overview     Currently on Plaquenil.  Places patient at greater risk for opportunistic infections, however most recent CT looks like more chronic disease           RETURN TO CLINIC IN 2 MONTHS    Portions of the record may have been created with voice-recognition software. Occasional wrong-word or sound-a-like substitutions may have occurred due to the inherent limitations of voice-recognition software. Read the chart carefully and recognize, using context, where substitutions have occurred.

## 2023-06-09 DIAGNOSIS — R05.3 CHRONIC COUGH: ICD-10-CM

## 2023-06-09 DIAGNOSIS — J32.9 CHRONIC RHINOSINUSITIS: ICD-10-CM

## 2023-06-12 RX ORDER — FLUTICASONE PROPIONATE 50 MCG
1 SPRAY, SUSPENSION (ML) NASAL 2 TIMES DAILY
Qty: 16 G | Refills: 5 | Status: SHIPPED | OUTPATIENT
Start: 2023-06-12

## 2023-06-12 RX ORDER — LEVOCETIRIZINE DIHYDROCHLORIDE 5 MG/1
5 TABLET, FILM COATED ORAL NIGHTLY
Qty: 90 TABLET | Refills: 3 | Status: SHIPPED | OUTPATIENT
Start: 2023-06-12 | End: 2023-08-31 | Stop reason: SDUPTHER

## 2023-06-12 RX ORDER — AZELASTINE 1 MG/ML
2 SPRAY, METERED NASAL 2 TIMES DAILY
Qty: 60 ML | Refills: 3 | Status: SHIPPED | OUTPATIENT
Start: 2023-06-12 | End: 2024-06-11

## 2023-06-26 ENCOUNTER — PATIENT MESSAGE (OUTPATIENT)
Dept: PULMONOLOGY | Facility: CLINIC | Age: 53
End: 2023-06-26
Payer: COMMERCIAL

## 2023-06-26 ENCOUNTER — PATIENT MESSAGE (OUTPATIENT)
Dept: FAMILY MEDICINE | Facility: CLINIC | Age: 53
End: 2023-06-26
Payer: COMMERCIAL

## 2023-07-17 ENCOUNTER — TELEPHONE (OUTPATIENT)
Dept: FAMILY MEDICINE | Facility: CLINIC | Age: 53
End: 2023-07-17
Payer: COMMERCIAL

## 2023-07-17 NOTE — TELEPHONE ENCOUNTER
----- Message from Jelly Carrera sent at 7/17/2023  2:51 PM CDT -----  Regarding: [id number]  Type:  Sooner Appointment Request    Patient is requesting a sooner appointment.  Patient declined first available appointment listed as well as another facility and provider .  Patient will not accept being placed on the waitlist and is requesting a message be sent to doctor.    Name of Caller: self    When is the first available appointment? 10/04    Symptoms: follow up     Would the patient rather a call back or a response via [facility]? Call back     Best Call Back Number: [phone]      Additional Information: Pt states he will like to be seen sooner and will like a call back from the nurse.

## 2023-07-24 ENCOUNTER — PATIENT MESSAGE (OUTPATIENT)
Dept: PULMONOLOGY | Facility: CLINIC | Age: 53
End: 2023-07-24
Payer: COMMERCIAL

## 2023-07-27 ENCOUNTER — OFFICE VISIT (OUTPATIENT)
Dept: FAMILY MEDICINE | Facility: CLINIC | Age: 53
End: 2023-07-27
Payer: COMMERCIAL

## 2023-07-27 VITALS
TEMPERATURE: 99 F | WEIGHT: 190.5 LBS | OXYGEN SATURATION: 98 % | HEART RATE: 77 BPM | DIASTOLIC BLOOD PRESSURE: 90 MMHG | HEIGHT: 65 IN | SYSTOLIC BLOOD PRESSURE: 150 MMHG | BODY MASS INDEX: 31.74 KG/M2

## 2023-07-27 DIAGNOSIS — R05.3 CHRONIC COUGH: ICD-10-CM

## 2023-07-27 DIAGNOSIS — M32.13 SYSTEMIC LUPUS ERYTHEMATOSUS WITH LUNG INVOLVEMENT, UNSPECIFIED SLE TYPE: ICD-10-CM

## 2023-07-27 DIAGNOSIS — N28.89 RIGHT KIDNEY MASS: ICD-10-CM

## 2023-07-27 DIAGNOSIS — Z00.00 ANNUAL PHYSICAL EXAM: Primary | ICD-10-CM

## 2023-07-27 DIAGNOSIS — J84.9 INTERSTITIAL LUNG DISEASE: ICD-10-CM

## 2023-07-27 PROCEDURE — 3077F PR MOST RECENT SYSTOLIC BLOOD PRESSURE >= 140 MM HG: ICD-10-PCS | Mod: CPTII,S$GLB,, | Performed by: FAMILY MEDICINE

## 2023-07-27 PROCEDURE — 3080F PR MOST RECENT DIASTOLIC BLOOD PRESSURE >= 90 MM HG: ICD-10-PCS | Mod: CPTII,S$GLB,, | Performed by: FAMILY MEDICINE

## 2023-07-27 PROCEDURE — 1159F MED LIST DOCD IN RCRD: CPT | Mod: CPTII,S$GLB,, | Performed by: FAMILY MEDICINE

## 2023-07-27 PROCEDURE — 3080F DIAST BP >= 90 MM HG: CPT | Mod: CPTII,S$GLB,, | Performed by: FAMILY MEDICINE

## 2023-07-27 PROCEDURE — 3077F SYST BP >= 140 MM HG: CPT | Mod: CPTII,S$GLB,, | Performed by: FAMILY MEDICINE

## 2023-07-27 PROCEDURE — 99396 PREV VISIT EST AGE 40-64: CPT | Mod: S$GLB,,, | Performed by: FAMILY MEDICINE

## 2023-07-27 PROCEDURE — 3008F PR BODY MASS INDEX (BMI) DOCUMENTED: ICD-10-PCS | Mod: CPTII,S$GLB,, | Performed by: FAMILY MEDICINE

## 2023-07-27 PROCEDURE — 99999 PR PBB SHADOW E&M-EST. PATIENT-LVL IV: CPT | Mod: PBBFAC,,, | Performed by: FAMILY MEDICINE

## 2023-07-27 PROCEDURE — 3044F PR MOST RECENT HEMOGLOBIN A1C LEVEL <7.0%: ICD-10-PCS | Mod: CPTII,S$GLB,, | Performed by: FAMILY MEDICINE

## 2023-07-27 PROCEDURE — 99396 PR PREVENTIVE VISIT,EST,40-64: ICD-10-PCS | Mod: S$GLB,,, | Performed by: FAMILY MEDICINE

## 2023-07-27 PROCEDURE — 3008F BODY MASS INDEX DOCD: CPT | Mod: CPTII,S$GLB,, | Performed by: FAMILY MEDICINE

## 2023-07-27 PROCEDURE — 99999 PR PBB SHADOW E&M-EST. PATIENT-LVL IV: ICD-10-PCS | Mod: PBBFAC,,, | Performed by: FAMILY MEDICINE

## 2023-07-27 PROCEDURE — 1159F PR MEDICATION LIST DOCUMENTED IN MEDICAL RECORD: ICD-10-PCS | Mod: CPTII,S$GLB,, | Performed by: FAMILY MEDICINE

## 2023-07-27 PROCEDURE — 3044F HG A1C LEVEL LT 7.0%: CPT | Mod: CPTII,S$GLB,, | Performed by: FAMILY MEDICINE

## 2023-07-27 RX ORDER — PROMETHAZINE HYDROCHLORIDE AND CODEINE PHOSPHATE 6.25; 1 MG/5ML; MG/5ML
5 SOLUTION ORAL EVERY 4 HOURS PRN
Qty: 240 ML | Refills: 0 | Status: SHIPPED | OUTPATIENT
Start: 2023-07-27 | End: 2023-08-06

## 2023-07-27 RX ORDER — PREDNISONE 50 MG/1
50 TABLET ORAL
Qty: 7 TABLET | Refills: 0 | Status: SHIPPED | OUTPATIENT
Start: 2023-07-27 | End: 2023-08-31

## 2023-07-27 RX ORDER — ALBUTEROL SULFATE 90 UG/1
2 AEROSOL, METERED RESPIRATORY (INHALATION) EVERY 6 HOURS PRN
Qty: 18 G | Refills: 5 | Status: SHIPPED | OUTPATIENT
Start: 2023-07-27 | End: 2023-10-06 | Stop reason: SDUPTHER

## 2023-07-27 RX ORDER — BUDESONIDE, GLYCOPYRROLATE, AND FORMOTEROL FUMARATE 160; 9; 4.8 UG/1; UG/1; UG/1
1 AEROSOL, METERED RESPIRATORY (INHALATION) 2 TIMES DAILY
Qty: 10.7 G | Refills: 3 | Status: SHIPPED | OUTPATIENT
Start: 2023-07-27 | End: 2023-10-06 | Stop reason: SDUPTHER

## 2023-07-27 NOTE — PROGRESS NOTES
Chief Complaint   Patient presents with    Follow-up    Results    Knee Pain    Cough       SUBJECTIVE:   Ronal Ham is a 52 y.o. male presenting for his annual checkup.   Current Outpatient Medications   Medication Sig Dispense Refill    amLODIPine (NORVASC) 10 MG tablet TAKE 1 TABLET(10 MG) BY MOUTH EVERY DAY 90 tablet 3    azelastine (ASTELIN) 137 mcg (0.1 %) nasal spray 2 sprays (274 mcg total) by Nasal route 2 (two) times daily. 60 mL 3    fluticasone propionate (FLONASE) 50 mcg/actuation nasal spray 1 spray (50 mcg total) by Each Nostril route 2 (two) times a day. 16 g 5    hydrOXYchloroQUINE (PLAQUENIL) 200 mg tablet TAKE 2 TABLETS(400 MG) BY MOUTH EVERY  tablet 3    ibuprofen (ADVIL,MOTRIN) 800 MG tablet Take 1 tablet (800 mg total) by mouth every 8 (eight) hours as needed (TAKE WITH MEALS). 60 tablet 2    levocetirizine (XYZAL) 5 MG tablet Take 1 tablet (5 mg total) by mouth every evening. 90 tablet 3    meloxicam (MOBIC) 15 MG tablet Take 1 tablet (15 mg total) by mouth once daily. 30 tablet 0    montelukast (SINGULAIR) 10 mg tablet Take 1 tablet (10 mg total) by mouth every evening. 90 tablet 3    promethazine-dextromethorphan (PROMETHAZINE-DM) 6.25-15 mg/5 mL Syrp Take 5 mLs by mouth every 8 (eight) hours as needed (cough). 180 mL 0    sildenafiL (VIAGRA) 100 MG tablet Take 1 tablet (100 mg total) by mouth daily as needed for Erectile Dysfunction. 10 tablet 5    traMADoL (ULTRAM) 50 mg tablet Take 1 tablet (50 mg total) by mouth every 6 (six) hours. 28 tablet 5    albuterol (PROAIR HFA) 90 mcg/actuation inhaler Inhale 2 puffs into the lungs every 6 (six) hours as needed for Wheezing. Rescue 18 g 5    budesonide-glycopyr-formoterol (BREZTRI AEROSPHERE) 160-9-4.8 mcg/actuation HFAA Inhale 1 Inhaler into the lungs 2 (two) times daily. 10.7 g 3    ipratropium (ATROVENT) 42 mcg (0.06 %) nasal spray 2 sprays by Each Nostril route 4 (four) times daily. 15 mL 5    pantoprazole (PROTONIX) 40 MG  "tablet Take 1 tablet (40 mg total) by mouth once daily. 90 tablet 3    predniSONE (DELTASONE) 50 MG Tab Take 1 tablet (50 mg total) by mouth every 7 days. 7 tablet 0    promethazine-codeine 6.25-10 mg/5 ml (PHENERGAN WITH CODEINE) 6.25-10 mg/5 mL syrup Take 5 mLs by mouth every 4 (four) hours as needed for Cough. 240 mL 0     No current facility-administered medications for this visit.     Allergies: Carafate  [sucralfate]   Patient Active Problem List    Diagnosis Date Noted    Pain and swelling of right knee 10/26/2022    Decreased range of motion with decreased strength 10/26/2022    Impaired functional mobility, balance, gait, and endurance 10/26/2022    Drug-induced immunodeficiency 10/20/2022    Primary osteoarthritis of right knee 10/20/2022    Chronic midline low back pain with bilateral sciatica 08/11/2021    Chronic pain 06/02/2021    Low testosterone in male 12/24/2018    Systemic lupus erythematosus with lung involvement     Effusion of left knee joint 11/17/2015    Chondromalacia of left patellofemoral joint 11/17/2015    Prediabetes 09/11/2015    Chronic cough 09/02/2014    Tinea pedis 04/01/2014    Male erectile disorder 04/01/2014    Pain in limb 12/03/2013    Chronic rhinosinusitis 10/03/2012    GERD (gastroesophageal reflux disease) 10/03/2012    Raynaud's syndrome 09/19/2012    Interstitial lung disease 09/19/2012    SLE (systemic lupus erythematosus) 09/07/2012    Benign hypertension 09/07/2012       ROS:  Feeling well. No dyspnea or chest pain on exertion. No abdominal pain, change in bowel habits, black or bloody stools. No urinary tract or prostatic symptoms. No neurological complaints.  Still with joint issues and breathing issues  OBJECTIVE:   The patient appears well, alert, oriented x 3, in no distress.   BP (!) 150/90   Pulse 77   Temp 98.6 °F (37 °C) (Oral)   Ht 5' 5" (1.651 m)   Wt 86.4 kg (190 lb 7.6 oz)   SpO2 98%   BMI 31.70 kg/m²   Wt Readings from Last 5 Encounters: "   07/27/23 86.4 kg (190 lb 7.6 oz)   06/08/23 88.7 kg (195 lb 8.8 oz)   06/05/23 83.9 kg (185 lb)   05/05/23 89.3 kg (196 lb 13.9 oz)   03/22/23 87 kg (191 lb 12.8 oz)       ENT abnormal.  Neck supple. No adenopathy or thyromegaly. DOUG. Lungs are clear, good air entry, no wheezes, rhonchi or rales. S1 and S2 normal, no murmurs, regular rate and rhythm. Abdomen is soft without tenderness, guarding, mass or organomegaly.  exam: deferred.  Extremities show no edema, normal peripheral pulses. Neurological is normal without focal findings.  Knees with swelling, more in the right    ASSESSMENT:   1. Annual physical exam    2. Interstitial lung disease    3. Chronic cough    4. Systemic lupus erythematosus with lung involvement, unspecified SLE type    5. Right kidney mass          PLAN:   Counseled on age appropriate medical preventative services, including age appropriate cancer screenings, over all nutritional health, need for a consistent exercise regimen and an over all push towards maintaining a vigorous and active lifestyle.  Counseled on age appropriate vaccines and discussed upcoming health care needs based on age/gender.  Spent time with patient counseling on need for a good patient/doctor relationship moving forward.  Discussed use of common OTC medications and supplements.  Discussed common dietary aids and use of caffeine and the need for good sleep hygiene and stress management.    Problem List Items Addressed This Visit       SLE (systemic lupus erythematosus)    Overview     Currently on Plaquenil.  CTD ILD associated.         Relevant Medications    predniSONE (DELTASONE) 50 MG Tab    Interstitial lung disease    Overview     Recent CT with peripheral and basilar GGOs with traction bronchiectasis, reticulations and honeycombing.  Suspect this is a NSIP fibrotic type.  Does not appear to be progressive either radiographically or on PFTs.  Fibrotic phenotype suggests a poor response to antifibrotic/new  modulating therapies.  That being said, if continues to progress can do a trial of antifibrotic therapy monitor for response.  Repeat PFTs, chest CT in 6 MWT.         Relevant Medications    predniSONE (DELTASONE) 50 MG Tab    budesonide-glycopyr-formoterol (BREZTRI AEROSPHERE) 160-9-4.8 mcg/actuation HFAA    albuterol (PROAIR HFA) 90 mcg/actuation inhaler    Chronic cough    Overview     Suspect this is the manifestation of chronic sinus issues.  Reports being on steroids and ABx per his PCP.  Going to start a more aggressive sinus regimen and see if it improves.         Relevant Medications    predniSONE (DELTASONE) 50 MG Tab     Other Visit Diagnoses       Annual physical exam    -  Primary    Right kidney mass        Relevant Orders    US Abdomen Limited

## 2023-07-28 ENCOUNTER — TELEPHONE (OUTPATIENT)
Dept: FAMILY MEDICINE | Facility: CLINIC | Age: 53
End: 2023-07-28
Payer: COMMERCIAL

## 2023-07-28 RX ORDER — IPRATROPIUM BROMIDE 42 UG/1
2 SPRAY, METERED NASAL 4 TIMES DAILY
Qty: 15 ML | Refills: 5 | Status: SHIPPED | OUTPATIENT
Start: 2023-07-28 | End: 2023-08-31

## 2023-07-28 NOTE — TELEPHONE ENCOUNTER
Is patient to take 1 tablet of the prednisone daily for 7 days or  1 tablet every 7 days    Please advise. Patient asking for clarification.

## 2023-07-28 NOTE — TELEPHONE ENCOUNTER
Patient advised. He also would like to know if the nasal spray discussed during his visit can be sent in as well. He couldn't verify the name of the spray, but stated that it wasn't the astelin nasal spray  - said this was something new that was discussed.

## 2023-07-28 NOTE — TELEPHONE ENCOUNTER
----- Message from Jo Ann Davey sent at 7/28/2023 11:29 AM CDT -----  Regarding: pt called  Type: Patient Call Back         Who called: ELICIA VILLARREAL [[id number]]         What is the request in detail: Pt would like clarification on directions for predniSONE (DELTASONE) 50 MG Tab. Please Advise          Can the clinic reply by [facility]? No         Would the patient rather a call back or a response via [facility]? Call back         Best call back number:     [phone]             Additional Information:         Thank you.

## 2023-07-31 ENCOUNTER — OFFICE VISIT (OUTPATIENT)
Dept: ORTHOPEDICS | Facility: CLINIC | Age: 53
End: 2023-07-31
Payer: COMMERCIAL

## 2023-07-31 ENCOUNTER — HOSPITAL ENCOUNTER (OUTPATIENT)
Dept: RADIOLOGY | Facility: HOSPITAL | Age: 53
Discharge: HOME | End: 2023-07-31
Attending: NURSE PRACTITIONER
Payer: COMMERCIAL

## 2023-07-31 DIAGNOSIS — M17.0 PRIMARY OSTEOARTHRITIS OF BOTH KNEES: Primary | ICD-10-CM

## 2023-07-31 DIAGNOSIS — M17.11 PRIMARY OSTEOARTHRITIS OF RIGHT KNEE: ICD-10-CM

## 2023-07-31 DIAGNOSIS — M54.41 ACUTE LOW BACK PAIN WITH RIGHT-SIDED SCIATICA, UNSPECIFIED BACK PAIN LATERALITY: ICD-10-CM

## 2023-07-31 DIAGNOSIS — M17.0 PRIMARY OSTEOARTHRITIS OF BOTH KNEES: ICD-10-CM

## 2023-07-31 PROCEDURE — 1159F PR MEDICATION LIST DOCUMENTED IN MEDICAL RECORD: ICD-10-PCS | Mod: CPTII,S$GLB,, | Performed by: NURSE PRACTITIONER

## 2023-07-31 PROCEDURE — 1160F PR REVIEW ALL MEDS BY PRESCRIBER/CLIN PHARMACIST DOCUMENTED: ICD-10-PCS | Mod: CPTII,S$GLB,, | Performed by: NURSE PRACTITIONER

## 2023-07-31 PROCEDURE — 20610 PR DRAIN/INJECT LARGE JOINT/BURSA: ICD-10-PCS | Mod: RT,S$GLB,, | Performed by: NURSE PRACTITIONER

## 2023-07-31 PROCEDURE — 99214 OFFICE O/P EST MOD 30 MIN: CPT | Mod: 25,S$GLB,, | Performed by: NURSE PRACTITIONER

## 2023-07-31 PROCEDURE — 1160F RVW MEDS BY RX/DR IN RCRD: CPT | Mod: CPTII,S$GLB,, | Performed by: NURSE PRACTITIONER

## 2023-07-31 PROCEDURE — 73564 XR KNEE ORTHO BILAT WITH FLEXION: ICD-10-PCS | Mod: 26,50,, | Performed by: RADIOLOGY

## 2023-07-31 PROCEDURE — 1159F MED LIST DOCD IN RCRD: CPT | Mod: CPTII,S$GLB,, | Performed by: NURSE PRACTITIONER

## 2023-07-31 PROCEDURE — 3044F HG A1C LEVEL LT 7.0%: CPT | Mod: CPTII,S$GLB,, | Performed by: NURSE PRACTITIONER

## 2023-07-31 PROCEDURE — 73564 X-RAY EXAM KNEE 4 OR MORE: CPT | Mod: 26,50,, | Performed by: RADIOLOGY

## 2023-07-31 PROCEDURE — 3044F PR MOST RECENT HEMOGLOBIN A1C LEVEL <7.0%: ICD-10-PCS | Mod: CPTII,S$GLB,, | Performed by: NURSE PRACTITIONER

## 2023-07-31 PROCEDURE — 20610 DRAIN/INJ JOINT/BURSA W/O US: CPT | Mod: RT,S$GLB,, | Performed by: NURSE PRACTITIONER

## 2023-07-31 PROCEDURE — 73564 X-RAY EXAM KNEE 4 OR MORE: CPT | Mod: TC,50

## 2023-07-31 PROCEDURE — 99214 PR OFFICE/OUTPT VISIT, EST, LEVL IV, 30-39 MIN: ICD-10-PCS | Mod: 25,S$GLB,, | Performed by: NURSE PRACTITIONER

## 2023-07-31 PROCEDURE — 99999 PR PBB SHADOW E&M-EST. PATIENT-LVL III: CPT | Mod: PBBFAC,,, | Performed by: NURSE PRACTITIONER

## 2023-07-31 PROCEDURE — 99999 PR PBB SHADOW E&M-EST. PATIENT-LVL III: ICD-10-PCS | Mod: PBBFAC,,, | Performed by: NURSE PRACTITIONER

## 2023-07-31 RX ORDER — CYCLOBENZAPRINE HCL 10 MG
10 TABLET ORAL 3 TIMES DAILY PRN
Qty: 30 TABLET | Refills: 0 | Status: SHIPPED | OUTPATIENT
Start: 2023-07-31 | End: 2023-08-10

## 2023-07-31 RX ADMIN — TRIAMCINOLONE ACETONIDE 40 MG: 40 INJECTION, SUSPENSION INTRA-ARTICULAR; INTRAMUSCULAR at 10:07

## 2023-07-31 NOTE — PROGRESS NOTES
CC: right knee pain and low back pain      HPI: Pt with right knee pain for the past few years. The pain is aching and medial. It is worse with increased activity and better with rest. Recently there is also burning pain in the knee and a feeling like the leg wants to give out. He has been seen in the past and has known right knee djd. He has had cortisone and gel injections with good relief from the cortisone and no relief from the gel. The gel actually made his knee hurt more. He takes tramadol for severe pain and mobic prn daily. He also has acute low back pain. He was being seen for this in 2021 through worker's comp, but since then he hasn't had f/u. The back pain is located across the lower back on both sides. 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 He is ambulating without assistive device. There is a mild limp.    ROS  General: denies fever and chills  Resp: no c/o sob  CVS: no c/o cp  MSK: c/o right knee and low back pain    PE  General: AAOx3, pleasant and cooperative  Resp: respirations even and unlabored  MSK: right knee exam  0 degrees extension  120 degrees flexion  No warmth or erythema   + pain over the medial joint line  - effusion  5/5 quad strength    Xray:  Reviewed by me with the patient: tricompartmental degenerative changes noted with varus deformity     Assessment:  Bilateral knee djd    Plan:  PT orders entered  Cortisone injection right knee today  RICE  Mobic or tramadol prn as prescribed   Back brace while working on the yard    Knee Injection Procedure Note  Diagnosis: right knee degenerative arthritis  Indications: right knee pain  Procedure Details: Verbal consent was obtained for the procedure. The injection site was identified and the skin was prepared with alcohol. Verbal consent obtained. The right knee was injected from an anterolateral approach with 1 ml of Kenalog and 4 ml Lidocaine under sterile technique using a 25 gauge needle. The needle was removed and the area  cleansed and dressed.  Complications:  Patient tolerated the procedure well.    he was advised to rest the knee today, using ice and elevation as needed for comfort and swelling.Immediate relief of the knee pain may be short lived and secondary to the lidocaine. he may have an increase in discomfort tonight followed by steady improvement over the next several days. It may take 1-2 weeks following the injection to get the full benefit of the medication.

## 2023-08-01 RX ORDER — TRIAMCINOLONE ACETONIDE 40 MG/ML
40 INJECTION, SUSPENSION INTRA-ARTICULAR; INTRAMUSCULAR
Status: COMPLETED | OUTPATIENT
Start: 2023-07-31 | End: 2023-07-31

## 2023-08-02 ENCOUNTER — HOSPITAL ENCOUNTER (OUTPATIENT)
Dept: PULMONOLOGY | Facility: CLINIC | Age: 53
Discharge: HOME | End: 2023-08-02
Payer: COMMERCIAL

## 2023-08-02 VITALS — BODY MASS INDEX: 34.65 KG/M2 | WEIGHT: 195.56 LBS | HEIGHT: 63 IN

## 2023-08-02 DIAGNOSIS — J84.9 INTERSTITIAL LUNG DISEASE: ICD-10-CM

## 2023-08-02 LAB
DLCO SINGLE BREATH LLN: 17.51
DLCO SINGLE BREATH PRE REF: 40.6 %
DLCO SINGLE BREATH REF: 24.44
DLCOC SBVA LLN: 2.79
DLCOC SBVA REF: 4.28
DLCOC SINGLE BREATH LLN: 17.51
DLCOC SINGLE BREATH REF: 24.44
DLCOCSBVAULN: 5.78
DLCOCSINGLEBREATHULN: 31.37
DLCOSINGLEBREATHULN: 31.37
DLCOVA LLN: 2.79
DLCOVA PRE REF: 87.5 %
DLCOVA PRE: 3.75 ML/(MIN*MMHG*L) (ref 2.79–5.78)
DLCOVA REF: 4.28
DLCOVAULN: 5.78
ERV LLN: -16448.91
ERV PRE REF: 67.1 %
ERV REF: 1.09
ERVULN: ABNORMAL
FEF 25 75 LLN: 1.08
FEF 25 75 PRE REF: 59.9 %
FEF 25 75 REF: 2.4
FET100 CHG: -0.4 %
FEV05 LLN: 1.12
FEV05 REF: 2.26
FEV1 CHG: 2.8 %
FEV1 FVC LLN: 69
FEV1 FVC PRE REF: 99.2 %
FEV1 FVC REF: 80
FEV1 LLN: 1.88
FEV1 PRE REF: 64.4 %
FEV1 REF: 2.55
FEV1 VOL CHG: 0.05
FRCPLETH LLN: 2.15
FRCPLETH PREREF: 75.1 %
FRCPLETH REF: 3.14
FRCPLETHULN: 4.13
FVC CHG: 0.2 %
FVC LLN: 2.39
FVC PRE REF: 65.1 %
FVC REF: 3.16
FVC VOL CHG: 0
IVC PRE: 1.91 L (ref 2.39–3.95)
IVC SINGLE BREATH LLN: 2.39
IVC SINGLE BREATH PRE REF: 60.3 %
IVC SINGLE BREATH REF: 3.16
IVCSINGLEBREATHULN: 3.95
LLN IC: -9999997.49
PEF LLN: 4.87
PEF PRE REF: 75.9 %
PEF REF: 7.02
PHYSICIAN COMMENT: ABNORMAL
POST FEF 25 75: 1.61 L/S (ref 1.08–4.25)
POST FET 100: 6.16 SEC
POST FEV1 FVC: 81.69 % (ref 69.05–89.99)
POST FEV1: 1.69 L (ref 1.88–3.17)
POST FEV5: 1.36 L (ref 1.12–3.39)
POST FVC: 2.06 L (ref 2.39–3.95)
POST PEF: 7.34 L/S (ref 4.87–9.17)
PRE DLCO: 9.92 ML/(MIN*MMHG) (ref 17.51–31.37)
PRE ERV: 0.73 L (ref -16448.91–16451.09)
PRE FEF 25 75: 1.44 L/S (ref 1.08–4.25)
PRE FET 100: 6.18 SEC
PRE FEV05 REF: 56.6 %
PRE FEV1 FVC: 79.64 % (ref 69.05–89.99)
PRE FEV1: 1.64 L (ref 1.88–3.17)
PRE FEV5: 1.28 L (ref 1.12–3.39)
PRE FRC PL: 2.36 L (ref 2.15–4.13)
PRE FVC: 2.06 L (ref 2.39–3.95)
PRE IC: 1.33 L (ref -9999997.49–#######.####)
PRE PEF: 5.33 L/S (ref 4.87–9.17)
PRE REF IC: 52.9 %
PRE RV: 1.63 L (ref 1.38–2.73)
PRE TLC: 3.69 L (ref 4.55–6.86)
RAW PRE REF: 88.7 %
RAW PRE: 2.71 CMH2O*S/L (ref 3.06–3.06)
RAW REF: 3.06
REF IC: 2.51
RV LLN: 1.38
RV PRE REF: 79.3 %
RV REF: 2.05
RVTLC LLN: 26
RVTLC PRE REF: 126.2 %
RVTLC PRE: 44.19 % (ref 26.04–44)
RVTLC REF: 35
RVTLCULN: 44
RVULN: 2.73
SGAW PRE REF: 169.4 %
SGAW PRE: 0.14 1/(CMH2O*S) (ref 0.08–0.08)
SGAW REF: 0.08
TLC LLN: 4.55
TLC PRE REF: 64.7 %
TLC REF: 5.71
TLC ULN: 6.86
ULN IC: ABNORMAL
VA PRE: 2.65 L (ref 5.56–5.56)
VA SINGLE BREATH LLN: 5.56
VA SINGLE BREATH PRE REF: 47.6 %
VA SINGLE BREATH REF: 5.56
VASINGLEBREATHULN: 5.56
VC LLN: 2.39
VC PRE REF: 65.1 %
VC PRE: 2.06 L (ref 2.39–3.95)
VC REF: 3.16
VC ULN: 3.95

## 2023-08-02 PROCEDURE — 94726 PLETHYSMOGRAPHY LUNG VOLUMES: CPT | Mod: S$GLB,,, | Performed by: INTERNAL MEDICINE

## 2023-08-02 PROCEDURE — 94618 PULMONARY STRESS TESTING: ICD-10-PCS | Mod: S$GLB,,, | Performed by: INTERNAL MEDICINE

## 2023-08-02 PROCEDURE — 94060 PR EVAL OF BRONCHOSPASM: ICD-10-PCS | Mod: S$GLB,,, | Performed by: INTERNAL MEDICINE

## 2023-08-02 PROCEDURE — 94060 EVALUATION OF WHEEZING: CPT | Mod: S$GLB,,, | Performed by: INTERNAL MEDICINE

## 2023-08-02 PROCEDURE — 94726 PULM FUNCT TST PLETHYSMOGRAP: ICD-10-PCS | Mod: S$GLB,,, | Performed by: INTERNAL MEDICINE

## 2023-08-02 PROCEDURE — 94729 DIFFUSING CAPACITY: CPT | Mod: S$GLB,,, | Performed by: INTERNAL MEDICINE

## 2023-08-02 PROCEDURE — 94729 PR C02/MEMBANE DIFFUSE CAPACITY: ICD-10-PCS | Mod: S$GLB,,, | Performed by: INTERNAL MEDICINE

## 2023-08-02 PROCEDURE — 94618 PULMONARY STRESS TESTING: CPT | Mod: S$GLB,,, | Performed by: INTERNAL MEDICINE

## 2023-08-02 NOTE — PROCEDURES
Ronal Ham is a 52 y.o.  male patient, who presents for a 6 minute walk test ordered by MD Cydney.  The diagnosis is Interstitial Lung Disease; Connective Tissue Disease.  The patient's BMI is 34.6 kg/m2.  Predicted distance (lower limit of normal) is 418.13 meters.      Test Results:    The test was completed without stopping. The total time walked was 360 seconds. During walking, the patient reported:  Other (Comment) (Right knee pain). The patient used no assistive devices during testing.     08/02/2023---------Distance: 365.76 meters (1200 feet)     O2 Sat % Supplemental Oxygen Heart Rate Blood Pressure Lili Scale   Pre-exercise  (Resting) 98 % Room Air 82 bpm 149/87 mmHg 0   During Exercise 90 % Room Air 102 bpm 155/85 mmHg 3   Post-exercise  (Recovery) 99 % Room Air  86 bpm       Recovery Time: 75 seconds    Performing nurse/tech: Estopinal RRT      PREVIOUS STUDY:   4/20/2021---------Distance: 405.99 meters (1332 feet)       O2 Sat % Supplemental Oxygen Heart Rate Blood Pressure Lili Scale   Pre-exercise  (Resting) 97 % Room Air 100 bpm 139/84 mmHg 0   During Exercise 91 % Room Air 114 bpm 164/84 mmHg 2   Post-exercise  (Recovery) 98 % Room Air  95 bpm   mmHg        CLINICAL INTERPRETATION:  Six minute walk distance is 365.76 meters (1200 feet) with moderate dyspnea.  During exercise, there was significant desaturation while breathing room air.  Blood pressure remained stable and Heart rate increased significantly with walking.  The patient reported non-pulmonary symptoms during exercise.  Since the previous study in April 2021, exercise capacity may be somewhat worse.  Based upon age and body mass index, exercise capacity is less than predicted.

## 2023-08-24 ENCOUNTER — TELEPHONE (OUTPATIENT)
Dept: FAMILY MEDICINE | Facility: CLINIC | Age: 53
End: 2023-08-24
Payer: COMMERCIAL

## 2023-08-24 ENCOUNTER — PATIENT MESSAGE (OUTPATIENT)
Dept: ORTHOPEDICS | Facility: CLINIC | Age: 53
End: 2023-08-24
Payer: COMMERCIAL

## 2023-08-24 NOTE — TELEPHONE ENCOUNTER
Patient wants to know if he start his testosterone injection again after his  abd US.    Problem: Anemia Care Plan (Adult and Pediatric)  Goal: *Labs within defined limits  Outcome: Not Progressing Towards Goal  Some improved of lab results: Hgb increased from 4.5 to 6.4, Platelet improve from 5 to 20.  Patient remain in neutropenic precaution due to low WBC 0.4

## 2023-08-24 NOTE — TELEPHONE ENCOUNTER
----- Message from Deana Tejada sent at 8/24/2023  1:06 PM CDT -----  Regarding: self [phone]  .Type: Patient Call Back    Who called:self    What is the request in detail: pt requesting a call back regarding scheduling a follow up appointment.    Can the clinic reply by [facility]? no    Would the patient rather a call back or a response via [facility]?call back     Best call back number [phone]

## 2023-08-29 ENCOUNTER — OFFICE VISIT (OUTPATIENT)
Dept: FAMILY MEDICINE | Facility: CLINIC | Age: 53
End: 2023-08-29
Payer: COMMERCIAL

## 2023-08-29 VITALS
OXYGEN SATURATION: 98 % | SYSTOLIC BLOOD PRESSURE: 124 MMHG | WEIGHT: 189.63 LBS | HEIGHT: 65 IN | HEART RATE: 79 BPM | DIASTOLIC BLOOD PRESSURE: 82 MMHG | TEMPERATURE: 98 F | BODY MASS INDEX: 31.59 KG/M2

## 2023-08-29 DIAGNOSIS — I10 BENIGN HYPERTENSION: Primary | ICD-10-CM

## 2023-08-29 PROCEDURE — 3008F PR BODY MASS INDEX (BMI) DOCUMENTED: ICD-10-PCS | Mod: CPTII,S$GLB,, | Performed by: FAMILY MEDICINE

## 2023-08-29 PROCEDURE — 3044F HG A1C LEVEL LT 7.0%: CPT | Mod: CPTII,S$GLB,, | Performed by: FAMILY MEDICINE

## 2023-08-29 PROCEDURE — 3079F DIAST BP 80-89 MM HG: CPT | Mod: CPTII,S$GLB,, | Performed by: FAMILY MEDICINE

## 2023-08-29 PROCEDURE — 3074F PR MOST RECENT SYSTOLIC BLOOD PRESSURE < 130 MM HG: ICD-10-PCS | Mod: CPTII,S$GLB,, | Performed by: FAMILY MEDICINE

## 2023-08-29 PROCEDURE — 3008F BODY MASS INDEX DOCD: CPT | Mod: CPTII,S$GLB,, | Performed by: FAMILY MEDICINE

## 2023-08-29 PROCEDURE — 4010F PR ACE/ARB THEARPY RXD/TAKEN: ICD-10-PCS | Mod: CPTII,S$GLB,, | Performed by: FAMILY MEDICINE

## 2023-08-29 PROCEDURE — 99214 PR OFFICE/OUTPT VISIT, EST, LEVL IV, 30-39 MIN: ICD-10-PCS | Mod: S$GLB,,, | Performed by: FAMILY MEDICINE

## 2023-08-29 PROCEDURE — 99999 PR PBB SHADOW E&M-EST. PATIENT-LVL III: ICD-10-PCS | Mod: PBBFAC,,, | Performed by: FAMILY MEDICINE

## 2023-08-29 PROCEDURE — 3044F PR MOST RECENT HEMOGLOBIN A1C LEVEL <7.0%: ICD-10-PCS | Mod: CPTII,S$GLB,, | Performed by: FAMILY MEDICINE

## 2023-08-29 PROCEDURE — 99214 OFFICE O/P EST MOD 30 MIN: CPT | Mod: S$GLB,,, | Performed by: FAMILY MEDICINE

## 2023-08-29 PROCEDURE — 99999 PR PBB SHADOW E&M-EST. PATIENT-LVL III: CPT | Mod: PBBFAC,,, | Performed by: FAMILY MEDICINE

## 2023-08-29 PROCEDURE — 3079F PR MOST RECENT DIASTOLIC BLOOD PRESSURE 80-89 MM HG: ICD-10-PCS | Mod: CPTII,S$GLB,, | Performed by: FAMILY MEDICINE

## 2023-08-29 PROCEDURE — 4010F ACE/ARB THERAPY RXD/TAKEN: CPT | Mod: CPTII,S$GLB,, | Performed by: FAMILY MEDICINE

## 2023-08-29 PROCEDURE — 3074F SYST BP LT 130 MM HG: CPT | Mod: CPTII,S$GLB,, | Performed by: FAMILY MEDICINE

## 2023-08-29 RX ORDER — LOSARTAN POTASSIUM 50 MG/1
50 TABLET ORAL DAILY
Qty: 90 TABLET | Refills: 3 | Status: SHIPPED | OUTPATIENT
Start: 2023-08-29 | End: 2023-08-31 | Stop reason: DRUGHIGH

## 2023-08-29 NOTE — PROGRESS NOTES
"HISTORY OF PRESENT ILLNESS:  Ronal Ham is a 53 y.o. male who presents to the clinic today for Hypertension  .     The patient is taking hypertensive medications compliantly without side effects.  Denies chest pain, dyspnea, edema, or TIA's.  Notes more b/p issues.  Has issues with heat and lungs and the knees/jjoints.      Patient Active Problem List   Diagnosis    SLE (systemic lupus erythematosus)    Benign hypertension    Raynaud's syndrome    Interstitial lung disease    Chronic rhinosinusitis    GERD (gastroesophageal reflux disease)    Pain in limb    Tinea pedis    Male erectile disorder    Chronic cough    Prediabetes    Effusion of left knee joint    Chondromalacia of left patellofemoral joint    Systemic lupus erythematosus with lung involvement    Low testosterone in male    Chronic pain    Chronic midline low back pain with bilateral sciatica    Drug-induced immunodeficiency    Primary osteoarthritis of right knee    Pain and swelling of right knee    Decreased range of motion with decreased strength    Impaired functional mobility, balance, gait, and endurance    Right renal mass           CARE TEAM:  Patient Care Team:  Roselyn Kim NP as PCP - General (Hospitalist)  Paulette Sewell NP as Nurse Practitioner (Orthopedic Surgery)  Sherman Osman MD as Consulting Physician (Rheumatology)         ROS        PHYSICAL EXAM:  /82   Pulse 79   Temp 98.2 °F (36.8 °C) (Oral)   Ht 5' 5" (1.651 m)   Wt 86 kg (189 lb 9.5 oz)   SpO2 98%   BMI 31.55 kg/m²   Wt Readings from Last 5 Encounters:   08/31/23 84.8 kg (187 lb)   08/29/23 86 kg (189 lb 9.5 oz)   08/02/23 88.7 kg (195 lb 8.8 oz)   07/27/23 86.4 kg (190 lb 7.6 oz)   06/08/23 88.7 kg (195 lb 8.8 oz)     BP Readings from Last 5 Encounters:   08/31/23 135/86   08/29/23 124/82   07/27/23 (!) 150/90   06/08/23 132/76   06/05/23 124/86           He appears well, in no apparent distress.  Alert and oriented times three, pleasant and " cooperative. Vital signs are as documented in vital signs section.  Chronic coarse bs noted  S1 and S2 normal, no murmurs, clicks, gallops or rubs. Regular rate and rhythm. Chest is clear; no wheezes or rales. No edema or JVD.  Chronic changes in the knees      Medication List with Changes/Refills   New Medications    HYDROCODONE-ACETAMINOPHEN (NORCO) 5-325 MG PER TABLET    Take 1 tablet by mouth every 6 (six) hours as needed for Pain.    LOSARTAN (COZAAR) 100 MG TABLET    Take 1 tablet (100 mg total) by mouth once daily.   Current Medications    ALBUTEROL (PROAIR HFA) 90 MCG/ACTUATION INHALER    Inhale 2 puffs into the lungs every 6 (six) hours as needed for Wheezing. Rescue    AMLODIPINE (NORVASC) 10 MG TABLET    TAKE 1 TABLET(10 MG) BY MOUTH EVERY DAY    AZELASTINE (ASTELIN) 137 MCG (0.1 %) NASAL SPRAY    2 sprays (274 mcg total) by Nasal route 2 (two) times daily.    BUDESONIDE-GLYCOPYR-FORMOTEROL (BREZTRI AEROSPHERE) 160-9-4.8 MCG/ACTUATION HFAA    Inhale 1 Inhaler into the lungs 2 (two) times daily.    FLUTICASONE PROPIONATE (FLONASE) 50 MCG/ACTUATION NASAL SPRAY    1 spray (50 mcg total) by Each Nostril route 2 (two) times a day.    HYDROXYCHLOROQUINE (PLAQUENIL) 200 MG TABLET    TAKE 2 TABLETS(400 MG) BY MOUTH EVERY DAY    IBUPROFEN (ADVIL,MOTRIN) 800 MG TABLET    Take 1 tablet (800 mg total) by mouth every 8 (eight) hours as needed (TAKE WITH MEALS).    MELOXICAM (MOBIC) 15 MG TABLET    Take 1 tablet (15 mg total) by mouth once daily.    PANTOPRAZOLE (PROTONIX) 40 MG TABLET    Take 1 tablet (40 mg total) by mouth once daily.    SILDENAFIL (VIAGRA) 100 MG TABLET    Take 1 tablet (100 mg total) by mouth daily as needed for Erectile Dysfunction.   Changed and/or Refilled Medications    Modified Medication Previous Medication    LEVOCETIRIZINE (XYZAL) 5 MG TABLET levocetirizine (XYZAL) 5 MG tablet       Take 1 tablet (5 mg total) by mouth every evening.    Take 1 tablet (5 mg total) by mouth every evening.     MONTELUKAST (SINGULAIR) 10 MG TABLET montelukast (SINGULAIR) 10 mg tablet       Take 1 tablet (10 mg total) by mouth every evening.    Take 1 tablet (10 mg total) by mouth every evening.    PROMETHAZINE-DEXTROMETHORPHAN (PROMETHAZINE-DM) 6.25-15 MG/5 ML SYRP promethazine-dextromethorphan (PROMETHAZINE-DM) 6.25-15 mg/5 mL Syrp       Take 5 mLs by mouth every 8 (eight) hours as needed (cough).    Take 5 mLs by mouth every 8 (eight) hours as needed (cough).   Discontinued Medications    IPRATROPIUM (ATROVENT) 42 MCG (0.06 %) NASAL SPRAY    2 sprays by Each Nostril route 4 (four) times daily.    LOSARTAN (COZAAR) 50 MG TABLET    Take 1 tablet (50 mg total) by mouth once daily.    PREDNISONE (DELTASONE) 50 MG TAB    Take 1 tablet (50 mg total) by mouth every 7 days.    TRAMADOL (ULTRAM) 50 MG TABLET    Take 1 tablet (50 mg total) by mouth every 6 (six) hours.       ASSESSMENT AND PLAN:    Problem List Items Addressed This Visit       Benign hypertension - Primary   He has severe comorbid conditions.  Start new therapy and go from there  Work on exposure to air quality as well    Future Appointments   Date Time Provider Department Center   9/5/2023  9:30 AM SBP US2 SBP ULSND [facility]   9/13/2023  3:00 PM Porfirio Woods, PT SBP OPTHPY [facility]   9/19/2023  1:00 PM Sandra Dale, PT [facility] RHB[facility] [place]   9/28/2023 11:30 AM Roselyn Kim, NP [facility] [place]       No follow-ups on file. or sooner as needed.

## 2023-08-31 PROBLEM — N28.89 RIGHT RENAL MASS: Chronic | Status: ACTIVE | Noted: 2023-08-31

## 2023-09-18 ENCOUNTER — PATIENT MESSAGE (OUTPATIENT)
Dept: PEDIATRICS | Facility: CLINIC | Age: 53
End: 2023-09-18
Payer: COMMERCIAL

## 2023-09-19 ENCOUNTER — CLINICAL SUPPORT (OUTPATIENT)
Dept: REHABILITATION | Facility: HOSPITAL | Age: 53
End: 2023-09-19
Payer: COMMERCIAL

## 2023-09-19 DIAGNOSIS — M54.41 ACUTE LOW BACK PAIN WITH RIGHT-SIDED SCIATICA, UNSPECIFIED BACK PAIN LATERALITY: ICD-10-CM

## 2023-09-19 PROCEDURE — 97161 PT EVAL LOW COMPLEX 20 MIN: CPT

## 2023-09-19 PROCEDURE — 97110 THERAPEUTIC EXERCISES: CPT

## 2023-09-19 NOTE — PLAN OF CARE
[facility]   Physical Therapy Initial Evaluation      Name: Ronal Ham  [facility] Number: [id number]    Therapy Diagnosis:   Encounter Diagnosis   Name Primary?    Acute low back pain with right-sided sciatica, unspecified back pain laterality         Physician: Paulette Sewell NP    Physician Orders: PT Eval and Treat   Medical Diagnosis from Referral: M54.41 (ICD-10-CM) - Acute low back pain with right-sided sciatica, unspecified back pain laterality   Evaluation Date: 9/19/2023  Authorization Period Expiration: 07/30/2024   Plan of Care Expiration: 10/17/2023  Progress Note Due: 10/17/2023  Date of Surgery: n/a  Visit # / Visits authorized: 1/ 1   FOTO: 1/ 3    Precautions: Standard     Time In: 1:20 PM  Time Out: 1:45 PM  Total Billable Time: 25 minutes (1 LCE)    Subjective     Date of onset: chronic, exacerbation of pain level over that past three weeks    History of current condition - Ronal reports: increased low back pain over past few weeks, insidious onset, occasionally has radiating pain down sides of bilateral legs, right>left.  Pt also reports bilateral knee pain, right>left.  Pt says he has the ability to go to a gym, went once last week but does not do consistently.  Pt denies bowel or bladder incontinence but does report increased frequency over past few months.    Falls: no    Imaging: none: no new relevant imaging    Prior Therapy: yes, in past for same dx.  Non-compliant with home exercise program  Social History:  lives with their spouse  Occupation: [employer]  Prior Level of Function: independent  Current Level of Function: independent    Pain:  Current 5/10, worst 7/10, best 3/10   Location: bilateral low back    Description: Aching  Aggravating Factors: Sitting and Laying  Easing Factors:  stretching    Patients goals: reduce pain, feel better     Medical History:   Past Medical History:   Diagnosis Date    Arthritis     Eye injury     od hit above od     GERD (gastroesophageal reflux disease)     Hypertension     Lupus (systemic lupus erythematosus)     Pulmonary fibrosis     Raynaud phenomenon        Surgical History:   Ronal Ham  has a past surgical history that includes Hernia repair; Transforaminal epidural injection of steroid (Bilateral, 6/2/2021); Colonoscopy (N/A, 7/14/2022); and Colonoscopy (N/A, 9/19/2022).    Medications:   Ronal has a current medication list which includes the following prescription(s): albuterol, amlodipine, azelastine, breztri aerosphere, fluticasone propionate, hydrocodone-acetaminophen, hydroxychloroquine, ibuprofen, levocetirizine, losartan, meloxicam, montelukast, pantoprazole, promethazine-dextromethorphan, and sildenafil.    Allergies:   Review of patient's allergies indicates:   Allergen Reactions    Carafate  [sucralfate] Rash     Other reaction(s): Hives        Objective      Observation: normal gait pattern    Posture:  mild anterior pelvic tilt    Lumbar Range of Motion:    Degrees Pain   Flexion WFL   Stretching in lower back and legs        Extension WNL   Pain across low back        Left Side Bending WFL tightness        Right Side Bending WFL tightness        Left rotation   WFL tightness        Right Rotation   WFL tightness          Hip ER/IR Range of Motion to be assessed next session       Lower Extremity Strength  Right LE  Left LE    Knee extension: 5/5 Knee extension: 5/5   Knee flexion: 4+/5 Knee flexion: 4+/5   Hip flexion: 4-/5 Hip flexion: 4-/5   Hip extension:  4-/5 Hip extension: 4-/5   Hip abduction: 4-/5 Hip abduction: 4-/5   Hip adduction: 4/5 Hip adduction 4/5   Ankle dorsiflexion: 4+/5 Ankle dorsiflexion: 4+/5   Ankle plantarflexion: 4+/5 Ankle plantarflexion: 4+/5     30 second sit to stand: NEXT VISIT    Special Tests:  -Repeated Flexion: no change in pain  -Repeated Ext: no change in pain  -Piriformis Test: negative  -OH Squat: increased anterior translation of femur, increased  "anterior pelvic tilt      Neuro Dynamic Testing:    Sciatic nerve:      SLR: R = negative     L = negative       Femoral Nerve:    Femoral nerve test: negative    Palpation: mild pain with palpation of lumbar paraspinals    Sensation: intact to light touch, pt denies saddle sensation    Flexibility:    Ely's test: R = 105 degrees ; L = 109 degrees   Ifeanyi's test: R = + ; L = +   Deon test: R = + ; L = +  Hamstring 90/90: right= 65 degrees ; left= 62 degrees     Intake Outcome Measure for FOTO Lumbar Spine Survey    Therapist reviewed FOTO scores for Ronal Ham on 9/19/2023.   FOTO report - see Media section or FOTO account episode details.    Intake Score: 41% limitation  Projected Limitation: 35% limitation    Modified Oswestry: 20%         Treatment     Total Treatment time (time-based codes) separate from Evaluation: 8 minutes     Ronal received the treatments listed below:      therapeutic exercises to develop flexibility for 8 minutes including:  - hamstring stretch with strap 20" x 5 bilaterally  - hip abductor / cross body stretch with strap 20" x 5 bilaterally  - lower trunk rotation 10 reps bilaterally       Patient Education and Home Exercises     Education provided:   - reviewed anatomy / physiology related to diagnosis  - role of PT, PT POC and goals  - attendance policy     Written Home Exercises Provided: yes. Exercises were reviewed and Ronal was able to demonstrate them prior to the end of the session.  Ronal demonstrated good  understanding of the education provided. See EMR under Patient Instructions for exercises provided during therapy sessions.    Assessment     Ronal is a 53 y.o. male referred to outpatient Physical Therapy with a medical diagnosis of M54.41 (ICD-10-CM) - Acute low back pain with right-sided sciatica, unspecified back pain laterality. Patient presents with low back and lateral upper thigh pain, decreased proximal lower extremity strength, decreased core " strength, soft tissue restrictions and postural dysfunction, limiting patient's ability to perform work duties.     Patient prognosis is Good.   Patient will benefit from skilled outpatient Physical Therapy to address the deficits stated above and in the chart below, provide patient /family education, and to maximize patientt's level of independence.     Plan of care discussed with patient: Yes  Patient's spiritual, cultural and educational needs considered and patient is agreeable to the plan of care and goals as stated below:     Anticipated Barriers for therapy: work schedule    Medical Necessity is demonstrated by the following  History  Co-morbidities and personal factors that may impact the plan of care [] LOW: no personal factors / co-morbidities  [x] MODERATE: 1-2 personal factors / co-morbidities  [] HIGH: 3+ personal factors / co-morbidities    Moderate / High Support Documentation:   Co-morbidities affecting plan of care: OA    Personal Factors:   no deficits     Examination  Body Structures and Functions, activity limitations and participation restrictions that may impact the plan of care [x] LOW: addressing 1-2 elements  [] MODERATE: 3+ elements  [] HIGH: 4+ elements (please support below)    Moderate / High Support Documentation: Based on PMHX, co morbidities , data from assessments and functional level of assistance required with task and clinical presentation directly impacting function.        Clinical Presentation [x] LOW: stable  [] MODERATE: Evolving  [] HIGH: Unstable     Decision Making/ Complexity Score: low       Goals:  Short Term Goals: 4 weeks   1. Patient will demonstrate good transverse abdominal muscle contraction for improved deep abdominal strength and lumbar stability.  2. Increase lumbar ROM to 100% of WNL in order to improve functional mobility.     3. Patient will demonstrate good sitting/standing posture and body mechanics for improved spine health and decreased risk of future  injury.   4. Patient will improve bilateral lower extremity MMT grades by >/=1/2 grade in order to improve strength for standing ADLs.   5. Patient will score >/= TBD repetitions on 30-Second Sit to  order to improve bilateral lower extremity muscular power for transfers.   6. Patient will be compliant with home exercise program to supplement therapy in promoting functional mobility.    Long Term Goals: 8 weeks   1. Patient will improve FOTO score to </= 35% limited to decrease perceived limitation with maintaining/changing body position.   2. Patient will be 100% compliant with updated HEP in order to maximize PT benefits post-discharge.  3. Patient will improve bilateral lower extremity MMT grades by >/=1 grade in order to improve strength for standing activities of daily living.  4. Patient will score >/= 16 repetitions on 30-Second Sit to  order to improve bilateral lower extremity muscular power for transfers.    5. Patient will report pain at worst over 2-week period as </=2/10 in order to improve general activity tolerance.   6. Pt will begin some form of home/community fitness in order to sustain progress gained in PT  7. Pt to demonstrate negative Bridge Test in order to show improved core strength for lumbar stabilization.       Plan     Plan of care Certification: 9/19/2023 to 10/17/2023.    Outpatient Physical Therapy 2 times weekly for 8 weeks to include the following interventions: Aquatic Therapy, Electrical Stimulation PRN, Manual Therapy, Moist Heat/ Ice, Neuromuscular Re-ed, Patient Education, Therapeutic Activities, Therapeutic Exercise, and functional dry needling PRN .     Sandra Dale, PT        Physician's Signature: _________________________________________ Date: ________________

## 2023-09-27 ENCOUNTER — CLINICAL SUPPORT (OUTPATIENT)
Dept: REHABILITATION | Facility: HOSPITAL | Age: 53
End: 2023-09-27
Payer: COMMERCIAL

## 2023-09-27 DIAGNOSIS — M54.41 CHRONIC MIDLINE LOW BACK PAIN WITH BILATERAL SCIATICA: Primary | ICD-10-CM

## 2023-09-27 DIAGNOSIS — M54.42 CHRONIC MIDLINE LOW BACK PAIN WITH BILATERAL SCIATICA: Primary | ICD-10-CM

## 2023-09-27 DIAGNOSIS — G89.29 CHRONIC MIDLINE LOW BACK PAIN WITH BILATERAL SCIATICA: Primary | ICD-10-CM

## 2023-09-27 PROCEDURE — 97110 THERAPEUTIC EXERCISES: CPT

## 2023-09-27 NOTE — PROGRESS NOTES
[facility]   Physical Therapy Treatment Note      Name: Ronal Ham  Clinic Number: [id number]    Therapy Diagnosis:   Encounter Diagnosis   Name Primary?    Chronic midline low back pain with bilateral sciatica Yes     Physician: Paulette Sewell NP    Visit Date: 9/27/2023    Physician Orders: PT Eval and Treat   Medical Diagnosis from Referral: M54.41 (ICD-10-CM) - Acute low back pain with right-sided sciatica, unspecified back pain laterality   Evaluation Date: 9/19/2023  Authorization Period Expiration: 07/30/2024   Plan of Care Expiration: 10/17/2023  Progress Note Due: 10/17/2023  Date of Surgery: n/a  Visit # / Visits authorized: 1/1   FOTO: 1/ 3     Precautions: Standard      Time In: 11:50 AM  Time Out: 12:30 PM  Total Billable Time: 40 minutes    PTA Visit #: 0/5       Subjective     Patient reports: he has noticed some improvements in his symptoms following the stretches at home  He was compliant with home exercise program.  Response to previous treatment: improvement in stiffness  Functional change: no change    Pain: 3/10  Location: bilateral back      Objective      Objective Measures updated at progress report unless specified.     Treatment     Ronal received the treatments listed below:      therapeutic exercises to develop strength, endurance, ROM, flexibility, and posture for 40 minutes including:    LTRs 30x  Supine Hamstring Stretch with strap 3x30s both sides  Supine Cross Body stretch 3x30s  Supine DARNELL stretch 3x30s both sides  Sidelying clams 2x10 both sides  Bridges 2x10  Supine Hip Flexor Stretch 3x30s both sides  Ab isometric 30x 5 sec hold  Supine bent knee transverse abdominis activation 10x 5 sec hold  Supine bent knee march with transverse abdominis 20x 2 rounds    Patient Education and Home Exercises       Education provided:   - home exercise program    Written Home Exercises Provided: yes. Exercises were reviewed and Ronal was able to  demonstrate them prior to the end of the session.  Ronal demonstrated good  understanding of the education provided. See Electronic Medical Record under Patient Instructions for exercises provided during therapy sessions    Assessment     Patient presented for first follow up session with improvement in symptoms following stretching. Patient presents with increased lumbar lordosis and poor activation of transverse abdominis, which improved following education. 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    Ronal Is progressing well towards his goals.   Patient prognosis is Good.     Patient will continue to benefit from skilled outpatient physical therapy to address the deficits listed in the problem list box on initial evaluation, provide pt/family education and to maximize pt's level of independence in the home and community environment.     Patient's spiritual, cultural and educational needs considered and pt agreeable to plan of care and goals.     Anticipated barriers to physical therapy: none    Goals:  Short Term Goals: 4 weeks   1. Patient will demonstrate good transverse abdominal muscle contraction for improved deep abdominal strength and lumbar stability.  2. Increase lumbar ROM to 100% of WNL in order to improve functional mobility.     3. Patient will demonstrate good sitting/standing posture and body mechanics for improved spine health and decreased risk of future injury.   4. Patient will improve bilateral lower extremity MMT grades by >/=1/2 grade in order to improve strength for standing ADLs.   5. Patient will score >/= TBD repetitions on 30-Second Sit to  order to improve bilateral lower extremity muscular power for transfers.   6. Patient will be compliant with home exercise program to supplement therapy in promoting functional mobility.     Long Term Goals: 8 weeks   1.  Patient will improve FOTO score to </= 35% limited to decrease perceived limitation with maintaining/changing body position.   2. Patient will be 100% compliant with updated HEP in order to maximize PT benefits post-discharge.  3. Patient will improve bilateral lower extremity MMT grades by >/=1 grade in order to improve strength for standing activities of daily living.  4. Patient will score >/= 16 repetitions on 30-Second Sit to  order to improve bilateral lower extremity muscular power for transfers.    5. Patient will report pain at worst over 2-week period as </=2/10 in order to improve general activity tolerance.   6. Pt will begin some form of home/community fitness in order to sustain progress gained in PT  7. Pt to demonstrate negative Bridge Test in order to show improved core strength for lumbar stabilization.     Connor Hayden, PT

## 2023-10-02 ENCOUNTER — CLINICAL SUPPORT (OUTPATIENT)
Dept: REHABILITATION | Facility: HOSPITAL | Age: 53
End: 2023-10-02
Payer: COMMERCIAL

## 2023-10-02 DIAGNOSIS — M54.41 ACUTE LOW BACK PAIN WITH RIGHT-SIDED SCIATICA, UNSPECIFIED BACK PAIN LATERALITY: ICD-10-CM

## 2023-10-02 DIAGNOSIS — M54.42 CHRONIC MIDLINE LOW BACK PAIN WITH BILATERAL SCIATICA: Primary | ICD-10-CM

## 2023-10-02 DIAGNOSIS — G89.29 CHRONIC MIDLINE LOW BACK PAIN WITH BILATERAL SCIATICA: Primary | ICD-10-CM

## 2023-10-02 DIAGNOSIS — M54.41 CHRONIC MIDLINE LOW BACK PAIN WITH BILATERAL SCIATICA: Primary | ICD-10-CM

## 2023-10-02 PROCEDURE — 97110 THERAPEUTIC EXERCISES: CPT | Mod: CQ

## 2023-10-02 NOTE — PROGRESS NOTES
"[facility]   Physical Therapy Treatment Note      Name: Ronal Ham  Clinic Number: [id number]    Therapy Diagnosis:   Encounter Diagnoses   Name Primary?    Chronic midline low back pain with bilateral sciatica Yes    Acute low back pain with right-sided sciatica, unspecified back pain laterality      Physician: Paulette Sewell NP    Visit Date: 10/2/2023    Physician Orders: PT Eval and Treat   Medical Diagnosis from Referral: M54.41 (ICD-10-CM) - Acute low back pain with right-sided sciatica, unspecified back pain laterality   Evaluation Date: 9/19/2023  Authorization Period Expiration: 07/30/2024   Plan of Care Expiration: 10/17/2023  Progress Note Due: 10/17/2023  Date of Surgery: n/a    Visit # / Visits authorized: 1/1; 1/20     FOTO: 1/ 3     Precautions: Standard      PTA Visit #: 1/5     Time In: 2:04 PM   Time Out: 2:45 PM   Total Billable Time: 41 minutes    Subjective     Pt stated, " It seems like when I don't do much I have more back pain".     He was compliant with home exercise program.  Response to previous treatment: initial eval   Functional change: none     Pain: 5/10  Location: back pain with right sciatic pain     Objective      Objective Measures updated at progress report unless specified.     Treatment     Ronal received the treatments listed below:      therapeutic exercises to develop strength, ROM, flexibility, posture, and core stabilization for 45 minutes including:    Seated tball roll outs x 10 each directions 3 second hold   Seated trunk flexion stretch 10 x 5 second hold   Seated hamstring stretch 3 x 30 second hold   Seated right nerve flossing 2 x 10   Lower trunk rotation 30 x  Bridges 2 x 10  Ab isometric 30x 5 second hold  Supine bent knee transverse abdominis activation 10 x 5 second hold  Supine bent knee march with transverse abdominis 20x 2 rounds  Supine Cross Body stretch 3 x 30 second   Supine DARNELL stretch 3 x 30 second both " sides  Supine Hip Flexor Stretch 3 x 30 second both sides    NOT PERFORMED TODAY   Sidelying clams 2 x 10 both sides    Patient Education and Home Exercises       Education provided:   - home exercise program     Written Home Exercises Provided: yes. Exercises were reviewed and Ronal was able to demonstrate them prior to the end of the session.  Ronal demonstrated good  understanding of the education provided. See Electronic Medical Record under Patient Instructions for exercises provided during therapy sessions     Assessment     Patient was able to complete therapeutic exercise again today without reports of increased or reproduced pain throughout or after Physical Therapy treatment. Patient with some discomfort and swelling throughout left knee upon entering Physical Therapy treatment due to patient with playing basketball with son and grandchildren.     Roanl Is progressing well towards his goals.   Pt prognosis is Good.     Pt will continue to benefit from skilled outpatient physical therapy to address the deficits listed in the problem list box on initial evaluation, provide pt/family education and to maximize pt's level of independence in the home and community environment.     Pt's spiritual, cultural and educational needs considered and pt agreeable to plan of care and goals.     Anticipated barriers to physical therapy: none     Goals:   Short Term Goals: 4 weeks   1. Patient will demonstrate good transverse abdominal muscle contraction for improved deep abdominal strength and lumbar stability.  2. Increase lumbar ROM to 100% of WNL in order to improve functional mobility.     3. Patient will demonstrate good sitting/standing posture and body mechanics for improved spine health and decreased risk of future injury.   4. Patient will improve bilateral lower extremity MMT grades by >/=1/2 grade in order to improve strength for standing ADLs.   5. Patient will score >/= TBD repetitions on 30-Second Sit  to  order to improve bilateral lower extremity muscular power for transfers.   6. Patient will be compliant with home exercise program to supplement therapy in promoting functional mobility.     Long Term Goals: 8 weeks   1. Patient will improve FOTO score to </= 35% limited to decrease perceived limitation with maintaining/changing body position.   2. Patient will be 100% compliant with updated HEP in order to maximize PT benefits post-discharge.  3. Patient will improve bilateral lower extremity MMT grades by >/=1 grade in order to improve strength for standing activities of daily living.  4. Patient will score >/= 16 repetitions on 30-Second Sit to  order to improve bilateral lower extremity muscular power for transfers.    5. Patient will report pain at worst over 2-week period as </=2/10 in order to improve general activity tolerance.   6. Pt will begin some form of home/community fitness in order to sustain progress gained in PT  7. Pt to demonstrate negative Bridge Test in order to show improved core strength for lumbar stabilization.     Plan     Continue with Physical Therapist Plan of Care.     Konstantin Cortez, PTA

## 2023-10-07 DIAGNOSIS — K21.9 GASTROESOPHAGEAL REFLUX DISEASE: ICD-10-CM

## 2023-10-09 ENCOUNTER — TELEPHONE (OUTPATIENT)
Dept: FAMILY MEDICINE | Facility: CLINIC | Age: 53
End: 2023-10-09
Payer: COMMERCIAL

## 2023-10-09 RX ORDER — PANTOPRAZOLE SODIUM 40 MG/1
40 TABLET, DELAYED RELEASE ORAL
Qty: 90 TABLET | Refills: 1 | Status: SHIPPED | OUTPATIENT
Start: 2023-10-09

## 2023-10-09 NOTE — TELEPHONE ENCOUNTER
"----- Message from Jodie Haq sent at 10/9/2023 12:21 PM CDT -----  Regarding: Kristal " [facility] "  .Type: Patient Call Back    Who called: Keisha " [facility] "    What is the request in detail: Requesting to verify the rx pantoprazole (PROTONIX) 40 MG tablet    Can the clinic reply by [facility]? Call back     Would the patient rather a call back or a response via [facility]?  Call back     Best call back number: ..  [facility]/pharmacy #[id number] - [place] - [address]  [address]  [place] [address]  Phone: [phone] Fax: [phone]        "

## 2023-10-09 NOTE — TELEPHONE ENCOUNTER
Return call to pharmacy,and they asked if it was okay to fill Protonix. Authorization given and informed that it was okay, that it was written for the patient today.

## 2023-10-10 ENCOUNTER — CLINICAL SUPPORT (OUTPATIENT)
Dept: REHABILITATION | Facility: HOSPITAL | Age: 53
End: 2023-10-10
Payer: COMMERCIAL

## 2023-10-10 DIAGNOSIS — Z74.09 IMPAIRED MOBILITY AND ACTIVITIES OF DAILY LIVING: ICD-10-CM

## 2023-10-10 DIAGNOSIS — Z78.9 IMPAIRED MOBILITY AND ACTIVITIES OF DAILY LIVING: ICD-10-CM

## 2023-10-10 PROCEDURE — 97110 THERAPEUTIC EXERCISES: CPT

## 2023-10-10 NOTE — PROGRESS NOTES
[facility]   Physical Therapy Treatment Note      Name: Ronal Ham  Clinic Number: [id number]    Therapy Diagnosis:   Encounter Diagnosis   Name Primary?    Impaired mobility and activities of daily living        Physician: Paulette Sewell NP    Visit Date: 10/10/2023    Physician Orders: PT Eval and Treat   Medical Diagnosis from Referral: M54.41 (ICD-10-CM) - Acute low back pain with right-sided sciatica, unspecified back pain laterality   Evaluation Date: 9/19/2023  Authorization Period Expiration: 07/30/2024   Plan of Care Expiration: 10/17/2023  Progress Note Due: 10/17/2023  Date of Surgery: n/a    Visit # / Visits authorized: 1/1; 3/20     FOTO: 1/ 3     Precautions: Standard      PTA Visit #: 1/5     Time In: 3:20 PM   Time Out: 3:40 PM   Total Billable Time: 40 minutes    Subjective     Pt reports mild pain at start of session, generally improving.      He was compliant with home exercise program.  Response to previous treatment: initial eval   Functional change: none     Pain: 5/10  Location: back pain with right sciatic pain     Objective      Objective Measures updated at progress report unless specified.     Treatment     Ronal received the treatments listed below:      therapeutic exercises to develop strength, ROM, flexibility, posture, and core stabilization for 45 minutes including:    -NuStep x 5 min   - supine lower trunk rotation x 10 bilaterally  - Supine hamstring stretch 3 x 30 second hold with strap  - Bridges 2 x 10 with blue band  - Ab isometric 30x 5 second hold  - Supine bent knee fall out with blue band  - supine bent knee march with transverse abdominis 20x 2 rounds  - Supine Cross Body stretch 3 x 30 second   - Supine DARNELL stretch 3 x 30 second both sides  - Supine Hip Flexor Stretch 3 x 30 second both sides  - standing hip hinges 2 x 10 reps  - hamstring curl 45# 2 x 10 reps  - leg press 60# seat setting 7 2 x 10 reps      Patient Education  and Home Exercises       Education provided:   - home exercise program     Written Home Exercises Provided: yes. Exercises were reviewed and Ronal was able to demonstrate them prior to the end of the session.  Ronal demonstrated good  understanding of the education provided. See Electronic Medical Record under Patient Instructions for exercises provided during therapy sessions     Assessment     Patient tolerated session well, mild right knee pain verbalized at end of session but low back pain was minimal.  Stressed importance of daily stretching to improve overall mobility and decrease pain.  Pt would benefit from continued skilled physical therapy intervention.    Ronal Is progressing well towards his goals.   Pt prognosis is Good.     Pt will continue to benefit from skilled outpatient physical therapy to address the deficits listed in the problem list box on initial evaluation, provide pt/family education and to maximize pt's level of independence in the home and community environment.     Pt's spiritual, cultural and educational needs considered and pt agreeable to plan of care and goals.     Anticipated barriers to physical therapy: none     Goals:   Short Term Goals: 4 weeks   1. Patient will demonstrate good transverse abdominal muscle contraction for improved deep abdominal strength and lumbar stability.  2. Increase lumbar ROM to 100% of WNL in order to improve functional mobility.     3. Patient will demonstrate good sitting/standing posture and body mechanics for improved spine health and decreased risk of future injury.   4. Patient will improve bilateral lower extremity MMT grades by >/=1/2 grade in order to improve strength for standing ADLs.   5. Patient will score >/= TBD repetitions on 30-Second Sit to  order to improve bilateral lower extremity muscular power for transfers.   6. Patient will be compliant with home exercise program to supplement therapy in promoting functional  mobility.     Long Term Goals: 8 weeks   1. Patient will improve FOTO score to </= 35% limited to decrease perceived limitation with maintaining/changing body position.   2. Patient will be 100% compliant with updated HEP in order to maximize PT benefits post-discharge.  3. Patient will improve bilateral lower extremity MMT grades by >/=1 grade in order to improve strength for standing activities of daily living.  4. Patient will score >/= 16 repetitions on 30-Second Sit to  order to improve bilateral lower extremity muscular power for transfers.    5. Patient will report pain at worst over 2-week period as </=2/10 in order to improve general activity tolerance.   6. Pt will begin some form of home/community fitness in order to sustain progress gained in PT  7. Pt to demonstrate negative Bridge Test in order to show improved core strength for lumbar stabilization.     Plan     Continue with Physical Therapist Plan of Care.     Sandra Dale, PT

## 2023-10-11 ENCOUNTER — OFFICE VISIT (OUTPATIENT)
Dept: ORTHOPEDICS | Facility: CLINIC | Age: 53
End: 2023-10-11
Payer: COMMERCIAL

## 2023-10-11 DIAGNOSIS — M17.0 PRIMARY OSTEOARTHRITIS OF BOTH KNEES: Primary | ICD-10-CM

## 2023-10-11 PROCEDURE — 99499 UNLISTED E&M SERVICE: CPT | Mod: S$GLB,,, | Performed by: NURSE PRACTITIONER

## 2023-10-11 PROCEDURE — 20610 PR DRAIN/INJECT LARGE JOINT/BURSA: ICD-10-PCS | Mod: 50,S$GLB,, | Performed by: NURSE PRACTITIONER

## 2023-10-11 PROCEDURE — 99999 PR PBB SHADOW E&M-EST. PATIENT-LVL III: CPT | Mod: PBBFAC,,, | Performed by: NURSE PRACTITIONER

## 2023-10-11 PROCEDURE — 99499 NO LOS: ICD-10-PCS | Mod: S$GLB,,, | Performed by: NURSE PRACTITIONER

## 2023-10-11 PROCEDURE — 99999 PR PBB SHADOW E&M-EST. PATIENT-LVL III: ICD-10-PCS | Mod: PBBFAC,,, | Performed by: NURSE PRACTITIONER

## 2023-10-11 PROCEDURE — 20610 DRAIN/INJ JOINT/BURSA W/O US: CPT | Mod: 50,S$GLB,, | Performed by: NURSE PRACTITIONER

## 2023-10-11 RX ADMIN — TRIAMCINOLONE ACETONIDE 80 MG: 40 INJECTION, SUSPENSION INTRA-ARTICULAR; INTRAMUSCULAR at 08:10

## 2023-10-17 ENCOUNTER — PATIENT MESSAGE (OUTPATIENT)
Dept: PODIATRY | Facility: CLINIC | Age: 53
End: 2023-10-17
Payer: COMMERCIAL

## 2023-10-18 ENCOUNTER — CLINICAL SUPPORT (OUTPATIENT)
Dept: REHABILITATION | Facility: HOSPITAL | Age: 53
End: 2023-10-18
Payer: COMMERCIAL

## 2023-10-18 DIAGNOSIS — Z78.9 IMPAIRED MOBILITY AND ACTIVITIES OF DAILY LIVING: Primary | ICD-10-CM

## 2023-10-18 DIAGNOSIS — Z74.09 IMPAIRED MOBILITY AND ACTIVITIES OF DAILY LIVING: Primary | ICD-10-CM

## 2023-10-18 PROCEDURE — 97110 THERAPEUTIC EXERCISES: CPT

## 2023-10-18 NOTE — PROGRESS NOTES
"[facility]   Physical Therapy Treatment Note      Name: Ronal Ham  Clinic Number: [id number]    Therapy Diagnosis:   No diagnosis found.    Physician: Paulette Sewell NP    Visit Date: 10/18/2023    Physician Orders: PT Eval and Treat   Medical Diagnosis from Referral: M54.41 (ICD-10-CM) - Acute low back pain with right-sided sciatica, unspecified back pain laterality   Evaluation Date: 9/19/2023  Authorization Period Expiration: 07/30/2024   Plan of Care Expiration: 10/17/2023  Progress Note Due: 10/17/2023  Date of Surgery: n/a    Visit # / Visits authorized: 1/1; 1/20     FOTO: 1/ 3     Precautions: Standard      PTA Visit #: 1/5     Time In: 2:04 PM   Time Out: 2:45 PM   Total Billable Time: 41 minutes    Subjective     Pt stated, " It seems like when I don't do much I have more back pain".     He was compliant with home exercise program.  Response to previous treatment: initial eval   Functional change: none     Pain: 5/10  Location: back pain with right sciatic pain     Objective      Objective Measures updated at progress report unless specified.     Treatment     Ronal received the treatments listed below:      therapeutic exercises to develop strength, ROM, flexibility, posture, and core stabilization for 45 minutes including:    therapeutic exercises to develop strength, ROM, flexibility, posture, and core stabilization for 45 minutes including:     - NuStep x 5 min   - supine lower trunk rotation x 10 bilaterally  - Supine hamstring stretch 3 x 30 second hold with strap  - Bridges 2 x 10 with blue band  - Ab isometric 30x 5 second hold  - Supine bent knee fall out with blue band  - supine bent knee march with transverse abdominis 20x 2 rounds  - Supine Cross Body stretch 3 x 30 second   - Supine DARNELL stretch 3 x 30 second both sides  - Supine Hip Flexor Stretch 3 x 30 second both sides  - standing hip hinges 2 x 10 reps  - hamstring curl 45# 2 x 10 reps  - leg " press 60# seat setting 7 2 x 10 reps     Patient Education and Home Exercises       Education provided:   - home exercise program     Written Home Exercises Provided: yes. Exercises were reviewed and Ronal was able to demonstrate them prior to the end of the session.  Ronal demonstrated good  understanding of the education provided. See Electronic Medical Record under Patient Instructions for exercises provided during therapy sessions     Assessment     Patient was able to complete therapeutic exercise again today without reports of increased or reproduced pain throughout or after Physical Therapy treatment. Patient with some discomfort and swelling throughout left knee upon entering Physical Therapy treatment due to patient with playing basketball with son and grandchildren.     Ronal Is progressing well towards his goals.   Pt prognosis is Good.     Pt will continue to benefit from skilled outpatient physical therapy to address the deficits listed in the problem list box on initial evaluation, provide pt/family education and to maximize pt's level of independence in the home and community environment.     Pt's spiritual, cultural and educational needs considered and pt agreeable to plan of care and goals.     Anticipated barriers to physical therapy: none     Goals:   Short Term Goals: 4 weeks   1. Patient will demonstrate good transverse abdominal muscle contraction for improved deep abdominal strength and lumbar stability.  2. Increase lumbar ROM to 100% of WNL in order to improve functional mobility.     3. Patient will demonstrate good sitting/standing posture and body mechanics for improved spine health and decreased risk of future injury.   4. Patient will improve bilateral lower extremity MMT grades by >/=1/2 grade in order to improve strength for standing ADLs.   5. Patient will score >/= TBD repetitions on 30-Second Sit to  order to improve bilateral lower extremity muscular power for  transfers.   6. Patient will be compliant with home exercise program to supplement therapy in promoting functional mobility.     Long Term Goals: 8 weeks   1. Patient will improve FOTO score to </= 35% limited to decrease perceived limitation with maintaining/changing body position.   2. Patient will be 100% compliant with updated HEP in order to maximize PT benefits post-discharge.  3. Patient will improve bilateral lower extremity MMT grades by >/=1 grade in order to improve strength for standing activities of daily living.  4. Patient will score >/= 16 repetitions on 30-Second Sit to  order to improve bilateral lower extremity muscular power for transfers.    5. Patient will report pain at worst over 2-week period as </=2/10 in order to improve general activity tolerance.   6. Pt will begin some form of home/community fitness in order to sustain progress gained in PT  7. Pt to demonstrate negative Bridge Test in order to show improved core strength for lumbar stabilization.     Plan     Continue with Physical Therapist Plan of Care.     Konstantin Cortez, PTA

## 2023-10-18 NOTE — PROGRESS NOTES
[facility]   Physical Therapy Treatment Note      Name: Ronal Ham  Clinic Number: [id number]    Therapy Diagnosis:   No diagnosis found.      Physician: Paulette Sewell NP    Visit Date: 10/18/2023    Physician Orders: PT Eval and Treat   Medical Diagnosis from Referral: M54.41 (ICD-10-CM) - Acute low back pain with right-sided sciatica, unspecified back pain laterality   Evaluation Date: 9/19/2023  Authorization Period Expiration: 07/30/2024   Plan of Care Expiration: 10/17/2023  Progress Note Due: 10/17/2023  Date of Surgery: n/a    Visit # / Visits authorized: 1/1; 3/20     FOTO: 1/ 3     Precautions: Standard      PTA Visit #: 1/5     Time In: 2:45 PM   Time Out: 3:30 PM   Total Billable Time: 45 minutes    Subjective     Pt reports mild pain at start of session, generally improving.      He was compliant with home exercise program.  Response to previous treatment: initial eval   Functional change: none     Pain: 5/10  Location: back pain with right sciatic pain     Objective      Objective Measures updated at progress report unless specified.     Treatment     Ronal received the treatments listed below:      therapeutic exercises to develop strength, ROM, flexibility, posture, and core stabilization for 45 minutes including:    -NuStep x 5 min   - supine lower trunk rotation x 10 bilaterally  - Supine hamstring stretch 3 x 30 second hold with strap  - Bridges 2 x 10 with blue band  - Ab isometric 30x 5 second hold  - Supine bent knee fall out with blue band  - supine bent knee march with transverse abdominis 20x 2 rounds  - Supine Cross Body stretch 3 x 30 second   - Supine DARNELL stretch 3 x 30 second both sides  - Supine Hip Flexor Stretch 3 x 30 second both sides  - standing hip hinges 2 x 10 reps  - hamstring curl 45# 2 x 10 reps  - leg press 60# seat setting 7 2 x 10 reps      Patient Education and Home Exercises       Education provided:   - home exercise  program     Written Home Exercises Provided: yes. Exercises were reviewed and Ronal was able to demonstrate them prior to the end of the session.  Ronal demonstrated good  understanding of the education provided. See Electronic Medical Record under Patient Instructions for exercises provided during therapy sessions     Assessment     Patient tolerated session well, mild right knee pain verbalized at end of session but low back pain was minimal.  Stressed importance of daily stretching to improve overall mobility and decrease pain.  Pt would benefit from continued skilled physical therapy intervention.    Ronal Is progressing well towards his goals.   Pt prognosis is Good.     Pt will continue to benefit from skilled outpatient physical therapy to address the deficits listed in the problem list box on initial evaluation, provide pt/family education and to maximize pt's level of independence in the home and community environment.     Pt's spiritual, cultural and educational needs considered and pt agreeable to plan of care and goals.     Anticipated barriers to physical therapy: none     Goals:   Short Term Goals: 4 weeks   1. Patient will demonstrate good transverse abdominal muscle contraction for improved deep abdominal strength and lumbar stability.  2. Increase lumbar ROM to 100% of WNL in order to improve functional mobility.     3. Patient will demonstrate good sitting/standing posture and body mechanics for improved spine health and decreased risk of future injury.   4. Patient will improve bilateral lower extremity MMT grades by >/=1/2 grade in order to improve strength for standing ADLs.   5. Patient will score >/= TBD repetitions on 30-Second Sit to  order to improve bilateral lower extremity muscular power for transfers.   6. Patient will be compliant with home exercise program to supplement therapy in promoting functional mobility.     Long Term Goals: 8 weeks   1. Patient will improve FOTO  score to </= 35% limited to decrease perceived limitation with maintaining/changing body position.   2. Patient will be 100% compliant with updated HEP in order to maximize PT benefits post-discharge.  3. Patient will improve bilateral lower extremity MMT grades by >/=1 grade in order to improve strength for standing activities of daily living.  4. Patient will score >/= 16 repetitions on 30-Second Sit to  order to improve bilateral lower extremity muscular power for transfers.    5. Patient will report pain at worst over 2-week period as </=2/10 in order to improve general activity tolerance.   6. Pt will begin some form of home/community fitness in order to sustain progress gained in PT  7. Pt to demonstrate negative Bridge Test in order to show improved core strength for lumbar stabilization.     Plan     Continue with Physical Therapist Plan of Care.     Deandre Lopez, PT

## 2023-10-20 RX ORDER — TRIAMCINOLONE ACETONIDE 40 MG/ML
80 INJECTION, SUSPENSION INTRA-ARTICULAR; INTRAMUSCULAR
Status: COMPLETED | OUTPATIENT
Start: 2023-10-11 | End: 2023-10-11

## 2023-10-20 NOTE — PROGRESS NOTES
Pt with known bilateral knee djd. He returns today to repeat cortisone injections for pain relief. His last injections were in July. He is considering surgery now due to the injections not lasting that long.    Knee Injection Procedure Note  Diagnosis: bilateral knee degenerative arthritis  Indications: bilateral knee pain  Procedure Details: Verbal consent was obtained for the procedure. The injection site was identified and the skin was prepared with alcohol. The bilateral knee was injected from an anterolateral approach with 1 ml of Kenalog and 4 ml Lidocaine each under sterile technique using a 25 gauge needle. The needle was removed and the area cleansed and dressed.  Complications:  Patient tolerated the procedure well.    he was advised to rest the knee today, using ice and elevation as needed for comfort and swelling.Immediate relief of the knee pain may be short lived and secondary to the lidocaine. he may have an increase in discomfort tonight followed by steady improvement over the next several days. It may take 1-2 weeks following the injection to get the full benefit of the medication.

## 2023-10-23 ENCOUNTER — CLINICAL SUPPORT (OUTPATIENT)
Dept: REHABILITATION | Facility: HOSPITAL | Age: 53
End: 2023-10-23
Payer: COMMERCIAL

## 2023-10-23 DIAGNOSIS — Z74.09 IMPAIRED MOBILITY AND ACTIVITIES OF DAILY LIVING: Primary | ICD-10-CM

## 2023-10-23 DIAGNOSIS — Z78.9 IMPAIRED MOBILITY AND ACTIVITIES OF DAILY LIVING: Primary | ICD-10-CM

## 2023-10-23 PROCEDURE — 97110 THERAPEUTIC EXERCISES: CPT

## 2023-10-23 NOTE — PROGRESS NOTES
[facility]   Physical Therapy Treatment Note      Name: Ronal Ham  Clinic Number: [id number]    Therapy Diagnosis:   Encounter Diagnosis   Name Primary?    Impaired mobility and activities of daily living Yes     Physician: Paulette Sewell NP    Visit Date: 10/23/2023    Physician Orders: PT Eval and Treat   Medical Diagnosis from Referral: M54.41 (ICD-10-CM) - Acute low back pain with right-sided sciatica, unspecified back pain laterality   Evaluation Date: 9/19/2023  Authorization Period Expiration: 07/30/2024   Plan of Care Expiration: 12/23/2023  Progress Note Due: 11/23/2023  Date of Surgery: n/a    Visit # / Visits authorized: 1/1; 5/20     FOTO: 1/ 3     Precautions: Standard      PTA Visit #: 1/5     Time In: 1:15 PM   Time Out: 1:55 PM   Total Billable Time: 40 minutes    Subjective     Pt reports mild pain at start of session, generally improving.      He was compliant with home exercise program.  Response to previous treatment: initial eval   Functional change: none     Pain: 5/10  Location: back pain with right sciatic pain     Objective      Hip strength:   Hip Right Left   External rotation  4/5 4/5      Hip Range of Motion:  Right hip external rotation reduced vs left but not formally measured     Treatment     Ronal received the treatments listed below:      therapeutic exercises to develop strength, ROM, flexibility, posture, and core stabilization for 35 minutes including:    LTRs  Seated Dick Stretch 3x30s both sides  Frog Bridge 2x10  Sidelying Plank Clam Hold 5x 5 sec both sides  Kneeling Hip Flexor Stretch 3x30s both sides  Standing marches with cable column 10lbs each arm 20lbs    NT:  - supine lower trunk rotation x 10 bilaterally  - Supine hamstring stretch 3 x 30 second hold with strap  - Bridges 2 x 10 with blue band  - Ab isometric 30x 5 second hold  - Supine bent knee fall out with blue band  - supine bent knee march with transverse abdominis  20x 2 rounds  - Supine Cross Body stretch 3 x 30 second   - Supine DARNELL stretch 3 x 30 second both sides  - Supine Hip Flexor Stretch 3 x 30 second both sides  - standing hip hinges 2 x 10 reps  - hamstring curl 45# 2 x 10 reps  - leg press 60# seat setting 7 2 x 10 reps  -NuStep x 5 min     Ronal received the following manual therapy techniques: Joint mobilizations were applied to both hips for 5 minutes, including:    Prone PA to hip grade 3-4     Patient Education and Home Exercises       Education provided:   - home exercise program     Written Home Exercises Provided: yes. Exercises were reviewed and Ronal was able to demonstrate them prior to the end of the session.  Ronal demonstrated good  understanding of the education provided. See Electronic Medical Record under Patient Instructions for exercises provided during therapy sessions     Assessment     Patient reassessed today and is demonstrating deficits in hip mobility of right with impaired deep external rotator strength. Patient deficits addressed today with updated exercise program and manual techniques. Pt would benefit from continued skilled physical therapy intervention. Plan of care extended today to address additional deficits    Ronal Is progressing well towards his goals.   Pt prognosis is Good.     Pt will continue to benefit from skilled outpatient physical therapy to address the deficits listed in the problem list box on initial evaluation, provide pt/family education and to maximize pt's level of independence in the home and community environment.     Pt's spiritual, cultural and educational needs considered and pt agreeable to plan of care and goals.     Anticipated barriers to physical therapy: none     Goals:   Short Term Goals: 4 weeks   1. Patient will demonstrate good transverse abdominal muscle contraction for improved deep abdominal strength and lumbar stability.  2. Increase lumbar ROM to 100% of WNL in order to improve  functional mobility.     3. Patient will demonstrate good sitting/standing posture and body mechanics for improved spine health and decreased risk of future injury.   4. Patient will improve bilateral lower extremity MMT grades by >/=1/2 grade in order to improve strength for standing ADLs.   5. Patient will score >/= TBD repetitions on 30-Second Sit to  order to improve bilateral lower extremity muscular power for transfers.   6. Patient will be compliant with home exercise program to supplement therapy in promoting functional mobility.     Long Term Goals: 8 weeks   1. Patient will improve FOTO score to </= 35% limited to decrease perceived limitation with maintaining/changing body position.   2. Patient will be 100% compliant with updated HEP in order to maximize PT benefits post-discharge.  3. Patient will improve bilateral lower extremity MMT grades by >/=1 grade in order to improve strength for standing activities of daily living.  4. Patient will score >/= 16 repetitions on 30-Second Sit to  order to improve bilateral lower extremity muscular power for transfers.    5. Patient will report pain at worst over 2-week period as </=2/10 in order to improve general activity tolerance.   6. Pt will begin some form of home/community fitness in order to sustain progress gained in PT  7. Pt to demonstrate negative Bridge Test in order to show improved core strength for lumbar stabilization.     Previous Short Term Goals Status:   ongoing  New Short Term Goals Status:   ongoing  Long Term Goal Status: continue per initial plan of care.  Reasons for Recertification of Therapy:   goals not met with deficits still present    Plan     Updated Certification Period: 10/23/2023 to 12/23/2023   Recommended Treatment Plan: 1 times per week for 8 weeks:  Gait Training, Manual Therapy, Moist Heat/ Ice, Neuromuscular Re-ed, Patient Education, Therapeutic Activities, and Therapeutic Exercise  Other Recommendations:       Connor Hayden, PT

## 2023-10-30 ENCOUNTER — CLINICAL SUPPORT (OUTPATIENT)
Dept: REHABILITATION | Facility: HOSPITAL | Age: 53
End: 2023-10-30
Payer: COMMERCIAL

## 2023-10-30 DIAGNOSIS — Z74.09 IMPAIRED MOBILITY AND ACTIVITIES OF DAILY LIVING: Primary | ICD-10-CM

## 2023-10-30 DIAGNOSIS — Z78.9 IMPAIRED MOBILITY AND ACTIVITIES OF DAILY LIVING: Primary | ICD-10-CM

## 2023-10-30 PROCEDURE — 97110 THERAPEUTIC EXERCISES: CPT | Mod: CQ

## 2023-10-30 NOTE — PROGRESS NOTES
[facility]   Physical Therapy Treatment Note      Name: Ronal Ham  Clinic Number: [id number]    Therapy Diagnosis:   Encounter Diagnosis   Name Primary?    Impaired mobility and activities of daily living Yes     Physician: Paulette Sewell NP    Visit Date: 10/30/2023    Physician Orders: PT Eval and Treat   Medical Diagnosis from Referral: M54.41 (ICD-10-CM) - Acute low back pain with right-sided sciatica, unspecified back pain laterality   Evaluation Date: 9/19/2023  Authorization Period Expiration: 07/30/2024   Plan of Care Expiration: 11/23/2023  Progress Note Due: 11/23/2023  Date of Surgery: n/a     Visit # / Visits authorized: 1/1; 6/20      FOTO: 1/ 3     Precautions: Standard       PTA Visit #: 1/5     Time In: 2:04 PM   Time Out: 2:45 PM   Total Billable Time: 41 minutes    Subjective     Pt reports some discomfort throughout lumbar spine upon entering Physical Therapy treatment, however, no more than normal.      He was compliant with home exercise program.  Response to previous treatment: initial eval   Functional change: none      Pain: 5/10  Location: back pain with right sciatic pain     Objective      Objective Measures updated at progress report unless specified.     Treatment     Ronal received the treatments listed below:      therapeutic exercises to develop strength, ROM, flexibility, posture, and core stabilization for 41 minutes including:     NuStep x 5 min   Lower trunk rotations 3 x 10  Frog Bridge 3 x 10  Sidelying Plank Clam Hold 5 x 5 second both sides  Kneeling Hip Flexor Stretch 3 x 30 second both sides  Seated Dick Stretch 3 x 30 second both sides  standing hip hinges 2 x 10 reps  Standing marches with cable column 10 lbs each arm 20 x  hamstring curl 45# 2 x 10 reps  leg press 60# seat setting 7 2 x 10 reps    NT:  - Supine hamstring stretch 3 x 30 second hold with strap  - Bridges 2 x 10 with blue band  - Ab isometric 30x 5 second hold  -  Supine bent knee fall out with blue band  - supine bent knee march with transverse abdominis 20x 2 rounds  - Supine Cross Body stretch 3 x 30 second   - Supine Hip Flexor Stretch 3 x 30 second both sides     Ronal received the following manual therapy techniques: Joint mobilizations were applied to both hips for 00 minutes, including:     Prone PA grade 3-4     Patient Education and Home Exercises       Education provided:   - home exercise program     Written Home Exercises Provided: yes. Exercises were reviewed and Ronal was able to demonstrate them prior to the end of the session.  Ronal demonstrated good  understanding of the education provided. See Electronic Medical Record under Patient Instructions for exercises provided during therapy sessions    Assessment     Patient continues to complete strengthening and stretching therapeutic exercise without reports of increased or reproduced pain throughout or after Physical Therapy treatment. Patient able to complete bilateral lower extremity strengthening therapeutic exercise and scifit cool down without increased or reproduced pain to back or bilateral knees. Patient did require verbal cues with leg press to keep knee slightly bend in extension.     Ronal Is progressing well towards his goals.   Pt prognosis is Good.     Pt will continue to benefit from skilled outpatient physical therapy to address the deficits listed in the problem list box on initial evaluation, provide pt/family education and to maximize pt's level of independence in the home and community environment.     Pt's spiritual, cultural and educational needs considered and pt agreeable to plan of care and goals.     Anticipated barriers to physical therapy: none      Goals:   Short Term Goals: 4 weeks   1. Patient will demonstrate good transverse abdominal muscle contraction for improved deep abdominal strength and lumbar stability.  2. Increase lumbar ROM to 100% of WNL in order to improve  functional mobility.     3. Patient will demonstrate good sitting/standing posture and body mechanics for improved spine health and decreased risk of future injury.   4. Patient will improve bilateral lower extremity MMT grades by >/=1/2 grade in order to improve strength for standing ADLs.   5. Patient will score >/= TBD repetitions on 30-Second Sit to  order to improve bilateral lower extremity muscular power for transfers.   6. Patient will be compliant with home exercise program to supplement therapy in promoting functional mobility.     Long Term Goals: 8 weeks   1. Patient will improve FOTO score to </= 35% limited to decrease perceived limitation with maintaining/changing body position.   2. Patient will be 100% compliant with updated HEP in order to maximize PT benefits post-discharge.  3. Patient will improve bilateral lower extremity MMT grades by >/=1 grade in order to improve strength for standing activities of daily living.  4. Patient will score >/= 16 repetitions on 30-Second Sit to  order to improve bilateral lower extremity muscular power for transfers.    5. Patient will report pain at worst over 2-week period as </=2/10 in order to improve general activity tolerance.   6. Pt will begin some form of home/community fitness in order to sustain progress gained in PT  7. Pt to demonstrate negative Bridge Test in order to show improved core strength for lumbar stabilization.     Plan     Continue with Physical Therapist Plan of Care.     Konstantin Cortez, LAURIE

## 2023-11-07 PROBLEM — J84.9 ILD (INTERSTITIAL LUNG DISEASE): Status: ACTIVE | Noted: 2023-11-07

## 2023-11-07 PROBLEM — J96.01 ACUTE RESPIRATORY FAILURE WITH HYPOXIA: Status: ACTIVE | Noted: 2023-11-07

## 2023-11-13 ENCOUNTER — PATIENT OUTREACH (OUTPATIENT)
Dept: ADMINISTRATIVE | Facility: CLINIC | Age: 53
End: 2023-11-13
Payer: COMMERCIAL

## 2023-11-13 NOTE — PROGRESS NOTES
C3 nurse spoke with Ronal Ham  for a TCC post hospital discharge follow up call. The patient does not have a scheduled HOSFU appointment with Roselyn Kim NP  within 5-7 days post hospital discharge date 11/11/2023. C3 nurse was unable to schedule HOSFU appointment.  Patient's PCP is a non-[facility] provider.   Encouraged patient to schedule a HOSPFU appointment within 5-7 days post discharge, he voiced understanding.

## 2023-11-28 ENCOUNTER — OFFICE VISIT (OUTPATIENT)
Dept: CARDIOLOGY | Facility: CLINIC | Age: 53
End: 2023-11-28
Payer: COMMERCIAL

## 2023-11-28 VITALS
DIASTOLIC BLOOD PRESSURE: 69 MMHG | HEIGHT: 64 IN | SYSTOLIC BLOOD PRESSURE: 135 MMHG | OXYGEN SATURATION: 90 % | WEIGHT: 193.81 LBS | HEART RATE: 98 BPM | BODY MASS INDEX: 33.09 KG/M2

## 2023-11-28 DIAGNOSIS — R00.2 HEART PALPITATIONS: ICD-10-CM

## 2023-11-28 DIAGNOSIS — R94.31 ABNORMAL ELECTROCARDIOGRAM (ECG) (EKG): ICD-10-CM

## 2023-11-28 DIAGNOSIS — R07.2 PRECORDIAL PAIN: ICD-10-CM

## 2023-11-28 DIAGNOSIS — R00.2 PALPITATIONS: ICD-10-CM

## 2023-11-28 DIAGNOSIS — J84.9 INTERSTITIAL LUNG DISEASE: Primary | ICD-10-CM

## 2023-11-28 DIAGNOSIS — J96.01 ACUTE RESPIRATORY FAILURE WITH HYPOXIA: ICD-10-CM

## 2023-11-28 PROCEDURE — 3078F DIAST BP <80 MM HG: CPT | Mod: CPTII,S$GLB,, | Performed by: NURSE PRACTITIONER

## 2023-11-28 PROCEDURE — 1160F RVW MEDS BY RX/DR IN RCRD: CPT | Mod: CPTII,S$GLB,, | Performed by: NURSE PRACTITIONER

## 2023-11-28 PROCEDURE — 1111F PR DISCHARGE MEDS RECONCILED W/ CURRENT OUTPATIENT MED LIST: ICD-10-PCS | Mod: CPTII,S$GLB,, | Performed by: NURSE PRACTITIONER

## 2023-11-28 PROCEDURE — 99999 PR PBB SHADOW E&M-EST. PATIENT-LVL V: ICD-10-PCS | Mod: PBBFAC,,, | Performed by: NURSE PRACTITIONER

## 2023-11-28 PROCEDURE — 4010F PR ACE/ARB THEARPY RXD/TAKEN: ICD-10-PCS | Mod: CPTII,S$GLB,, | Performed by: NURSE PRACTITIONER

## 2023-11-28 PROCEDURE — 1111F DSCHRG MED/CURRENT MED MERGE: CPT | Mod: CPTII,S$GLB,, | Performed by: NURSE PRACTITIONER

## 2023-11-28 PROCEDURE — 1160F PR REVIEW ALL MEDS BY PRESCRIBER/CLIN PHARMACIST DOCUMENTED: ICD-10-PCS | Mod: CPTII,S$GLB,, | Performed by: NURSE PRACTITIONER

## 2023-11-28 PROCEDURE — 3008F BODY MASS INDEX DOCD: CPT | Mod: CPTII,S$GLB,, | Performed by: NURSE PRACTITIONER

## 2023-11-28 PROCEDURE — 99999 PR PBB SHADOW E&M-EST. PATIENT-LVL V: CPT | Mod: PBBFAC,,, | Performed by: NURSE PRACTITIONER

## 2023-11-28 PROCEDURE — 1159F PR MEDICATION LIST DOCUMENTED IN MEDICAL RECORD: ICD-10-PCS | Mod: CPTII,S$GLB,, | Performed by: NURSE PRACTITIONER

## 2023-11-28 PROCEDURE — 3078F PR MOST RECENT DIASTOLIC BLOOD PRESSURE < 80 MM HG: ICD-10-PCS | Mod: CPTII,S$GLB,, | Performed by: NURSE PRACTITIONER

## 2023-11-28 PROCEDURE — 3044F PR MOST RECENT HEMOGLOBIN A1C LEVEL <7.0%: ICD-10-PCS | Mod: CPTII,S$GLB,, | Performed by: NURSE PRACTITIONER

## 2023-11-28 PROCEDURE — 99203 PR OFFICE/OUTPT VISIT, NEW, LEVL III, 30-44 MIN: ICD-10-PCS | Mod: 25,S$GLB,, | Performed by: NURSE PRACTITIONER

## 2023-11-28 PROCEDURE — 3075F PR MOST RECENT SYSTOLIC BLOOD PRESS GE 130-139MM HG: ICD-10-PCS | Mod: CPTII,S$GLB,, | Performed by: NURSE PRACTITIONER

## 2023-11-28 PROCEDURE — 4010F ACE/ARB THERAPY RXD/TAKEN: CPT | Mod: CPTII,S$GLB,, | Performed by: NURSE PRACTITIONER

## 2023-11-28 PROCEDURE — 93000 ELECTROCARDIOGRAM COMPLETE: CPT | Mod: S$GLB,,, | Performed by: INTERNAL MEDICINE

## 2023-11-28 PROCEDURE — 3075F SYST BP GE 130 - 139MM HG: CPT | Mod: CPTII,S$GLB,, | Performed by: NURSE PRACTITIONER

## 2023-11-28 PROCEDURE — 99203 OFFICE O/P NEW LOW 30 MIN: CPT | Mod: 25,S$GLB,, | Performed by: NURSE PRACTITIONER

## 2023-11-28 PROCEDURE — 1159F MED LIST DOCD IN RCRD: CPT | Mod: CPTII,S$GLB,, | Performed by: NURSE PRACTITIONER

## 2023-11-28 PROCEDURE — 3044F HG A1C LEVEL LT 7.0%: CPT | Mod: CPTII,S$GLB,, | Performed by: NURSE PRACTITIONER

## 2023-11-28 PROCEDURE — 93000 EKG 12-LEAD: ICD-10-PCS | Mod: S$GLB,,, | Performed by: INTERNAL MEDICINE

## 2023-11-28 PROCEDURE — 3008F PR BODY MASS INDEX (BMI) DOCUMENTED: ICD-10-PCS | Mod: CPTII,S$GLB,, | Performed by: NURSE PRACTITIONER
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
## 2023-11-30 ENCOUNTER — HOSPITAL ENCOUNTER (OUTPATIENT)
Dept: RADIOLOGY | Facility: HOSPITAL | Age: 53
Discharge: HOME | End: 2023-11-30
Attending: INTERNAL MEDICINE
Payer: COMMERCIAL

## 2023-11-30 ENCOUNTER — OFFICE VISIT (OUTPATIENT)
Dept: PULMONOLOGY | Facility: CLINIC | Age: 53
End: 2023-11-30
Payer: COMMERCIAL

## 2023-11-30 ENCOUNTER — TELEPHONE (OUTPATIENT)
Dept: PULMONOLOGY | Facility: CLINIC | Age: 53
End: 2023-11-30
Payer: COMMERCIAL

## 2023-11-30 VITALS
SYSTOLIC BLOOD PRESSURE: 136 MMHG | HEIGHT: 64 IN | WEIGHT: 193.31 LBS | DIASTOLIC BLOOD PRESSURE: 75 MMHG | BODY MASS INDEX: 33 KG/M2

## 2023-11-30 DIAGNOSIS — J84.9 INTERSTITIAL LUNG DISEASE: Primary | ICD-10-CM

## 2023-11-30 DIAGNOSIS — R05.3 CHRONIC COUGH: ICD-10-CM

## 2023-11-30 DIAGNOSIS — M32.13 SYSTEMIC LUPUS ERYTHEMATOSUS WITH LUNG INVOLVEMENT, UNSPECIFIED SLE TYPE: ICD-10-CM

## 2023-11-30 DIAGNOSIS — J18.9 COMMUNITY ACQUIRED PNEUMONIA, UNSPECIFIED LATERALITY: ICD-10-CM

## 2023-11-30 DIAGNOSIS — J84.9 INTERSTITIAL LUNG DISEASE: ICD-10-CM

## 2023-11-30 PROCEDURE — 4010F PR ACE/ARB THEARPY RXD/TAKEN: ICD-10-PCS | Mod: CPTII,S$GLB,, | Performed by: INTERNAL MEDICINE

## 2023-11-30 PROCEDURE — 4010F ACE/ARB THERAPY RXD/TAKEN: CPT | Mod: CPTII,S$GLB,, | Performed by: INTERNAL MEDICINE

## 2023-11-30 PROCEDURE — 1111F PR DISCHARGE MEDS RECONCILED W/ CURRENT OUTPATIENT MED LIST: ICD-10-PCS | Mod: CPTII,S$GLB,, | Performed by: INTERNAL MEDICINE

## 2023-11-30 PROCEDURE — 1159F PR MEDICATION LIST DOCUMENTED IN MEDICAL RECORD: ICD-10-PCS | Mod: CPTII,S$GLB,, | Performed by: INTERNAL MEDICINE

## 2023-11-30 PROCEDURE — 99999 PR PBB SHADOW E&M-EST. PATIENT-LVL V: ICD-10-PCS | Mod: PBBFAC,,, | Performed by: INTERNAL MEDICINE

## 2023-11-30 PROCEDURE — 99999 PR PBB SHADOW E&M-EST. PATIENT-LVL V: CPT | Mod: PBBFAC,,, | Performed by: INTERNAL MEDICINE

## 2023-11-30 PROCEDURE — 71046 X-RAY EXAM CHEST 2 VIEWS: CPT | Mod: TC

## 2023-11-30 PROCEDURE — 3008F PR BODY MASS INDEX (BMI) DOCUMENTED: ICD-10-PCS | Mod: CPTII,S$GLB,, | Performed by: INTERNAL MEDICINE

## 2023-11-30 PROCEDURE — 99215 OFFICE O/P EST HI 40 MIN: CPT | Mod: S$GLB,,, | Performed by: INTERNAL MEDICINE

## 2023-11-30 PROCEDURE — 3008F BODY MASS INDEX DOCD: CPT | Mod: CPTII,S$GLB,, | Performed by: INTERNAL MEDICINE

## 2023-11-30 PROCEDURE — 1111F DSCHRG MED/CURRENT MED MERGE: CPT | Mod: CPTII,S$GLB,, | Performed by: INTERNAL MEDICINE

## 2023-11-30 PROCEDURE — 3075F PR MOST RECENT SYSTOLIC BLOOD PRESS GE 130-139MM HG: ICD-10-PCS | Mod: CPTII,S$GLB,, | Performed by: INTERNAL MEDICINE

## 2023-11-30 PROCEDURE — 3075F SYST BP GE 130 - 139MM HG: CPT | Mod: CPTII,S$GLB,, | Performed by: INTERNAL MEDICINE

## 2023-11-30 PROCEDURE — 3078F DIAST BP <80 MM HG: CPT | Mod: CPTII,S$GLB,, | Performed by: INTERNAL MEDICINE

## 2023-11-30 PROCEDURE — 3044F HG A1C LEVEL LT 7.0%: CPT | Mod: CPTII,S$GLB,, | Performed by: INTERNAL MEDICINE

## 2023-11-30 PROCEDURE — 99215 PR OFFICE/OUTPT VISIT, EST, LEVL V, 40-54 MIN: ICD-10-PCS | Mod: S$GLB,,, | Performed by: INTERNAL MEDICINE

## 2023-11-30 PROCEDURE — 1159F MED LIST DOCD IN RCRD: CPT | Mod: CPTII,S$GLB,, | Performed by: INTERNAL MEDICINE

## 2023-11-30 PROCEDURE — 3078F PR MOST RECENT DIASTOLIC BLOOD PRESSURE < 80 MM HG: ICD-10-PCS | Mod: CPTII,S$GLB,, | Performed by: INTERNAL MEDICINE

## 2023-11-30 PROCEDURE — 71046 XR CHEST PA AND LATERAL: ICD-10-PCS | Mod: 26,,, | Performed by: RADIOLOGY

## 2023-11-30 PROCEDURE — 3044F PR MOST RECENT HEMOGLOBIN A1C LEVEL <7.0%: ICD-10-PCS | Mod: CPTII,S$GLB,, | Performed by: INTERNAL MEDICINE

## 2023-11-30 PROCEDURE — 71046 X-RAY EXAM CHEST 2 VIEWS: CPT | Mod: 26,,, | Performed by: RADIOLOGY

## 2023-11-30 RX ORDER — PREDNISONE 20 MG/1
TABLET ORAL
Qty: 46 TABLET | Refills: 0 | Status: SHIPPED | OUTPATIENT
Start: 2023-11-30 | End: 2023-12-28

## 2023-11-30 RX ORDER — LEVOFLOXACIN 750 MG/1
750 TABLET ORAL DAILY
Qty: 7 TABLET | Refills: 0 | Status: SHIPPED | OUTPATIENT
Start: 2023-11-30 | End: 2023-12-07

## 2023-11-30 RX ORDER — CODEINE PHOSPHATE AND GUAIFENESIN 10; 100 MG/5ML; MG/5ML
10 SOLUTION ORAL EVERY 6 HOURS PRN
Qty: 237 ML | Refills: 0 | Status: SHIPPED | OUTPATIENT
Start: 2023-11-30 | End: 2024-03-06 | Stop reason: SDUPTHER

## 2023-11-30 RX ORDER — IPRATROPIUM BROMIDE AND ALBUTEROL SULFATE 2.5; .5 MG/3ML; MG/3ML
3 SOLUTION RESPIRATORY (INHALATION) EVERY 4 HOURS PRN
Qty: 75 ML | Refills: 2 | Status: SHIPPED | OUTPATIENT
Start: 2023-11-30 | End: 2024-11-29

## 2023-11-30 NOTE — PROGRESS NOTES
"Subjective:     Reason for visit:  systemic lupus erythematosus with lung involvement)    Patient ID:  Ronal Ham is a 53 y.o. male with SLE, Raynaud's phenomenon, GERD, HTN    Interval History:  Patient is present with his wife that provides history. Patient today is very short of breath today. He was recently admitted to the hospital on 11/07/2023 for shortness of breath. He was treated with oxygen therapy and breathing treatments. He had a total stay of 4 days in the hospital. He had a walk test in the hospital that qualified him for home oxygen. He has required supplemental oxygen day and night. He wears the nasal cannula between 3-4L. His functional mobility and ADL's are very affected. He has a productive cough with yellow sputum. Prior to this visit, patient was only using his oxygen as needed and was able to move around more.     He saw cardiology. He has an appointment for an echo the following week.     Additional Pulmonary History:  Childhood Illnesses:  None   Occupational/Environmental: Unknown  Tobacco/Smoking:  Never    Objective:     Vitals:    11/30/23 0923   BP: 136/75   BP Location: Right arm   Patient Position: Sitting   SpO2: Comment: 3.0   Weight: 87.7 kg (193 lb 5.5 oz)   Height: 5' 4" (1.626 m)         Physical Exam  Vitals and nursing note reviewed.   Constitutional:       General: He is not in acute distress.     Appearance: He is not ill-appearing, toxic-appearing or diaphoretic.   HENT:      Head: Normocephalic and atraumatic.      Nose: No rhinorrhea.      Mouth/Throat:      Mouth: Mucous membranes are moist.   Eyes:      General: No scleral icterus.     Extraocular Movements: Extraocular movements intact.   Cardiovascular:      Rate and Rhythm: Normal rate and regular rhythm.   Pulmonary:      Effort: No tachypnea, accessory muscle usage, respiratory distress or retractions.      Breath sounds: Rales (bases) present. No rhonchi.   Abdominal:      General: There is no " distension.   Musculoskeletal:      Right lower leg: Edema present.      Left lower leg: Edema present.   Skin:     General: Skin is warm and dry.      Coloration: Skin is not jaundiced.      Findings: No rash.   Neurological:      General: No focal deficit present.      Mental Status: Mental status is at baseline.          Personal Diagnostic Review and Interpretation    02/14/2023 CXR:  Relatively similar prominent interstitium but possible LLL opacity.  Aortic atherosclerosis.    07/11/2023 CT Chest without contrast: CT with peripheral and basilar GGOs with traction bronchiectasis, reticulations and honeycombing    04/20/2021 CT chest without contrast:  Basilar and peripheral GGOs with reticulations and varicoid bronchiectasis    11/05/2014 CT chest with contrast:  Bibasilar GGOs with upper lung zone bullous disease      Pertinent Studies Reviewed & Interpreted:     Pulmonary Function Tests:     08/02/2023: FEV1 64.1, FVC 65.1, FEV1/FVC 99.2, no bronchodilator response, TLC 64.7, DLCO 40  04/06/2021:  FEV1 88, FVC 92, FEV1/FVC 80; no bronchodilator response.  TLC 74, FRC 56, RV 54.  DLCO 74  05/13/2019:  FEV1 68, FVC 73, FEV1/FVC 93, FEF 25-75 51; no bronchodilator response.  TLC 56, FRC 54, RV 30.  DLCO 46    6 Minute Walk Tests:     08/02/2023: 365.76 SpO2 90% on room air peak exercise. Appropriate tachycardiac and hypertensive response  04/20/2021: 406 m (451 m).  SpO2 91% on room air peak exercise.  Appropriate tachycardic and hypertensive response    Echocardiograms:   05/10/2017 stress echo:  EF 55%; 11 Mets; concentric remodeling with LAE (VILMA 33)      Assessment & Plan:   1. Interstitial lung disease  Overview:  53 year-old diagnosed ILD secondary to SLE. Recent hospitalization for exacerbation. His oxygen demands have increased since last visit. He is short of breath at complete rest. He needs to follow up with rheumatology for immunosuppressives. Discussed with patient and his wife about possibly  starting cellcept.   -Start prednisone taper in setting of exacerbation  -Obtain Chest X-Ray to rule out infectious process  -Referral to lung transplant   -Referral to rheumatology   -Referral to pulmonary rehab   -Next month complete 6MWT and PFTs    Orders:  -     Ambulatory referral/consult to Transplant, Lung; Future; Expected date: 12/07/2023  -     Stress test, pulmonary; Future  -     Spirometry with/without bronchodilator; Future  -     Lung Volumes; Future  -     DLCO-Carbon Monoxide Diffusing Capacity; Future  -     albuterol-ipratropium (DUO-NEB) 2.5 mg-0.5 mg/3 mL nebulizer solution; Take 3 mLs by nebulization every 4 (four) hours as needed for Wheezing. Rescue  Dispense: 75 mL; Refill: 2  -     Ambulatory referral/consult to Rheumatology; Future; Expected date: 12/07/2023  -     Ambulatory referral/consult to Pulmonary Rehab; Future; Expected date: 12/07/2023  -     X-Ray Chest PA And Lateral; Future; Expected date: 11/30/2023  -     predniSONE (DELTASONE) 20 MG tablet; Take 3 tablets (60 mg total) by mouth once daily for 7 days, THEN 2 tablets (40 mg total) once daily for 7 days, THEN 1 tablet (20 mg total) once daily for 7 days, THEN 0.5 tablets (10 mg total) once daily for 7 days.  Dispense: 46 tablet; Refill: 0  -     guaiFENesin-codeine 100-10 mg/5 ml (TUSSI-ORGANIDIN NR)  mg/5 mL syrup; Take 10 mLs by mouth every 6 (six) hours as needed for Cough.  Dispense: 237 mL; Refill: 0    2. Systemic lupus erythematosus with lung involvement, unspecified SLE type  Overview:  Treatment of the lupus will effectively treat underlying lung disease.  See the section on interstitial lung disease and lupus    Orders:  -     Ambulatory referral/consult to Transplant, Lung; Future; Expected date: 12/07/2023  -     Stress test, pulmonary; Future  -     Spirometry with/without bronchodilator; Future  -     Lung Volumes; Future  -     DLCO-Carbon Monoxide Diffusing Capacity; Future  -     albuterol-ipratropium  (DUO-NEB) 2.5 mg-0.5 mg/3 mL nebulizer solution; Take 3 mLs by nebulization every 4 (four) hours as needed for Wheezing. Rescue  Dispense: 75 mL; Refill: 2  -     Ambulatory referral/consult to Rheumatology; Future; Expected date: 12/07/2023  -     Ambulatory referral/consult to Pulmonary Rehab; Future; Expected date: 12/07/2023  -     X-Ray Chest PA And Lateral; Future; Expected date: 11/30/2023  -     predniSONE (DELTASONE) 20 MG tablet; Take 3 tablets (60 mg total) by mouth once daily for 7 days, THEN 2 tablets (40 mg total) once daily for 7 days, THEN 1 tablet (20 mg total) once daily for 7 days, THEN 0.5 tablets (10 mg total) once daily for 7 days.  Dispense: 46 tablet; Refill: 0    3. Chronic cough  Overview:  Productive yellow cough. Desaturation with each coughing spell.  -Continue duoneb treatments  -Refilled guaiFENesin-codeine   -Get chest x-ray today            RETURN TO CLINIC IN 1 MONTHS    Portions of the record may have been created with voice-recognition software. Occasional wrong-word or sound-a-like substitutions may have occurred due to the inherent limitations of voice-recognition software. Read the chart carefully and recognize, using context, where substitutions have occurred.

## 2023-12-06 ENCOUNTER — TELEPHONE (OUTPATIENT)
Dept: TRANSPLANT | Facility: CLINIC | Age: 53
End: 2023-12-06
Payer: COMMERCIAL

## 2023-12-06 NOTE — TELEPHONE ENCOUNTER
Received referral for lung transplantation. Message sent to Dr. Estrada. Awaiting recommendations.

## 2023-12-07 ENCOUNTER — TELEPHONE (OUTPATIENT)
Dept: TRANSPLANT | Facility: CLINIC | Age: 53
End: 2023-12-07
Payer: COMMERCIAL

## 2023-12-07 NOTE — TELEPHONE ENCOUNTER
"12/14/23 - Received financial clearance for consult with PFTs and 6 minute walk test. Attempted to contact patient regarding the referral to our department. No answer. Left a message regarding the referral and requested a return call.    12/7/23 - Message and clinicals sent to [facility] for financial clearance for consult with PFTs and a 6 minute walk test.    ----- Message from Ada Estrada DO sent at 12/7/2023  8:53 AM CST -----  Regarding: RE: Lung transplant referral  Appropriate for LUT.  Will need PFTs and 6MWT.  Thanks    ----- Message -----  From: Laya Alfaro RN  Sent: 12/6/2023   5:59 PM CST  To: Ada Estrada DO  Subject: Lung transplant referral                         Lung Transplant Referral Note    Referral from: Farzaneh Schumacher MD    Lung diagnosis: ILD - SLE;     Age: 53 y.o.    Height/Weight/BMI:  HT: 5'4" / WT: 87.5 kg / Body mass index is 33.13 kg/m².    Smoking history: Social History    Tobacco Use      Smoking status: Never      Smokeless tobacco: Never    PFT date: 08/02/2023  FVC         2.06 / 65.1  FEV1       1.64 / 64.4  FEV1/FVC     80  TLC         3.69 / 64.7  DLCO      9.92 / 40.6    Six minute walk test date: 08/02/2023  Distance: 365.76 meters (1200 feet); desaturation to 90% with exertion  Recently qualified for oxygen in the hospital    CXR date: 11/30/2023  Impression:  Progression of bilateral interstitial opacities.       Chest CT date: 07/11/2023  Impression:  Stable chronic interstitial lung disease with chronic interstitial fibrosis as noted 04/20/2021.  This is been present on examinations dating to 11/05/2014 and while the distribution is stable, the appearance suggest progression.     Round hyperdense lesion RIGHT kidney, nonspecific.  Triple phase renal mass protocol CT recommended for further evaluation to exclude possibility of neoplasm.      Echo date: 12/05/2023    Impression:  · Left Ventricle: The left ventricle is normal in size.  Mildly to " moderately increased wall thickness. Possible regional wall motion abnormalities present.  Unable to exclude basal inferior hypokinesis. There is low normal systolic function with a visually estimated ejection fraction of 50 - 55%. There is diastolic dysfunction but grade cannot be determined.  ·  Left Atrium: PFO or small ASD.  ·  Mitral Valve: There is no stenosis. There is trace to mild regurgitation.  ·  Right Ventricle: Normal right ventricular cavity size. Systolic function is normal.  ·  Extra cardiac shunt.  Bubble study does not clearly demonstrate right-to-left intracardiac shunt.      Other pertinent medical history:  Raynaud's phenomenon, GERD, HTN    Dr. Schumacher's treatment plan on 11/30/23:  Recent hospitalization for exacerbation. His oxygen demands have increased since last visit. He is short of breath at complete rest. He needs to follow up with rheumatology for immunosuppressives. Discussed with patient and his wife about possibly starting cellcept.   -Start prednisone taper in setting of exacerbation  -Obtain Chest X-Ray to rule out infectious process  -Referral to lung transplant   -Referral to rheumatology   -Referral to pulmonary rehab   -Next month complete 6MWT and PFTs      Recommendations?

## 2024-01-03 ENCOUNTER — OFFICE VISIT (OUTPATIENT)
Dept: CARDIOLOGY | Facility: CLINIC | Age: 54
End: 2024-01-03
Payer: COMMERCIAL

## 2024-01-03 VITALS
SYSTOLIC BLOOD PRESSURE: 146 MMHG | WEIGHT: 193 LBS | HEIGHT: 64 IN | HEART RATE: 88 BPM | BODY MASS INDEX: 32.95 KG/M2 | DIASTOLIC BLOOD PRESSURE: 80 MMHG | OXYGEN SATURATION: 90 %

## 2024-01-03 DIAGNOSIS — J96.01 ACUTE RESPIRATORY FAILURE WITH HYPOXIA: Primary | ICD-10-CM

## 2024-01-03 DIAGNOSIS — R07.2 PRECORDIAL PAIN: ICD-10-CM

## 2024-01-03 PROCEDURE — 1159F MED LIST DOCD IN RCRD: CPT | Mod: CPTII,S$GLB,TXP, | Performed by: NURSE PRACTITIONER

## 2024-01-03 PROCEDURE — 99213 OFFICE O/P EST LOW 20 MIN: CPT | Mod: S$GLB,TXP,, | Performed by: NURSE PRACTITIONER

## 2024-01-03 PROCEDURE — 3077F SYST BP >= 140 MM HG: CPT | Mod: CPTII,S$GLB,TXP, | Performed by: NURSE PRACTITIONER

## 2024-01-03 PROCEDURE — 1160F RVW MEDS BY RX/DR IN RCRD: CPT | Mod: CPTII,S$GLB,TXP, | Performed by: NURSE PRACTITIONER

## 2024-01-03 PROCEDURE — 3008F BODY MASS INDEX DOCD: CPT | Mod: CPTII,S$GLB,TXP, | Performed by: NURSE PRACTITIONER

## 2024-01-03 PROCEDURE — 3079F DIAST BP 80-89 MM HG: CPT | Mod: CPTII,S$GLB,TXP, | Performed by: NURSE PRACTITIONER

## 2024-01-03 PROCEDURE — 99999 PR PBB SHADOW E&M-EST. PATIENT-LVL IV: CPT | Mod: PBBFAC,TXP,, | Performed by: NURSE PRACTITIONER

## 2024-01-03 NOTE — PROGRESS NOTES
"        Cardiology    1/3/2024  1:26 PM    Problem list  Patient Active Problem List   Diagnosis    SLE (systemic lupus erythematosus)    Benign hypertension    Raynaud's syndrome    Interstitial lung disease    Chronic rhinosinusitis    GERD (gastroesophageal reflux disease)    Pain in limb    Tinea pedis    Male erectile disorder    Chronic cough    Prediabetes    Effusion of left knee joint    Chondromalacia of left patellofemoral joint    Systemic lupus erythematosus with lung involvement    Low testosterone in male    Chronic pain    Chronic midline low back pain with bilateral sciatica    Drug-induced immunodeficiency    Primary osteoarthritis of right knee    Pain and swelling of right knee    Decreased range of motion with decreased strength    Impaired functional mobility, balance, gait, and endurance    Right renal mass    Impaired mobility and activities of daily living    ILD (interstitial lung disease) Exacerbation    Acute respiratory failure with hypoxia    Precordial pain    Palpitations       CC:  Results review    HPI:  Off of oxygen and comfortable at rest.  No chest pain, feeling well overall.  Will be seeing rheum in coming weeks.  Having some BRBPR on occasion- does have constipation at times but upredictable. No swelling in legs since tapering off of prednisone.  Right foot feels "funny"- for a couple of years. "Can feel everything" but just feels different. Stress test results reviewed.  Patient will be seeing lung transplant.    Medications  Current Outpatient Medications   Medication Sig Dispense Refill    albuterol (PROAIR HFA) 90 mcg/actuation inhaler Inhale 2 puffs into the lungs every 6 (six) hours as needed for Wheezing. Rescue 18 g 5    albuterol-ipratropium (DUO-NEB) 2.5 mg-0.5 mg/3 mL nebulizer solution Take 3 mLs by nebulization every 4 (four) hours as needed for Wheezing. Rescue 75 mL 2    amLODIPine (NORVASC) 10 MG tablet TAKE 1 TABLET(10 MG) BY MOUTH EVERY DAY 90 tablet 3    " azelastine (ASTELIN) 137 mcg (0.1 %) nasal spray 2 sprays (274 mcg total) by Nasal route 2 (two) times daily. 60 mL 3    budesonide-glycopyr-formoterol (BREZTRI AEROSPHERE) 160-9-4.8 mcg/actuation HFAA Inhale 1 Inhaler into the lungs 2 (two) times daily. 10.7 g 3    fluticasone propionate (FLONASE) 50 mcg/actuation nasal spray 1 spray (50 mcg total) by Each Nostril route 2 (two) times a day. 16 g 5    guaiFENesin-codeine 100-10 mg/5 ml (TUSSI-ORGANIDIN NR)  mg/5 mL syrup Take 10 mLs by mouth every 6 (six) hours as needed for Cough. 237 mL 0    HYDROcodone-acetaminophen (NORCO) 5-325 mg per tablet Take 1 tablet by mouth every 4 (four) hours as needed for Pain. 42 tablet 0    hydrOXYchloroQUINE (PLAQUENIL) 200 mg tablet TAKE 2 TABLETS(400 MG) BY MOUTH EVERY  tablet 3    ibuprofen (ADVIL,MOTRIN) 800 MG tablet Take 1 tablet (800 mg total) by mouth every 8 (eight) hours as needed (TAKE WITH MEALS). 60 tablet 2    levocetirizine (XYZAL) 5 MG tablet Take 1 tablet (5 mg total) by mouth every evening. 90 tablet 3    losartan (COZAAR) 100 MG tablet Take 1 tablet (100 mg total) by mouth once daily. 90 tablet 3    meloxicam (MOBIC) 15 MG tablet Take 1 tablet (15 mg total) by mouth once daily. 30 tablet 0    montelukast (SINGULAIR) 10 mg tablet Take 1 tablet (10 mg total) by mouth every evening. 90 tablet 3    pantoprazole (PROTONIX) 40 MG tablet TAKE 1 TABLET BY MOUTH EVERY DAY 90 tablet 1    sildenafiL (VIAGRA) 100 MG tablet Take 1 tablet (100 mg total) by mouth daily as needed for Erectile Dysfunction. 10 tablet 5    testosterone cypionate (DEPOTESTOTERONE CYPIONATE) 100 mg/mL injection Inject 2 mLs (200 mg total) into the muscle every 14 (fourteen) days. 10 mL 3    zolpidem (AMBIEN) 5 MG Tab Take 1 tablet (5 mg total) by mouth every evening. 30 tablet 3     No current facility-administered medications for this visit.      Prior to Admission medications    Medication Sig Start Date End Date Taking? Authorizing  Provider   albuterol (PROAIR HFA) 90 mcg/actuation inhaler Inhale 2 puffs into the lungs every 6 (six) hours as needed for Wheezing. Rescue 10/6/23 10/5/24 Yes Roselyn Kim NP   albuterol-ipratropium (DUO-NEB) 2.5 mg-0.5 mg/3 mL nebulizer solution Take 3 mLs by nebulization every 4 (four) hours as needed for Wheezing. Rescue 11/30/23 11/29/24 Yes Farzaneh Schumacher MD   amLODIPine (NORVASC) 10 MG tablet TAKE 1 TABLET(10 MG) BY MOUTH EVERY DAY 4/26/23  Yes Bruce Terrell MD   azelastine (ASTELIN) 137 mcg (0.1 %) nasal spray 2 sprays (274 mcg total) by Nasal route 2 (two) times daily. 6/12/23 6/11/24 Yes Tristan Lamas MD   budesonide-glycopyr-formoterol (BREZTRI AEROSPHERE) 160-9-4.8 mcg/actuation HFAA Inhale 1 Inhaler into the lungs 2 (two) times daily. 12/5/23  Yes Nathan Arreola MD   fluticasone propionate (FLONASE) 50 mcg/actuation nasal spray 1 spray (50 mcg total) by Each Nostril route 2 (two) times a day. 6/12/23  Yes Tristan Lamas MD   guaiFENesin-codeine 100-10 mg/5 ml (TUSSI-ORGANIDIN NR)  mg/5 mL syrup Take 10 mLs by mouth every 6 (six) hours as needed for Cough. 11/30/23  Yes Farzaneh Schumacher MD   HYDROcodone-acetaminophen (NORCO) 5-325 mg per tablet Take 1 tablet by mouth every 4 (four) hours as needed for Pain. 10/6/23  Yes Roselyn Kim NP   hydrOXYchloroQUINE (PLAQUENIL) 200 mg tablet TAKE 2 TABLETS(400 MG) BY MOUTH EVERY DAY 1/20/23  Yes Bruce Terrell MD   ibuprofen (ADVIL,MOTRIN) 800 MG tablet Take 1 tablet (800 mg total) by mouth every 8 (eight) hours as needed (TAKE WITH MEALS). 6/5/23  Yes Bruce Terrell MD   levocetirizine (XYZAL) 5 MG tablet Take 1 tablet (5 mg total) by mouth every evening. 8/31/23 8/30/24 Yes Roselyn Kim, NP   losartan (COZAAR) 100 MG tablet Take 1 tablet (100 mg total) by mouth once daily. 8/31/23 8/30/24 Yes Roselyn Kim, QI   meloxicam (MOBIC) 15 MG tablet Take 1 tablet (15 mg total) by mouth once daily. 5/5/23  Yes Holly Ball, DNP    montelukast (SINGULAIR) 10 mg tablet Take 1 tablet (10 mg total) by mouth every evening. 8/31/23 8/25/24 Yes Roselyn Kim NP   pantoprazole (PROTONIX) 40 MG tablet TAKE 1 TABLET BY MOUTH EVERY DAY 10/9/23  Yes Randa Jackson NP   sildenafiL (VIAGRA) 100 MG tablet Take 1 tablet (100 mg total) by mouth daily as needed for Erectile Dysfunction. 1/20/23 1/20/24 Yes Bruce Terrell MD   testosterone cypionate (DEPOTESTOTERONE CYPIONATE) 100 mg/mL injection Inject 2 mLs (200 mg total) into the muscle every 14 (fourteen) days. 10/6/23 4/5/24 Yes Roselyn Kim NP   zolpidem (AMBIEN) 5 MG Tab Take 1 tablet (5 mg total) by mouth every evening. 11/15/23 5/15/24 Yes Roselyn Kim NP         History  Past Medical History:   Diagnosis Date    Arthritis     Eye injury     od hit above od    GERD (gastroesophageal reflux disease)     Hypertension     Lupus (systemic lupus erythematosus)     Pulmonary fibrosis     Raynaud phenomenon      Past Surgical History:   Procedure Laterality Date    COLONOSCOPY N/A 7/14/2022    Procedure: COLONOSCOPY;  Surgeon: Raman Brenner MD;  Location: [address]);  Service: Endoscopy;  Laterality: N/A;  fully vaccinated  instructions emailed    COLONOSCOPY N/A 9/19/2022    Procedure: COLONOSCOPY;  Surgeon: Guicho Chavarria MD;  Location: [address]);  Service: Endoscopy;  Laterality: N/A;  previous order placed by Dr. RANDA Hurt on 4/26/22 unusable  fully vaccinated, prep instr emailed -ml    HERNIA REPAIR      TRANSFORAMINAL EPIDURAL INJECTION OF STEROID Bilateral 6/2/2021    Procedure: LUMBAR TRANSFORAMINAL BILATERAL L4/5 DIRECT REFERRAL;  Surgeon: Blayne Garcia MD;  Location: [facility];  Service: Pain Management;  Laterality: Bilateral;  NEEDS CONSENT     Social History     Socioeconomic History    Marital status:     Number of children: 5    Years of education: some colle   Occupational History    Occupation: [employer] [place]      Employer:  [employer]   Tobacco Use    Smoking status: Never     Passive exposure: Never    Smokeless tobacco: Never   Substance and Sexual Activity    Alcohol use: No    Drug use: No    Sexual activity: Yes     Partners: Female   Social History Narrative    Adult Screenings updated and reviewed    Mammogram( for females)    Pap ( for females)    PSA( for males )    Colonoscopy age  48    Flu shot yearly    Td     Pneumovax recommended one time  at age  65    Zostavax recommended one time at  age  58    Eye exam recommended yearly    Bone density      Social Determinants of Health     Financial Resource Strain: Low Risk  (11/9/2023)    Overall Financial Resource Strain (CARDIA)     Difficulty of Paying Living Expenses: Not hard at all   Food Insecurity: No Food Insecurity (11/9/2023)    Hunger Vital Sign     Worried About Running Out of Food in the Last Year: Never true     Ran Out of Food in the Last Year: Never true   Transportation Needs: No Transportation Needs (11/9/2023)    PRAPARE - Transportation     Lack of Transportation (Medical): No     Lack of Transportation (Non-Medical): No   Stress: No Stress Concern Present (11/9/2023)    [employer] - Occupational Stress Questionnaire     Feeling of Stress : Not at all   Social Connections: Socially Integrated (11/9/2023)    Social Connection and Isolation Panel [NHANES]     Frequency of Communication with Friends and Family: More than three times a week     Frequency of Social Gatherings with Friends and Family: More than three times a week     Attends Holiness Services: More than 4 times per year     Active Member of Clubs or Organizations: Yes     Attends Club or Organization Meetings: More than 4 times per year     Marital Status:    Housing Stability: Low Risk  (11/9/2023)    Housing Stability Vital Sign     Unable to Pay for Housing in the Last Year: No     Number of Places Lived in the Last Year: 1     Unstable Housing in the  Last Year: No         Allergies  Review of patient's allergies indicates:   Allergen Reactions    Carafate  [sucralfate] Rash     Other reaction(s): Hives         Review of Systems   Review of Systems   Constitutional: Negative for diaphoresis and malaise/fatigue.   HENT: Negative.     Cardiovascular:  Negative for chest pain, claudication, dyspnea on exertion, irregular heartbeat, leg swelling, near-syncope, orthopnea, palpitations, paroxysmal nocturnal dyspnea and syncope.   Respiratory:  Negative for shortness of breath.    Endocrine: Negative for polydipsia, polyphagia and polyuria.   Hematologic/Lymphatic: Does not bruise/bleed easily.   Gastrointestinal:  Negative for bloating, nausea and vomiting.   Genitourinary: Negative.    Neurological:  Negative for excessive daytime sleepiness, dizziness, light-headedness, loss of balance and weakness.   Psychiatric/Behavioral:  The patient is not nervous/anxious.    Allergic/Immunologic: Negative.          Physical Exam  Wt Readings from Last 1 Encounters:   01/03/24 87.5 kg (193 lb 0.2 oz)     BP Readings from Last 3 Encounters:   01/03/24 (!) 146/80   12/29/23 (!) 140/86   12/13/23 (!) 161/98     Pulse Readings from Last 1 Encounters:   01/03/24 88     Body mass index is 33.13 kg/m².    Physical Exam  Vitals and nursing note reviewed.   Constitutional:       Appearance: Normal appearance. He is ill-appearing.      Comments:      HENT:      Head: Normocephalic and atraumatic.      Mouth/Throat:      Mouth: Mucous membranes are moist.   Eyes:      Pupils: Pupils are equal, round, and reactive to light.   Cardiovascular:      Rate and Rhythm: Normal rate and regular rhythm.      Pulses:           Radial pulses are 2+ on the right side and 2+ on the left side.        Dorsalis pedis pulses are 2+ on the right side and 2+ on the left side.        Posterior tibial pulses are 2+ on the right side and 2+ on the left side.      Heart sounds: No murmur heard.  Pulmonary:       Effort: Pulmonary effort is normal. No respiratory distress.      Breath sounds: Normal breath sounds.      Comments: Coarse posteriorly  Abdominal:      General: There is no distension.      Tenderness: There is no abdominal tenderness.   Musculoskeletal:      Cervical back: Normal range of motion.      Right lower leg: No edema.      Left lower leg: No edema.   Skin:     General: Skin is warm and dry.      Findings: No erythema.   Neurological:      General: No focal deficit present.      Mental Status: He is alert.   Psychiatric:         Mood and Affect: Mood normal.         Behavior: Behavior normal.                 Problem List Items Addressed This Visit          Pulmonary    Acute respiratory failure with hypoxia - Primary    Overview     Now requiring supplemental oxygen nearly round the clock  Encourage him to wear it with any and all activity         Current Assessment & Plan     Continue as now            Cardiac/Vascular    Precordial pain    Overview     Nuclear stress negative            Contact PCP to discuss BRBPR  Echo shows PFO/small ASD. Discussed with staff and will monitor at this time.  Plan for repeat echo in 3-6 months      @Crissy Garcia DNP

## 2024-01-22 DIAGNOSIS — M51.36 DDD (DEGENERATIVE DISC DISEASE), LUMBAR: Primary | ICD-10-CM

## 2024-01-22 NOTE — PROGRESS NOTES
"DATE: 1/23/2024  PATIENT: Ronal Ham    Attending Physician: Brennan Rick MD    HISTORY:  Ronal Ham is a 53 y.o. male who returns to me today for follow up.  He was last seen by me 5/20/2021.  Today he is doing well but notes he continues to have low back and right knee pain. He tried low back PT for 8 weeks in the last 6 months with no relief. He has been receiving bilateral knee CSIs with Paulette Sewell and his last injection was on 10/11/23.    The Patient denies myelopathic symptoms such as handwriting changes or difficulty with buttons/coins/keys. Denies perineal paresthesias, bowel/bladder dysfunction.    PMH/PSH/FamHx/SocHx:  Unchanged from prior visit    ROS:  REVIEW OF SYSTEMS:  Constitution: Negative. Negative for chills, fever and night sweats.   HENT: Negative for congestion and headaches.    Eyes: Negative for blurred vision, left vision loss and right vision loss.   Cardiovascular: Negative for chest pain and syncope.   Respiratory: Negative for cough and shortness of breath.    Endocrine: Negative for polydipsia, polyphagia and polyuria.   Hematologic/Lymphatic: Negative for bleeding problem. Does not bruise/bleed easily.   Skin: Negative for dry skin, itching and rash.   Musculoskeletal: Negative for falls and muscle weakness.   Gastrointestinal: Negative for abdominal pain and bowel incontinence.   Allergic/Immunologic: Negative for hives and persistent infections.  Genitourinary: Negative for urinary retention/incontinence and nocturia.   Neurological: negative for disturbances in coordination, no myelopathic symptoms such as handwriting changes or difficulty with buttons, coins, keys or small objects. No loss of balance and seizures.   Psychiatric/Behavioral: Negative for depression. The patient does not have insomnia.   Denies perineal paresthesias, bowel or bladder incontinence    EXAM:  Ht 5' 4" (1.626 m)   Wt 87.5 kg (192 lb 14.4 oz)   BMI 33.11 kg/m²     My physical " examination was notable for the following findings:     Musculoskeletal and neuro exam stable      IMAGING:  No new imaging today.    Today I personally reviewed CT abdomen pelvis that demonstrates degenerative changes at L5-S1.    Xray knee bilateral demonstrates moderate DJD in right knee.    PROCEDURE:  I have explained the risks, benefits, and alternatives of the procedure in detail.  The patient voices understanding and all questions have been answered.  Verbal consent obtained and the patient agrees to proceed as planned. So after I performed a sterile prep of the skin in the normal fashion the right knee is injected using a 22 gauge needle from the anterolateral approach with a combination of 4cc 1% plain lidocaine and 40 mg of kenalog.  The patient is cautioned an immediate relief of pain is secondary to the local anesthetic and will be temporary.  After the anesthetic wears off there may be a increase in pain that may last for a few hours or a few days and they should use ice to help alleviate this flair up of pain.       Body mass index is 33.11 kg/m².    Hemoglobin A1C   Date Value Ref Range Status   06/05/2023 5.2 4.0 - 5.6 % Final     Comment:     ADA Screening Guidelines:  5.7-6.4%  Consistent with prediabetes  >or=6.5%  Consistent with diabetes    High levels of fetal hemoglobin interfere with the HbA1C  assay. Heterozygous hemoglobin variants (HbS, HgC, etc)do  not significantly interfere with this assay.   However, presence of multiple variants may affect accuracy.     03/11/2022 5.4 4.0 - 5.6 % Final     Comment:     ADA Screening Guidelines:  5.7-6.4%  Consistent with prediabetes  >or=6.5%  Consistent with diabetes    High levels of fetal hemoglobin interfere with the HbA1C  assay. Heterozygous hemoglobin variants (HbS, HgC, etc)do  not significantly interfere with this assay.   However, presence of multiple variants may affect accuracy.     04/13/2021 5.3 4.0 - 5.6 % Final     Comment:     ADA  Screening Guidelines:  5.7-6.4%  Consistent with prediabetes  >or=6.5%  Consistent with diabetes    High levels of fetal hemoglobin interfere with the HbA1C  assay. Heterozygous hemoglobin variants (HbS, HgC, etc)do  not significantly interfere with this assay.   However, presence of multiple variants may affect accuracy.           ASSESSMENT/PLAN:    Ronal was seen today for low-back pain and knee pain.    Diagnoses and all orders for this visit:    Lumbar radiculopathy, chronic  -     MRI Lumbar Spine Without Contrast; Future  -     Ambulatory referral/consult to Physical/Occupational Therapy; Future        Today we discussed at length all of the different treatment options including anti-inflammatories, acetaminophen, rest, ice, heat, physical therapy including strengthening and stretching exercises, home exercises, ROM, aerobic conditioning, aqua therapy, other modalities including ultrasound, massage, and dry needling, epidural steroid injections and finally surgical intervention.      Pt presents with chronic low back pain. Failure of conservative rx. Will obtain MRI to further evaluate and call with results. Pt also presents with chronic right knee pain. Administered CSI in clinic today.

## 2024-01-23 ENCOUNTER — OFFICE VISIT (OUTPATIENT)
Dept: ORTHOPEDICS | Facility: CLINIC | Age: 54
End: 2024-01-23
Payer: COMMERCIAL

## 2024-01-23 ENCOUNTER — OFFICE VISIT (OUTPATIENT)
Dept: PULMONOLOGY | Facility: CLINIC | Age: 54
End: 2024-01-23
Payer: COMMERCIAL

## 2024-01-23 ENCOUNTER — TELEPHONE (OUTPATIENT)
Dept: PULMONOLOGY | Facility: CLINIC | Age: 54
End: 2024-01-23
Payer: COMMERCIAL

## 2024-01-23 ENCOUNTER — HOSPITAL ENCOUNTER (OUTPATIENT)
Dept: RADIOLOGY | Facility: HOSPITAL | Age: 54
Discharge: HOME | End: 2024-01-23
Attending: ORTHOPAEDIC SURGERY
Payer: COMMERCIAL

## 2024-01-23 VITALS
HEART RATE: 90 BPM | HEIGHT: 64 IN | WEIGHT: 196.88 LBS | SYSTOLIC BLOOD PRESSURE: 144 MMHG | OXYGEN SATURATION: 94 % | DIASTOLIC BLOOD PRESSURE: 72 MMHG | BODY MASS INDEX: 33.61 KG/M2

## 2024-01-23 VITALS — WEIGHT: 192.88 LBS | HEIGHT: 64 IN | BODY MASS INDEX: 32.93 KG/M2

## 2024-01-23 DIAGNOSIS — M54.16 LUMBAR RADICULOPATHY, CHRONIC: Primary | ICD-10-CM

## 2024-01-23 DIAGNOSIS — K21.9 GASTROESOPHAGEAL REFLUX DISEASE, UNSPECIFIED WHETHER ESOPHAGITIS PRESENT: ICD-10-CM

## 2024-01-23 DIAGNOSIS — M51.36 DDD (DEGENERATIVE DISC DISEASE), LUMBAR: ICD-10-CM

## 2024-01-23 DIAGNOSIS — M32.13 SYSTEMIC LUPUS ERYTHEMATOSUS WITH LUNG INVOLVEMENT, UNSPECIFIED SLE TYPE: Primary | ICD-10-CM

## 2024-01-23 DIAGNOSIS — J96.01 ACUTE RESPIRATORY FAILURE WITH HYPOXIA: ICD-10-CM

## 2024-01-23 DIAGNOSIS — M17.11 PRIMARY OSTEOARTHRITIS OF RIGHT KNEE: Primary | ICD-10-CM

## 2024-01-23 DIAGNOSIS — J84.9 INTERSTITIAL LUNG DISEASE: ICD-10-CM

## 2024-01-23 PROCEDURE — 1159F MED LIST DOCD IN RCRD: CPT | Mod: CPTII,NTX,S$GLB, | Performed by: INTERNAL MEDICINE

## 2024-01-23 PROCEDURE — 3008F BODY MASS INDEX DOCD: CPT | Mod: CPTII,S$GLB,, | Performed by: ORTHOPAEDIC SURGERY

## 2024-01-23 PROCEDURE — 3078F DIAST BP <80 MM HG: CPT | Mod: CPTII,NTX,S$GLB, | Performed by: INTERNAL MEDICINE

## 2024-01-23 PROCEDURE — 4010F ACE/ARB THERAPY RXD/TAKEN: CPT | Mod: CPTII,S$GLB,, | Performed by: ORTHOPAEDIC SURGERY

## 2024-01-23 PROCEDURE — 99213 OFFICE O/P EST LOW 20 MIN: CPT | Mod: 25,S$GLB,, | Performed by: ORTHOPAEDIC SURGERY

## 2024-01-23 PROCEDURE — 3077F SYST BP >= 140 MM HG: CPT | Mod: CPTII,NTX,S$GLB, | Performed by: INTERNAL MEDICINE

## 2024-01-23 PROCEDURE — 4010F ACE/ARB THERAPY RXD/TAKEN: CPT | Mod: CPTII,NTX,S$GLB, | Performed by: INTERNAL MEDICINE

## 2024-01-23 PROCEDURE — 99999 PR PBB SHADOW E&M-EST. PATIENT-LVL IV: CPT | Mod: PBBFAC,TXP,, | Performed by: INTERNAL MEDICINE

## 2024-01-23 PROCEDURE — 99213 OFFICE O/P EST LOW 20 MIN: CPT | Mod: NTX,S$GLB,, | Performed by: INTERNAL MEDICINE

## 2024-01-23 PROCEDURE — 3008F BODY MASS INDEX DOCD: CPT | Mod: CPTII,NTX,S$GLB, | Performed by: INTERNAL MEDICINE

## 2024-01-23 PROCEDURE — 99999 PR PBB SHADOW E&M-EST. PATIENT-LVL IV: CPT | Mod: PBBFAC,,, | Performed by: ORTHOPAEDIC SURGERY

## 2024-01-23 PROCEDURE — 20610 DRAIN/INJ JOINT/BURSA W/O US: CPT | Mod: RT,S$GLB,, | Performed by: ORTHOPAEDIC SURGERY

## 2024-01-23 RX ORDER — TRIAMCINOLONE ACETONIDE 40 MG/ML
40 INJECTION, SUSPENSION INTRA-ARTICULAR; INTRAMUSCULAR ONCE
Status: COMPLETED | OUTPATIENT
Start: 2024-01-23 | End: 2024-01-23

## 2024-01-23 RX ORDER — MYCOPHENOLATE MOFETIL 500 MG/1
TABLET ORAL
Qty: 126 TABLET | Refills: 0 | Status: SHIPPED | OUTPATIENT
Start: 2024-01-23 | End: 2024-02-20 | Stop reason: SDUPTHER

## 2024-01-23 RX ADMIN — TRIAMCINOLONE ACETONIDE 40 MG: 40 INJECTION, SUSPENSION INTRA-ARTICULAR; INTRAMUSCULAR at 02:01

## 2024-01-23 NOTE — TELEPHONE ENCOUNTER
November 3, 2022      Jose Esposito  [address]   [facility] [place] [address]              Dear  or ,    Jose Esposito is under the care of our institution for a left foot injury.  Part of his treatment plan includes the use of a knee scooter.  Please extend his knee scooter rental through March, to conclude on April 1, 2023.    Please call if clarification is necessary or if further questions arise.      Sincerely,              Daniel Puentes DO CAQSM   Patient rescheduled today 1/23/2024 at 3:30 pm

## 2024-01-23 NOTE — ASSESSMENT & PLAN NOTE
Hypoxic Respiratory failure very to his ILD from his lupus.    Continue oxygen support 2-3 L at rest and with activity.

## 2024-01-23 NOTE — TELEPHONE ENCOUNTER
----- Message from Rickey Mccloud sent at 1/23/2024 10:08 AM CST -----  Regarding: Call back requested  Contact: [phone]  Hi, pt spoke with someone that told him the appt time for  9:30 would be changed to 3:30pm today. Pt is asking for a call back to discuss the appt. [phone]  Thank you.

## 2024-01-23 NOTE — PROGRESS NOTES
"Subjective:     Reason for visit:  systemic lupus erythematosus with lung involvement)    Patient ID:  Ronal Ham is a 53 y.o. male with SLE, Raynaud's phenomenon, GERD, HTN    Interval History:  Mr. Ham is feeling better today than he did when I saw him last.  He is exercising and walking more and has been able to walk a little bit longer and his oxygen is not dropping ask fast.  We discussed at our last visit starting mycophenolate and he wants to continue to talk about that.  He did have COVID once since our last visit but has recovered completely from that and feels better than he did even before that.    Additional Pulmonary History:  Childhood Illnesses:  None   Occupational/Environmental: Unknown  Tobacco/Smoking:  Never    Objective:     Vitals:    01/23/24 1436   BP: (!) 144/72   Pulse: 90   SpO2: (!) 94%  Comment: 2 liters   Weight: 89.3 kg (196 lb 13.9 oz)   Height: 5' 4" (1.626 m)         Physical Exam  Vitals and nursing note reviewed.   Constitutional:       General: He is not in acute distress.     Appearance: He is not ill-appearing, toxic-appearing or diaphoretic.   HENT:      Head: Normocephalic and atraumatic.      Nose: No rhinorrhea.      Mouth/Throat:      Mouth: Mucous membranes are moist.   Eyes:      General: No scleral icterus.     Extraocular Movements: Extraocular movements intact.   Cardiovascular:      Rate and Rhythm: Normal rate and regular rhythm.   Pulmonary:      Effort: No tachypnea, accessory muscle usage, respiratory distress or retractions.      Breath sounds: Rales (bases) present. No rhonchi.   Abdominal:      General: There is no distension.   Musculoskeletal:      Right lower leg: No edema.      Left lower leg: No edema.   Skin:     General: Skin is warm and dry.      Coloration: Skin is not jaundiced.      Findings: No rash.   Neurological:      General: No focal deficit present.      Mental Status: Mental status is at baseline.          Personal Diagnostic " Review and Interpretation    02/14/2023 CXR:  Relatively similar prominent interstitium but possible LLL opacity.  Aortic atherosclerosis.    07/11/2023 CT Chest without contrast: CT with peripheral and basilar GGOs with traction bronchiectasis, reticulations and honeycombing    04/20/2021 CT chest without contrast:  Basilar and peripheral GGOs with reticulations and varicoid bronchiectasis    11/05/2014 CT chest with contrast:  Bibasilar GGOs with upper lung zone bullous disease      Pertinent Studies Reviewed & Interpreted:     Pulmonary Function Tests:     08/02/2023: FEV1 64.1, FVC 65.1, FEV1/FVC 99.2, no bronchodilator response, TLC 64.7, DLCO 40  04/06/2021:  FEV1 88, FVC 92, FEV1/FVC 80; no bronchodilator response.  TLC 74, FRC 56, RV 54.  DLCO 74  05/13/2019:  FEV1 68, FVC 73, FEV1/FVC 93, FEF 25-75 51; no bronchodilator response.  TLC 56, FRC 54, RV 30.  DLCO 46    6 Minute Walk Tests:     08/02/2023: 365.76 SpO2 90% on room air peak exercise. Appropriate tachycardiac and hypertensive response  04/20/2021: 406 m (451 m).  SpO2 91% on room air peak exercise.  Appropriate tachycardic and hypertensive response    Echocardiograms:   05/10/2017 stress echo:  EF 55%; 11 Mets; concentric remodeling with LAE (VILMA 33)      Assessment & Plan:   1. Systemic lupus erythematosus with lung involvement, unspecified SLE type  Overview:  Treatment of the lupus will effectively treat underlying lung disease.  See the section on interstitial lung disease and lupus    Orders:  -     CBC auto differential; Future; Expected date: 01/23/2024  -     Comprehensive Metabolic Panel; Future; Expected date: 01/23/2024  -     mycophenolate (CELLCEPT) 500 mg Tab; Take one tablet twice a day for 14 days, then take 1 tablet in the morning and 2 tablets at night for 14 days, then take 2 tablets in the morning and 2 tablets at night.  Dispense: 126 tablet; Refill: 0  -     CBC auto differential; Future; Expected date: 02/06/2024    2. Acute  respiratory failure with hypoxia  Overview:  Now requiring supplemental oxygen nearly round the clock  Encourage him to wear it with any and all activity    Assessment & Plan:  Hypoxic Respiratory failure very to his ILD from his lupus.    Continue oxygen support 2-3 L at rest and with activity.      3. Interstitial lung disease  Overview:  His symptoms have improved since his last visit but he continues to get very short of breath with minimal movement.  We discussed at the last visit starting mycophenolate and we will start titrating that up today.  CBC and CMP today and then we will repeat every 2 weeks for the 1st 2 months.      4. Gastroesophageal reflux disease, unspecified whether esophagitis present  Assessment & Plan:  Continue treatment as poor control will worsen his lung disease             RETURN TO CLINIC IN 1 MONTHS    Portions of the record may have been created with voice-recognition software. Occasional wrong-word or sound-a-like substitutions may have occurred due to the inherent limitations of voice-recognition software. Read the chart carefully and recognize, using context, where substitutions have occurred.

## 2024-01-27 ENCOUNTER — HOSPITAL ENCOUNTER (OUTPATIENT)
Dept: RADIOLOGY | Facility: HOSPITAL | Age: 54
Discharge: HOME | End: 2024-01-27
Attending: ORTHOPAEDIC SURGERY
Payer: COMMERCIAL

## 2024-01-27 DIAGNOSIS — M54.16 LUMBAR RADICULOPATHY, CHRONIC: ICD-10-CM

## 2024-01-27 PROCEDURE — 72148 MRI LUMBAR SPINE W/O DYE: CPT | Mod: 26,TXP,, | Performed by: RADIOLOGY

## 2024-01-27 PROCEDURE — 72148 MRI LUMBAR SPINE W/O DYE: CPT | Mod: TC,TXP

## 2024-01-31 ENCOUNTER — OFFICE VISIT (OUTPATIENT)
Dept: ORTHOPEDICS | Facility: CLINIC | Age: 54
End: 2024-01-31
Payer: COMMERCIAL

## 2024-01-31 DIAGNOSIS — M51.36 DDD (DEGENERATIVE DISC DISEASE), LUMBAR: Primary | ICD-10-CM

## 2024-01-31 PROCEDURE — 4010F ACE/ARB THERAPY RXD/TAKEN: CPT | Mod: CPTII,95,, | Performed by: ORTHOPAEDIC SURGERY

## 2024-01-31 PROCEDURE — 99213 OFFICE O/P EST LOW 20 MIN: CPT | Mod: 95,,, | Performed by: ORTHOPAEDIC SURGERY

## 2024-01-31 NOTE — PROGRESS NOTES
Established Patient - Audio Only Telehealth Visit     The patient location is: home  The chief complaint leading to consultation is: MRI results  Visit type: Virtual visit with audio only (telephone)  Total time spent with patient: 10 min       The reason for the audio only service rather than synchronous audio and video virtual visit was related to technical difficulties or patient preference/necessity.     Each patient to whom I provide medical services by telemedicine is:  (1) informed of the relationship between the physician and patient and the respective role of any other health care provider with respect to management of the patient; and (2) notified that they may decline to receive medical services by telemedicine and may withdraw from such care at any time. Patient verbally consented to receive this service via voice-only telephone call.    DATE: 1/31/2024  PATIENT: Ronal Ham    Attending Physician: Brennan Rick MD    HISTORY:  Ronal Ham is a 53 y.o. male who returns to me today for MRI results.  He was last seen by me 1/23/2024.  Today he is doing well but notes he continues to have low back pain.    The Patient denies myelopathic symptoms such as handwriting changes or difficulty with buttons/coins/keys. Denies perineal paresthesias, bowel/bladder dysfunction.    PMH/PSH/FamHx/SocHx:  Unchanged from prior visit    ROS:  REVIEW OF SYSTEMS:  Constitution: Negative. Negative for chills, fever and night sweats.   HENT: Negative for congestion and headaches.    Eyes: Negative for blurred vision, left vision loss and right vision loss.   Cardiovascular: Negative for chest pain and syncope.   Respiratory: Negative for cough and shortness of breath.    Endocrine: Negative for polydipsia, polyphagia and polyuria.   Hematologic/Lymphatic: Negative for bleeding problem. Does not bruise/bleed easily.   Skin: Negative for dry skin, itching and rash.   Musculoskeletal: Negative for falls and  muscle weakness.   Gastrointestinal: Negative for abdominal pain and bowel incontinence.   Allergic/Immunologic: Negative for hives and persistent infections.  Genitourinary: Negative for urinary retention/incontinence and nocturia.   Neurological: negative for disturbances in coordination, no myelopathic symptoms such as handwriting changes or difficulty with buttons, coins, keys or small objects. No loss of balance and seizures.   Psychiatric/Behavioral: Negative for depression. The patient does not have insomnia.   Denies perineal paresthesias, bowel or bladder incontinence    EXAM:  There were no vitals taken for this visit.    My physical examination was notable for the following findings:     Musculoskeletal and neuro exam stable      IMAGING:    Today I personally reviewed CT abdomen pelvis that demonstrates degenerative changes at L5-S1.     MRI lumbar demonstrates lumbar spondylosis with mild spinal canal and lateral recess stenosis at L3-L4 and L4-L5.  Mild-to-moderate neural foraminal narrowing from L3-L4 through L5-S1.     There is no height or weight on file to calculate BMI.    Hemoglobin A1C   Date Value Ref Range Status   06/05/2023 5.2 4.0 - 5.6 % Final     Comment:     ADA Screening Guidelines:  5.7-6.4%  Consistent with prediabetes  >or=6.5%  Consistent with diabetes    High levels of fetal hemoglobin interfere with the HbA1C  assay. Heterozygous hemoglobin variants (HbS, HgC, etc)do  not significantly interfere with this assay.   However, presence of multiple variants may affect accuracy.     03/11/2022 5.4 4.0 - 5.6 % Final     Comment:     ADA Screening Guidelines:  5.7-6.4%  Consistent with prediabetes  >or=6.5%  Consistent with diabetes    High levels of fetal hemoglobin interfere with the HbA1C  assay. Heterozygous hemoglobin variants (HbS, HgC, etc)do  not significantly interfere with this assay.   However, presence of multiple variants may affect accuracy.     04/13/2021 5.3 4.0 - 5.6 %  Final     Comment:     ADA Screening Guidelines:  5.7-6.4%  Consistent with prediabetes  >or=6.5%  Consistent with diabetes    High levels of fetal hemoglobin interfere with the HbA1C  assay. Heterozygous hemoglobin variants (HbS, HgC, etc)do  not significantly interfere with this assay.   However, presence of multiple variants may affect accuracy.           ASSESSMENT/PLAN:    There are no diagnoses linked to this encounter.    Today we discussed at length all of the different treatment options including anti-inflammatories, acetaminophen, rest, ice, heat, physical therapy including strengthening and stretching exercises, home exercises, ROM, aerobic conditioning, aqua therapy, other modalities including ultrasound, massage, and dry needling, epidural steroid injections and finally surgical intervention.      Pt presents with chronic low back pain. He would like to continue conservative rx and try PT. Pt will fu if pain persists.                     This service was not originating from a related E/M service provided within the previous 7 days nor will  to an E/M service or procedure within the next 24 hours or my soonest available appointment.  Prevailing standard of care was able to be met in this audio-only visit.

## 2024-02-08 DIAGNOSIS — M17.11 PRIMARY OSTEOARTHRITIS OF RIGHT KNEE: ICD-10-CM

## 2024-02-16 ENCOUNTER — PATIENT MESSAGE (OUTPATIENT)
Dept: ADMINISTRATIVE | Facility: OTHER | Age: 54
End: 2024-02-16
Payer: COMMERCIAL

## 2024-02-19 ENCOUNTER — OFFICE VISIT (OUTPATIENT)
Dept: RHEUMATOLOGY | Facility: CLINIC | Age: 54
End: 2024-02-19
Payer: COMMERCIAL

## 2024-02-19 VITALS
HEART RATE: 99 BPM | DIASTOLIC BLOOD PRESSURE: 82 MMHG | HEIGHT: 64 IN | WEIGHT: 198.63 LBS | BODY MASS INDEX: 33.91 KG/M2 | SYSTOLIC BLOOD PRESSURE: 142 MMHG

## 2024-02-19 DIAGNOSIS — M54.42 CHRONIC MIDLINE LOW BACK PAIN WITH BILATERAL SCIATICA: ICD-10-CM

## 2024-02-19 DIAGNOSIS — M54.41 CHRONIC MIDLINE LOW BACK PAIN WITH BILATERAL SCIATICA: ICD-10-CM

## 2024-02-19 DIAGNOSIS — M32.13 SYSTEMIC LUPUS ERYTHEMATOSUS WITH LUNG INVOLVEMENT, UNSPECIFIED SLE TYPE: Primary | ICD-10-CM

## 2024-02-19 DIAGNOSIS — J84.9 INTERSTITIAL LUNG DISEASE: ICD-10-CM

## 2024-02-19 DIAGNOSIS — M17.11 PRIMARY OSTEOARTHRITIS OF RIGHT KNEE: ICD-10-CM

## 2024-02-19 DIAGNOSIS — G89.29 CHRONIC MIDLINE LOW BACK PAIN WITH BILATERAL SCIATICA: ICD-10-CM

## 2024-02-19 PROCEDURE — 99999 PR PBB SHADOW E&M-EST. PATIENT-LVL IV: CPT | Mod: PBBFAC,TXP,, | Performed by: INTERNAL MEDICINE

## 2024-02-19 PROCEDURE — 3077F SYST BP >= 140 MM HG: CPT | Mod: CPTII,NTX,S$GLB, | Performed by: INTERNAL MEDICINE

## 2024-02-19 PROCEDURE — 1160F RVW MEDS BY RX/DR IN RCRD: CPT | Mod: CPTII,NTX,S$GLB, | Performed by: INTERNAL MEDICINE

## 2024-02-19 PROCEDURE — 3008F BODY MASS INDEX DOCD: CPT | Mod: CPTII,NTX,S$GLB, | Performed by: INTERNAL MEDICINE

## 2024-02-19 PROCEDURE — 4010F ACE/ARB THERAPY RXD/TAKEN: CPT | Mod: CPTII,NTX,S$GLB, | Performed by: INTERNAL MEDICINE

## 2024-02-19 PROCEDURE — 1159F MED LIST DOCD IN RCRD: CPT | Mod: CPTII,NTX,S$GLB, | Performed by: INTERNAL MEDICINE

## 2024-02-19 PROCEDURE — 3079F DIAST BP 80-89 MM HG: CPT | Mod: CPTII,NTX,S$GLB, | Performed by: INTERNAL MEDICINE

## 2024-02-19 PROCEDURE — 99213 OFFICE O/P EST LOW 20 MIN: CPT | Mod: NTX,S$GLB,, | Performed by: INTERNAL MEDICINE

## 2024-02-19 ASSESSMENT — ROUTINE ASSESSMENT OF PATIENT INDEX DATA (RAPID3)
TOTAL RAPID3 SCORE: 4.33
FATIGUE SCORE: 0
PSYCHOLOGICAL DISTRESS SCORE: 0
AM STIFFNESS SCORE: 0, NO
MDHAQ FUNCTION SCORE: 0
PATIENT GLOBAL ASSESSMENT SCORE: 5.5
PAIN SCORE: 7.5

## 2024-02-19 NOTE — PROGRESS NOTES
2/19/2024     8:00 AM   Rapid3 Question Responses and Scores   MDHAQ Score 0   Psychologic Score 0   Pain Score 7.5   When you awakened in the morning OVER THE LAST WEEK, did you feel stiff? No   Fatigue Score 0   Global Health Score 5.5   RAPID3 Score 4.33     Answers submitted by the patient for this visit:  Rheumatology Questionnaire (Submitted on 2/19/2024)  fever: No  eye redness: No  mouth sores: No  headaches: No  shortness of breath: Yes  chest pain: No  trouble swallowing: No  diarrhea: No  constipation: No  unexpected weight change: No  genital sore: No  During the last 3 days, have you had a skin rash?: No  Bruises or bleeds easily: No  cough: Yes

## 2024-02-19 NOTE — PROGRESS NOTES
History of present illness: 53-year-old gentleman with a diagnosis of SLE prior to 2005. He initially presented with arthritis and rash. He has interstitial lung disease. He has been on chronic Plaquenil therapy.  He has been erratic in his follow-ups.  I saw him last in 2017.  Was seen by Dr. Barraagn in 2021.  He has not had rheumatologic follow-up since then.  He is up-to-date on his eye exams.    He was hospitalized in November for breathing difficulty.  He was treated with high-dose steroids.  He had increased inflammatory markers but apparently did not take antibiotics.  He was seen by Pulmonary in January and started on mycophenolate.  He is presently on 1 g daily.  He is tolerating the mycophenolate.    He still has problems with knee and back pain.  He was evaluated by Orthopedics.  He is being scheduled for physical therapy.  He had had previous epidurals with some relief.  His pain is worse with activity.  He has no other peripheral joint complaints.      He denies any fever, headache.  He developed acne on his back after the high-dose steroids.  He has had no other rashes.  He has occasional conjunctivitis.  He has no oral ulcers, dry eye or mouth, pleurisy.  He has had no recent Raynaud's phenomena.  He has had no digital ulcers.  He has no chronic or bloody diarrhea, urethral discharge or ulcers.  He has paresthesias in his feet at night.  He has no thrombophlebitis.    Physical examination:  Skin:  He has acne like lesions on his back   ENT:  Adequate tears in saliva.  No conjunctivitis or oral ulcers.  Chest:  Clear to auscultation  Cardiac:  No murmurs gallops, rubs   Abdomen:  No organomegaly or masses.  No tenderness to palpation   Extremities:  No pedal edema.  No digital ulcers.  Musculoskeletal:  Cervical spine, shoulders, elbows, wrists, small joints of the hands are unremarkable.    He has decreased range of motion of the lumbar spine with some pain on range of motion.  He has no tenderness to  palpation.    Hips are unremarkable.    He has an effusion in the right knee.  He has no tenderness to palpation.  He has decreased range of motion of the right knee but no pain on range of motion.  Left knee is unremarkable.    Ankles and small joints the feet are unremarkable.  Laboratory:  He has not had lupus tests done for awhile.  His last 2 urinalyses did show proteinuria    Plans:  1. He will have laboratory studies drawn tomorrow after he sees Pulmonary   2. Continue Plaquenil as before.  I told him to obtain the results of his eye exam from his optometrist  3.  Return to see me in 12 months   4. If he still has proteinuria, I will refer him to Nephrology.    Answers submitted by the patient for this visit:  Rheumatology Questionnaire (Submitted on 2/19/2024)  fever: No  eye redness: No  mouth sores: No  headaches: No  shortness of breath: Yes  chest pain: No  trouble swallowing: No  diarrhea: No  constipation: No  unexpected weight change: No  genital sore: No  During the last 3 days, have you had a skin rash?: No  Bruises or bleeds easily: No  cough: Yes

## 2024-02-20 ENCOUNTER — LAB VISIT (OUTPATIENT)
Dept: LAB | Facility: HOSPITAL | Age: 54
End: 2024-02-20
Attending: INTERNAL MEDICINE
Payer: COMMERCIAL

## 2024-02-20 ENCOUNTER — OFFICE VISIT (OUTPATIENT)
Dept: PULMONOLOGY | Facility: CLINIC | Age: 54
End: 2024-02-20
Payer: COMMERCIAL

## 2024-02-20 VITALS
HEIGHT: 64 IN | BODY MASS INDEX: 33.16 KG/M2 | HEART RATE: 86 BPM | SYSTOLIC BLOOD PRESSURE: 138 MMHG | OXYGEN SATURATION: 96 % | WEIGHT: 194.25 LBS | DIASTOLIC BLOOD PRESSURE: 79 MMHG

## 2024-02-20 DIAGNOSIS — J84.9 INTERSTITIAL LUNG DISEASE: ICD-10-CM

## 2024-02-20 DIAGNOSIS — M32.13 SYSTEMIC LUPUS ERYTHEMATOSUS WITH LUNG INVOLVEMENT, UNSPECIFIED SLE TYPE: ICD-10-CM

## 2024-02-20 DIAGNOSIS — J96.01 ACUTE RESPIRATORY FAILURE WITH HYPOXIA: ICD-10-CM

## 2024-02-20 DIAGNOSIS — J84.9 INTERSTITIAL LUNG DISEASE: Primary | ICD-10-CM

## 2024-02-20 DIAGNOSIS — R05.3 CHRONIC COUGH: ICD-10-CM

## 2024-02-20 LAB
BILIRUB UR QL STRIP: NEGATIVE
CLARITY UR REFRACT.AUTO: CLEAR
COLOR UR AUTO: YELLOW
CREAT UR-MCNC: 118 MG/DL (ref 23–375)
GLUCOSE UR QL STRIP: NEGATIVE
HGB UR QL STRIP: NEGATIVE
KETONES UR QL STRIP: NEGATIVE
LEUKOCYTE ESTERASE UR QL STRIP: NEGATIVE
NITRITE UR QL STRIP: NEGATIVE
PH UR STRIP: 6 [PH] (ref 5–8)
PROT UR QL STRIP: ABNORMAL
PROT UR-MCNC: 12 MG/DL (ref 0–15)
PROT/CREAT UR: 0.1 MG/G{CREAT} (ref 0–0.2)
SP GR UR STRIP: 1.02 (ref 1–1.03)
URN SPEC COLLECT METH UR: ABNORMAL

## 2024-02-20 PROCEDURE — 99213 OFFICE O/P EST LOW 20 MIN: CPT | Mod: NTX,S$GLB,, | Performed by: INTERNAL MEDICINE

## 2024-02-20 PROCEDURE — 4010F ACE/ARB THERAPY RXD/TAKEN: CPT | Mod: CPTII,NTX,S$GLB, | Performed by: INTERNAL MEDICINE

## 2024-02-20 PROCEDURE — 3075F SYST BP GE 130 - 139MM HG: CPT | Mod: CPTII,NTX,S$GLB, | Performed by: INTERNAL MEDICINE

## 2024-02-20 PROCEDURE — 99999 PR PBB SHADOW E&M-EST. PATIENT-LVL IV: CPT | Mod: PBBFAC,TXP,, | Performed by: INTERNAL MEDICINE

## 2024-02-20 PROCEDURE — 3008F BODY MASS INDEX DOCD: CPT | Mod: CPTII,NTX,S$GLB, | Performed by: INTERNAL MEDICINE

## 2024-02-20 PROCEDURE — 84156 ASSAY OF PROTEIN URINE: CPT | Mod: TXP | Performed by: INTERNAL MEDICINE

## 2024-02-20 PROCEDURE — 3078F DIAST BP <80 MM HG: CPT | Mod: CPTII,NTX,S$GLB, | Performed by: INTERNAL MEDICINE

## 2024-02-20 PROCEDURE — 81003 URINALYSIS AUTO W/O SCOPE: CPT | Mod: TXP | Performed by: INTERNAL MEDICINE

## 2024-02-20 PROCEDURE — 1159F MED LIST DOCD IN RCRD: CPT | Mod: CPTII,NTX,S$GLB, | Performed by: INTERNAL MEDICINE

## 2024-02-20 RX ORDER — MYCOPHENOLATE MOFETIL 500 MG/1
1000 TABLET ORAL 2 TIMES DAILY
Qty: 120 TABLET | Refills: 11 | Status: SHIPPED | OUTPATIENT
Start: 2024-02-20 | End: 2024-06-18

## 2024-02-20 NOTE — PROGRESS NOTES
"Subjective:     Reason for visit:  systemic lupus erythematosus with lung involvement)    Patient ID:  Ronal Ham is a 53 y.o. male with SLE, Raynaud's phenomenon, GERD, HTN    Interval History:  Mr. Ham is feeling better today and is tolerating his mycophenolate well.  His initial labs look good and he followed up with Rheumatology and is planning to get labs drawn today.  He does have his cough again that did improve slightly when we were on steroids but his allergies have at started acting up and is planning to restart his allergy medicine    Additional Pulmonary History:  Childhood Illnesses:  None   Occupational/Environmental: Unknown  Tobacco/Smoking:  Never    Objective:     Vitals:    02/20/24 1436   BP: 138/79   BP Location: Right arm   Patient Position: Sitting   Pulse: 86   SpO2: 96%   Weight: 88.1 kg (194 lb 3.6 oz)   Height: 5' 4" (1.626 m)         Physical Exam  Vitals and nursing note reviewed.   Constitutional:       General: He is not in acute distress.     Appearance: He is not ill-appearing, toxic-appearing or diaphoretic.   HENT:      Head: Normocephalic and atraumatic.      Nose: No rhinorrhea.      Mouth/Throat:      Mouth: Mucous membranes are moist.   Eyes:      General: No scleral icterus.     Extraocular Movements: Extraocular movements intact.   Cardiovascular:      Rate and Rhythm: Normal rate and regular rhythm.   Pulmonary:      Effort: No tachypnea, accessory muscle usage, respiratory distress or retractions.      Breath sounds: Rales (bases) present. No rhonchi.   Abdominal:      General: There is no distension.   Musculoskeletal:      Right lower leg: No edema.      Left lower leg: No edema.   Skin:     General: Skin is warm and dry.      Coloration: Skin is not jaundiced.      Findings: No rash.   Neurological:      General: No focal deficit present.      Mental Status: Mental status is at baseline.          Personal Diagnostic Review and Interpretation    02/14/2023 " CXR:  Relatively similar prominent interstitium but possible LLL opacity.  Aortic atherosclerosis.    07/11/2023 CT Chest without contrast: CT with peripheral and basilar GGOs with traction bronchiectasis, reticulations and honeycombing    04/20/2021 CT chest without contrast:  Basilar and peripheral GGOs with reticulations and varicoid bronchiectasis    11/05/2014 CT chest with contrast:  Bibasilar GGOs with upper lung zone bullous disease      Pertinent Studies Reviewed & Interpreted:     Pulmonary Function Tests:     08/02/2023: FEV1 64.1, FVC 65.1, FEV1/FVC 99.2, no bronchodilator response, TLC 64.7, DLCO 40  04/06/2021:  FEV1 88, FVC 92, FEV1/FVC 80; no bronchodilator response.  TLC 74, FRC 56, RV 54.  DLCO 74  05/13/2019:  FEV1 68, FVC 73, FEV1/FVC 93, FEF 25-75 51; no bronchodilator response.  TLC 56, FRC 54, RV 30.  DLCO 46    6 Minute Walk Tests:     08/02/2023: 365.76 SpO2 90% on room air peak exercise. Appropriate tachycardiac and hypertensive response  04/20/2021: 406 m (451 m).  SpO2 91% on room air peak exercise.  Appropriate tachycardic and hypertensive response    Echocardiograms:   05/10/2017 stress echo:  EF 55%; 11 Mets; concentric remodeling with LAE (VILMA 33)      Assessment & Plan:   1. Interstitial lung disease  Overview:  Doing well with his mycophenolate and now on a 1000 mg b.i.d. we will continue that for the foreseeable future and repeat PFTs in 6 months following up with Rheumatology as well  CBC and CMP today       2. Systemic lupus erythematosus with lung involvement, unspecified SLE type  Overview:  Treatment of the lupus will effectively treat underlying lung disease.  See the section on interstitial lung disease and lupus    Orders:  -     mycophenolate (CELLCEPT) 500 mg Tab; Take 2 tablets (1,000 mg total) by mouth 2 (two) times daily. Take one tablet twice a day for 14 days, then take 1 tablet in the morning and 2 tablets at night for 14 days, then take 2 tablets in the morning  and 2 tablets at night.  Dispense: 120 tablet; Refill: 11  -     Complete PFT w/ bronchodilator; Future; Expected date: 08/20/2024    3. Acute respiratory failure with hypoxia  Overview:  He feels that his need for his oxygen his less than it was previously but he does keep it with him at all times just in case      4. Chronic cough  Overview:  Continue aggressive care for his allergic rhinitis that is a little bit worse at this time            RETURN TO CLINIC IN 6 MONTHS    Portions of the record may have been created with voice-recognition software. Occasional wrong-word or sound-a-like substitutions may have occurred due to the inherent limitations of voice-recognition software. Read the chart carefully and recognize, using context, where substitutions have occurred.    Farzaneh Schumacher M.D.  Pulmonary/Critical Care

## 2024-03-06 ENCOUNTER — TELEPHONE (OUTPATIENT)
Dept: PULMONOLOGY | Facility: CLINIC | Age: 54
End: 2024-03-06
Payer: COMMERCIAL

## 2024-03-06 RX ORDER — CODEINE PHOSPHATE AND GUAIFENESIN 10; 100 MG/5ML; MG/5ML
10 SOLUTION ORAL EVERY 6 HOURS PRN
Qty: 237 ML | Refills: 0 | Status: SHIPPED | OUTPATIENT
Start: 2024-03-06 | End: 2024-03-11 | Stop reason: SDUPTHER

## 2024-03-11 RX ORDER — IBUPROFEN 800 MG/1
800 TABLET ORAL EVERY 8 HOURS PRN
Qty: 60 TABLET | Refills: 2 | OUTPATIENT
Start: 2024-03-11

## 2024-03-25 DIAGNOSIS — I10 BENIGN HYPERTENSION: ICD-10-CM

## 2024-03-25 DIAGNOSIS — M70.51 PES ANSERINUS BURSITIS OF RIGHT KNEE: ICD-10-CM

## 2024-03-25 DIAGNOSIS — Z79.899 ENCOUNTER FOR LONG-TERM (CURRENT) USE OF OTHER MEDICATIONS: ICD-10-CM

## 2024-03-27 NOTE — TELEPHONE ENCOUNTER
Refill Routing Note   Medication(s) are not appropriate for processing by [facility] for the following reason(s):        Non-participating provider    [facility] action(s):  Route               Appointments  past 12m or future 3m with PCP    Date Provider   Last Visit   12/13/2023 Roselyn Kim NP   Next Visit   Visit date not found Roselyn Kim NP   ED visits in past 90 days: 0        Note composed:7:55 PM 03/26/2024

## 2024-04-03 RX ORDER — AMLODIPINE BESYLATE 10 MG/1
10 TABLET ORAL
Qty: 90 TABLET | Refills: 2 | Status: SHIPPED | OUTPATIENT
Start: 2024-04-03

## 2024-04-03 RX ORDER — HYDROXYCHLOROQUINE SULFATE 200 MG/1
400 TABLET, FILM COATED ORAL
Qty: 180 TABLET | Refills: 2 | Status: SHIPPED | OUTPATIENT
Start: 2024-04-03

## 2024-05-27 PROBLEM — J96.01 ACUTE RESPIRATORY FAILURE WITH HYPOXIA: Status: RESOLVED | Noted: 2023-11-07 | Resolved: 2024-05-27

## 2024-06-11 DIAGNOSIS — M17.11 PRIMARY OSTEOARTHRITIS OF RIGHT KNEE: ICD-10-CM

## 2024-07-11 RX ORDER — IBUPROFEN 800 MG/1
800 TABLET ORAL EVERY 8 HOURS PRN
Qty: 60 TABLET | Refills: 2 | OUTPATIENT
Start: 2024-07-11

## 2024-08-21 PROBLEM — K92.1 BLOOD PRESENT IN STOOL: Status: ACTIVE | Noted: 2024-08-21

## 2024-08-22 ENCOUNTER — TELEPHONE (OUTPATIENT)
Dept: GASTROENTEROLOGY | Facility: CLINIC | Age: 54
End: 2024-08-22
Payer: COMMERCIAL

## 2024-08-26 ENCOUNTER — PATIENT MESSAGE (OUTPATIENT)
Dept: GASTROENTEROLOGY | Facility: CLINIC | Age: 54
End: 2024-08-26
Payer: COMMERCIAL

## 2024-08-26 ENCOUNTER — TELEPHONE (OUTPATIENT)
Dept: GASTROENTEROLOGY | Facility: CLINIC | Age: 54
End: 2024-08-26
Payer: COMMERCIAL

## 2024-08-26 NOTE — TELEPHONE ENCOUNTER
pATIENT IS SCHEDULED FOR 9/16/2024 FOR A DOUBLE SENT THE SUPREP INSTRUCTIONS TO HIM IN HIS MY CHART.      ----- Message from Malgorzata Smith CST sent at 8/22/2024  6:54 AM CDT -----  Regarding: Date of service  Another one.

## 2024-08-30 DIAGNOSIS — J84.9 INTERSTITIAL LUNG DISEASE: ICD-10-CM

## 2024-08-31 NOTE — TELEPHONE ENCOUNTER
Refill Routing Note   Medication(s) are not appropriate for processing by [facility] for the following reason(s):        Non-participating provider:     [facility] action(s):  Route      Medication Therapy Plan:         Appointments  past 12m or future 3m with PCP    Date Provider   Last Visit   8/21/2024 Roselyn Kim NP   Next Visit   Visit date not found Roselyn Kim NP   ED visits in past 90 days: 0        Note composed:9:42 PM 08/30/2024

## 2024-09-02 RX ORDER — ALBUTEROL SULFATE 90 UG/1
2 INHALANT RESPIRATORY (INHALATION) EVERY 6 HOURS PRN
Qty: 18 G | Refills: 1 | Status: SHIPPED | OUTPATIENT
Start: 2024-09-02 | End: 2025-09-02

## 2024-09-16 ENCOUNTER — TELEPHONE (OUTPATIENT)
Dept: GASTROENTEROLOGY | Facility: CLINIC | Age: 54
End: 2024-09-16
Payer: COMMERCIAL

## 2024-10-28 ENCOUNTER — OFFICE VISIT (OUTPATIENT)
Dept: ORTHOPEDICS | Facility: CLINIC | Age: 54
End: 2024-10-28
Payer: COMMERCIAL

## 2024-10-28 VITALS — BODY MASS INDEX: 30.04 KG/M2 | HEIGHT: 64 IN | WEIGHT: 175.94 LBS

## 2024-10-28 DIAGNOSIS — M17.11 PRIMARY OSTEOARTHRITIS OF RIGHT KNEE: Primary | ICD-10-CM

## 2024-10-28 PROCEDURE — 20610 DRAIN/INJ JOINT/BURSA W/O US: CPT | Mod: RT,S$GLB,, | Performed by: NURSE PRACTITIONER

## 2024-10-28 PROCEDURE — 99213 OFFICE O/P EST LOW 20 MIN: CPT | Mod: 25,S$GLB,, | Performed by: NURSE PRACTITIONER

## 2024-10-28 PROCEDURE — 3008F BODY MASS INDEX DOCD: CPT | Mod: CPTII,S$GLB,, | Performed by: NURSE PRACTITIONER

## 2024-10-28 PROCEDURE — 4010F ACE/ARB THERAPY RXD/TAKEN: CPT | Mod: CPTII,S$GLB,, | Performed by: NURSE PRACTITIONER

## 2024-10-28 PROCEDURE — 1159F MED LIST DOCD IN RCRD: CPT | Mod: CPTII,S$GLB,, | Performed by: NURSE PRACTITIONER

## 2024-10-28 PROCEDURE — 1160F RVW MEDS BY RX/DR IN RCRD: CPT | Mod: CPTII,S$GLB,, | Performed by: NURSE PRACTITIONER

## 2024-10-28 PROCEDURE — 99999 PR PBB SHADOW E&M-EST. PATIENT-LVL IV: CPT | Mod: PBBFAC,,, | Performed by: NURSE PRACTITIONER

## 2024-10-28 RX ORDER — TRIAMCINOLONE ACETONIDE 40 MG/ML
40 INJECTION, SUSPENSION INTRA-ARTICULAR; INTRAMUSCULAR
Status: COMPLETED | OUTPATIENT
Start: 2024-10-28 | End: 2024-10-28

## 2024-10-28 RX ADMIN — TRIAMCINOLONE ACETONIDE 40 MG: 40 INJECTION, SUSPENSION INTRA-ARTICULAR; INTRAMUSCULAR at 06:10

## 2024-11-07 PROBLEM — K92.1 BLOOD PRESENT IN STOOL: Status: RESOLVED | Noted: 2024-08-21 | Resolved: 2024-11-07

## 2024-11-11 DIAGNOSIS — M17.0 PRIMARY OSTEOARTHRITIS OF BOTH KNEES: Primary | ICD-10-CM

## 2024-11-20 ENCOUNTER — TELEPHONE (OUTPATIENT)
Dept: PULMONOLOGY | Facility: CLINIC | Age: 54
End: 2024-11-20
Payer: COMMERCIAL

## 2024-11-20 NOTE — TELEPHONE ENCOUNTER
Call was returned to patient in regards to scheduling a hospital follow up. Patient is scheduled with the first available appointment on 11/22/24 at 4 pm with Dr Pillai. Patient confirmed and verbalized understanding.

## 2024-11-20 NOTE — TELEPHONE ENCOUNTER
----- Message from Sandy sent at 11/20/2024  2:50 PM CST -----  Regarding: APPOINTMENT  Contact: [phone]  Calling to schedule an appointment per recent ER visit and abnormal imaging as soon as possible. Please call patient to schedule today.

## 2024-11-22 ENCOUNTER — OFFICE VISIT (OUTPATIENT)
Dept: PULMONOLOGY | Facility: CLINIC | Age: 54
End: 2024-11-22
Payer: COMMERCIAL

## 2024-11-22 VITALS
DIASTOLIC BLOOD PRESSURE: 81 MMHG | HEIGHT: 64 IN | WEIGHT: 183.44 LBS | SYSTOLIC BLOOD PRESSURE: 154 MMHG | HEART RATE: 99 BPM | OXYGEN SATURATION: 96 % | BODY MASS INDEX: 31.32 KG/M2

## 2024-11-22 DIAGNOSIS — J84.9 INTERSTITIAL LUNG DISEASE: ICD-10-CM

## 2024-11-22 DIAGNOSIS — J96.11 CHRONIC HYPOXIC RESPIRATORY FAILURE: ICD-10-CM

## 2024-11-22 DIAGNOSIS — J06.9 UPPER RESPIRATORY TRACT INFECTION, UNSPECIFIED TYPE: ICD-10-CM

## 2024-11-22 DIAGNOSIS — R59.0 MEDIASTINAL LYMPHADENOPATHY: Primary | ICD-10-CM

## 2024-11-22 PROCEDURE — 99214 OFFICE O/P EST MOD 30 MIN: CPT | Mod: S$GLB,,, | Performed by: STUDENT IN AN ORGANIZED HEALTH CARE EDUCATION/TRAINING PROGRAM

## 2024-11-22 PROCEDURE — 99999 PR PBB SHADOW E&M-EST. PATIENT-LVL V: CPT | Mod: PBBFAC,,, | Performed by: STUDENT IN AN ORGANIZED HEALTH CARE EDUCATION/TRAINING PROGRAM

## 2024-11-22 RX ORDER — PREDNISONE 20 MG/1
TABLET ORAL
Qty: 8 TABLET | Refills: 0 | Status: SHIPPED | OUTPATIENT
Start: 2024-11-26 | End: 2024-12-06

## 2024-12-09 ENCOUNTER — OFFICE VISIT (OUTPATIENT)
Dept: ORTHOPEDICS | Facility: CLINIC | Age: 54
End: 2024-12-09
Payer: COMMERCIAL

## 2024-12-09 DIAGNOSIS — G89.29 CHRONIC BILATERAL LOW BACK PAIN WITHOUT SCIATICA: Primary | ICD-10-CM

## 2024-12-09 DIAGNOSIS — M17.0 PRIMARY OSTEOARTHRITIS OF BOTH KNEES: ICD-10-CM

## 2024-12-09 DIAGNOSIS — M54.50 CHRONIC BILATERAL LOW BACK PAIN WITHOUT SCIATICA: Primary | ICD-10-CM

## 2024-12-09 PROCEDURE — 99499 UNLISTED E&M SERVICE: CPT | Mod: S$GLB,,, | Performed by: NURSE PRACTITIONER

## 2024-12-09 PROCEDURE — 20610 DRAIN/INJ JOINT/BURSA W/O US: CPT | Mod: RT,S$GLB,, | Performed by: NURSE PRACTITIONER

## 2024-12-09 PROCEDURE — 99999 PR PBB SHADOW E&M-EST. PATIENT-LVL III: CPT | Mod: PBBFAC,,, | Performed by: NURSE PRACTITIONER

## 2024-12-09 NOTE — PROGRESS NOTES
Ronal Ham presents to clinic today for a right knee Synvisc-One injection.     Exam demonstrates the no effusion in the right knee, and the skin is intact.    Radiographs reveal degenerative changes of the right knee    Diagnosis: primary osteoarthritis of the right knee    After time out was performed and patient ID, side, and site were verified, the right knee was sterilly prepped in the standard fashion.  Verbal consent obtained. A 25-gauge needle was introduced into the right knee joint from an danni-lateral site without complication. The right knee wasthen injected with 6 ml of Synvisc-One. Sterile dressing was applied.  The patient was instructed to resume activities as tolerated and to call with any problems.     Patient will return as needed. PT orders sent to [facility] in [facility].

## 2024-12-11 ENCOUNTER — OFFICE VISIT (OUTPATIENT)
Dept: PULMONOLOGY | Facility: CLINIC | Age: 54
End: 2024-12-11
Payer: COMMERCIAL

## 2024-12-11 VITALS
WEIGHT: 187.81 LBS | BODY MASS INDEX: 32.06 KG/M2 | HEART RATE: 89 BPM | DIASTOLIC BLOOD PRESSURE: 84 MMHG | HEIGHT: 64 IN | OXYGEN SATURATION: 96 % | SYSTOLIC BLOOD PRESSURE: 144 MMHG

## 2024-12-11 DIAGNOSIS — J84.9 INTERSTITIAL LUNG DISEASE: Primary | ICD-10-CM

## 2024-12-11 DIAGNOSIS — J96.11 CHRONIC HYPOXIC RESPIRATORY FAILURE: ICD-10-CM

## 2024-12-11 DIAGNOSIS — R59.0 MEDIASTINAL LYMPHADENOPATHY: ICD-10-CM

## 2024-12-11 PROCEDURE — 99999 PR PBB SHADOW E&M-EST. PATIENT-LVL IV: CPT | Mod: PBBFAC,,, | Performed by: INTERNAL MEDICINE

## 2024-12-11 PROCEDURE — 3044F HG A1C LEVEL LT 7.0%: CPT | Mod: CPTII,S$GLB,, | Performed by: INTERNAL MEDICINE

## 2024-12-11 PROCEDURE — 4010F ACE/ARB THERAPY RXD/TAKEN: CPT | Mod: CPTII,S$GLB,, | Performed by: INTERNAL MEDICINE

## 2024-12-11 PROCEDURE — 3077F SYST BP >= 140 MM HG: CPT | Mod: CPTII,S$GLB,, | Performed by: INTERNAL MEDICINE

## 2024-12-11 PROCEDURE — 1159F MED LIST DOCD IN RCRD: CPT | Mod: CPTII,S$GLB,, | Performed by: INTERNAL MEDICINE

## 2024-12-11 PROCEDURE — 3079F DIAST BP 80-89 MM HG: CPT | Mod: CPTII,S$GLB,, | Performed by: INTERNAL MEDICINE

## 2024-12-11 PROCEDURE — 3008F BODY MASS INDEX DOCD: CPT | Mod: CPTII,S$GLB,, | Performed by: INTERNAL MEDICINE

## 2024-12-11 PROCEDURE — 99213 OFFICE O/P EST LOW 20 MIN: CPT | Mod: S$GLB,,, | Performed by: INTERNAL MEDICINE

## 2024-12-11 RX ORDER — GUAIFENESIN 600 MG/1
1200 TABLET, EXTENDED RELEASE ORAL 2 TIMES DAILY
Qty: 120 TABLET | Refills: 1 | Status: SHIPPED | OUTPATIENT
Start: 2024-12-11 | End: 2025-12-11

## 2024-12-11 NOTE — PROGRESS NOTES
"Subjective:     Reason for visit:  systemic lupus erythematosus with lung involvement)    Patient ID:  Ronal Ham is a 54 y.o. male with SLE, Raynaud's phenomenon, GERD, HTN    Interval History:  Mr. Ham was recently very sick and was seen in the ER and then in [facility] and treated with a prolonged steroid taper.  He likely developed some type of viral infection that triggered an ILD flare.  He does feel little better today after finishing a prolonged tend steroid taper last week but is still having some shortness of breath.  He does not have a follow up with Rheumatology scheduled yet but does plan on following up with them.  CTA obtained in the ER at the time of his illness showed enlarged mediastinal adenopathy so we will repeat that in a few weeks to ensure it was only reactive and there was not something else going on.    Additional Pulmonary History:  Childhood Illnesses:  None   Occupational/Environmental: Unknown  Tobacco/Smoking:  Never    Objective:     Vitals:    12/11/24 1515   BP: (!) 144/84   BP Location: Left arm   Patient Position: Sitting   Pulse: 89   SpO2: 96%   Weight: 85.2 kg (187 lb 13.3 oz)   Height: 5' 4" (1.626 m)         Physical Exam  Vitals and nursing note reviewed.   Constitutional:       General: He is not in acute distress.     Appearance: He is not ill-appearing, toxic-appearing or diaphoretic.   HENT:      Head: Normocephalic and atraumatic.      Nose: No rhinorrhea.      Mouth/Throat:      Mouth: Mucous membranes are moist.   Eyes:      General: No scleral icterus.     Extraocular Movements: Extraocular movements intact.   Cardiovascular:      Rate and Rhythm: Normal rate and regular rhythm.   Pulmonary:      Effort: No tachypnea, accessory muscle usage, respiratory distress or retractions.      Breath sounds: Rales (bases) present. No rhonchi.   Abdominal:      General: There is no distension.   Musculoskeletal:      Right lower leg: No edema.      Left lower " leg: No edema.   Skin:     General: Skin is warm and dry.      Coloration: Skin is not jaundiced.      Findings: No rash.   Neurological:      General: No focal deficit present.      Mental Status: Mental status is at baseline.          Personal Diagnostic Review and Interpretation    02/14/2023 CXR:  Relatively similar prominent interstitium but possible LLL opacity.  Aortic atherosclerosis.    07/11/2023 CT Chest without contrast: CT with peripheral and basilar GGOs with traction bronchiectasis, reticulations and honeycombing    04/20/2021 CT chest without contrast:  Basilar and peripheral GGOs with reticulations and varicoid bronchiectasis    11/05/2014 CT chest with contrast:  Bibasilar GGOs with upper lung zone bullous disease      Pertinent Studies Reviewed & Interpreted:     Pulmonary Function Tests:     08/02/2023: FEV1 64.1, FVC 65.1, FEV1/FVC 99.2, no bronchodilator response, TLC 64.7, DLCO 40  04/06/2021:  FEV1 88, FVC 92, FEV1/FVC 80; no bronchodilator response.  TLC 74, FRC 56, RV 54.  DLCO 74  05/13/2019:  FEV1 68, FVC 73, FEV1/FVC 93, FEF 25-75 51; no bronchodilator response.  TLC 56, FRC 54, RV 30.  DLCO 46    6 Minute Walk Tests:     08/02/2023: 365.76 SpO2 90% on room air peak exercise. Appropriate tachycardiac and hypertensive response  04/20/2021: 406 m (451 m).  SpO2 91% on room air peak exercise.  Appropriate tachycardic and hypertensive response    Echocardiograms:   05/10/2017 stress echo:  EF 55%; 11 Mets; concentric remodeling with LAE (VILMA 33)      Assessment & Plan:      Problem List Items Addressed This Visit          Pulmonary    Interstitial lung disease - Primary    Current Assessment & Plan     SLE related ILD. Currently on plaquenil and mycophenolate. Follows with Rheum.  Recently treated for possible ILD flare in the setting of acute respiratory illness, completed a steroid taper  - Continue plaquenil and mycophenolate  - Repeat PFTs and walk test in 3 months after acute  respiratory illness has improved  - Continue Breztri  - Will follow up with Rheumatology early next year          Relevant Orders    Ambulatory referral/consult to Pulmonary Rehab    Echo    Chronic hypoxic respiratory failure    Current Assessment & Plan     Has prn oxygen at home. Has been needing oxygen more often and at all times with exertion. He is using 2 L. he is much better now and back to using it more as needed            Other    Mediastinal lymphadenopathy    Current Assessment & Plan     We will repeat CTA in the next few weeks and see if there is any concern for ongoing enlarged adenopathy               RETURN TO CLINIC IN 2 MONTHS    Portions of the record may have been created with voice-recognition software. Occasional wrong-word or sound-a-like substitutions may have occurred due to the inherent limitations of voice-recognition software. Read the chart carefully and recognize, using context, where substitutions have occurred.    Farzaneh Schumacher M.D.  Pulmonary/Critical Care

## 2024-12-11 NOTE — ASSESSMENT & PLAN NOTE
SLE related ILD. Currently on plaquenil and mycophenolate. Follows with Rheum.  Recently treated for possible ILD flare in the setting of acute respiratory illness, completed a steroid taper  - Continue plaquenil and mycophenolate  - Repeat PFTs and walk test in 3 months after acute respiratory illness has improved  - Continue Breztri  - Will follow up with Rheumatology early next year

## 2024-12-11 NOTE — ASSESSMENT & PLAN NOTE
We will repeat CTA in the next few weeks and see if there is any concern for ongoing enlarged adenopathy

## 2024-12-11 NOTE — ASSESSMENT & PLAN NOTE
Has prn oxygen at home. Has been needing oxygen more often and at all times with exertion. He is using 2 L. he is much better now and back to using it more as needed

## 2024-12-16 ENCOUNTER — OCCUPATIONAL HEALTH (OUTPATIENT)
Dept: URGENT CARE | Facility: CLINIC | Age: 54
End: 2024-12-16

## 2024-12-16 DIAGNOSIS — Z02.89 ENCOUNTER FOR EXAMINATION REQUIRED BY DEPARTMENT OF TRANSPORTATION (DOT): Primary | ICD-10-CM

## 2024-12-20 ENCOUNTER — HOSPITAL ENCOUNTER (OUTPATIENT)
Dept: RADIOLOGY | Facility: HOSPITAL | Age: 54
Discharge: HOME | End: 2024-12-20
Attending: STUDENT IN AN ORGANIZED HEALTH CARE EDUCATION/TRAINING PROGRAM
Payer: COMMERCIAL

## 2024-12-20 ENCOUNTER — HOSPITAL ENCOUNTER (OUTPATIENT)
Dept: CARDIOLOGY | Facility: HOSPITAL | Age: 54
Discharge: HOME | End: 2024-12-20
Attending: INTERNAL MEDICINE
Payer: COMMERCIAL

## 2024-12-20 VITALS
HEIGHT: 64 IN | BODY MASS INDEX: 31.92 KG/M2 | SYSTOLIC BLOOD PRESSURE: 144 MMHG | DIASTOLIC BLOOD PRESSURE: 84 MMHG | HEART RATE: 82 BPM | WEIGHT: 187 LBS

## 2024-12-20 DIAGNOSIS — R59.0 MEDIASTINAL LYMPHADENOPATHY: ICD-10-CM

## 2024-12-20 DIAGNOSIS — J84.9 INTERSTITIAL LUNG DISEASE: ICD-10-CM

## 2024-12-20 LAB
ASCENDING AORTA: 3.34 CM
AV AREA BY CONTINUOUS VTI: 3.4 CM2
AV INDEX (PROSTH): 0.87
AV LVOT MEAN GRADIENT: 3 MMHG
AV LVOT PEAK GRADIENT: 6 MMHG
AV MEAN GRADIENT: 4.1 MMHG
AV PEAK GRADIENT: 6.8 MMHG
AV VALVE AREA BY VELOCITY RATIO: 3.8 CM²
AV VALVE AREA: 3.3 CM2
AV VELOCITY RATIO: 1
BSA FOR ECHO PROCEDURE: 1.96 M2
CV ECHO LV RWT: 0.31 CM
DOP CALC AO PEAK VEL: 1.3 M/S
DOP CALC AO VTI: 23.8 CM
DOP CALC LVOT AREA: 3.8 CM2
DOP CALC LVOT DIAMETER: 2.2 CM
DOP CALC LVOT PEAK VEL: 1.3 M/S
DOP CALC LVOT STROKE VOLUME: 78.3 CM3
DOP CALCLVOT PEAK VEL VTI: 20.6 CM
E WAVE DECELERATION TIME: 189.52 MS
E/A RATIO: 1.05
E/E' RATIO: 6.13 M/S
ECHO EF ESTIMATED: 63 %
ECHO LV POSTERIOR WALL: 0.8 CM (ref 0.6–1.1)
EJECTION FRACTION: 63 %
FRACTIONAL SHORTENING: 33.3 % (ref 28–44)
INTERVENTRICULAR SEPTUM: 0.8 CM (ref 0.6–1.1)
IVC DIAMETER: 1.97 CM
IVRT: 94.2 MS
LA MAJOR: 5 CM
LA MINOR: 4.96 CM
LA WIDTH: 4.08 CM
LEFT ATRIUM SIZE: 4.21 CM
LEFT ATRIUM VOLUME INDEX MOD: 30.8 ML/M2
LEFT ATRIUM VOLUME INDEX: 38.3 ML/M2
LEFT ATRIUM VOLUME MOD: 58.61 ML
LEFT ATRIUM VOLUME: 72.71 CM3
LEFT INTERNAL DIMENSION IN SYSTOLE: 3.4 CM (ref 2.1–4)
LEFT VENTRICLE DIASTOLIC VOLUME INDEX: 64.14 ML/M2
LEFT VENTRICLE DIASTOLIC VOLUME: 121.87 ML
LEFT VENTRICLE MASS INDEX: 73.9 G/M2
LEFT VENTRICLE SYSTOLIC VOLUME INDEX: 24.1 ML/M2
LEFT VENTRICLE SYSTOLIC VOLUME: 45.7 ML
LEFT VENTRICULAR INTERNAL DIMENSION IN DIASTOLE: 5.1 CM (ref 3.5–6)
LEFT VENTRICULAR MASS: 140.5 G
LV LATERAL E/E' RATIO: 5.11
LV SEPTAL E/E' RATIO: 7.67
MV A" WAVE DURATION": 176.97 MS
MV PEAK A VEL: 0.44 M/S
MV PEAK E VEL: 0.46 M/S
OHS CV RV/LV RATIO: 0.61 CM
PISA TR MAX VEL: 2.41 M/S
PULM VEIN A" WAVE DURATION": 176.97 MS
PULM VEIN S/D RATIO: 1.28
PULMONIC VEIN PEAK A VELOCITY: 0.2 M/S
PV PEAK D VEL: 0.4 M/S
PV PEAK S VEL: 0.51 M/S
RA MAJOR: 5.03 CM
RA PRESSURE ESTIMATED: 8 MMHG
RA WIDTH: 4.09 CM
RIGHT ATRIAL AREA: 19.1 CM2
RIGHT VENTRICLE DIASTOLIC BASEL DIMENSION: 3.1 CM
RV TB RVSP: 10 MMHG
RV TISSUE DOPPLER FREE WALL SYSTOLIC VELOCITY 1 (APICAL 4 CHAMBER VIEW): 16.8 CM/S
SINUS: 3.37 CM
STJ: 3 CM
TDI LATERAL: 0.09 M/S
TDI SEPTAL: 0.06 M/S
TDI: 0.08 M/S
TR MAX PG: 23 MMHG
TRICUSPID ANNULAR PLANE SYSTOLIC EXCURSION: 2.88 CM
TV PEAK GRADIENT: 23 MMHG
TV REST PULMONARY ARTERY PRESSURE: 31 MMHG
Z-SCORE OF LEFT VENTRICULAR DIMENSION IN END DIASTOLE: -0.44
Z-SCORE OF LEFT VENTRICULAR DIMENSION IN END SYSTOLE: 0.27

## 2024-12-20 PROCEDURE — 93306 TTE W/DOPPLER COMPLETE: CPT

## 2024-12-20 PROCEDURE — 71260 CT THORAX DX C+: CPT | Mod: TC

## 2024-12-20 PROCEDURE — 93306 TTE W/DOPPLER COMPLETE: CPT | Mod: 26,,, | Performed by: INTERNAL MEDICINE

## 2024-12-20 PROCEDURE — 25500020 PHARM REV CODE 255: Performed by: STUDENT IN AN ORGANIZED HEALTH CARE EDUCATION/TRAINING PROGRAM

## 2024-12-20 PROCEDURE — 71260 CT THORAX DX C+: CPT | Mod: 26,,, | Performed by: STUDENT IN AN ORGANIZED HEALTH CARE EDUCATION/TRAINING PROGRAM

## 2024-12-20 RX ADMIN — IOHEXOL 75 ML: 350 INJECTION, SOLUTION INTRAVENOUS at 03:12

## 2024-12-30 ENCOUNTER — TELEPHONE (OUTPATIENT)
Dept: PULMONOLOGY | Facility: CLINIC | Age: 54
End: 2024-12-30
Payer: COMMERCIAL

## 2024-12-30 ENCOUNTER — TELEPHONE (OUTPATIENT)
Dept: URGENT CARE | Facility: CLINIC | Age: 54
End: 2024-12-30

## 2024-12-30 NOTE — TELEPHONE ENCOUNTER
Spoke with pt in regards to his HOLD paperwork for his physical. I stated we needed an actual note from his doctor not the exam report. Pt understood.

## 2024-12-30 NOTE — TELEPHONE ENCOUNTER
I spoke with patient. 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 The paperwork he had Dr Schumacher fill out was not enough, now he needs a letter from Dr Schumacher. I let him know I will send a message to Dr Schumacher for review and to advise. Patient verbalized understanding.     [facility] fax#[phone]

## 2024-12-30 NOTE — TELEPHONE ENCOUNTER
----- Message from Lylette sent at 12/30/2024  1:55 PM CST -----  Regarding: Pt Advice  Contact: [phone]  Missed Callback         Pt returning callback from missed call. Requesting to speak with somebody in   office. Please call. [phone]

## 2024-12-30 NOTE — TELEPHONE ENCOUNTER
----- Message from Sandy sent at 12/30/2024 12:58 PM CST -----  Regarding: Work Physical Letter  Contact: [phone]  Type:  Needs Medical Advice    Who Called: Ronal  Symptoms (please be specific):    How long has patient had these symptoms:    Pharmacy name and phone #:    Would the patient rather a call back or a response via [facility]? call  Best Call Back Number: [phone]  Additional Information: requesting letter to be faxed for D.O.T. physical

## 2024-12-31 ENCOUNTER — TELEPHONE (OUTPATIENT)
Dept: PULMONOLOGY | Facility: CLINIC | Age: 54
End: 2024-12-31
Payer: COMMERCIAL

## 2025-01-10 ENCOUNTER — TELEPHONE (OUTPATIENT)
Dept: PULMONOLOGY | Facility: CLINIC | Age: 55
End: 2025-01-10
Payer: COMMERCIAL

## 2025-01-10 NOTE — TELEPHONE ENCOUNTER
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 Please call [phone]

## 2025-01-10 NOTE — TELEPHONE ENCOUNTER
Call was returned to patient in regards to his CDL sent to [employer]. Patient states he need Dr. Schumacher to send a letter. I advised patient to reach out to the company to see what paperwork is needed. Patient states he'll give the office a call back. Office number was provided. Patient verbalized understanding.    Patient clinic notes was faxed ([phone]) on 12/20/24 and confirmed at 3:13 pm.

## 2025-01-14 ENCOUNTER — TELEPHONE (OUTPATIENT)
Dept: PULMONOLOGY | Facility: CLINIC | Age: 55
End: 2025-01-14
Payer: COMMERCIAL

## 2025-01-14 ENCOUNTER — DOCUMENTATION ONLY (OUTPATIENT)
Dept: PULMONOLOGY | Facility: HOSPITAL | Age: 55
End: 2025-01-14
Payer: COMMERCIAL

## 2025-01-14 NOTE — LETTER
January 14, 2025    Ronal Ham  [address] [facility] [address]            [address]  [facility] [address]  Phone: [phone] January 14, 2025     Patient: Ronal Ham   YOB: 1970   Date of Visit: 1/14/2025       To Whom It May Concern:     Ronal Ham is under my care for his pulmonary disease. He has a stable disease that may occasionally have exacerbations that require medical care. He does have Chronic hypoxic respiratory failure that is stable with the use of his portable oxygen and he should be allowed to use his oxygen as needed. This disease should not interfere with his job unless he has an acute change in his condition but he would be aware and able to make arrangements if needed.     If you have any questions or concerns, please don't hesitate to call.    Sincerely,        Farzaneh Schumacher MD

## 2025-01-15 ENCOUNTER — TELEPHONE (OUTPATIENT)
Dept: URGENT CARE | Facility: CLINIC | Age: 55
End: 2025-01-15
Payer: COMMERCIAL

## 2025-01-15 NOTE — TELEPHONE ENCOUNTER
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  Patient verbalized understanding.

## 2025-01-24 ENCOUNTER — OFFICE VISIT (OUTPATIENT)
Dept: OTOLARYNGOLOGY | Facility: CLINIC | Age: 55
End: 2025-01-24
Payer: COMMERCIAL

## 2025-01-24 ENCOUNTER — CLINICAL SUPPORT (OUTPATIENT)
Dept: OTOLARYNGOLOGY | Facility: CLINIC | Age: 55
End: 2025-01-24
Payer: COMMERCIAL

## 2025-01-24 ENCOUNTER — TELEPHONE (OUTPATIENT)
Dept: URGENT CARE | Facility: CLINIC | Age: 55
End: 2025-01-24
Payer: COMMERCIAL

## 2025-01-24 VITALS
SYSTOLIC BLOOD PRESSURE: 133 MMHG | DIASTOLIC BLOOD PRESSURE: 91 MMHG | BODY MASS INDEX: 31.99 KG/M2 | WEIGHT: 187.38 LBS | HEIGHT: 64 IN | HEART RATE: 85 BPM

## 2025-01-24 DIAGNOSIS — H93.13 TINNITUS OF BOTH EARS: Primary | ICD-10-CM

## 2025-01-24 DIAGNOSIS — J32.0 CHRONIC MAXILLARY SINUSITIS: Primary | ICD-10-CM

## 2025-01-24 DIAGNOSIS — H90.3 SENSORINEURAL HEARING LOSS (SNHL) OF BOTH EARS: ICD-10-CM

## 2025-01-24 DIAGNOSIS — H93.13 TINNITUS OF BOTH EARS: ICD-10-CM

## 2025-01-24 DIAGNOSIS — J30.2 SEASONAL ALLERGIC RHINITIS, UNSPECIFIED TRIGGER: ICD-10-CM

## 2025-01-24 PROCEDURE — 4010F ACE/ARB THERAPY RXD/TAKEN: CPT | Mod: CPTII,S$GLB,, | Performed by: STUDENT IN AN ORGANIZED HEALTH CARE EDUCATION/TRAINING PROGRAM

## 2025-01-24 PROCEDURE — 92557 COMPREHENSIVE HEARING TEST: CPT | Mod: S$GLB,,,

## 2025-01-24 PROCEDURE — 3080F DIAST BP >= 90 MM HG: CPT | Mod: CPTII,S$GLB,, | Performed by: STUDENT IN AN ORGANIZED HEALTH CARE EDUCATION/TRAINING PROGRAM

## 2025-01-24 PROCEDURE — 3008F BODY MASS INDEX DOCD: CPT | Mod: CPTII,S$GLB,, | Performed by: STUDENT IN AN ORGANIZED HEALTH CARE EDUCATION/TRAINING PROGRAM

## 2025-01-24 PROCEDURE — 3075F SYST BP GE 130 - 139MM HG: CPT | Mod: CPTII,S$GLB,, | Performed by: STUDENT IN AN ORGANIZED HEALTH CARE EDUCATION/TRAINING PROGRAM

## 2025-01-24 PROCEDURE — 1160F RVW MEDS BY RX/DR IN RCRD: CPT | Mod: CPTII,S$GLB,, | Performed by: STUDENT IN AN ORGANIZED HEALTH CARE EDUCATION/TRAINING PROGRAM

## 2025-01-24 PROCEDURE — 99204 OFFICE O/P NEW MOD 45 MIN: CPT | Mod: S$GLB,,, | Performed by: STUDENT IN AN ORGANIZED HEALTH CARE EDUCATION/TRAINING PROGRAM

## 2025-01-24 PROCEDURE — 92567 TYMPANOMETRY: CPT | Mod: S$GLB,,,

## 2025-01-24 PROCEDURE — 1159F MED LIST DOCD IN RCRD: CPT | Mod: CPTII,S$GLB,, | Performed by: STUDENT IN AN ORGANIZED HEALTH CARE EDUCATION/TRAINING PROGRAM

## 2025-01-24 NOTE — PROGRESS NOTES
Ronal Ham, a 54 y.o. male, was seen today in the clinic for an audiologic evaluation. Mr. Ham reported tinnitus in both ears, but noted it was more bothersome in the right ear. He reported occasional imbalance. Patient denied otalgia and aural fullness.    Tympanometry revealed Type A tympanogram in the right ear and Type A tympanogram in the left ear. Audiogram results revealed normal hearing sensitivity with a mild sensorineural hearing loss (SNHL) at 2000 Hz in the right ear and normal hearing sensitivity with a mild SNHL at 2000 Hz in the left ear.  Speech reception thresholds were noted at 15 dBHL in the right ear and 15 dBHL in the left ear.  Speech discrimination scores were 100% in the right ear and 100% in the left ear.    Recommendations:  Otologic evaluation  Annual audiogram or sooner if change is perceived  Hearing protection in noise

## 2025-01-24 NOTE — PROGRESS NOTES
Ear, Nose, & Throat  Otolaryngology - Head & Neck Surgery        Subjective:     Chief Complaint:   Chief Complaint   Patient presents with    Tinnitus       Ronal Ham is a 54 y.o. male who was referred to me by Roselyn Kim in consultation for tinnitus.    Initial referral for tinnitus.  Patient notes intermittent nonpulsatile tinnitus without subjective hearing loss.  No other significant otologic history.    He is more interested in discussing his sinus issues.  He states that he has persistent right-sided aural fullness for the past year which worsens with his sinus issues.  He notes temporary relief but popping his ears.    Has a long history of sinus issues.  Currently taking Claritin and Flonase.  He works as a .  Notes strong symptoms consistent with nasal histamine release including rhinitis, ocular pruritus, and sneezing fits.  He has not been allergy tested.  He states that his daughter was recently tested and noted to be strongly reactive to grass allergens.  Denies any previous history of asthma or eczema.  He states that he gets 2-3 sinus infections per year.  Last course of Augmentin was 11/18/2023.  He states that he knows that he is getting an infection by bilateral maxillary sinus pressure and purulent rhinorrhea.    Past Medical History  Active Ambulatory Problems     Diagnosis Date Noted    SLE (systemic lupus erythematosus) 09/07/2012    Benign hypertension 09/07/2012    Raynaud's syndrome 09/19/2012    Interstitial lung disease 09/19/2012    GERD (gastroesophageal reflux disease) 10/03/2012    Pain in limb 12/03/2013    Tinea pedis 04/01/2014    Male erectile disorder 04/01/2014    Chronic cough 09/02/2014    Effusion of left knee joint 11/17/2015    Chondromalacia of left patellofemoral joint 11/17/2015    Systemic lupus erythematosus with lung involvement     Low testosterone in male 12/24/2018    Chronic pain 06/02/2021    Chronic midline low back pain with  bilateral sciatica 08/11/2021    Drug-induced immunodeficiency 10/20/2022    Primary osteoarthritis of right knee 10/20/2022    Pain and swelling of right knee 10/26/2022    Decreased range of motion with decreased strength 10/26/2022    Impaired functional mobility, balance, gait, and endurance 10/26/2022    Right renal mass 08/31/2023    Impaired mobility and activities of daily living 10/10/2023    Precordial pain 11/28/2023    Palpitations 11/28/2023    Upper respiratory infection 11/22/2024    Mediastinal lymphadenopathy 11/22/2024    Chronic hypoxic respiratory failure 11/22/2024     Resolved Ambulatory Problems     Diagnosis Date Noted    Leukopenia 09/19/2012    Chronic rhinosinusitis 10/03/2012    Cough 07/20/2013    Encounter for vitamin deficiency screening 04/01/2014    Sinusitis, acute 09/02/2014    Prediabetes 09/11/2015    Decreased ROM of lumbar spine 03/20/2020    Decreased ROM of right knee 03/20/2020    Decreased strength 03/20/2020    Gait abnormality 03/20/2020    SOB (shortness of breath)     Weakness of both lower extremities 06/01/2021    Decreased range of motion of lumbar spine 06/01/2021    ILD (interstitial lung disease) Exacerbation 11/07/2023    Acute respiratory failure with hypoxia 11/07/2023    Blood present in stool 08/21/2024     Past Medical History:   Diagnosis Date    Arthritis     Eye injury     Hypertension     Lupus (systemic lupus erythematosus)     Pulmonary fibrosis     Raynaud phenomenon        Past Surgical History  He has a past surgical history that includes Hernia repair; Transforaminal epidural injection of steroid (Bilateral, 06/02/2021); Colonoscopy (N/A, 07/14/2022); Colonoscopy (N/A, 09/19/2022); Colonoscopy (09/20/2024); Esophagogastroduodenoscopy (N/A, 9/20/2024); and Colonoscopy (N/A, 9/20/2024).    Past Surgical History:   Procedure Laterality Date    COLONOSCOPY N/A 07/14/2022    Procedure: COLONOSCOPY;  Surgeon: Raman Brenner MD;  Location: [address]  [facility]);  Service: Endoscopy;  Laterality: N/A;  fully vaccinated  instructions emailed    COLONOSCOPY N/A 09/19/2022    Procedure: COLONOSCOPY;  Surgeon: Guicho Chavarria MD;  [facility]  Service: Endoscopy;  Laterality: N/A;  previous order placed by Dr. RANDA Hurt on 4/26/22 unusable  fully vaccinated, prep instr emailed -ml    COLONOSCOPY  09/20/2024    COLONOSCOPY N/A 9/20/2024    Procedure: COLONOSCOPY;  Surgeon: Lilly Gruber MD;  Location: [facility];  Service: Endoscopy;  Laterality: N/A;    ESOPHAGOGASTRODUODENOSCOPY N/A 9/20/2024    Procedure: EGD (ESOPHAGOGASTRODUODENOSCOPY);  Surgeon: Lilly Gruber MD;  Location: [facility];  Service: Endoscopy;  Laterality: N/A;    HERNIA REPAIR      TRANSFORAMINAL EPIDURAL INJECTION OF STEROID Bilateral 06/02/2021    Procedure: LUMBAR TRANSFORAMINAL BILATERAL L4/5 DIRECT REFERRAL;  Surgeon: Blayne Garcia MD;  Location: [facility];  Service: Pain Management;  Laterality: Bilateral;  NEEDS CONSENT        Family History  His family history includes Glaucoma in his brother and father; Heart disease in his father and mother; No Known Problems in his daughter, maternal aunt, maternal grandfather, maternal grandmother, maternal uncle, paternal aunt, paternal grandfather, paternal grandmother, paternal uncle, sister, and son.    Social History  He reports that he has never smoked. He has never been exposed to tobacco smoke. He has never used smokeless tobacco. He reports that he does not drink alcohol and does not use drugs.    Allergies  He is allergic to carafate  [sucralfate].    Medications  He has a current medication list which includes the following prescription(s): albuterol, albuterol-ipratropium, amlodipine, breztri aerosphere, cetirizine, guaifenesin, hydrocodone-acetaminophen, hydroxychloroquine, montelukast, mycophenolate, pantoprazole, promethazine-dextromethorphan, sildenafil, testosterone cypionate, valsartan, azelastine, and  "zolpidem.    ROS:  Pertinent positive and negative review of systems as noted in HPI.     Objective:     BP (!) 133/91 (BP Location: Left arm, Patient Position: Sitting)   Pulse 85   Ht 5' 4" (1.626 m)   Wt 85 kg (187 lb 6.3 oz)   BMI 32.17 kg/m²    Physical Exam  Constitutional: Well appearing, communicating. No acute distress  Voice: Euphonic  Eyes: Conjunctiva WNL, Pupils reactive  Head/Face: House Brackmann I Bilaterally.  Right Ear:    Auricle normally developed   EAC: non- edematous, normal   Tympanic membrane: intact and retracted mild   Middle Ear: No middle ear effusion, auto insufflate  Left Ear:    Auricle normally developed   EAC: non- edematous, normal   Tympanic membrane: intact   Middle Ear: No middle ear effusion, auto insufflate  Nose:    Septum Deviated Right    moderate edematous turbinate hypertrophy   non-purulent rhinorrhea present   External valve collapse absent   Modified [place] DNT   Tenderness to sinus palpation in both maxillary    Oropharynx: No masses or lesions within oropharynx. Tonsillar fossas symmetric. No erythema. No cobblestoning  Neck/Lymphatic: No cervical lymphadenopathy. No masses noted on exam.  Neuro/Psychiatric:     Affect: Appropriate   Eyes: EOMI intact  Respiratory: No increased WOB, no stridor     Data Review:   LABS  WBC (K/uL)   Date Value   12/03/2024 7.97     Eos # (K/uL)   Date Value   12/03/2024 0.2     Platelets (K/uL)   Date Value   12/03/2024 315     Hemoglobin A1C (%)   Date Value   12/03/2024 5.3        I have independently reviewed the lab results shown above. Findings significant for the conditions being treated include: normal    IMAGING    No pertinent imaging available    AUDIO    I have independently reviewed the following audiogram and reviewed the report. Findings as follows:     Pure Tone Audiometry: Symmetric  Right - Normal (0-25 dB) to Mild (26-40 dB) sensorineural hearing loss  Left - Normal (0-25 dB) to Mild (26-40 dB) sensorineural " hearing loss    Tympanometry  Right: Type A  Left: Type A    Word Discrimination Score  Right: 100%  Left 100%    Acoustic Reflexes  Right Stimulation - Right reflex: DNT   Left Reflex: DNT  Left Stimulation - Right reflex: DNT   Left Reflex: DNT      Procedures:         Assessment:     1. Chronic maxillary sinusitis    2. Seasonal allergic rhinitis, unspecified trigger    3. Tinnitus of both ears    4. Sensorineural hearing loss (SNHL) of both ears        Plan:     I had a long discussion with the patient regarding his condition and the further workup and management options.  Recommend NSI in continuation with current regimen of flonase and claritin. I will get a CT of the sinus to assess for CRS. Will call with results and triage appropriately.  Consider RAST if negative    Discussed bilateral sensorineural hearing loss.  Encouraged hearing protection in loud noise.  Overall patient is fairly on bothered by tinnitus.  More concerned with aural fullness from uncontrolled sinonasal disease.      Voice recognition software was used in the creation of this note/communication and any sound-alike errors which may have occurred from its use should be taken in context when interpreting. If such errors prevent a clear understanding of the note/communication, please contact the office for clarification.     Orders Placed This Encounter   Procedures    CT Sinuses without Contrast

## 2025-01-24 NOTE — TELEPHONE ENCOUNTER
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 Pt understood and stated he will be in Monday.

## 2025-01-31 ENCOUNTER — TELEPHONE (OUTPATIENT)
Dept: URGENT CARE | Facility: CLINIC | Age: 55
End: 2025-01-31
Payer: COMMERCIAL

## 2025-04-16 ENCOUNTER — TELEPHONE (OUTPATIENT)
Dept: PULMONOLOGY | Facility: CLINIC | Age: 55
End: 2025-04-16
Payer: COMMERCIAL

## 2025-04-16 NOTE — TELEPHONE ENCOUNTER
----- Message from Celio sent at 4/16/2025 10:07 AM CDT -----  Consult/AdvisoryName Of Caller:Ronal Contact Preference:[phone] of call: Pt returning a missed call he is going back to work he stated make the appt and leave a detail msg regarding time and date

## 2025-04-16 NOTE — TELEPHONE ENCOUNTER
----- Message from Med Assistant Hunter sent at 4/16/2025  9:39 AM CDT -----  Type:  Appointment Request  Name of Caller:pt When is the first available appointment?No accessWould the patient rather a call back or a response via [facility]? Call Best Call Back Number: [phone] Information: Pt finally got his insurance back active and he would like to reschedule all 3 apts he had on 2.26.25. Pt is trying to be seen back to back like originally scheduled bt doctor does not have apts until June 9th. Please give pt a call back to discuss getting him scheduled early with doctor or getting him set up with PA in office. Once he is scheduled with doctor he can give us a call back to scheduled the walk test on same day.  
Attempted to contact patient in regard to scheduling an appointment with Dr Schumacher. No message, message left for the patient to call the office.   
27

## 2025-04-16 NOTE — TELEPHONE ENCOUNTER
Call was returned to patient in regards to scheduling a follow up visit. Patient is scheduled with Dr. Schumacher first available appointment on 5/14/25 at 10 am with PFT's and 6MWT prior. Patient is added to the wait list. Patient confirmed and verbalized understanding.   Name band;

## 2025-04-22 ENCOUNTER — OFFICE VISIT (OUTPATIENT)
Dept: ORTHOPEDICS | Facility: CLINIC | Age: 55
End: 2025-04-22
Payer: COMMERCIAL

## 2025-04-22 DIAGNOSIS — M17.11 PRIMARY OSTEOARTHRITIS OF RIGHT KNEE: ICD-10-CM

## 2025-04-22 DIAGNOSIS — M25.561 ACUTE PAIN OF RIGHT KNEE: Primary | ICD-10-CM

## 2025-04-22 PROCEDURE — 99213 OFFICE O/P EST LOW 20 MIN: CPT | Mod: 25,S$GLB,, | Performed by: NURSE PRACTITIONER

## 2025-04-22 PROCEDURE — 99999 PR PBB SHADOW E&M-EST. PATIENT-LVL III: CPT | Mod: PBBFAC,,, | Performed by: NURSE PRACTITIONER

## 2025-04-22 PROCEDURE — 20610 DRAIN/INJ JOINT/BURSA W/O US: CPT | Mod: RT,S$GLB,, | Performed by: NURSE PRACTITIONER

## 2025-04-22 RX ORDER — HYDROCODONE BITARTRATE AND ACETAMINOPHEN 5; 325 MG/1; MG/1
1 TABLET ORAL EVERY 6 HOURS PRN
Qty: 28 TABLET | Refills: 0 | Status: SHIPPED | OUTPATIENT
Start: 2025-04-22 | End: 2025-05-06 | Stop reason: ALTCHOICE

## 2025-04-22 RX ORDER — MELOXICAM 15 MG/1
15 TABLET ORAL DAILY
Qty: 30 TABLET | Refills: 1 | Status: SHIPPED | OUTPATIENT
Start: 2025-04-22

## 2025-04-22 RX ORDER — METHOCARBAMOL 750 MG/1
750 TABLET, FILM COATED ORAL 4 TIMES DAILY PRN
Qty: 40 TABLET | Refills: 0 | Status: SHIPPED | OUTPATIENT
Start: 2025-04-22 | End: 2025-05-06 | Stop reason: ALTCHOICE

## 2025-04-22 RX ADMIN — TRIAMCINOLONE ACETONIDE 40 MG: 40 INJECTION, SUSPENSION INTRA-ARTICULAR; INTRAMUSCULAR at 05:04

## 2025-04-22 NOTE — PROGRESS NOTES
CC: right knee pain      HPI:   Pt with c/o right knee pain. The pain is aching and global. He has been seen in the past and has known djd of the right knee. He has had cortisone injections with good relief and he would like to repeat that today. He is ambulating without assistive device. There is not a limp.      ROS  General: denies fever and chills  Resp: no c/o sob  CVS: no c/o cp  MSK: c/o right knee pain    PE  General: AAOx3, pleasant and cooperative  Resp: respirations even and unlabored  MSK: right knee exam  5 degrees extension  125 degrees flexion  No warmth or erythema   - effusion  + crepitus  5/5 quad and hamstring strength    Assessment:  Right knee djd    Plan:  Cortisone injection right knee today  RICE  Nsaids prn  F/u as needed    Knee Injection Procedure Note  Diagnosis: right knee degenerative arthritis  Indications: right knee pain  Procedure Details: Verbal consent was obtained for the procedure. The injection site was identified and the skin was prepared with alcohol. The right knee was injected from an anterolateral approach with 1 ml of Kenalog and 4 ml Lidocaine under sterile technique using a 25 gauge needle. The needle was removed and the area cleansed and dressed.  Complications:  Patient tolerated the procedure well.    he was advised to rest the knee today, using ice and elevation as needed for comfort and swelling.Immediate relief of the knee pain may be short lived and secondary to the lidocaine. he may have an increase in discomfort tonight followed by steady improvement over the next several days. It may take 1-2 weeks following the injection to get the full benefit of the medication.            This note was generated with the assistance of ambient listening technology. Verbal consent was obtained by the patient and accompanying visitor(s) for the recording of patient appointment to facilitate this note. I attest to having reviewed and edited the generated note for accuracy,  though some syntax or spelling errors may persist. Please contact the author of this note for any clarification.

## 2025-05-06 PROBLEM — R07.2 PRECORDIAL PAIN: Status: RESOLVED | Noted: 2023-11-28 | Resolved: 2025-05-06

## 2025-05-06 PROBLEM — R00.2 PALPITATIONS: Status: RESOLVED | Noted: 2023-11-28 | Resolved: 2025-05-06

## 2025-05-06 PROBLEM — J06.9 UPPER RESPIRATORY INFECTION: Status: RESOLVED | Noted: 2024-11-22 | Resolved: 2025-05-06

## 2025-05-07 RX ORDER — TRIAMCINOLONE ACETONIDE 40 MG/ML
40 INJECTION, SUSPENSION INTRA-ARTICULAR; INTRAMUSCULAR
Status: COMPLETED | OUTPATIENT
Start: 2025-04-22 | End: 2025-04-22

## 2025-05-14 ENCOUNTER — HOSPITAL ENCOUNTER (OUTPATIENT)
Dept: PULMONOLOGY | Facility: CLINIC | Age: 55
Discharge: HOME | End: 2025-05-14
Payer: COMMERCIAL

## 2025-05-14 ENCOUNTER — OFFICE VISIT (OUTPATIENT)
Dept: PULMONOLOGY | Facility: CLINIC | Age: 55
End: 2025-05-14
Payer: COMMERCIAL

## 2025-05-14 ENCOUNTER — PATIENT MESSAGE (OUTPATIENT)
Dept: RHEUMATOLOGY | Facility: CLINIC | Age: 55
End: 2025-05-14
Payer: COMMERCIAL

## 2025-05-14 VITALS
BODY MASS INDEX: 30.22 KG/M2 | DIASTOLIC BLOOD PRESSURE: 96 MMHG | SYSTOLIC BLOOD PRESSURE: 149 MMHG | HEIGHT: 64 IN | HEART RATE: 103 BPM | OXYGEN SATURATION: 99 % | WEIGHT: 177 LBS

## 2025-05-14 VITALS — BODY MASS INDEX: 30.22 KG/M2 | WEIGHT: 177 LBS | HEIGHT: 64 IN

## 2025-05-14 DIAGNOSIS — M32.13 SYSTEMIC LUPUS ERYTHEMATOSUS WITH LUNG INVOLVEMENT, UNSPECIFIED SLE TYPE: ICD-10-CM

## 2025-05-14 DIAGNOSIS — J84.9 INTERSTITIAL LUNG DISEASE: ICD-10-CM

## 2025-05-14 DIAGNOSIS — J96.11 CHRONIC HYPOXIC RESPIRATORY FAILURE: ICD-10-CM

## 2025-05-14 DIAGNOSIS — R59.0 MEDIASTINAL LYMPHADENOPATHY: Primary | ICD-10-CM

## 2025-05-14 DIAGNOSIS — K21.9 GASTROESOPHAGEAL REFLUX DISEASE, UNSPECIFIED WHETHER ESOPHAGITIS PRESENT: ICD-10-CM

## 2025-05-14 PROCEDURE — 99999 PR PBB SHADOW E&M-EST. PATIENT-LVL V: CPT | Mod: PBBFAC,,, | Performed by: INTERNAL MEDICINE

## 2025-05-14 RX ORDER — HYDROXYCHLOROQUINE SULFATE 200 MG/1
400 TABLET, FILM COATED ORAL DAILY
Qty: 180 TABLET | Refills: 3 | Status: SHIPPED | OUTPATIENT
Start: 2025-05-14 | End: 2026-05-14

## 2025-05-14 RX ORDER — MYCOPHENOLATE MOFETIL 500 MG/1
1000 TABLET ORAL 2 TIMES DAILY
Qty: 120 TABLET | Refills: 3 | Status: SHIPPED | OUTPATIENT
Start: 2025-05-14 | End: 2025-09-11

## 2025-05-14 RX ORDER — FLUCONAZOLE 200 MG/1
200 TABLET ORAL
COMMUNITY
Start: 2024-12-17

## 2025-05-14 RX ORDER — TRIAMCINOLONE ACETONIDE 0.25 MG/G
1 OINTMENT TOPICAL
COMMUNITY
Start: 2024-12-17

## 2025-05-14 RX ORDER — IPRATROPIUM BROMIDE AND ALBUTEROL SULFATE 2.5; .5 MG/3ML; MG/3ML
3 SOLUTION RESPIRATORY (INHALATION) EVERY 4 HOURS PRN
Qty: 75 ML | Refills: 2 | Status: SHIPPED | OUTPATIENT
Start: 2025-05-14 | End: 2026-05-14

## 2025-05-14 NOTE — PROGRESS NOTES
"Subjective:     Reason for visit:  systemic lupus erythematosus with lung involvement)    Patient ID:  Ronal Ham is a 54 y.o. male with SLE, Raynaud's phenomenon, GERD, HTN    Interval History:  Mr. Ham had a few months in between insurances and had difficulty getting his hydroxychloroquine and there was some concern that his mycophenolate was causing some skin side effects so he has just started it back in the last 2 weeks. He feels that his overall lung function is worse and his PFTs today are worse. We discussed starting ofev but decided to let him get back on his other medications first and follow up with rheumatology and repeat his PFTs next visit.     Additional Pulmonary History:  Childhood Illnesses:  None   Occupational/Environmental: Unknown  Tobacco/Smoking:  Never    Objective:     Vitals:    05/14/25 1013   BP: (!) 149/96   Pulse: 103   SpO2: 99%   Weight: 80.3 kg (177 lb)   Height: 5' 4" (1.626 m)         Physical Exam  Vitals and nursing note reviewed.   Constitutional:       General: He is not in acute distress.     Appearance: He is not ill-appearing, toxic-appearing or diaphoretic.   HENT:      Head: Normocephalic and atraumatic.      Nose: No rhinorrhea.      Mouth/Throat:      Mouth: Mucous membranes are moist.   Eyes:      General: No scleral icterus.     Extraocular Movements: Extraocular movements intact.   Cardiovascular:      Rate and Rhythm: Normal rate and regular rhythm.   Pulmonary:      Effort: No tachypnea, accessory muscle usage, respiratory distress or retractions.      Breath sounds: Rales (bases) present. No rhonchi.   Abdominal:      General: There is no distension.   Musculoskeletal:      Right lower leg: No edema.      Left lower leg: No edema.   Skin:     General: Skin is warm and dry.      Coloration: Skin is not jaundiced.      Findings: No rash.   Neurological:      General: No focal deficit present.      Mental Status: Mental status is at baseline.      "     Personal Diagnostic Review and Interpretation    02/14/2023 CXR:  Relatively similar prominent interstitium but possible LLL opacity.  Aortic atherosclerosis.    07/11/2023 CT Chest without contrast: CT with peripheral and basilar GGOs with traction bronchiectasis, reticulations and honeycombing    04/20/2021 CT chest without contrast:  Basilar and peripheral GGOs with reticulations and varicoid bronchiectasis    11/05/2014 CT chest with contrast:  Bibasilar GGOs with upper lung zone bullous disease      Pertinent Studies Reviewed & Interpreted:     Pulmonary Function Tests:   Decrease in all parameters on todays PFTs     6 Minute Walk Tests:     Repeated today  Continued hypoxia with exertion    Echocardiograms:   Results for orders placed during the hospital encounter of 12/20/24    Echo    Interpretation Summary    Left Ventricle: The left ventricle is normal in size. Ventricular mass is normal. Normal wall thickness. Normal wall motion. There is normal systolic function with a visually estimated ejection fraction of 60 - 65%. Ejection fraction is approximately 63%. There is normal diastolic function.    Right Ventricle: Normal right ventricular cavity size. Wall thickness is normal. Systolic function is normal.    Left Atrium: Left atrium is mildly dilated.    Tricuspid Valve: There is mild regurgitation.    Pulmonary Artery: The estimated pulmonary artery systolic pressure is 31 mmHg.    IVC/SVC: Intermediate venous pressure at 8 mmHg.    Pericardium: There is a  trivial-small effusion adjacent to the right atrium.        Assessment & Plan:      Problem List Items Addressed This Visit          Pulmonary    Interstitial lung disease    Current Assessment & Plan   SLE related ILD. Currently on plaquenil and mycophenolate. Follows with Rheum.  Just restarted mycophenolate after stopping due to concerns of side effects.   - Continue plaquenil and mycophenolate  - Repeat PFTs in 3 months since he has been off of  mycophenolate and plaquenil and if his FVC continues to decline or does not improve we discussed ofev and starting that at that time.   - Continue Breztri  - Will follow up with Rheumatology asap         Relevant Medications    albuterol-ipratropium (DUO-NEB) 2.5 mg-0.5 mg/3 mL nebulizer solution    Other Relevant Orders    Ambulatory referral/consult to Pulmonary Rehab    Flutter valve treatment    Chronic hypoxic respiratory failure    Current Assessment & Plan   Has prn oxygen at home. Has been needing oxygen more often and at all times with exertion. He is using 2 L. he is much better now and back to using it more as needed            Immunology/Multi System    Systemic lupus erythematosus with lung involvement    Overview   Treatment of the lupus will effectively treat underlying lung disease.  See the section on interstitial lung disease and lupus         Relevant Medications    mycophenolate (CELLCEPT) 500 mg Tab    albuterol-ipratropium (DUO-NEB) 2.5 mg-0.5 mg/3 mL nebulizer solution    Other Relevant Orders    Spirometry without Bronchodilator    Ambulatory referral/consult to Advanced Lung Disease    Ambulatory referral/consult to Pulmonary Rehab    Flutter valve treatment       GI    GERD (gastroesophageal reflux disease)    Current Assessment & Plan   Continue treatment as poor control will worsen his lung disease             Other    RESOLVED: Mediastinal lymphadenopathy - Primary    Current Assessment & Plan   Improved on previous imaging.                RETURN TO CLINIC IN 3 MONTHS    Portions of the record may have been created with voice-recognition software. Occasional wrong-word or sound-a-like substitutions may have occurred due to the inherent limitations of voice-recognition software. Read the chart carefully and recognize, using context, where substitutions have occurred.    Farzaneh Schumacher M.D.  Pulmonary/Critical Care

## 2025-05-15 ENCOUNTER — TELEPHONE (OUTPATIENT)
Dept: TRANSPLANT | Facility: CLINIC | Age: 55
End: 2025-05-15
Payer: COMMERCIAL

## 2025-05-15 DIAGNOSIS — J84.9 ILD (INTERSTITIAL LUNG DISEASE): Primary | ICD-10-CM

## 2025-05-15 LAB
ERVN2 LLN: -16448.91
ERVN2 PRE REF: 63.9 %
ERVN2 PRE: 0.69 L (ref -16448.91–16451.09)
ERVN2 REF: 1.09
ERVN2ULN: ABNORMAL
FEF 25 75 LLN: 1.73
FEF 25 75 PRE REF: 35.7 %
FEF 25 75 REF: 3.45
FET100 CHG: 17 %
FEV05 LLN: 1.18
FEV05 REF: 2.32
FEV1 CHG: 14 %
FEV1 FVC LLN: 67
FEV1 FVC PRE REF: 98.4 %
FEV1 FVC REF: 79
FEV1 LLN: 2.32
FEV1 PRE REF: 45.9 %
FEV1 REF: 3.04
FEV1 VOL CHG: 0.19
FEV1FVCZSCORE: -0.19
FEV1ZSCORE: -3.59
FRCN2 LLN: 2.23
FRCN2 PRE REF: 42 %
FRCN2 REF: 3.22
FRCN2ULN: 4.2
FVC CHG: 8.6 %
FVC LLN: 2.95
FVC PRE REF: 46.6 %
FVC REF: 3.84
FVC VOL CHG: 0.15
FVCZSCORE: -3.85
ICN2REF: 2.61
LLN IC N2: -9999997.39
PEF LLN: 6.26
PEF PRE REF: 67.4 %
PEF REF: 8.2
PHYSICIAN COMMENT: ABNORMAL
POST FEF 25 75: 1.59 L/S (ref 1.73–5.16)
POST FET 100: 7.04 SEC
POST FEV1 FVC: 81.75 % (ref 67.37–89.32)
POST FEV1: 1.59 L (ref 2.32–3.72)
POST FEV5: 1.28 L (ref 1.18–3.45)
POST FVC: 1.94 L (ref 2.95–4.73)
POST PEF: 5.19 L/S (ref 6.26–10.14)
PRE FEF 25 75: 1.23 L/S (ref 1.73–5.16)
PRE FET 100: 6.02 SEC
PRE FEV05 REF: 49.3 %
PRE FEV1 FVC: 77.87 % (ref 67.37–89.32)
PRE FEV1: 1.39 L (ref 2.32–3.72)
PRE FEV5: 1.14 L (ref 1.18–3.45)
PRE FRC N2: 1.35 L (ref 2.23–4.2)
PRE FVC: 1.79 L (ref 2.95–4.73)
PRE IC N2: 1.23 L (ref -9999997.39–#######.####)
PRE PEF: 5.52 L/S (ref 6.26–10.14)
PRE REF IC N2: 46.9 %
RVN2 LLN: 1.46
RVN2 PRE REF: 30.8 %
RVN2 PRE: 0.66 L (ref 1.46–2.81)
RVN2 REF: 2.13
RVN2TLCN2 LLN: 26.82
RVN2TLCN2 PRE REF: 71.3 %
RVN2TLCN2 PRE: 25.51 % (ref 26.82–44.78)
RVN2TLCN2 REF: 35.8
RVN2TLCN2ULN: 44.78
RVN2ULN: 2.81
TLCN2 LLN: 4.76
TLCN2 PRE REF: 43.6 %
TLCN2 PRE: 2.58 L (ref 4.76–7.06)
TLCN2 REF: 5.91
TLCN2ULN: 7.06
TLCN2ZSCORE: -4.76
ULN IC N2: ABNORMAL
VCMAXN2 LLN: 2.95
VCMAXN2 PRE REF: 50 %
VCMAXN2 PRE: 1.92 L (ref 2.95–4.73)
VCMAXN2 REF: 3.84
VCMAXN2ULN: 4.73

## 2025-05-15 NOTE — PROCEDURES
Ronal Ham is a 54 y.o.   male patient, who presents for a 6 minute walk test ordered by MD Freya.  The diagnosis is Interstitial Lung Disease.  The patient's BMI is 30.4 kg/m2. Predicted distance (lower limit of normal) is 427.82 meters.    Test Results:    The test was completed without stopping.    The total time walked was 360 seconds.  During walking, the patient reported:  Dyspnea. The patient used supplemental oxygen during testing.     05/15/2025---------Distance: 396.24 meters (1300 feet)     O2 Sat % Supplemental Oxygen Heart Rate Blood Pressure Lili Scale   Pre-exercise  (Resting) 99 % Room Air 103 bpm (!) 149/96  mmHg 3   During Exercise 78 % 2 L/M 121 bpm (!) 154/96  mmHg 4   Post-exercise   100 % 2 L/M  88 bpm    mmHg       Recovery Time: 163 seconds    Oxygen Qualification:     O2 Sat % Supplemental Oxygen Heart Rate Blood Pressure Lili Scale   Pre-exercise  (Resting) 100 % 2 L/M  91 bpm  (!) 145/98    mmHg 3    During Exercise 98 %  2 L/M  106 bpm  174/90   mmHg 4    Post-exercise   96 %  2 L/M  97 bpm      mmHg         Recovery Time: 60 seconds    Performing nurse/tech: Arnold GRAFF    PREVIOUS STUDY:   The patient had a previous study.  08/02/2023---------Distance: 365.76 meters (1200 feet)       O2 Sat % Supplemental Oxygen Heart Rate Blood Pressure Lili Scale   Pre-exercise  (Resting) 98 % Room Air 82 bpm 149/87 mmHg 0   During Exercise 90 % Room Air 102 bpm 155/85 mmHg 3   Post-exercise  (Recovery) 99 % Room Air  86 bpm            CLINICAL INTERPRETATION:  Six minute walk distance is 396.24 meters (1300 feet) with moderate dyspnea.  During exercise, there was significant desaturation while breathing supplemental oxygen.  Both blood pressure and heart rate remained stable with walking.  The patient did not report non-pulmonary symptoms during exercise.  The patient did complete the study, walking 360 seconds of the 360 second test.  The patient may benefit from using supplemental  oxygen.  Since the previous study in 8/2023, exercise capacity is unchanged.  Based upon age and body mass index, exercise capacity is less than predicted.   Oxygen saturation did improve while breathing supplemental oxygen.

## 2025-05-15 NOTE — TELEPHONE ENCOUNTER
"Order received from Dr. Evans.   Patient completed a 6 MWT on 5/14/25 with results pending.  Medical assistant Roseann Bowens called patient who accepted an ALD consult visit at 1400 pm on 5/29/25. A printed appointment slip has been mailed to the patient.     ----- Message from Ada Estrada DO sent at 5/15/2025 12:27 PM CDT -----  Regarding: RE: ALD Referral    Appropriate for ALD.  Needs 6MWT    ----- Message -----  From: Amarilis Alvarado RN  Sent: 5/15/2025   7:57 AM CDT  To: Ada Estrada DO  Subject: ALD Referral                                     [facility] ([facility]) [facility] Referral Note    Referral from: Dr. Farzaneh Schumacher  Lung diagnosis: Pulmonary Fibrosis Other, Specify Cause - SLE    Age: 54 y.o.    Height/Weight/BMI:  5' 4" / 80.3 kg / 30.38 kg/m²    Smoking history:   Social History  Tobacco Use    Smoking status: Never      Passive exposure: Never    Smokeless tobacco: Never    PFT date: 05/14/2025  FVC Pre-bronchodilator 1.79 (46.6%); Post-bronchodilator 1.96 (8.6% change)FEV1 1.9 (45.9%) TLC 2.58 (4.6%) DLCO - not done    6 MWT date: None since 2023    Chest CT date: 12/21/2024  Impression: No significant interval change of pulmonary fibrosis represented by reticular confluent densities and traction bronchiectasis in the lower lobes, right middle lobe and lingula consistent with NSIP pattern    Echo date: 12/20/2024    ·  Left Ventricle: The left ventricle is normal in size. Ventricular mass is normal. Normal wall thickness. Normal wall motion. There is normal systolic function with a visually estimated ejection fraction of 60 - 65%. Ejection fraction is approximately 63%. There is normal diastolic function.·  Right Ventricle: Normal right ventricular cavity size. Wall thickness is normal. Systolic function is normal.·  Left Atrium: Left atrium is mildly dilated.·  Tricuspid Valve: There is mild regurgitation.·  Pulmonary Artery: The estimated pulmonary artery systolic pressure " is 31 mmHg.·  IVC/SVC: Intermediate venous pressure at 8 mmHg.·  Pericardium: There is a  trivial-small effusion adjacent to the right atrium.    Other pertinent medical history: GERD, HTN    Recommendations?

## 2025-05-27 NOTE — PROGRESS NOTES
Advanced Lung Disease INITIAL EVALUATION                                                                                                                                           Reason for Visit:  Evaluation for lung transplant    Referring Physician: Farzaneh Schumacher MD    History of Present Illness: Ronal Ham is a 54 y.o. male who is on 2L of oxygen.  He is on no assisted ventilation.  His New York Heart Association Class is II and a Karnofsky score of 80% - Normal activity with effort: some symptoms of disease. He is not diabetic.    Requires Supplemental O2: Yes.  With exercise: Nasal cannula - 2 L/min.    Exacerbations: 0 occurrences  (Enter the number of times in the last year)    Microbiology Infections: No    Patient is a 53 yo A[facility] chronic respiratory failure with hypoxia, SLE with Raynaud syndrome, ILD who presents for evaluation and treatment of ILD.     Smoking hx: no consistent smoking.   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   singulair    Treatment hx:   Current- mycophenolate started 6 months. Had hyperpigmentations, tried to be off it but continued to have them when he was off. Plaquenil has been off of it for about a month.     Immunology:   Positive: LISA (high 1:2560), anti-Sm/RNP (high 292.48), RF (high 20)  Negative: dsDNA ab, anti SSA/SSB, anti Sm,     Patient was off mycophenolate and plaquenil for several months while in between insurances. He was concerned that mycophenolate had been causing skin issues. Has difficult with carrying heavy objects. Walking at a pace he becomes short of breath and starts coughing. Last seen ophthalmalgias last year, and feels that his eye sight has diminished. Strong smells makes him cough, pushing  makes shortness of breath. Has been taking 1g AM and 500 mg PM.     Past  Medical History:   Diagnosis Date    Arthritis     Eye injury     od hit above od    GERD (gastroesophageal reflux disease)     Hypertension     Lupus (systemic lupus erythematosus)     Mediastinal lymphadenopathy 11/22/2024    Pulmonary fibrosis     Raynaud phenomenon      Past Surgical History:   Procedure Laterality Date    COLONOSCOPY N/A 07/14/2022    Procedure: COLONOSCOPY;  Surgeon: Raman Brenner MD;  Location: [facility] FLR);  Service: Endoscopy;  Laterality: N/A;  fully vaccinated  instructions emailed    COLONOSCOPY N/A 09/19/2022    Procedure: COLONOSCOPY;  Surgeon: Guicho Chavarria MD;  Location: [address] FLR);  Service: Endoscopy;  Laterality: N/A;  previous order placed by Dr. RANDA Hurt on 4/26/22 unusable  fully vaccinated, prep instr emailed -ml    COLONOSCOPY  09/20/2024    COLONOSCOPY N/A 9/20/2024    Procedure: COLONOSCOPY;  Surgeon: Lilly Gruber MD;  Location: [facility];  Service: Endoscopy;  Laterality: N/A;    ESOPHAGOGASTRODUODENOSCOPY N/A 9/20/2024    Procedure: EGD (ESOPHAGOGASTRODUODENOSCOPY);  Surgeon: Lilly Gruber MD;  Location: [facility];  Service: Endoscopy;  Laterality: N/A;    HERNIA REPAIR      TRANSFORAMINAL EPIDURAL INJECTION OF STEROID Bilateral 06/02/2021    Procedure: LUMBAR TRANSFORAMINAL BILATERAL L4/5 DIRECT REFERRAL;  Surgeon: Blayne Garcia MD;  Location: [facility];  Service: Pain Management;  Laterality: Bilateral;  NEEDS CONSENT     Allergies: Carafate  [sucralfate]    Current Outpatient Medications   Medication Sig    albuterol (PROVENTIL/VENTOLIN HFA) 90 mcg/actuation inhaler Inhale 2 puffs into the lungs every 6 (six) hours as needed for Wheezing. Rescue    albuterol-ipratropium (DUO-NEB) 2.5 mg-0.5 mg/3 mL nebulizer solution Take 3 mLs by nebulization every 4 (four) hours as needed for Wheezing. Rescue    amLODIPine (NORVASC) 10 MG tablet Take 1 tablet (10 mg total) by mouth once daily.    azelastine (ASTELIN) 137 mcg (0.1 %) nasal  spray 2 sprays (274 mcg total) by Nasal route 2 (two) times daily.    budesonide-glycopyr-formoterol (BREZTRI AEROSPHERE) 160-9-4.8 mcg/actuation HFAA Inhale 1 Inhaler into the lungs 2 (two) times daily.    cetirizine (ZYRTEC) 10 MG tablet Take 1 tablet (10 mg total) by mouth every evening.    fluconazole (DIFLUCAN) 200 MG Tab Take 200 mg by mouth twice a week.    guaiFENesin (MUCINEX) 600 mg 12 hr tablet Take 2 tablets (1,200 mg total) by mouth 2 (two) times daily.    hydroxychloroquine (PLAQUENIL) 200 mg tablet TAKE 2 TABLETS BY MOUTH EVERY DAY    hydroxychloroquine (PLAQUENIL) 200 mg tablet Take 2 tablets (400 mg total) by mouth once daily.    meloxicam (MOBIC) 15 MG tablet Take 1 tablet (15 mg total) by mouth once daily.    montelukast (SINGULAIR) 10 mg tablet Take 1 tablet (10 mg total) by mouth every evening.    mycophenolate (CELLCEPT) 500 mg Tab Take 2 tablets (1,000 mg total) by mouth 2 (two) times daily.    pantoprazole (PROTONIX) 40 MG tablet Take 1 tablet (40 mg total) by mouth once daily.    promethazine-dextromethorphan (PROMETHAZINE-DM) 6.25-15 mg/5 mL Syrp Take 5 mLs by mouth every 6 (six) hours as needed (cough).    sildenafiL (VIAGRA) 100 MG tablet Take 1 tablet (100 mg total) by mouth daily as needed for Erectile Dysfunction.    testosterone cypionate (DEPOTESTOTERONE CYPIONATE) 100 mg/mL injection Inject 2 mLs (200 mg total) into the muscle every 14 (fourteen) days.    triamcinolone acetonide 0.025% (KENALOG) 0.025 % Oint Apply 1 application  topically.    valsartan (DIOVAN) 160 MG tablet Take 1 tablet (160 mg total) by mouth once daily.     No current facility-administered medications for this visit.     Immunization History   Administered Date(s) Administered    COVID-19, MRNA, LN-S, PF (Pfizer) (Purple Cap) 07/30/2021, 09/21/2021    Influenza - Quadrivalent 10/03/2014, 09/24/2015    Influenza - Quadrivalent - PF *Preferred* (6 months and older) 12/11/2017    MMR 06/07/2010    Meningococcal  "Conjugate (MCV4P) 06/02/2010    Tdap 06/02/2010     Family History:    Family History   Problem Relation Name Age of Onset    Heart disease Mother Ronal Ham     Heart disease Father Ronal Ham     Glaucoma Father Ronal Ham     Glaucoma Brother      No Known Problems Sister      No Known Problems Daughter      No Known Problems Son      No Known Problems Maternal Aunt      No Known Problems Maternal Uncle      No Known Problems Paternal Aunt      No Known Problems Paternal Uncle      No Known Problems Maternal Grandmother      No Known Problems Maternal Grandfather      No Known Problems Paternal Grandmother      No Known Problems Paternal Grandfather      Asthma Neg Hx      Emphysema Neg Hx      Amblyopia Neg Hx      Blindness Neg Hx      Cancer Neg Hx      Cataracts Neg Hx      Diabetes Neg Hx      Hypertension Neg Hx      Macular degeneration Neg Hx      Retinal detachment Neg Hx      Strabismus Neg Hx      Stroke Neg Hx      Thyroid disease Neg Hx       Social History     Substance and Sexual Activity   Alcohol Use No      Social History     Substance and Sexual Activity   Drug Use No    Social History[1]    Review of Systems   Constitutional:  Positive for malaise/fatigue.   HENT:  Negative for congestion.    Respiratory:  Positive for cough and shortness of breath. Negative for sputum production and wheezing.    Cardiovascular:  Negative for chest pain, palpitations and leg swelling.   Gastrointestinal:  Negative for diarrhea, heartburn, nausea and vomiting.   Musculoskeletal:  Positive for myalgias.   Skin:  Positive for rash. Negative for itching.   Neurological:  Negative for weakness.     Vitals  /87 (BP Location: Right arm, Patient Position: Sitting)   Pulse 89   Temp 98.5 °F (36.9 °C) (Oral)   Resp 20   Ht 5' 4" (1.626 m)   Wt 80.3 kg (177 lb 0.5 oz)   SpO2 99%   BMI 30.39 kg/m²     Physical Exam  Vitals reviewed.   Constitutional:       General: He is not in acute distress.     " Appearance: He is not ill-appearing.   HENT:      Head: Normocephalic.      Mouth/Throat:      Mouth: Mucous membranes are moist.   Eyes:      General: No scleral icterus.     Extraocular Movements: Extraocular movements intact.      Pupils: Pupils are equal, round, and reactive to light.   Cardiovascular:      Rate and Rhythm: Normal rate and regular rhythm.      Heart sounds: No murmur heard.     No friction rub. No gallop.   Pulmonary:      Effort: No respiratory distress.      Breath sounds: Rales (bibasilar) present. No wheezing or rhonchi.   Abdominal:      General: Abdomen is flat.      Palpations: Abdomen is soft.   Musculoskeletal:      Right lower leg: No edema.      Left lower leg: No edema.   Skin:     Capillary Refill: Capillary refill takes less than 2 seconds.   Neurological:      General: No focal deficit present.      Mental Status: He is alert and oriented to person, place, and time.      Motor: No weakness.   Psychiatric:         Mood and Affect: Mood normal.         Behavior: Behavior normal.         Thought Content: Thought content normal.         Judgment: Judgment normal.     Labs:    PFT: 25  FEV1: 1.59L (59.9%)  FVC: 1.94L (55.2%)  FEV1/FVC: 82  T.58L (43.6%)  DLCO: unable to perform.     PFT: 23  FEV1: 1.54L (58.2%)  FVC: 1.95L (59.3%)  FEV1/FVC: 61  T.38L (40%)  DLCO: 8.68L (34.4%)    PFT: 23  FEV1: 1.69L (67.2%)  FVC: 2.06L (65.3%)  FEV1/FVC: 82  TLC: 3.69L (64.7%)  DLCO: 9.92 (40.6%)    PFT: 21  FEV1: 1.72L (93.5%)  FVC: 2.11L (95.5%)  FEV1/FVC: 82  T.86L (74%)  DLCO: 12.75 (74.2%)    Ronal Ham is a 54 y.o.   male patient, who presents for a 6 minute walk test ordered by MD Freya.  The diagnosis is Interstitial Lung Disease.  The patient's BMI is 30.4 kg/m2. Predicted distance (lower limit of normal) is 427.82 meters.     Test Results:     The test was completed without stopping.    The total time walked was 360 seconds.  During  walking, the patient reported:  Dyspnea. The patient used supplemental oxygen during testing.      05/15/2025---------Distance: 396.24 meters (1300 feet)       O2 Sat % Supplemental Oxygen Heart Rate Blood Pressure Lili Scale   Pre-exercise  (Resting) 99 % Room Air 103 bpm (!) 149/96  mmHg 3   During Exercise 78 % 2 L/M 121 bpm (!) 154/96  mmHg 4   Post-exercise    100 % 2 L/M  88 bpm    mmHg       Recovery Time: 163 seconds     Oxygen Qualification:       O2 Sat % Supplemental Oxygen Heart Rate Blood Pressure Lili Scale   Pre-exercise  (Resting) 100 % 2 L/M  91 bpm  (!) 145/98    mmHg 3    During Exercise 98 %  2 L/M  106 bpm  174/90   mmHg 4    Post-exercise    96 %  2 L/M  97 bpm      mmHg          Recovery Time: 60 seconds     Performing nurse/tech: Arnold GRAFF     PREVIOUS STUDY:   The patient had a previous study.  08/02/2023---------Distance: 365.76 meters (1200 feet)       O2 Sat % Supplemental Oxygen Heart Rate Blood Pressure Lili Scale   Pre-exercise  (Resting) 98 % Room Air 82 bpm 149/87 mmHg 0   During Exercise 90 % Room Air 102 bpm 155/85 mmHg 3   Post-exercise  (Recovery) 99 % Room Air  86 bpm          CLINICAL INTERPRETATION:  Six minute walk distance is 396.24 meters (1300 feet) with moderate dyspnea.  During exercise, there was significant desaturation while breathing supplemental oxygen.  Both blood pressure and heart rate remained stable with walking.  The patient did not report non-pulmonary symptoms during exercise.  The patient did complete the study, walking 360 seconds of the 360 second test.  The patient may benefit from using supplemental oxygen.  Since the previous study in 8/2023, exercise capacity is unchanged.  Based upon age and body mass index, exercise capacity is less than predicted.   Oxygen saturation did improve while breathing supplemental oxygen.         5/14/2025     8:25 AM 8/2/2023    12:04 PM 4/20/2021     1:58 PM   6MW   6MWT Status completed without stopping completed  without stopping completed without stopping   Patient Reported Dyspnea Other (Comment)  No complaints    Was O2 used? Yes No No   Delivery Method Cannula;Pull Tank;Continuous Flow     6MW Distance walked (feet) 1300 feet 1200 feet 1332 feet   Distance walked (meters) 396.24 meters 365.76 meters 405.99 meters   Did patient stop? No No No   Oxygen Saturation 99 % 98 % 97 %   Supplemental Oxygen Room Air Room Air  Room Air    Heart Rate 103 bpm 82 bpm 100 bpm   Blood Pressure 149/96 149/87 139/84   Lili Dyspnea Rating  moderate nothing at all nothing at all   Oxygen Saturation 78 % 90 % 91 %   Supplemental Oxygen 2 L/M Room Air  Room Air    Heart Rate 121 bpm 102 bpm 114 bpm   Blood Pressure 154/96 155/85 164/84   Lili Dyspnea Rating  somewhat heavy moderate light   Recovery Time (seconds) 163 seconds 75 seconds 73 seconds   Oxygen Saturation 100 % 99 % 98 %   Supplemental Oxygen 2 L/M Room Air  Room Air    Heart Rate 88 bpm 86 bpm 95 bpm       Data saved with a previous flowsheet row definition     Imaging:  Results for orders placed during the hospital encounter of 11/30/23    X-Ray Chest PA And Lateral    Narrative  EXAMINATION:  XR CHEST PA AND LATERAL    CLINICAL HISTORY:  Interstitial pulmonary disease, unspecified    TECHNIQUE:  PA and lateral views of the chest were performed.    COMPARISON:  11/07/2023.    02/14/2023.    06/21/2006.    FINDINGS:  The trachea is unremarkable.  The cardiomediastinal silhouette is within normal limits.  There is no evidence of free air beneath the hemidiaphragms.  There are no pleural effusions.  There is no evidence of a pneumothorax.  There no evidence of pneumomediastinum.  There has been progression of the bilateral interstitial opacities.  There are degenerative changes in the osseous structures.    Impression  Progression of bilateral interstitial opacities.    Electronically signed by: Ángel Wetzel MD  Date:    11/30/2023  Time:    15:59    Results for orders placed during  the hospital encounter of 04/03/24    CT Abdomen Pelvis With IV Contrast Routine Oral Contrast    Narrative  EXAMINATION:  CT ABDOMEN PELVIS WITH IV CONTRAST    CLINICAL HISTORY:  Bowel obstruction suspected;Abdominal distension;    TECHNIQUE:  Low dose axial images, sagittal and coronal reformations were obtained from the lung bases to the pubic symphysis following the IV administration of 100 mL of Omnipaque 350 and the oral administration of 30 mL of Omnipaque 350. Axial and coronal images reformatted.    COMPARISON:  CT 09/20/2023    FINDINGS:  Bronchiectasis and subpleural fibrosis at the bilateral lung basis, unchanged.  No pleural effusion.  No pericardial effusion.    The liver appears normal.  The biliary ducts are not dilated.  The gallbladder is present, no radiopaque stones seen within.    The distal esophagus and stomach appear normal.    The pancreas, spleen and bilateral adrenal glands appear normal.    The abdominal aorta tapers normally, no significant atherosclerotic plaque.  No para-aortic lymphadenopathy.    The right kidney and right ureter appear normal.  The left kidney and left ureter appear normal.  The prostate appear within normal limits.  Mild-moderate  distention of the bladder with urine.    Mild stool retention, no inflammatory changes of the bowel seen, no bowel dilation.  The appendix is not readily identified.  The small bowel is not dilated.  No mesenteric inflammatory change.  No free air, no free fluid.    The abdominal wall demonstrates mild diastasis of the rectus abdominal muscles.  Small fat containing umbilical hernia.  The inguinal regions appear normal.    The osseous structures demonstrate no osseous lesions.  There is degenerative disc disease L5-S1 with subtle retrolisthesis of L5 relative to S1.    Impression  Bronchiectasis and fibrosis at the lung basis, unchanged.    No evidence of bowel obstruction.    Electronically signed by: Madelyn Alejandro  MD  Date:    04/03/2024  Time:    13:31    CT chest with contrast 12/20/24 bilateral basilar predominant pleural based reticulation with interlobular septal thickening and significant traction bronchiectasis. Bilateral upper lobe predominant paraseptal emphysema.     Cardiodiagnostics:  Echo: 12/20/24    Left Ventricle: The left ventricle is normal in size. Ventricular mass is normal. Normal wall thickness. Normal wall motion. There is normal systolic function with a visually estimated ejection fraction of 60 - 65%. Ejection fraction is approximately 63%. There is normal diastolic function.    Right Ventricle: Normal right ventricular cavity size. Wall thickness is normal. Systolic function is normal.    Left Atrium: Left atrium is mildly dilated.    Tricuspid Valve: There is mild regurgitation.    Pulmonary Artery: The estimated pulmonary artery systolic pressure is 31 mmHg.    IVC/SVC: Intermediate venous pressure at 8 mmHg.    Pericardium: There is a  trivial-small effusion adjacent to the right atrium.    Assessment:    1. Chronic hypoxic respiratory failure    2. Systemic lupus erythematosus with lung involvement, unspecified SLE type    3. Interstitial lung disease due to connective tissue disease      Plan:     1. Chronic hypoxic respiratory failure  Assessment & Plan:  Patient with Hypoxic Respiratory failure which is Chronic.  he is on home oxygen at 2 LPM. Supplemental oxygen was provided and noted- [unfilled].   Signs/symptoms of respiratory failure include- tachypnea, increased work of breathing, and use of accessory muscles. Contributing diagnoses includes - Interstitial lung disease Labs and images were reviewed. Patient Has not had a recent ABG. Will treat underlying causes and adjust management of respiratory failure as follows-   - discussed importance of preventing long term organ damage with desaturation. Educated that use of oxygen may help improved symptoms of shortness of breath and  exercise tolerance.       2. Systemic lupus erythematosus with lung involvement, unspecified SLE type  Overview:  Treatment of the lupus will effectively treat underlying lung disease.  See the section on interstitial lung disease and lupus    Assessment & Plan:  Pt restarted mycophenolate. Has been taking 1g in AM and 500 mg nightly. Instructed to increase to taking medication 1 g BID. Planning to start plaquenil. Will reach out to Rheumatology for appointment.     Orders:  -     Ambulatory referral/consult to Advanced Lung Disease    3. Interstitial lung disease due to connective tissue disease  Assessment & Plan:  NSIP secondary to presumable SLE. Recently restarted mycophenolate, but has been out of plaquenil due to cost. Has not followed with Rheumatology and is currently on wait list. Will reach out to attempt to obtain sooner appointment. Ct chest in the last year has showed progression of his disease. His FVC is stable compared to 5.5 months ago and DLCO is surprisingly improved. Will monitor in 3 months. Discussed importance of use of oxygen with recent walk test showing desaturation to 78%.   - discussed possible need to start OFEV. However want to monitor while on mycophenolate and plaquenil as patient is concerned about side effects.   - discussed patient needing to obtain appointment with Optometrist while on plaquenil.       Follow up in 3 months walk test starting on 2L, PFTs. Needs follow up with Rheumatology.     Marc Sosa MD  [facility]         [1]   Social History  Socioeconomic History    Marital status:     Number of children: 5    Years of education: some colle   Occupational History    Occupation: [employer]      Employer: [employer]   Tobacco Use    Smoking status: Never     Passive exposure: Never    Smokeless tobacco: Never   Substance and Sexual Activity    Alcohol use: No    Drug use: No    Sexual activity: Yes     Partners: Female   Social History Narrative    Adult  Screenings updated and reviewed    Mammogram( for females)    Pap ( for females)    PSA( for males )    Colonoscopy age  48    Flu shot yearly    Td     Pneumovax recommended one time  at age  65    Zostavax recommended one time at  age  58    Eye exam recommended yearly    Bone density      Social Drivers of Health     Financial Resource Strain: Low Risk  (2/19/2024)    Overall Financial Resource Strain (CARDIA)     Difficulty of Paying Living Expenses: Not hard at all   Food Insecurity: No Food Insecurity (2/19/2024)    Hunger Vital Sign     Worried About Running Out of Food in the Last Year: Never true     Ran Out of Food in the Last Year: Never true   Transportation Needs: No Transportation Needs (2/19/2024)    PRAPARE - Transportation     Lack of Transportation (Medical): No     Lack of Transportation (Non-Medical): No   Physical Activity: Unknown (2/19/2024)    Exercise Vital Sign     Days of Exercise per Week: 3 days   Stress: No Stress Concern Present (2/19/2024)    [employer] - Occupational Stress Questionnaire     Feeling of Stress : Only a little   Housing Stability: Low Risk  (2/19/2024)    Housing Stability Vital Sign     Unable to Pay for Housing in the Last Year: No     Number of Places Lived in the Last Year: 1     Unstable Housing in the Last Year: No

## 2025-05-29 ENCOUNTER — OFFICE VISIT (OUTPATIENT)
Dept: TRANSPLANT | Facility: CLINIC | Age: 55
End: 2025-05-29
Payer: COMMERCIAL

## 2025-05-29 VITALS
DIASTOLIC BLOOD PRESSURE: 87 MMHG | BODY MASS INDEX: 30.22 KG/M2 | RESPIRATION RATE: 20 BRPM | HEART RATE: 89 BPM | OXYGEN SATURATION: 99 % | HEIGHT: 64 IN | SYSTOLIC BLOOD PRESSURE: 138 MMHG | TEMPERATURE: 99 F | WEIGHT: 177 LBS

## 2025-05-29 DIAGNOSIS — M35.9 INTERSTITIAL LUNG DISEASE DUE TO CONNECTIVE TISSUE DISEASE: ICD-10-CM

## 2025-05-29 DIAGNOSIS — J96.11 CHRONIC HYPOXIC RESPIRATORY FAILURE: Primary | ICD-10-CM

## 2025-05-29 DIAGNOSIS — J84.89 INTERSTITIAL LUNG DISEASE DUE TO CONNECTIVE TISSUE DISEASE: ICD-10-CM

## 2025-05-29 DIAGNOSIS — M32.13 SYSTEMIC LUPUS ERYTHEMATOSUS WITH LUNG INVOLVEMENT, UNSPECIFIED SLE TYPE: ICD-10-CM

## 2025-05-29 PROCEDURE — 99999 PR PBB SHADOW E&M-EST. PATIENT-LVL V: CPT | Mod: PBBFAC,TXP,, | Performed by: INTERNAL MEDICINE

## 2025-05-29 NOTE — ASSESSMENT & PLAN NOTE
Pt restarted mycophenolate. Has been taking 1g in AM and 500 mg nightly. Instructed to increase to taking medication 1 g BID. Planning to start plaquenil. Will reach out to Rheumatology for appointment.

## 2025-05-29 NOTE — LETTER
May 29, 2025        Farzaneh Schumacher  [address]  [facility] [address]  Phone: [phone]  Fax: [phone]             [facility] Sch[facility]  [address]  [facility] [address]  Phone: [phone]   Patient: Ronal Ham   MR Number: [id number]   YOB: 1970   Date of Visit: 5/29/2025       Dear Dr. Farzaneh Schumacher    Thank you for referring Ronal Ham to me for evaluation. Attached you will find relevant portions of my assessment and plan of care.    If you have questions, please do not hesitate to call me. I look forward to following Ronal Ham along with you.    Sincerely,    Marc Sosa MD    Enclosure    If you would like to receive this communication electronically, please contact [email or web address] or [phone] to request viaCycle Link access.    viaCycle Link is a tool which provides read-only access to select patient information with whom you have a relationship. Its easy to use and provides real time access to review your patients record including encounter summaries, notes, results, and demographic information.    If you feel you have received this communication in error or would no longer like to receive these types of communications, please e-mail [email or web address]

## 2025-05-29 NOTE — ASSESSMENT & PLAN NOTE
Patient with Hypoxic Respiratory failure which is Chronic.  he is on home oxygen at 2 LPM. Supplemental oxygen was provided and noted- [unfilled].   Signs/symptoms of respiratory failure include- tachypnea, increased work of breathing, and use of accessory muscles. Contributing diagnoses includes - Interstitial lung disease Labs and images were reviewed. Patient Has not had a recent ABG. Will treat underlying causes and adjust management of respiratory failure as follows-   - discussed importance of preventing long term organ damage with desaturation. Educated that use of oxygen may help improved symptoms of shortness of breath and exercise tolerance.

## 2025-05-29 NOTE — ASSESSMENT & PLAN NOTE
NSIP secondary to presumable SLE. Recently restarted mycophenolate, but has been out of plaquenil due to cost. Has not followed with Rheumatology and is currently on wait list. Will reach out to attempt to obtain sooner appointment. Ct chest in the last year has showed progression of his disease. His FVC is stable compared to 5.5 months ago and DLCO is surprisingly improved. Will monitor in 3 months. Discussed importance of use of oxygen with recent walk test showing desaturation to 78%.   - discussed possible need to start OFEV. However want to monitor while on mycophenolate and plaquenil as patient is concerned about side effects.   - discussed patient needing to obtain appointment with Optometrist while on plaquenil.

## 2025-06-04 DIAGNOSIS — J84.89 INTERSTITIAL LUNG DISEASE DUE TO CONNECTIVE TISSUE DISEASE: Primary | ICD-10-CM

## 2025-06-04 DIAGNOSIS — M35.9 INTERSTITIAL LUNG DISEASE DUE TO CONNECTIVE TISSUE DISEASE: Primary | ICD-10-CM

## 2025-06-17 DIAGNOSIS — M35.9 INTERSTITIAL LUNG DISEASE DUE TO CONNECTIVE TISSUE DISEASE: Primary | ICD-10-CM

## 2025-06-17 DIAGNOSIS — J84.89 INTERSTITIAL LUNG DISEASE DUE TO CONNECTIVE TISSUE DISEASE: Primary | ICD-10-CM

## 2025-06-19 ENCOUNTER — TELEPHONE (OUTPATIENT)
Dept: TRANSPLANT | Facility: CLINIC | Age: 55
End: 2025-06-19
Payer: COMMERCIAL

## 2025-06-24 DIAGNOSIS — M25.561 ACUTE PAIN OF RIGHT KNEE: ICD-10-CM

## 2025-06-24 RX ORDER — MELOXICAM 15 MG/1
15 TABLET ORAL
Qty: 30 TABLET | Refills: 1 | Status: SHIPPED | OUTPATIENT
Start: 2025-06-24

## 2025-07-14 ENCOUNTER — TELEPHONE (OUTPATIENT)
Dept: RHEUMATOLOGY | Facility: CLINIC | Age: 55
End: 2025-07-14
Payer: COMMERCIAL

## 2025-07-14 NOTE — TELEPHONE ENCOUNTER
Copied from CRM #[id number]. Topic: Appointments - Appointment Access  >> Jul 14, 2025 10:23 AM Samreen Lundy wrote:  Patient calling regarding Appointment Access (message) for *Scheduling Request     Appt Type: Established - follow up      Date/Time Preference: first available     Treating Provider: Jacqui      Caller Name: Patient      Contact Preference: [phone]   Average

## 2025-08-21 ENCOUNTER — OFFICE VISIT (OUTPATIENT)
Dept: TRANSPLANT | Facility: CLINIC | Age: 55
End: 2025-08-21
Payer: COMMERCIAL

## 2025-08-21 ENCOUNTER — HOSPITAL ENCOUNTER (OUTPATIENT)
Dept: PULMONOLOGY | Facility: CLINIC | Age: 55
Discharge: HOME | End: 2025-08-21
Payer: COMMERCIAL

## 2025-08-21 VITALS
HEART RATE: 76 BPM | TEMPERATURE: 98 F | RESPIRATION RATE: 20 BRPM | HEIGHT: 64 IN | BODY MASS INDEX: 29.89 KG/M2 | DIASTOLIC BLOOD PRESSURE: 81 MMHG | WEIGHT: 175.06 LBS | SYSTOLIC BLOOD PRESSURE: 134 MMHG | OXYGEN SATURATION: 99 %

## 2025-08-21 VITALS — WEIGHT: 175 LBS | BODY MASS INDEX: 29.88 KG/M2 | HEIGHT: 64 IN

## 2025-08-21 DIAGNOSIS — M35.9 INTERSTITIAL LUNG DISEASE DUE TO CONNECTIVE TISSUE DISEASE: ICD-10-CM

## 2025-08-21 DIAGNOSIS — J84.89 INTERSTITIAL LUNG DISEASE DUE TO CONNECTIVE TISSUE DISEASE: ICD-10-CM

## 2025-08-21 DIAGNOSIS — J96.11 CHRONIC HYPOXIC RESPIRATORY FAILURE: Primary | ICD-10-CM

## 2025-08-21 DIAGNOSIS — J84.9 ILD (INTERSTITIAL LUNG DISEASE): ICD-10-CM

## 2025-08-21 LAB
DLCO SINGLE BREATH LLN: 16.92
DLCO SINGLE BREATH PRE REF: 36.1 %
DLCO SINGLE BREATH REF: 23.85
DLCOC SBVA LLN: 2.7
DLCOC SBVA REF: 4.18
DLCOC SINGLE BREATH LLN: 16.92
DLCOC SINGLE BREATH REF: 23.85
DLCOCSBVAULN: 5.66
DLCOCSINGLEBREATHULN: 30.77
DLCOSINGLEBREATHULN: 30.77
DLCOSINGLEBREATHZSCORE: -3.62
DLCOVA LLN: 2.7
DLCOVA PRE REF: 80.4 %
DLCOVA PRE: 3.36 ML/(MIN*MMHG*L) (ref 2.7–5.66)
DLCOVA REF: 4.18
DLCOVAULN: 5.66
ERV LLN: -16448.95
ERV PRE REF: 82.2 %
ERV REF: 1.05
ERVULN: ABNORMAL
FEF 25 75 LLN: 1.01
FEF 25 75 PRE REF: 52.9 %
FEF 25 75 REF: 2.31
FEV05 LLN: 1.07
FEV05 REF: 2.21
FEV1 FVC LLN: 68
FEV1 FVC PRE REF: 96.6 %
FEV1 FVC REF: 80
FEV1 LLN: 1.83
FEV1 PRE REF: 59.2 %
FEV1 REF: 2.5
FRCPLETH LLN: 2.18
FRCPLETH PREREF: 56.8 %
FRCPLETH REF: 3.17
FRCPLETHULN: 4.15
FVC LLN: 2.34
FVC PRE REF: 61.5 %
FVC REF: 3.12
IVC PRE: 2.12 L (ref 2.34–3.91)
IVC SINGLE BREATH LLN: 2.34
IVC SINGLE BREATH PRE REF: 68 %
IVC SINGLE BREATH REF: 3.12
IVCSINGLEBREATHULN: 3.91
LLN IC: -9999997.53
PEF LLN: 4.75
PEF PRE REF: 99.3 %
PEF REF: 6.9
PHYSICIAN COMMENT: ABNORMAL
PRE DLCO: 8.62 ML/(MIN*MMHG) (ref 16.92–30.77)
PRE ERV: 0.86 L (ref -16448.95–16451.05)
PRE FEF 25 75: 1.22 L/S (ref 1.01–4.14)
PRE FET 100: 7.31 SEC
PRE FEV05 REF: 56.2 %
PRE FEV1 FVC: 77.19 % (ref 68.48–89.87)
PRE FEV1: 1.48 L (ref 1.83–3.12)
PRE FEV5: 1.24 L (ref 1.07–3.34)
PRE FRC PL: 1.8 L (ref 2.18–4.15)
PRE FVC: 1.92 L (ref 2.34–3.91)
PRE IC: 1.26 L (ref -9999997.53–#######.####)
PRE PEF: 6.85 L/S (ref 4.75–9.05)
PRE REF IC: 50.9 %
PRE RV: 0.94 L (ref 1.45–2.79)
PRE TLC: 3.06 L (ref 4.55–6.86)
RAW PRE REF: 122 %
RAW PRE: 3.73 CMH2O*S/L (ref 3.06–3.06)
RAW REF: 3.06
REF IC: 2.47
RV LLN: 1.45
RV PRE REF: 44.2 %
RV REF: 2.12
RVTLC LLN: 27
RVTLC PRE REF: 84.8 %
RVTLC PRE: 30.7 % (ref 27.21–45.17)
RVTLC REF: 36
RVTLCULN: 45
RVULN: 2.79
SGAW PRE REF: 146.8 %
SGAW PRE: 0.12 1/(CMH2O*S) (ref 0.08–0.08)
SGAW REF: 0.08
TLC LLN: 4.55
TLC PRE REF: 53.5 %
TLC REF: 5.71
TLC ULN: 6.86
ULN IC: ABNORMAL
VA PRE: 2.57 L (ref 5.56–5.56)
VA SINGLE BREATH LLN: 5.56
VA SINGLE BREATH PRE REF: 46.2 %
VA SINGLE BREATH REF: 5.56
VASINGLEBREATHULN: 5.56
VC LLN: 2.34
VC PRE REF: 68 %
VC PRE: 2.12 L (ref 2.34–3.91)
VC REF: 3.12
VC ULN: 3.91

## 2025-08-21 PROCEDURE — 99999 PR PBB SHADOW E&M-EST. PATIENT-LVL IV: CPT | Mod: PBBFAC,TXP,, | Performed by: INTERNAL MEDICINE

## 2025-08-26 DIAGNOSIS — M35.9 INTERSTITIAL LUNG DISEASE DUE TO CONNECTIVE TISSUE DISEASE: Primary | ICD-10-CM

## 2025-08-26 DIAGNOSIS — J84.89 INTERSTITIAL LUNG DISEASE DUE TO CONNECTIVE TISSUE DISEASE: Primary | ICD-10-CM

## (undated) DEVICE — DRESSING LEUKOPLAST FLEX 1X3IN